# Patient Record
Sex: FEMALE | Race: BLACK OR AFRICAN AMERICAN | NOT HISPANIC OR LATINO
[De-identification: names, ages, dates, MRNs, and addresses within clinical notes are randomized per-mention and may not be internally consistent; named-entity substitution may affect disease eponyms.]

---

## 2018-04-23 PROBLEM — Z00.00 ENCOUNTER FOR PREVENTIVE HEALTH EXAMINATION: Status: ACTIVE | Noted: 2018-04-23

## 2018-04-24 ENCOUNTER — APPOINTMENT (OUTPATIENT)
Dept: CARDIOLOGY | Facility: CLINIC | Age: 43
End: 2018-04-24
Payer: MEDICAID

## 2018-04-24 DIAGNOSIS — Z82.49 FAMILY HISTORY OF ISCHEMIC HEART DISEASE AND OTHER DISEASES OF THE CIRCULATORY SYSTEM: ICD-10-CM

## 2018-04-24 PROCEDURE — 99205 OFFICE O/P NEW HI 60 MIN: CPT

## 2018-04-25 VITALS
WEIGHT: 240 LBS | SYSTOLIC BLOOD PRESSURE: 156 MMHG | HEIGHT: 59 IN | DIASTOLIC BLOOD PRESSURE: 90 MMHG | BODY MASS INDEX: 48.38 KG/M2 | HEART RATE: 90 BPM

## 2018-04-25 PROBLEM — Z82.49 FAMILY HISTORY OF PREMATURE CAD: Status: ACTIVE | Noted: 2018-04-25

## 2018-04-25 PROBLEM — Z82.49 FAMILY HISTORY OF HEART ATTACK: Status: ACTIVE | Noted: 2018-04-25

## 2018-04-25 RX ORDER — GARLIC 200 MG
TABLET ORAL
Refills: 0 | Status: ACTIVE | COMMUNITY

## 2018-04-25 RX ORDER — MULTIVIT-MIN/IRON/FOLIC ACID/K 18-600-40
CAPSULE ORAL
Refills: 0 | Status: ACTIVE | COMMUNITY

## 2018-04-25 RX ORDER — OXYCODONE HYDROCHLORIDE AND ACETAMINOPHEN 10; 325 MG/1; MG/1
10-325 TABLET ORAL
Refills: 0 | Status: ACTIVE | COMMUNITY

## 2018-04-25 RX ORDER — HYDROCHLOROTHIAZIDE 25 MG/1
25 TABLET ORAL
Refills: 0 | Status: ACTIVE | COMMUNITY

## 2018-04-25 RX ORDER — SIMVASTATIN 20 MG/1
20 TABLET, FILM COATED ORAL
Refills: 0 | Status: ACTIVE | COMMUNITY

## 2018-04-25 RX ORDER — LOSARTAN POTASSIUM 50 MG/1
50 TABLET, FILM COATED ORAL
Refills: 0 | Status: ACTIVE | COMMUNITY

## 2018-06-05 ENCOUNTER — APPOINTMENT (OUTPATIENT)
Dept: CARDIOLOGY | Facility: CLINIC | Age: 43
End: 2018-06-05

## 2018-06-19 ENCOUNTER — APPOINTMENT (OUTPATIENT)
Dept: CARDIOLOGY | Facility: CLINIC | Age: 43
End: 2018-06-19
Payer: MEDICAID

## 2018-06-19 VITALS
BODY MASS INDEX: 49.59 KG/M2 | HEART RATE: 80 BPM | HEIGHT: 59 IN | WEIGHT: 246 LBS | SYSTOLIC BLOOD PRESSURE: 120 MMHG | DIASTOLIC BLOOD PRESSURE: 87 MMHG

## 2018-06-19 DIAGNOSIS — R73.09 OTHER ABNORMAL GLUCOSE: ICD-10-CM

## 2018-06-19 DIAGNOSIS — I10 ESSENTIAL (PRIMARY) HYPERTENSION: ICD-10-CM

## 2018-06-19 DIAGNOSIS — E78.00 PURE HYPERCHOLESTEROLEMIA, UNSPECIFIED: ICD-10-CM

## 2018-06-19 DIAGNOSIS — E66.01 MORBID (SEVERE) OBESITY DUE TO EXCESS CALORIES: ICD-10-CM

## 2018-06-19 PROCEDURE — 99215 OFFICE O/P EST HI 40 MIN: CPT

## 2018-08-28 ENCOUNTER — APPOINTMENT (OUTPATIENT)
Dept: CARDIOLOGY | Facility: CLINIC | Age: 43
End: 2018-08-28

## 2018-09-21 ENCOUNTER — EMERGENCY (EMERGENCY)
Facility: HOSPITAL | Age: 43
LOS: 1 days | Discharge: HOME | End: 2018-09-21
Attending: EMERGENCY MEDICINE | Admitting: EMERGENCY MEDICINE
Payer: MEDICAID

## 2018-09-21 VITALS
SYSTOLIC BLOOD PRESSURE: 134 MMHG | DIASTOLIC BLOOD PRESSURE: 66 MMHG | TEMPERATURE: 98 F | HEART RATE: 86 BPM | OXYGEN SATURATION: 100 % | RESPIRATION RATE: 18 BRPM

## 2018-09-21 PROCEDURE — 99282 EMERGENCY DEPT VISIT SF MDM: CPT

## 2018-09-21 RX ORDER — IBUPROFEN 200 MG
600 TABLET ORAL ONCE
Qty: 0 | Refills: 0 | Status: COMPLETED | OUTPATIENT
Start: 2018-09-21 | End: 2018-09-21

## 2018-09-21 RX ORDER — ACETAMINOPHEN 500 MG
650 TABLET ORAL ONCE
Qty: 0 | Refills: 0 | Status: COMPLETED | OUTPATIENT
Start: 2018-09-21 | End: 2018-09-21

## 2018-09-21 RX ADMIN — Medication 600 MILLIGRAM(S): at 14:12

## 2018-09-21 RX ADMIN — Medication 650 MILLIGRAM(S): at 14:12
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
## 2018-09-21 NOTE — ED PROVIDER NOTE - CARE PLAN
Principal Discharge DX:	Back pain  Goal:	followup care and meds  Assessment and plan of treatment:	Take motrin 200 mg every 6-8 hours as needed for pain and/or tylenol 325 mg every 4 hours as needed for pain.  If worse pain, swelling, dizziness, headache, nausea, vomiting, or any worse symptoms return to the ER.  Follow up with your doctor within the next few days.  No heavy lifting.

## 2018-09-21 NOTE — ED PROVIDER NOTE - PLAN OF CARE
followup care and meds Take motrin 200 mg every 6-8 hours as needed for pain and/or tylenol 325 mg every 4 hours as needed for pain.  If worse pain, swelling, dizziness, headache, nausea, vomiting, or any worse symptoms return to the ER.  Follow up with your doctor within the next few days.  No heavy lifting.

## 2020-03-28 ENCOUNTER — INPATIENT (INPATIENT)
Facility: HOSPITAL | Age: 45
LOS: 72 days | Discharge: HOME HEALTH | End: 2020-06-09
Attending: INTERNAL MEDICINE | Admitting: INTERNAL MEDICINE
Payer: MEDICAID

## 2020-03-28 VITALS
HEART RATE: 111 BPM | RESPIRATION RATE: 20 BRPM | SYSTOLIC BLOOD PRESSURE: 135 MMHG | TEMPERATURE: 100 F | DIASTOLIC BLOOD PRESSURE: 86 MMHG | OXYGEN SATURATION: 93 %

## 2020-03-28 DIAGNOSIS — R73.03 PREDIABETES: ICD-10-CM

## 2020-03-28 DIAGNOSIS — E87.6 HYPOKALEMIA: ICD-10-CM

## 2020-03-28 DIAGNOSIS — Z98.891 HISTORY OF UTERINE SCAR FROM PREVIOUS SURGERY: Chronic | ICD-10-CM

## 2020-03-28 DIAGNOSIS — J96.01 ACUTE RESPIRATORY FAILURE WITH HYPOXIA: ICD-10-CM

## 2020-03-28 DIAGNOSIS — J18.9 PNEUMONIA, UNSPECIFIED ORGANISM: ICD-10-CM

## 2020-03-28 DIAGNOSIS — E87.1 HYPO-OSMOLALITY AND HYPONATREMIA: ICD-10-CM

## 2020-03-28 DIAGNOSIS — Z02.9 ENCOUNTER FOR ADMINISTRATIVE EXAMINATIONS, UNSPECIFIED: ICD-10-CM

## 2020-03-28 DIAGNOSIS — I10 ESSENTIAL (PRIMARY) HYPERTENSION: ICD-10-CM

## 2020-03-28 LAB
ALBUMIN SERPL ELPH-MCNC: 3.6 G/DL — SIGNIFICANT CHANGE UP (ref 3.3–5)
ALP SERPL-CCNC: 65 U/L — SIGNIFICANT CHANGE UP (ref 40–120)
ALT FLD-CCNC: 15 U/L — SIGNIFICANT CHANGE UP (ref 4–33)
ANION GAP SERPL CALC-SCNC: 15 MMO/L — HIGH (ref 7–14)
ANISOCYTOSIS BLD QL: SIGNIFICANT CHANGE UP
APTT BLD: 30 SEC — SIGNIFICANT CHANGE UP (ref 27.5–36.3)
AST SERPL-CCNC: 31 U/L — SIGNIFICANT CHANGE UP (ref 4–32)
BASE EXCESS BLDV CALC-SCNC: 2 MMOL/L — SIGNIFICANT CHANGE UP
BASOPHILS # BLD AUTO: 0.02 K/UL — SIGNIFICANT CHANGE UP (ref 0–0.2)
BASOPHILS NFR BLD AUTO: 0.1 % — SIGNIFICANT CHANGE UP (ref 0–2)
BASOPHILS NFR SPEC: 0 % — SIGNIFICANT CHANGE UP (ref 0–2)
BILIRUB SERPL-MCNC: 0.4 MG/DL — SIGNIFICANT CHANGE UP (ref 0.2–1.2)
BLASTS # FLD: 0 % — SIGNIFICANT CHANGE UP (ref 0–0)
BLOOD GAS VENOUS - CREATININE: 0.72 MG/DL — SIGNIFICANT CHANGE UP (ref 0.5–1.3)
BLOOD GAS VENOUS - FIO2: 21 — SIGNIFICANT CHANGE UP
BUN SERPL-MCNC: 5 MG/DL — LOW (ref 7–23)
BURR CELLS BLD QL SMEAR: SLIGHT — SIGNIFICANT CHANGE UP
CALCIUM SERPL-MCNC: 8.6 MG/DL — SIGNIFICANT CHANGE UP (ref 8.4–10.5)
CHLORIDE BLDV-SCNC: 102 MMOL/L — SIGNIFICANT CHANGE UP (ref 96–108)
CHLORIDE SERPL-SCNC: 96 MMOL/L — LOW (ref 98–107)
CO2 SERPL-SCNC: 23 MMOL/L — SIGNIFICANT CHANGE UP (ref 22–31)
CREAT SERPL-MCNC: 0.74 MG/DL — SIGNIFICANT CHANGE UP (ref 0.5–1.3)
EOSINOPHIL # BLD AUTO: 0.01 K/UL — SIGNIFICANT CHANGE UP (ref 0–0.5)
EOSINOPHIL NFR BLD AUTO: 0.1 % — SIGNIFICANT CHANGE UP (ref 0–6)
EOSINOPHIL NFR FLD: 0.9 % — SIGNIFICANT CHANGE UP (ref 0–6)
GAS PNL BLDV: 135 MMOL/L — LOW (ref 136–146)
GIANT PLATELETS BLD QL SMEAR: PRESENT — SIGNIFICANT CHANGE UP
GLUCOSE BLDV-MCNC: 129 MG/DL — HIGH (ref 70–99)
GLUCOSE SERPL-MCNC: 129 MG/DL — HIGH (ref 70–99)
HCG SERPL-ACNC: < 5 MIU/ML — SIGNIFICANT CHANGE UP
HCO3 BLDV-SCNC: 26 MMOL/L — SIGNIFICANT CHANGE UP (ref 20–27)
HCT VFR BLD CALC: 28.7 % — LOW (ref 34.5–45)
HCT VFR BLDV CALC: 26.9 % — LOW (ref 34.5–45)
HGB BLD-MCNC: 8.6 G/DL — LOW (ref 11.5–15.5)
HGB BLDV-MCNC: 8.7 G/DL — LOW (ref 11.5–15.5)
HYPOCHROMIA BLD QL: SIGNIFICANT CHANGE UP
IMM GRANULOCYTES NFR BLD AUTO: 1.4 % — SIGNIFICANT CHANGE UP (ref 0–1.5)
INR BLD: 1.29 — HIGH (ref 0.88–1.17)
LACTATE BLDV-MCNC: 2.3 MMOL/L — HIGH (ref 0.5–2)
LYMPHOCYTES # BLD AUTO: 1.58 K/UL — SIGNIFICANT CHANGE UP (ref 1–3.3)
LYMPHOCYTES # BLD AUTO: 11.5 % — LOW (ref 13–44)
LYMPHOCYTES NFR SPEC AUTO: 9.6 % — LOW (ref 13–44)
MAGNESIUM SERPL-MCNC: 1.8 MG/DL — SIGNIFICANT CHANGE UP (ref 1.6–2.6)
MCHC RBC-ENTMCNC: 21.5 PG — LOW (ref 27–34)
MCHC RBC-ENTMCNC: 30 % — LOW (ref 32–36)
MCV RBC AUTO: 71.8 FL — LOW (ref 80–100)
METAMYELOCYTES # FLD: 0 % — SIGNIFICANT CHANGE UP (ref 0–1)
MICROCYTES BLD QL: SIGNIFICANT CHANGE UP
MONOCYTES # BLD AUTO: 0.81 K/UL — SIGNIFICANT CHANGE UP (ref 0–0.9)
MONOCYTES NFR BLD AUTO: 5.9 % — SIGNIFICANT CHANGE UP (ref 2–14)
MONOCYTES NFR BLD: 2.6 % — SIGNIFICANT CHANGE UP (ref 2–9)
MYELOCYTES NFR BLD: 0 % — SIGNIFICANT CHANGE UP (ref 0–0)
NEUTROPHIL AB SER-ACNC: 86 % — HIGH (ref 43–77)
NEUTROPHILS # BLD AUTO: 11.12 K/UL — HIGH (ref 1.8–7.4)
NEUTROPHILS NFR BLD AUTO: 81 % — HIGH (ref 43–77)
NEUTS BAND # BLD: 0 % — SIGNIFICANT CHANGE UP (ref 0–6)
NRBC # FLD: 0.02 K/UL — SIGNIFICANT CHANGE UP (ref 0–0)
NT-PROBNP SERPL-SCNC: 74.31 PG/ML — SIGNIFICANT CHANGE UP
OTHER - HEMATOLOGY %: 0 — SIGNIFICANT CHANGE UP
PCO2 BLDV: 41 MMHG — SIGNIFICANT CHANGE UP (ref 41–51)
PH BLDV: 7.42 PH — SIGNIFICANT CHANGE UP (ref 7.32–7.43)
PHOSPHATE SERPL-MCNC: 2.7 MG/DL — SIGNIFICANT CHANGE UP (ref 2.5–4.5)
PLATELET # BLD AUTO: 404 K/UL — HIGH (ref 150–400)
PLATELET COUNT - ESTIMATE: NORMAL — SIGNIFICANT CHANGE UP
PMV BLD: 11 FL — SIGNIFICANT CHANGE UP (ref 7–13)
PO2 BLDV: 41 MMHG — HIGH (ref 35–40)
POIKILOCYTOSIS BLD QL AUTO: SLIGHT — SIGNIFICANT CHANGE UP
POTASSIUM BLDV-SCNC: 2.9 MMOL/L — CRITICAL LOW (ref 3.4–4.5)
POTASSIUM SERPL-MCNC: 3.2 MMOL/L — LOW (ref 3.5–5.3)
POTASSIUM SERPL-SCNC: 3.2 MMOL/L — LOW (ref 3.5–5.3)
PROMYELOCYTES # FLD: 0 % — SIGNIFICANT CHANGE UP (ref 0–0)
PROT SERPL-MCNC: 8.7 G/DL — HIGH (ref 6–8.3)
PROTHROM AB SERPL-ACNC: 14.8 SEC — HIGH (ref 9.8–13.1)
RBC # BLD: 4 M/UL — SIGNIFICANT CHANGE UP (ref 3.8–5.2)
RBC # FLD: 22.4 % — HIGH (ref 10.3–14.5)
REVIEW TO FOLLOW: YES — SIGNIFICANT CHANGE UP
SAO2 % BLDV: 68.8 % — SIGNIFICANT CHANGE UP (ref 60–85)
SARS-COV-2 RNA SPEC QL NAA+PROBE: DETECTED
SODIUM SERPL-SCNC: 134 MMOL/L — LOW (ref 135–145)
VARIANT LYMPHS # BLD: 0.9 % — SIGNIFICANT CHANGE UP
WBC # BLD: 13.73 K/UL — HIGH (ref 3.8–10.5)
WBC # FLD AUTO: 13.73 K/UL — HIGH (ref 3.8–10.5)

## 2020-03-28 PROCEDURE — 71045 X-RAY EXAM CHEST 1 VIEW: CPT | Mod: 26

## 2020-03-28 PROCEDURE — 99223 1ST HOSP IP/OBS HIGH 75: CPT

## 2020-03-28 RX ORDER — AZITHROMYCIN 500 MG/1
500 TABLET, FILM COATED ORAL ONCE
Refills: 0 | Status: COMPLETED | OUTPATIENT
Start: 2020-03-28 | End: 2020-03-28

## 2020-03-28 RX ORDER — POTASSIUM CHLORIDE 20 MEQ
40 PACKET (EA) ORAL ONCE
Refills: 0 | Status: COMPLETED | OUTPATIENT
Start: 2020-03-28 | End: 2020-03-28

## 2020-03-28 RX ORDER — SIMVASTATIN 20 MG/1
5 TABLET, FILM COATED ORAL AT BEDTIME
Refills: 0 | Status: DISCONTINUED | OUTPATIENT
Start: 2020-03-28 | End: 2020-06-09

## 2020-03-28 RX ORDER — ZINC SULFATE TAB 220 MG (50 MG ZINC EQUIVALENT) 220 (50 ZN) MG
220 TAB ORAL DAILY
Refills: 0 | Status: DISCONTINUED | OUTPATIENT
Start: 2020-03-28 | End: 2020-06-09

## 2020-03-28 RX ORDER — ASCORBIC ACID 60 MG
500 TABLET,CHEWABLE ORAL
Refills: 0 | Status: DISCONTINUED | OUTPATIENT
Start: 2020-03-28 | End: 2020-06-09

## 2020-03-28 RX ORDER — ACETAMINOPHEN 500 MG
975 TABLET ORAL ONCE
Refills: 0 | Status: COMPLETED | OUTPATIENT
Start: 2020-03-28 | End: 2020-03-28

## 2020-03-28 RX ORDER — LOSARTAN POTASSIUM 100 MG/1
50 TABLET, FILM COATED ORAL DAILY
Refills: 0 | Status: DISCONTINUED | OUTPATIENT
Start: 2020-03-28 | End: 2020-03-31

## 2020-03-28 RX ORDER — SODIUM CHLORIDE 9 MG/ML
1000 INJECTION INTRAMUSCULAR; INTRAVENOUS; SUBCUTANEOUS ONCE
Refills: 0 | Status: COMPLETED | OUTPATIENT
Start: 2020-03-28 | End: 2020-03-28

## 2020-03-28 RX ORDER — CEFTRIAXONE 500 MG/1
1000 INJECTION, POWDER, FOR SOLUTION INTRAMUSCULAR; INTRAVENOUS ONCE
Refills: 0 | Status: COMPLETED | OUTPATIENT
Start: 2020-03-28 | End: 2020-03-28

## 2020-03-28 RX ADMIN — Medication 40 MILLIEQUIVALENT(S): at 19:20

## 2020-03-28 RX ADMIN — SODIUM CHLORIDE 1000 MILLILITER(S): 9 INJECTION INTRAMUSCULAR; INTRAVENOUS; SUBCUTANEOUS at 19:21

## 2020-03-28 RX ADMIN — CEFTRIAXONE 100 MILLIGRAM(S): 500 INJECTION, POWDER, FOR SOLUTION INTRAMUSCULAR; INTRAVENOUS at 19:21

## 2020-03-28 RX ADMIN — Medication 975 MILLIGRAM(S): at 19:20

## 2020-03-28 RX ADMIN — AZITHROMYCIN 500 MILLIGRAM(S): 500 TABLET, FILM COATED ORAL at 19:21

## 2020-03-28 NOTE — ED PROVIDER NOTE - PMH
No pertinent past medical history HTN (hypertension)    No pertinent past medical history    Pre-diabetes

## 2020-03-28 NOTE — H&P ADULT - NSICDXFAMILYHX_GEN_ALL_CORE_FT
FAMILY HISTORY:  No pertinent family history in first degree relatives FAMILY HISTORY:  Family history of hypertension, ~ mother, sister  Family history of scleroderma, ~ mother ()

## 2020-03-28 NOTE — ED PROVIDER NOTE - ATTENDING CONTRIBUTION TO CARE
I performed a face-to-face evaluation of the patient and performed a history and physical examination. I agree with the history and physical examination.    SOB, ETIENNE, and non-productive cough. Hypoxic. Minimal LE edema (B). Concern for COVID PNA. CXR, labs, apply O2, admit.

## 2020-03-28 NOTE — H&P ADULT - PROBLEM SELECTOR PLAN 3
- potassium = 3.2  - s/p KCL 40 meq x one in the ED; will give additional 40 meq  - f/u repeat lab-work, including magnesium level

## 2020-03-28 NOTE — H&P ADULT - NSHPPHYSICALEXAM_GEN_ALL_CORE
PHYSICAL EXAMINATION:    APPEARANCE: Well groomed, adequately nourished.  NAD	  HEENT: Normal oral mucosa, PERRL, EOMI	  LYMPHATIC: No lymphadenopathy appreciated  CARDIOVASCULAR: (+) S1 S2.  No JVD.  No murmurs.  No edema  RESPIRATORY: Speaking without difficulty.  Hypoxia to 83% on RA (93% on 4L NC).  No wheezing, rhonchi, crackles appreciated  PSYCHIATRIC: A&O x 3.  Mood & affect appropriate to situation  GASTROINTESTINAL:  Soft, Non-tender, + BS	  SKIN: No rashes. No ecchymoses.  No cyanosis	  NEUROLOGICAL: Non-focal, A&Ox3, nonfocal, AVILES x 4 against gravity  EXTREMITIES: Normal range of motion.  No clubbing, cyanosis or edema  VASCULAR: Peripheral pulses palpable 2+ bilaterally

## 2020-03-28 NOTE — H&P ADULT - PROBLEM SELECTOR PLAN 1
- shortness of breath, ETIENNE  - O2 Sat = 83% on RA (93% to 97% on 4L NC)  - CXR w/ "B/L patchy opacities compatible with multifocal pneumonia..."  - COVID-19 PCR (+)  - s/p azithromycin and ceftriaxone in the ED; holding both for now  - continues on supplemental oxygen 4 liters via NC  - judicious fluid rehydration, in the setting of COVID-19  - HOB elevation  - albuterol PRN  - antitussive

## 2020-03-28 NOTE — H&P ADULT - NSICDXPASTMEDICALHX_GEN_ALL_CORE_FT
PAST MEDICAL HISTORY:  HTN (hypertension)     No pertinent past medical history     Pre-diabetes PAST MEDICAL HISTORY:  Dyslipidemia     HTN (hypertension)     Pre-diabetes
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
## 2020-03-28 NOTE — H&P ADULT - NSHPLABSRESULTS_GEN_ALL_CORE
Vital Signs Last 24 Hrs  T(C): 37.8 (28 Mar 2020 19:39), Max: 38.1 (28 Mar 2020 17:34)  T(F): 100 (28 Mar 2020 19:39), Max: 100.5 (28 Mar 2020 17:34)  HR: 101 (28 Mar 2020 19:39) (101 - 111)  BP: 151/94 (28 Mar 2020 19:39) (135/86 - 151/94)  BP(mean): --  RR: 22 (28 Mar 2020 19:39) (20 - 22)  SpO2: 96% (28 Mar 2020 19:39) (93% - 97%)    ===============================================================                          8.6    13.73 )-----------( 404      ( 28 Mar 2020 17:40 )             28.7     28 Mar 2020 17:40    134    |  96     |  5      ----------------------------<  129    3.2     |  23     |  0.74     Ca    8.6        28 Mar 2020 17:40  Phos  2.7       28 Mar 2020 17:40  Mg     1.8       28 Mar 2020 17:40    TPro  8.7    /  Alb  3.6    /  TBili  0.4    /  DBili  x      /  AST  31     /  ALT  15     /  AlkPhos  65     28 Mar 2020 17:40    LIVER FUNCTIONS - ( 28 Mar 2020 17:40 )  Alb: 3.6 g/dL / Pro: 8.7 g/dL / ALK PHOS: 65 u/L / ALT: 15 u/L / AST: 31 u/L / GGT: x           PT/INR - ( 28 Mar 2020 17:40 )   PT: 14.8 SEC;   INR: 1.29          PTT - ( 28 Mar 2020 17:40 )  PTT:30.0 SEC  CAPILLARY BLOOD GLUCOSE    ===============================================================      ===============================================================

## 2020-03-28 NOTE — H&P ADULT - NSHPSOCIALHISTORY_GEN_ALL_CORE
SOCIAL HISTORY:    Marital Status:  (  )    (  ) Single        (  )         (  )   Occupation:   Lives with:        (  ) alone        (  ) children    (  ) spouse           (  ) parents            (  ) other    No history of smoking  No history of alcohol abuse  No history of illegal drug use SOCIAL HISTORY:    Marital Status:  (  )    (  ) Single        (  )         (  )   Occupation:   Lives with:        (  ) alone        ( x ) children/daughter    (  ) spouse           ( x ) parents/father            (  ) other    No history of smoking  No history of alcohol abuse  No history of illegal drug use
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
## 2020-03-28 NOTE — ED PROVIDER NOTE - PROGRESS NOTE DETAILS
Angelito PGY3: on reassessment pt in NAD speaking and breathing comfortably satting high 90s on NC will admit for previous hypoxia in setting of sepsis with PNA likely 2/2 covid
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
## 2020-03-28 NOTE — ED PROVIDER NOTE - RESPIRATORY DISTRESS
no increased wob speaking and breathing comfortably no accessory mm use however hypoxic to 83 on RA -> 93% on 4LNC/no

## 2020-03-28 NOTE — H&P ADULT - NSHPREVIEWOFSYSTEMS_GEN_ALL_CORE
per ED and patient:    REVIEW OF SYSTEMS:    CONSTITUTIONAL: Sweating.  No fevers or chills.  Appetite improving since ED.  EYES/ENT: No visual changes.  No dysphagia  NECK: No pain or stiffness  RESPIRATORY: (+) cough, ETIENNE, shortness of breath.  No hemoptysis.  CARDIOVASCULAR: No chest pain or palpitation.  No lower extremity edema  GASTROINTESTINAL: Watery diarrhea.  No abdominal or epigastric pain. No nausea, vomiting or hematemesis.  No constipation. No melena or hematochezia.  GENITOURINARY: No dysuria, frequency or hematuria  MUSCULOSKELETAL: No joint pain, swelling, decreased ROM, erythema, warmth  NEUROLOGICAL: No numbness or weakness  PSYCHIATRY: No anxiety, or depression.  SKIN: No itching, burning, rashes, or lesions   All other review of systems is negative unless indicated above.

## 2020-03-28 NOTE — H&P ADULT - PROBLEM SELECTOR PLAN 7
- no acute issues presently  - on Losartan 50 mg and HCTZ 25 mg PTA; continuing w/ Losartan and holding HCTZ for now  - f/u w/ repeat measurements
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
## 2020-03-28 NOTE — H&P ADULT - PROBLEM SELECTOR PLAN 2
- CXR w/ "B/L patchy opacities compatible with multifocal pneumonia..."  - WBC = 13.73, Lactate = 2.3  - since COVID-19 resulted as positive, will not continue abx presently  - f/u pulse oximetry  - other supportive care as above - CXR w/ "B/L patchy opacities compatible with multifocal pneumonia..."  - WBC = 13.73, Lactate = 2.3  - since COVID-19 resulted as positive, will not continue abx presently  - started on Plaquenil  - f/u pulse oximetry  - other supportive care as above - CXR w/ "B/L patchy opacities compatible with multifocal pneumonia..."  - WBC = 13.73, Lactate = 2.3  - since COVID-19 resulted as positive, will not continue abx presently  - started on Plaquenil  - f/u pulse oximetry  - other supportive care as above  ----AM lab-work w/ Procalcitonin = 64.97; continuing azithromycin and ceftriaxone

## 2020-03-28 NOTE — H&P ADULT - PROBLEM SELECTOR PLAN 5
- Hgb = 8.6, MCV = 71.8, RDW = 22.4  - unclear etiology at present  - f/u anemia profile/iron studies in the AM

## 2020-03-29 DIAGNOSIS — D50.9 IRON DEFICIENCY ANEMIA, UNSPECIFIED: ICD-10-CM

## 2020-03-29 LAB
24R-OH-CALCIDIOL SERPL-MCNC: 5.6 NG/ML — LOW (ref 30–80)
ANION GAP SERPL CALC-SCNC: 15 MMO/L — HIGH (ref 7–14)
APTT BLD: 28.4 SEC — SIGNIFICANT CHANGE UP (ref 27.5–36.3)
BASOPHILS # BLD AUTO: 0.02 K/UL — SIGNIFICANT CHANGE UP (ref 0–0.2)
BASOPHILS NFR BLD AUTO: 0.2 % — SIGNIFICANT CHANGE UP (ref 0–2)
BUN SERPL-MCNC: 5 MG/DL — LOW (ref 7–23)
CALCIUM SERPL-MCNC: 8 MG/DL — LOW (ref 8.4–10.5)
CHLORIDE SERPL-SCNC: 94 MMOL/L — LOW (ref 98–107)
CO2 SERPL-SCNC: 23 MMOL/L — SIGNIFICANT CHANGE UP (ref 22–31)
CREAT SERPL-MCNC: 0.62 MG/DL — SIGNIFICANT CHANGE UP (ref 0.5–1.3)
EOSINOPHIL # BLD AUTO: 0.02 K/UL — SIGNIFICANT CHANGE UP (ref 0–0.5)
EOSINOPHIL NFR BLD AUTO: 0.2 % — SIGNIFICANT CHANGE UP (ref 0–6)
FERRITIN SERPL-MCNC: 182.1 NG/ML — HIGH (ref 15–150)
GLUCOSE SERPL-MCNC: 102 MG/DL — HIGH (ref 70–99)
HCT VFR BLD CALC: 25.4 % — LOW (ref 34.5–45)
HGB BLD-MCNC: 7.8 G/DL — LOW (ref 11.5–15.5)
IMM GRANULOCYTES NFR BLD AUTO: 2.5 % — HIGH (ref 0–1.5)
INR BLD: 1.26 — HIGH (ref 0.88–1.17)
IRON SATN MFR SERPL: 11 UG/DL — LOW (ref 30–160)
IRON SATN MFR SERPL: 237 UG/DL — SIGNIFICANT CHANGE UP (ref 140–530)
LACTATE SERPL-SCNC: 1.2 MMOL/L — SIGNIFICANT CHANGE UP (ref 0.5–2)
LYMPHOCYTES # BLD AUTO: 1.68 K/UL — SIGNIFICANT CHANGE UP (ref 1–3.3)
LYMPHOCYTES # BLD AUTO: 15.2 % — SIGNIFICANT CHANGE UP (ref 13–44)
MAGNESIUM SERPL-MCNC: 1.8 MG/DL — SIGNIFICANT CHANGE UP (ref 1.6–2.6)
MCHC RBC-ENTMCNC: 22.2 PG — LOW (ref 27–34)
MCHC RBC-ENTMCNC: 30.7 % — LOW (ref 32–36)
MCV RBC AUTO: 72.2 FL — LOW (ref 80–100)
MONOCYTES # BLD AUTO: 0.84 K/UL — SIGNIFICANT CHANGE UP (ref 0–0.9)
MONOCYTES NFR BLD AUTO: 7.6 % — SIGNIFICANT CHANGE UP (ref 2–14)
NEUTROPHILS # BLD AUTO: 8.24 K/UL — HIGH (ref 1.8–7.4)
NEUTROPHILS NFR BLD AUTO: 74.3 % — SIGNIFICANT CHANGE UP (ref 43–77)
NRBC # FLD: 0.02 K/UL — SIGNIFICANT CHANGE UP (ref 0–0)
PHOSPHATE SERPL-MCNC: 2.3 MG/DL — LOW (ref 2.5–4.5)
PLATELET # BLD AUTO: 281 K/UL — SIGNIFICANT CHANGE UP (ref 150–400)
PMV BLD: 11.2 FL — SIGNIFICANT CHANGE UP (ref 7–13)
POTASSIUM SERPL-MCNC: 3.2 MMOL/L — LOW (ref 3.5–5.3)
POTASSIUM SERPL-SCNC: 3.2 MMOL/L — LOW (ref 3.5–5.3)
PROCALCITONIN SERPL-MCNC: 64.97 NG/ML — HIGH (ref 0.02–0.1)
PROTHROM AB SERPL-ACNC: 14.5 SEC — HIGH (ref 9.8–13.1)
RBC # BLD: 3.52 M/UL — LOW (ref 3.8–5.2)
RBC # FLD: 22.2 % — HIGH (ref 10.3–14.5)
RETICS #: 16 K/UL — LOW (ref 25–125)
RETICS/RBC NFR: 0.5 % — SIGNIFICANT CHANGE UP (ref 0.5–2.5)
SODIUM SERPL-SCNC: 132 MMOL/L — LOW (ref 135–145)
TSH SERPL-MCNC: 1.08 UIU/ML — SIGNIFICANT CHANGE UP (ref 0.27–4.2)
UIBC SERPL-MCNC: 225.5 UG/DL — SIGNIFICANT CHANGE UP (ref 110–370)
WBC # BLD: 11.08 K/UL — HIGH (ref 3.8–10.5)
WBC # FLD AUTO: 11.08 K/UL — HIGH (ref 3.8–10.5)

## 2020-03-29 RX ORDER — AZITHROMYCIN 500 MG/1
500 TABLET, FILM COATED ORAL DAILY
Refills: 0 | Status: DISCONTINUED | OUTPATIENT
Start: 2020-03-29 | End: 2020-03-29

## 2020-03-29 RX ORDER — POTASSIUM CHLORIDE 20 MEQ
40 PACKET (EA) ORAL ONCE
Refills: 0 | Status: COMPLETED | OUTPATIENT
Start: 2020-03-29 | End: 2020-03-29

## 2020-03-29 RX ORDER — CEFTRIAXONE 500 MG/1
1000 INJECTION, POWDER, FOR SOLUTION INTRAMUSCULAR; INTRAVENOUS ONCE
Refills: 0 | Status: COMPLETED | OUTPATIENT
Start: 2020-03-29 | End: 2020-03-29

## 2020-03-29 RX ORDER — CEFTRIAXONE 500 MG/1
1000 INJECTION, POWDER, FOR SOLUTION INTRAMUSCULAR; INTRAVENOUS EVERY 24 HOURS
Refills: 0 | Status: DISCONTINUED | OUTPATIENT
Start: 2020-03-30 | End: 2020-04-01

## 2020-03-29 RX ORDER — ACETAMINOPHEN 500 MG
650 TABLET ORAL EVERY 6 HOURS
Refills: 0 | Status: DISCONTINUED | OUTPATIENT
Start: 2020-03-29 | End: 2020-06-09

## 2020-03-29 RX ORDER — INFLUENZA VIRUS VACCINE 15; 15; 15; 15 UG/.5ML; UG/.5ML; UG/.5ML; UG/.5ML
0.5 SUSPENSION INTRAMUSCULAR ONCE
Refills: 0 | Status: DISCONTINUED | OUTPATIENT
Start: 2020-03-29 | End: 2020-06-09

## 2020-03-29 RX ORDER — HYDROXYCHLOROQUINE SULFATE 200 MG
200 TABLET ORAL EVERY 12 HOURS
Refills: 0 | Status: COMPLETED | OUTPATIENT
Start: 2020-03-30 | End: 2020-04-02

## 2020-03-29 RX ORDER — CEFTRIAXONE 500 MG/1
INJECTION, POWDER, FOR SOLUTION INTRAMUSCULAR; INTRAVENOUS
Refills: 0 | Status: DISCONTINUED | OUTPATIENT
Start: 2020-03-30 | End: 2020-04-01

## 2020-03-29 RX ORDER — AZITHROMYCIN 500 MG/1
500 TABLET, FILM COATED ORAL DAILY
Refills: 0 | Status: DISCONTINUED | OUTPATIENT
Start: 2020-03-29 | End: 2020-04-01

## 2020-03-29 RX ORDER — HYDROXYCHLOROQUINE SULFATE 200 MG
400 TABLET ORAL EVERY 12 HOURS
Refills: 0 | Status: COMPLETED | OUTPATIENT
Start: 2020-03-29 | End: 2020-03-30

## 2020-03-29 RX ORDER — CEFTRIAXONE 500 MG/1
1000 INJECTION, POWDER, FOR SOLUTION INTRAMUSCULAR; INTRAVENOUS EVERY 24 HOURS
Refills: 0 | Status: DISCONTINUED | OUTPATIENT
Start: 2020-03-29 | End: 2020-03-29

## 2020-03-29 RX ORDER — ALBUTEROL 90 UG/1
2 AEROSOL, METERED ORAL EVERY 6 HOURS
Refills: 0 | Status: DISCONTINUED | OUTPATIENT
Start: 2020-03-29 | End: 2020-06-09

## 2020-03-29 RX ORDER — SODIUM,POTASSIUM PHOSPHATES 278-250MG
1 POWDER IN PACKET (EA) ORAL ONCE
Refills: 0 | Status: COMPLETED | OUTPATIENT
Start: 2020-03-29 | End: 2020-03-30

## 2020-03-29 RX ORDER — HYDROXYCHLOROQUINE SULFATE 200 MG
TABLET ORAL
Refills: 0 | Status: COMPLETED | OUTPATIENT
Start: 2020-03-29 | End: 2020-04-03

## 2020-03-29 RX ADMIN — Medication 650 MILLIGRAM(S): at 06:08

## 2020-03-29 RX ADMIN — Medication 650 MILLIGRAM(S): at 06:22

## 2020-03-29 RX ADMIN — Medication 200 MILLIGRAM(S): at 14:14

## 2020-03-29 RX ADMIN — Medication 400 MILLIGRAM(S): at 14:08

## 2020-03-29 RX ADMIN — Medication 200 MILLIGRAM(S): at 00:57

## 2020-03-29 RX ADMIN — Medication 40 MILLIEQUIVALENT(S): at 14:07

## 2020-03-29 RX ADMIN — ZINC SULFATE TAB 220 MG (50 MG ZINC EQUIVALENT) 220 MILLIGRAM(S): 220 (50 ZN) TAB at 14:08

## 2020-03-29 RX ADMIN — Medication 650 MILLIGRAM(S): at 00:57

## 2020-03-29 RX ADMIN — Medication 500 MILLIGRAM(S): at 14:07

## 2020-03-29 RX ADMIN — LOSARTAN POTASSIUM 50 MILLIGRAM(S): 100 TABLET, FILM COATED ORAL at 06:08

## 2020-03-29 RX ADMIN — Medication 650 MILLIGRAM(S): at 22:58

## 2020-03-29 RX ADMIN — Medication 650 MILLIGRAM(S): at 01:08

## 2020-03-29 RX ADMIN — Medication 650 MILLIGRAM(S): at 14:14

## 2020-03-29 RX ADMIN — Medication 1 TABLET(S): at 14:08

## 2020-03-30 DIAGNOSIS — J12.89 OTHER VIRAL PNEUMONIA: ICD-10-CM

## 2020-03-30 DIAGNOSIS — Z29.9 ENCOUNTER FOR PROPHYLACTIC MEASURES, UNSPECIFIED: ICD-10-CM

## 2020-03-30 LAB
ANION GAP SERPL CALC-SCNC: 14 MMO/L — SIGNIFICANT CHANGE UP (ref 7–14)
BASE EXCESS BLDA CALC-SCNC: 2.3 MMOL/L — SIGNIFICANT CHANGE UP
BUN SERPL-MCNC: 5 MG/DL — LOW (ref 7–23)
CALCIUM SERPL-MCNC: 8.2 MG/DL — LOW (ref 8.4–10.5)
CHLORIDE SERPL-SCNC: 92 MMOL/L — LOW (ref 98–107)
CO2 SERPL-SCNC: 23 MMOL/L — SIGNIFICANT CHANGE UP (ref 22–31)
CREAT SERPL-MCNC: 0.58 MG/DL — SIGNIFICANT CHANGE UP (ref 0.5–1.3)
GLUCOSE BLDA-MCNC: 129 MG/DL — HIGH (ref 70–99)
GLUCOSE BLDC GLUCOMTR-MCNC: 134 MG/DL — HIGH (ref 70–99)
GLUCOSE SERPL-MCNC: 96 MG/DL — SIGNIFICANT CHANGE UP (ref 70–99)
HBA1C BLD-MCNC: 5.9 % — HIGH (ref 4–5.6)
HCO3 BLDA-SCNC: 26 MMOL/L — SIGNIFICANT CHANGE UP (ref 22–26)
HCT VFR BLD CALC: 26.9 % — LOW (ref 34.5–45)
HCT VFR BLDA CALC: 25.2 % — LOW (ref 34.5–46.5)
HGB BLD-MCNC: 8 G/DL — LOW (ref 11.5–15.5)
HGB BLDA-MCNC: 8.1 G/DL — LOW (ref 11.5–15.5)
MAGNESIUM SERPL-MCNC: 2 MG/DL — SIGNIFICANT CHANGE UP (ref 1.6–2.6)
MCHC RBC-ENTMCNC: 21.6 PG — LOW (ref 27–34)
MCHC RBC-ENTMCNC: 29.7 % — LOW (ref 32–36)
MCV RBC AUTO: 72.5 FL — LOW (ref 80–100)
NRBC # FLD: 0.03 K/UL — SIGNIFICANT CHANGE UP (ref 0–0)
PCO2 BLDA: 42 MMHG — SIGNIFICANT CHANGE UP (ref 32–48)
PH BLDA: 7.42 PH — SIGNIFICANT CHANGE UP (ref 7.35–7.45)
PHOSPHATE SERPL-MCNC: 3.5 MG/DL — SIGNIFICANT CHANGE UP (ref 2.5–4.5)
PLATELET # BLD AUTO: 342 K/UL — SIGNIFICANT CHANGE UP (ref 150–400)
PMV BLD: 10.6 FL — SIGNIFICANT CHANGE UP (ref 7–13)
PO2 BLDA: 42 MMHG — CRITICAL LOW (ref 83–108)
POTASSIUM BLDA-SCNC: 3.3 MMOL/L — LOW (ref 3.4–4.5)
POTASSIUM SERPL-MCNC: 3.4 MMOL/L — LOW (ref 3.5–5.3)
POTASSIUM SERPL-SCNC: 3.4 MMOL/L — LOW (ref 3.5–5.3)
RBC # BLD: 3.71 M/UL — LOW (ref 3.8–5.2)
RBC # FLD: 22.8 % — HIGH (ref 10.3–14.5)
SAO2 % BLDA: 70.3 % — LOW (ref 95–99)
SODIUM BLDA-SCNC: 133 MMOL/L — LOW (ref 136–146)
SODIUM SERPL-SCNC: 129 MMOL/L — LOW (ref 135–145)
WBC # BLD: 13.62 K/UL — HIGH (ref 3.8–10.5)
WBC # FLD AUTO: 13.62 K/UL — HIGH (ref 3.8–10.5)

## 2020-03-30 PROCEDURE — 99233 SBSQ HOSP IP/OBS HIGH 50: CPT | Mod: GC,25

## 2020-03-30 PROCEDURE — 99233 SBSQ HOSP IP/OBS HIGH 50: CPT

## 2020-03-30 PROCEDURE — 93010 ELECTROCARDIOGRAM REPORT: CPT

## 2020-03-30 RX ORDER — FUROSEMIDE 40 MG
20 TABLET ORAL ONCE
Refills: 0 | Status: COMPLETED | OUTPATIENT
Start: 2020-03-30 | End: 2020-03-30

## 2020-03-30 RX ORDER — LOPERAMIDE HCL 2 MG
2 TABLET ORAL ONCE
Refills: 0 | Status: DISCONTINUED | OUTPATIENT
Start: 2020-03-30 | End: 2020-03-30

## 2020-03-30 RX ORDER — POTASSIUM CHLORIDE 20 MEQ
40 PACKET (EA) ORAL ONCE
Refills: 0 | Status: COMPLETED | OUTPATIENT
Start: 2020-03-30 | End: 2020-03-30

## 2020-03-30 RX ORDER — ENOXAPARIN SODIUM 100 MG/ML
40 INJECTION SUBCUTANEOUS DAILY
Refills: 0 | Status: DISCONTINUED | OUTPATIENT
Start: 2020-03-30 | End: 2020-04-01

## 2020-03-30 RX ADMIN — Medication 200 MILLIGRAM(S): at 00:35

## 2020-03-30 RX ADMIN — Medication 200 MILLIGRAM(S): at 12:20

## 2020-03-30 RX ADMIN — Medication 500 MILLIGRAM(S): at 00:35

## 2020-03-30 RX ADMIN — Medication 650 MILLIGRAM(S): at 00:00

## 2020-03-30 RX ADMIN — Medication 20 MILLIGRAM(S): at 18:23

## 2020-03-30 RX ADMIN — CEFTRIAXONE 100 MILLIGRAM(S): 500 INJECTION, POWDER, FOR SOLUTION INTRAMUSCULAR; INTRAVENOUS at 01:00

## 2020-03-30 RX ADMIN — Medication 40 MILLIEQUIVALENT(S): at 12:19

## 2020-03-30 RX ADMIN — AZITHROMYCIN 500 MILLIGRAM(S): 500 TABLET, FILM COATED ORAL at 13:31

## 2020-03-30 RX ADMIN — Medication 1 PACKET(S): at 00:35

## 2020-03-30 RX ADMIN — LOSARTAN POTASSIUM 50 MILLIGRAM(S): 100 TABLET, FILM COATED ORAL at 06:51

## 2020-03-30 RX ADMIN — SIMVASTATIN 5 MILLIGRAM(S): 20 TABLET, FILM COATED ORAL at 01:00

## 2020-03-30 RX ADMIN — Medication 1 TABLET(S): at 12:20

## 2020-03-30 RX ADMIN — ENOXAPARIN SODIUM 40 MILLIGRAM(S): 100 INJECTION SUBCUTANEOUS at 18:24

## 2020-03-30 RX ADMIN — CEFTRIAXONE 100 MILLIGRAM(S): 500 INJECTION, POWDER, FOR SOLUTION INTRAMUSCULAR; INTRAVENOUS at 13:33

## 2020-03-30 RX ADMIN — Medication 50 MILLIGRAM(S): at 18:23

## 2020-03-30 RX ADMIN — Medication 500 MILLIGRAM(S): at 12:21

## 2020-03-30 RX ADMIN — Medication 400 MILLIGRAM(S): at 01:00

## 2020-03-30 RX ADMIN — ZINC SULFATE TAB 220 MG (50 MG ZINC EQUIVALENT) 220 MILLIGRAM(S): 220 (50 ZN) TAB at 12:20

## 2020-03-30 RX ADMIN — Medication 500 MILLIGRAM(S): at 06:51

## 2020-03-30 NOTE — PROVIDER CONTACT NOTE (OTHER) - SITUATION
Patient O2 saturation decreased to 80% when transferring from stretcher to bed. Patient had temp of 100.4 and HR of 113.

## 2020-03-30 NOTE — PROGRESS NOTE ADULT - PROBLEM SELECTOR PLAN 3
Secondary to COVID-19 but given marked elevation in Procalcitonin, will c/w IV ceftriaxone and azithro

## 2020-03-30 NOTE — PROVIDER CONTACT NOTE (OTHER) - ASSESSMENT
Patient O2 saturation decreased to 80% when transferring from stretcher to bed. Patient had temp of 100.4 and HR of 113. Nonrebreather put on at 100%, patient O2 sat 93

## 2020-03-30 NOTE — PROGRESS NOTE ADULT - PROBLEM SELECTOR PLAN 1
Requiring non-rebreather  Secondary to PNA due to COVID-19  monitor O2 sats closely  d/w pulm, appreciate recs  Lasix 20 IV x 1  ABG  Albuterol MDI

## 2020-03-30 NOTE — PROVIDER CONTACT NOTE (OTHER) - SITUATION
Pt's vitals are 138/81 BP, 131 HR, RR is 39, O2 sat on 100% non-rebreather and 15L Nasal canula is 88 (can go up to 91% but comes back down)

## 2020-03-30 NOTE — PROGRESS NOTE ADULT - PROBLEM SELECTOR PLAN 8
d/w pulm  d/w ACP  plan d/w pt  VTE PPx with lovenox d/w pulm  d/w ACP  plan d/w pt  Called patient's father at patient's request  VTE PPx with lovenox
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
## 2020-03-30 NOTE — PROGRESS NOTE ADULT - PROBLEM SELECTOR PLAN 2
B/L patchy opacities compatible with multifocal pneumonia  QTc is 420 - trend  Trend inflammatory markers  c/w plaquenil, vitamin C  Adding solumedrol given requires NRB  ID contacted to evaluate for clinical trials  pulm c/s appreciated
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
## 2020-03-30 NOTE — PROGRESS NOTE ADULT - ATTENDING COMMENTS
Miguelito Quintana MD.    Attending Physician, [facility].   x [phone]       CPT 11426  Diagnoses  Acute hypoxic respiratory failure  Pneumonia due to novel coronovirus  multifocal pneumonia  Hyponatremia  Hypokalemia  Hypertension

## 2020-03-30 NOTE — PROGRESS NOTE ADULT - SUBJECTIVE AND OBJECTIVE BOX
Patient is a 44y old  Female who presents with a chief complaint of Acute Hypoxemic Respiratory Failure and Pneumonia (30 Mar 2020 17:04)      SUBJECTIVE / OVERNIGHT EVENTS:  Patient reports persistent dyspnea. reports dry cough which is slightly improved. Still spiking fevers.  Patient describes fatigue and episodes of PND.    MEDICATIONS  (STANDING):  ascorbic acid 500 milliGRAM(s) Oral two times a day  azithromycin   Tablet 500 milliGRAM(s) Oral daily  cefTRIAXone   IVPB      cefTRIAXone   IVPB 1000 milliGRAM(s) IV Intermittent every 24 hours  enoxaparin Injectable 40 milliGRAM(s) SubCutaneous daily  hydroxychloroquine 200 milliGRAM(s) Oral every 12 hours  hydroxychloroquine   Oral   influenza   Vaccine 0.5 milliLiter(s) IntraMuscular once  losartan 50 milliGRAM(s) Oral daily  methylPREDNISolone sodium succinate Injectable 50 milliGRAM(s) IV Push two times a day  multivitamin 1 Tablet(s) Oral daily  simvastatin 5 milliGRAM(s) Oral at bedtime  zinc sulfate 220 milliGRAM(s) Oral daily    MEDICATIONS  (PRN):  acetaminophen   Tablet .. 650 milliGRAM(s) Oral every 6 hours PRN Temp greater or equal to 38C (100.4F), Mild Pain (1 - 3)  ALBUTerol    90 MICROgram(s) HFA Inhaler 2 Puff(s) Inhalation every 6 hours PRN Shortness of Breath and/or Wheezing  guaiFENesin   Syrup  (Sugar-Free) 200 milliGRAM(s) Oral every 6 hours PRN Cough      Vital Signs Last 24 Hrs  T(C): 37.9 (30 Mar 2020 12:16), Max: 38 (29 Mar 2020 22:27)  T(F): 100.3 (30 Mar 2020 12:16), Max: 100.4 (29 Mar 2020 22:27)  HR: 109 (30 Mar 2020 12:16) (97 - 114)  BP: 147/99 (30 Mar 2020 12:16) (123/80 - 155/108)  BP(mean): --  RR: 21 (30 Mar 2020 12:16) (21 - 22)  SpO2: 93% (30 Mar 2020 12:16) (91% - 99%)      PHYSICAL EXAM:  GENERAL: Uncomfortable, Tachypnic (24-28/min), well-developed  HEENT: MMM, conjunctiva and sclera clear  NECK: Supple, No JVD, obese  CHEST/LUNG: Diminished BS throughout with scattered rales. No wheeze  HEART: Regular rate and rhythm; No murmurs, rubs, or gallops  ABDOMEN: Soft, Nontender, Nondistended; Bowel sounds present  EXTREMITIES:  2+ Peripheral Pulses, No clubbing, cyanosis, or edema  PSYCH: AAOx3  NEUROLOGY: non-focal  SKIN: No rashes or lesions    LABS:                        8.0    13.62 )-----------( 342      ( 30 Mar 2020 04:50 )             26.9     03-30    129<L>  |  92<L>  |  5<L>  ----------------------------<  96  3.4<L>   |  23  |  0.58    Ca    8.2<L>      30 Mar 2020 04:50  Phos  3.5     03-30  Mg     2.0     03-30    TPro  8.7<H>  /  Alb  3.6  /  TBili  0.4  /  DBili  x   /  AST  31  /  ALT  15  /  AlkPhos  65  03-28    PT/INR - ( 29 Mar 2020 06:45 )   PT: 14.5 SEC;   INR: 1.26          PTT - ( 29 Mar 2020 06:45 )  PTT:28.4 SEC

## 2020-03-30 NOTE — CHART NOTE - NSCHARTNOTEFT_GEN_A_CORE
44F hx HTN, HLD, preDM, who p/w SOB. States that she was having fevers, productive cough, which developed into SOB. Came to ED on 3/28 for SOB and admitted to the floors. O2 Sat 93% but had worsening hypoxia, requiring NRB. Given ceftriaxone, azithromycin, and started on plaquenil and steroids for COVID.     MICU consult called for hypoxia  Patient seen and  assessed; partially proned with O2 sat 90-91%. Patient position adjusted/ fully proned with O2 Sat improved to 94-96% on NRB. Patient with RR 20-26, tachycardic to the 120s. Patient found to have a fever earlier.       Recs: Continue with proned position and NRB  Recheck temp and give 1g Tylenol if still febrile    Patient not requiring intubation/ not ICU candidate at this time, please reconsult as indicated   Discussed with ICU attending Dr. Cuevas

## 2020-03-30 NOTE — PROVIDER CONTACT NOTE (OTHER) - ASSESSMENT
Pt's vitals are 138/81 BP, 131 HR, RR is 39, O2 sat on 100% non-rebreather and 15L Nasal canula is 88 (can go up to 91% but comes back down) Pt denies dizziness or lightheadedness. Pt denies feeling short of breath but states that she wants to "breathe better" Pt's vitals are 138/81 BP, 131 HR, RR is 39, O2 sat on 100% non-rebreather and 15L Nasal canula is 88 (can go up to 91%-94% but comes back down) Pt denies dizziness or lightheadedness. Pt denies feeling short of breath but states that she wants to "breathe better". Pt is currently belly breathing.

## 2020-03-31 LAB
ANION GAP SERPL CALC-SCNC: 16 MMO/L — HIGH (ref 7–14)
BASOPHILS # BLD AUTO: 0.04 K/UL — SIGNIFICANT CHANGE UP (ref 0–0.2)
BASOPHILS NFR BLD AUTO: 0.2 % — SIGNIFICANT CHANGE UP (ref 0–2)
BUN SERPL-MCNC: 9 MG/DL — SIGNIFICANT CHANGE UP (ref 7–23)
CALCIUM SERPL-MCNC: 9.1 MG/DL — SIGNIFICANT CHANGE UP (ref 8.4–10.5)
CHLORIDE SERPL-SCNC: 94 MMOL/L — LOW (ref 98–107)
CO2 SERPL-SCNC: 26 MMOL/L — SIGNIFICANT CHANGE UP (ref 22–31)
CREAT SERPL-MCNC: 0.63 MG/DL — SIGNIFICANT CHANGE UP (ref 0.5–1.3)
CRP SERPL-MCNC: 351 MG/L — HIGH
D DIMER BLD IA.RAPID-MCNC: 5363 NG/ML — SIGNIFICANT CHANGE UP
EOSINOPHIL # BLD AUTO: 0.01 K/UL — SIGNIFICANT CHANGE UP (ref 0–0.5)
EOSINOPHIL NFR BLD AUTO: 0.1 % — SIGNIFICANT CHANGE UP (ref 0–6)
ERYTHROCYTE [SEDIMENTATION RATE] IN BLOOD: 67 MM/HR — HIGH (ref 4–25)
FERRITIN SERPL-MCNC: 246.5 NG/ML — HIGH (ref 15–150)
GLUCOSE SERPL-MCNC: 162 MG/DL — HIGH (ref 70–99)
HCT VFR BLD CALC: 30.2 % — LOW (ref 34.5–45)
HGB BLD-MCNC: 9.1 G/DL — LOW (ref 11.5–15.5)
IMM GRANULOCYTES NFR BLD AUTO: 2.6 % — HIGH (ref 0–1.5)
LDH SERPL L TO P-CCNC: 849 U/L — HIGH (ref 135–225)
LYMPHOCYTES # BLD AUTO: 1.89 K/UL — SIGNIFICANT CHANGE UP (ref 1–3.3)
LYMPHOCYTES # BLD AUTO: 9.7 % — LOW (ref 13–44)
MAGNESIUM SERPL-MCNC: 2.2 MG/DL — SIGNIFICANT CHANGE UP (ref 1.6–2.6)
MCHC RBC-ENTMCNC: 21.6 PG — LOW (ref 27–34)
MCHC RBC-ENTMCNC: 30.1 % — LOW (ref 32–36)
MCV RBC AUTO: 71.7 FL — LOW (ref 80–100)
MONOCYTES # BLD AUTO: 0.93 K/UL — HIGH (ref 0–0.9)
MONOCYTES NFR BLD AUTO: 4.8 % — SIGNIFICANT CHANGE UP (ref 2–14)
NEUTROPHILS # BLD AUTO: 16.07 K/UL — HIGH (ref 1.8–7.4)
NEUTROPHILS NFR BLD AUTO: 82.6 % — HIGH (ref 43–77)
NRBC # FLD: 0.07 K/UL — SIGNIFICANT CHANGE UP (ref 0–0)
PHOSPHATE SERPL-MCNC: 2.6 MG/DL — SIGNIFICANT CHANGE UP (ref 2.5–4.5)
PLATELET # BLD AUTO: 254 K/UL — SIGNIFICANT CHANGE UP (ref 150–400)
PMV BLD: SIGNIFICANT CHANGE UP FL (ref 7–13)
POTASSIUM SERPL-MCNC: 3.4 MMOL/L — LOW (ref 3.5–5.3)
POTASSIUM SERPL-SCNC: 3.4 MMOL/L — LOW (ref 3.5–5.3)
PROCALCITONIN SERPL-MCNC: 15.37 NG/ML — HIGH (ref 0.02–0.1)
RBC # BLD: 4.21 M/UL — SIGNIFICANT CHANGE UP (ref 3.8–5.2)
RBC # FLD: 22.3 % — HIGH (ref 10.3–14.5)
SODIUM SERPL-SCNC: 136 MMOL/L — SIGNIFICANT CHANGE UP (ref 135–145)
WBC # BLD: 19.44 K/UL — HIGH (ref 3.8–10.5)
WBC # FLD AUTO: 19.44 K/UL — HIGH (ref 3.8–10.5)

## 2020-03-31 PROCEDURE — 99233 SBSQ HOSP IP/OBS HIGH 50: CPT

## 2020-03-31 PROCEDURE — 99233 SBSQ HOSP IP/OBS HIGH 50: CPT | Mod: GC

## 2020-03-31 RX ORDER — TOCILIZUMAB 20 MG/ML
400 INJECTION, SOLUTION, CONCENTRATE INTRAVENOUS ONCE
Refills: 0 | Status: COMPLETED | OUTPATIENT
Start: 2020-03-31 | End: 2020-03-31

## 2020-03-31 RX ORDER — POTASSIUM CHLORIDE 20 MEQ
40 PACKET (EA) ORAL ONCE
Refills: 0 | Status: COMPLETED | OUTPATIENT
Start: 2020-03-31 | End: 2020-03-31

## 2020-03-31 RX ORDER — POTASSIUM CHLORIDE 20 MEQ
40 PACKET (EA) ORAL ONCE
Refills: 0 | Status: DISCONTINUED | OUTPATIENT
Start: 2020-03-31 | End: 2020-03-31

## 2020-03-31 RX ADMIN — ENOXAPARIN SODIUM 40 MILLIGRAM(S): 100 INJECTION SUBCUTANEOUS at 14:28

## 2020-03-31 RX ADMIN — Medication 40 MILLIEQUIVALENT(S): at 10:05

## 2020-03-31 RX ADMIN — Medication 1 TABLET(S): at 13:33

## 2020-03-31 RX ADMIN — Medication 500 MILLIGRAM(S): at 18:03

## 2020-03-31 RX ADMIN — Medication 200 MILLIGRAM(S): at 14:28

## 2020-03-31 RX ADMIN — ZINC SULFATE TAB 220 MG (50 MG ZINC EQUIVALENT) 220 MILLIGRAM(S): 220 (50 ZN) TAB at 13:33

## 2020-03-31 RX ADMIN — Medication 500 MILLIGRAM(S): at 04:54

## 2020-03-31 RX ADMIN — Medication 200 MILLIGRAM(S): at 00:40

## 2020-03-31 RX ADMIN — Medication 50 MILLIGRAM(S): at 04:54

## 2020-03-31 RX ADMIN — Medication 200 MILLIGRAM(S): at 20:35

## 2020-03-31 RX ADMIN — AZITHROMYCIN 500 MILLIGRAM(S): 500 TABLET, FILM COATED ORAL at 14:27

## 2020-03-31 RX ADMIN — Medication 50 MILLIGRAM(S): at 18:02

## 2020-03-31 RX ADMIN — SIMVASTATIN 5 MILLIGRAM(S): 20 TABLET, FILM COATED ORAL at 20:34

## 2020-03-31 RX ADMIN — LOSARTAN POTASSIUM 50 MILLIGRAM(S): 100 TABLET, FILM COATED ORAL at 04:54

## 2020-03-31 RX ADMIN — CEFTRIAXONE 100 MILLIGRAM(S): 500 INJECTION, POWDER, FOR SOLUTION INTRAMUSCULAR; INTRAVENOUS at 14:27

## 2020-03-31 RX ADMIN — TOCILIZUMAB 100 MILLIGRAM(S): 20 INJECTION, SOLUTION, CONCENTRATE INTRAVENOUS at 10:05

## 2020-03-31 NOTE — PROGRESS NOTE ADULT - SUBJECTIVE AND OBJECTIVE BOX
OBJECTIVE:  ICU Vital Signs Last 24 Hrs  T(C): 36.9 (31 Mar 2020 04:37), Max: 37.9 (30 Mar 2020 12:16)  T(F): 98.5 (31 Mar 2020 04:37), Max: 100.3 (30 Mar 2020 12:16)  HR: 115 (31 Mar 2020 04:37) (109 - 131)  BP: 154/95 (31 Mar 2020 04:37) (138/81 - 154/95)  BP(mean): --  ABP: --  ABP(mean): --  RR: 35 (31 Mar 2020 04:37) (21 - 41)  SpO2: 90% (31 Mar 2020 04:37) (88% - 94%)        03-30 @ 07:01  -  03-31 @ 07:00  --------------------------------------------------------  IN: 0 mL / OUT: 1200 mL / NET: -1200 mL      CAPILLARY BLOOD GLUCOSE      POCT Blood Glucose.: 134 mg/dL (30 Mar 2020 18:15)      PHYSICAL EXAM:  General:   HEENT:   Respiratory:   Cardiovascular:   Abdomen:   Extremities:   Skin:   Neurological:    HOSPITAL MEDICATIONS:  enoxaparin Injectable 40 milliGRAM(s) SubCutaneous daily    azithromycin   Tablet 500 milliGRAM(s) Oral daily  cefTRIAXone   IVPB      cefTRIAXone   IVPB 1000 milliGRAM(s) IV Intermittent every 24 hours  hydroxychloroquine 200 milliGRAM(s) Oral every 12 hours  hydroxychloroquine   Oral       methylPREDNISolone sodium succinate Injectable 50 milliGRAM(s) IV Push two times a day  simvastatin 5 milliGRAM(s) Oral at bedtime    ALBUTerol    90 MICROgram(s) HFA Inhaler 2 Puff(s) Inhalation every 6 hours PRN  guaiFENesin   Syrup  (Sugar-Free) 200 milliGRAM(s) Oral every 6 hours PRN    acetaminophen   Tablet .. 650 milliGRAM(s) Oral every 6 hours PRN          ascorbic acid 500 milliGRAM(s) Oral two times a day  multivitamin 1 Tablet(s) Oral daily  zinc sulfate 220 milliGRAM(s) Oral daily    influenza   Vaccine 0.5 milliLiter(s) IntraMuscular once          LABS:                        9.1    19.44 )-----------( 254      ( 31 Mar 2020 07:11 )             30.2     Hgb Trend: 9.1<--, 8.0<--, 7.8<--, 8.6<--  03-31    136  |  94<L>  |  9   ----------------------------<  162<H>  3.4<L>   |  26  |  0.63    Ca    9.1      31 Mar 2020 07:11  Phos  2.6     03-31  Mg     2.2     03-31      Creatinine Trend: 0.63<--, 0.58<--, 0.62<--, 0.74<--      Arterial Blood Gas:  03-30 @ 18:30  7.42/42/42/26/70.3/2.3  ABG lactate: --        MICROBIOLOGY:     RADIOLOGY:  [ ] Reviewed and interpreted by me    PULMONARY FUNCTION TESTS: Pt seen and examined by the bedside. Overnight, became more hypoxic. Feeling more SOB and hypoxic to 80-90% (lowest O2 in 70s when exerting herself). Having diarrhea.     OBJECTIVE:  ICU Vital Signs Last 24 Hrs  T(C): 36.9 (31 Mar 2020 04:37), Max: 37.9 (30 Mar 2020 12:16)  T(F): 98.5 (31 Mar 2020 04:37), Max: 100.3 (30 Mar 2020 12:16)  HR: 115 (31 Mar 2020 04:37) (109 - 131)  BP: 154/95 (31 Mar 2020 04:37) (138/81 - 154/95)  BP(mean): --  ABP: --  ABP(mean): --  RR: 35 (31 Mar 2020 04:37) (21 - 41)  SpO2: 90% (31 Mar 2020 04:37) (88% - 94%)      03-30 @ 07:01  -  03-31 @ 07:00  --------------------------------------------------------  IN: 0 mL / OUT: 1200 mL / NET: -1200 mL      CAPILLARY BLOOD GLUCOSE  POCT Blood Glucose.: 134 mg/dL (30 Mar 2020 18:15)      PHYSICAL EXAM:  General: NAD, pleasant   HEENT: NCAT, moist mucosal membranes  Respiratory: decreased breath sounds; tachypneic   Cardiovascular: S1/S2 normal, RRR, no murmurs/rubs/gallops appreciated  Abdomen: soft, non-tender, non-distended with normal bowel sounds  Extremities: no b/l LE edema, no cyanosis/clubbing  Skin: warm/dry  Neurological: AAOx3, answering questions appropriately, no focal deficits    HOSPITAL MEDICATIONS:  enoxaparin Injectable 40 milliGRAM(s) SubCutaneous daily    azithromycin   Tablet 500 milliGRAM(s) Oral daily  cefTRIAXone   IVPB      cefTRIAXone   IVPB 1000 milliGRAM(s) IV Intermittent every 24 hours  hydroxychloroquine 200 milliGRAM(s) Oral every 12 hours  hydroxychloroquine   Oral       methylPREDNISolone sodium succinate Injectable 50 milliGRAM(s) IV Push two times a day  simvastatin 5 milliGRAM(s) Oral at bedtime    ALBUTerol    90 MICROgram(s) HFA Inhaler 2 Puff(s) Inhalation every 6 hours PRN  guaiFENesin   Syrup  (Sugar-Free) 200 milliGRAM(s) Oral every 6 hours PRN    acetaminophen   Tablet .. 650 milliGRAM(s) Oral every 6 hours PRN    ascorbic acid 500 milliGRAM(s) Oral two times a day  multivitamin 1 Tablet(s) Oral daily  zinc sulfate 220 milliGRAM(s) Oral daily    influenza   Vaccine 0.5 milliLiter(s) IntraMuscular once          LABS:                        9.1    19.44 )-----------( 254      ( 31 Mar 2020 07:11 )             30.2     Hgb Trend: 9.1<--, 8.0<--, 7.8<--, 8.6<--  03-31    136  |  94<L>  |  9   ----------------------------<  162<H>  3.4<L>   |  26  |  0.63    Ca    9.1      31 Mar 2020 07:11  Phos  2.6     03-31  Mg     2.2     03-31      Creatinine Trend: 0.63<--, 0.58<--, 0.62<--, 0.74<--      Arterial Blood Gas:  03-30 @ 18:30  7.42/42/42/26/70.3/2.3  ABG lactate: --
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
## 2020-03-31 NOTE — CONSULT NOTE ADULT - ASSESSMENT
44F hx HTN, HLD, preDM, who p/w SOB. COVID+. Course c/b worsening hypoxic respiratory failure to 80's while on NRB. On Tocilizumab.    Impression  - Worsening hypoxic respiratory failure in the setting of COVID+ on trial Tocilizumab    Recommendation  - Continue NRB and pronation for now. Patient seem to be improving s/p Tocilizumab  - Inhaler PRN   - Patient not currently a MICU candidate.   - Will continue to follow

## 2020-03-31 NOTE — PROGRESS NOTE ADULT - SUBJECTIVE AND OBJECTIVE BOX
Patient is a 44y old  Female who presents with a chief complaint of Acute Hypoxemic Respiratory Failure and Pneumonia (31 Mar 2020 13:49)      SUBJECTIVE / OVERNIGHT EVENTS:  Patient with unchanged SOB and cough.  No fevers.    Continuous pulse ox - dips to 85%, mostly high 80s/low 90s.    MEDICATIONS  (STANDING):  ascorbic acid 500 milliGRAM(s) Oral two times a day  azithromycin   Tablet 500 milliGRAM(s) Oral daily  cefTRIAXone   IVPB      cefTRIAXone   IVPB 1000 milliGRAM(s) IV Intermittent every 24 hours  enoxaparin Injectable 40 milliGRAM(s) SubCutaneous daily  hydroxychloroquine 200 milliGRAM(s) Oral every 12 hours  hydroxychloroquine   Oral   influenza   Vaccine 0.5 milliLiter(s) IntraMuscular once  methylPREDNISolone sodium succinate Injectable 50 milliGRAM(s) IV Push two times a day  multivitamin 1 Tablet(s) Oral daily  simvastatin 5 milliGRAM(s) Oral at bedtime  zinc sulfate 220 milliGRAM(s) Oral daily    MEDICATIONS  (PRN):  acetaminophen   Tablet .. 650 milliGRAM(s) Oral every 6 hours PRN Temp greater or equal to 38C (100.4F), Mild Pain (1 - 3)  ALBUTerol    90 MICROgram(s) HFA Inhaler 2 Puff(s) Inhalation every 6 hours PRN Shortness of Breath and/or Wheezing  guaiFENesin   Syrup  (Sugar-Free) 200 milliGRAM(s) Oral every 6 hours PRN Cough      Vital Signs Last 24 Hrs  T(C): 36.9 (31 Mar 2020 04:37), Max: 36.9 (30 Mar 2020 22:27)  T(F): 98.5 (31 Mar 2020 04:37), Max: 98.5 (31 Mar 2020 04:37)  HR: 115 (31 Mar 2020 04:37) (111 - 131)  BP: 154/95 (31 Mar 2020 04:37) (138/81 - 154/95)  BP(mean): --  RR: 35 (31 Mar 2020 04:37) (35 - 41)  SpO2: 90% (31 Mar 2020 04:37) (88% - 94%)  CAPILLARY BLOOD GLUCOSE      POCT Blood Glucose.: 134 mg/dL (30 Mar 2020 18:15)    I&O's Summary    30 Mar 2020 07:01  -  31 Mar 2020 07:00  --------------------------------------------------------  IN: 0 mL / OUT: 1200 mL / NET: -1200 mL    31 Mar 2020 07:01  -  31 Mar 2020 17:01  --------------------------------------------------------  IN: 600 mL / OUT: 400 mL / NET: 200 mL      PHYSICAL EXAM:  GENERAL: Uncomfortable, Tachypnic (32/min), well-developed  HEENT: MMM, conjunctiva and sclera clear  NECK: Supple, No JVD, obese  CHEST/LUNG: Diminished BS throughout  HEART: Regular rate and rhythm; No murmurs, rubs, or gallops  ABDOMEN: Soft, Nontender, Nondistended; Bowel sounds present  EXTREMITIES:  2+ Peripheral Pulses, No clubbing, cyanosis, or edema  PSYCH: AAOx3  NEUROLOGY: non-focal  SKIN: No rashes or lesions      LABS:                        9.1    19.44 )-----------( 254      ( 31 Mar 2020 07:11 )             30.2     03-31    136  |  94<L>  |  9   ----------------------------<  162<H>  3.4<L>   |  26  |  0.63    Ca    9.1      31 Mar 2020 07:11  Phos  2.6     03-31  Mg     2.2     03-31          Care Discussed with Consultants/Other Providers:  Dr. Fitzgerald, [facility]

## 2020-03-31 NOTE — PROGRESS NOTE ADULT - PROBLEM SELECTOR PLAN 2
B/L patchy opacities compatible with multifocal pneumonia  QTc is 449, was 420   Trending inflammatory markers  c/w plaquenil, vitamin C.  Given Tocilizumab  On solumedrol   NRB with NC   pulm c/s appreciated

## 2020-03-31 NOTE — CHART NOTE - NSCHARTNOTEFT_GEN_A_CORE
RN called that patient is tachypneic and de-stating between 85-91% with a respiratory rate of 38 and tachycardic at 124 bpm. Patient is on 15 L NC and 100% FiO2 NRB. Patient's ABG came back and results were as follows:   ABG - ( 30 Mar 2020 18:30 )  pH, Arterial: 7.42  pH, Blood: x     /  pCO2: 42    /  pO2: 42    / HCO3: 26    / Base Excess: 2.3   /  SaO2: 70.3        MICU was consulted for recs: "Patient seen and  assessed; partially proned with O2 sat 90-91%. Patient position adjusted/ fully proned with O2 Sat improved to 94-96% on NRB. Patient with RR 20-26, tachycardic to the 120s. Patient found to have a fever earlier.   Recs: Continue with proned position and NRB  Recheck temp and give 1g Tylenol if still febrile    Patient not requiring intubation/ not ICU candidate at this time, please reconsult as indicated   Discussed with ICU attending Dr. Cuevas."    Patient was seen and examined after MICU to follow up with current condition. The patient was lying fully prone stating it was uncomfortable but knows she had to lay like that for better oxygenation. She was stating between 94-96% while fully prone. Her respiratory rate slightly decreased to 26.     Vital Signs Last 24 Hrs  T(C): 36.9 (30 Mar 2020 22:27), Max: 37.9 (30 Mar 2020 12:16)  T(F): 98.4 (30 Mar 2020 22:27), Max: 100.3 (30 Mar 2020 12:16)  HR: 119 (30 Mar 2020 22:27) (97 - 131)  BP: 138/81 (30 Mar 2020 21:20) (123/80 - 155/108)  BP(mean): --  RR: 37 (30 Mar 2020 22:27) (21 - 41)  SpO2: 92% (30 Mar 2020 22:27) (88% - 99%)      Plan:    - Continue to prone patient as tolerated  - The patient does not currently have a fever but will continue to monitor and treat appropriately with IV tyenol as recommended by MICU  - Bedboard is looking for a continuous pulse ox for the patient at the recommendation of Dr. Quintana  - EKG was sinus tachycardia with QTc of 455  - Will continue to monitor the patient through the night and call MICU for further assistance if needed

## 2020-03-31 NOTE — PROGRESS NOTE ADULT - PROBLEM SELECTOR PLAN 3
Secondary to COVID-19  Marked elevation in Procalcitonin, will c/w IV ceftriaxone and azithro for a 5 day course

## 2020-03-31 NOTE — PROGRESS NOTE ADULT - PROBLEM SELECTOR PLAN 1
Requiring non-rebreather with NC  Secondary to PNA due to COVID-19  monitor O2 sats closely  d/w pulm.  MICU evaluated patient - appreciate eval  d/w pulm, appreciate recs  Lasix 20 IV x 1 yesterday. Seems euvolemic today.  Albuterol MDI

## 2020-03-31 NOTE — PROGRESS NOTE ADULT - ASSESSMENT
44F hx HTN, HLD, preDM, who p/w SOB. COVID+, now hypoxic on NRB. Elevated ferritin. No CRP. CXR with b/l patchy opacities, diffuse. All consistent with COVID. Procal is high. Pulmonary consulted for hypoxia. Today pt is more hypoxic than yesterday. Improves with proning. s/p tocilizumab this morning  - another dose of tocilizumab at 6PM  - cont proning as tolerated  - cont azithro, plaquenil, check EKG for QTc  - f/u coags, ddimer, LDH, ferritin, lactate, full T cell subset, CPK, Trop, G6PD, NK cells, Cytokine panel  - daily labs: CBC, CMP, Mg, Phos, CRP, Ferritin  - Every 2-3 days: T cell subset, Ddimer, LDH, CPK, and Trops, coags  - maintain K >4 and Mg > 3, daily EKGs  - cont solumedrol at 1 mg/kg   - would obtain cultures to r/o underlying infection  - spoke with pt about possibility of intubation; low threshold to intubate pt if worsens    ----------------------------------------  Celestina Elizalde MD PGY-6  Pulmonary/Critical Care Fellow  Pager # [phone] ([place]), [phone] ([facility])

## 2020-03-31 NOTE — PROGRESS NOTE ADULT - ATTENDING COMMENTS
Miguelito Quintana MD.    Attending Physician, [facility].   x [phone]       CPT [id number]  Diagnoses  Acute hypoxic respiratory failure  Pneumonia due to novel coronovirus  multifocal pneumonia  Hyponatremia  Hypokalemia  Hypertension

## 2020-04-01 LAB
ANION GAP SERPL CALC-SCNC: 16 MMO/L — HIGH (ref 7–14)
ANISOCYTOSIS BLD QL: SIGNIFICANT CHANGE UP
BASOPHILS # BLD AUTO: 0.04 K/UL — SIGNIFICANT CHANGE UP (ref 0–0.2)
BASOPHILS NFR BLD AUTO: 0.2 % — SIGNIFICANT CHANGE UP (ref 0–2)
BASOPHILS NFR SPEC: 0 % — SIGNIFICANT CHANGE UP (ref 0–2)
BLASTS # FLD: 0 % — SIGNIFICANT CHANGE UP (ref 0–0)
BUN SERPL-MCNC: 16 MG/DL — SIGNIFICANT CHANGE UP (ref 7–23)
CALCIUM SERPL-MCNC: 9.1 MG/DL — SIGNIFICANT CHANGE UP (ref 8.4–10.5)
CHLORIDE SERPL-SCNC: 100 MMOL/L — SIGNIFICANT CHANGE UP (ref 98–107)
CO2 SERPL-SCNC: 26 MMOL/L — SIGNIFICANT CHANGE UP (ref 22–31)
CREAT SERPL-MCNC: 0.68 MG/DL — SIGNIFICANT CHANGE UP (ref 0.5–1.3)
CRP SERPL-MCNC: 180.8 MG/L — HIGH
ELLIPTOCYTES BLD QL SMEAR: SLIGHT — SIGNIFICANT CHANGE UP
EOSINOPHIL # BLD AUTO: 0 K/UL — SIGNIFICANT CHANGE UP (ref 0–0.5)
EOSINOPHIL NFR BLD AUTO: 0 % — SIGNIFICANT CHANGE UP (ref 0–6)
EOSINOPHIL NFR FLD: 0 % — SIGNIFICANT CHANGE UP (ref 0–6)
GIANT PLATELETS BLD QL SMEAR: PRESENT — SIGNIFICANT CHANGE UP
GLUCOSE SERPL-MCNC: 146 MG/DL — HIGH (ref 70–99)
HCT VFR BLD CALC: 28.2 % — LOW (ref 34.5–45)
HGB BLD-MCNC: 8.4 G/DL — LOW (ref 11.5–15.5)
HYPOCHROMIA BLD QL: SIGNIFICANT CHANGE UP
IMM GRANULOCYTES NFR BLD AUTO: 4 % — HIGH (ref 0–1.5)
LYMPHOCYTES # BLD AUTO: 1.99 K/UL — SIGNIFICANT CHANGE UP (ref 1–3.3)
LYMPHOCYTES # BLD AUTO: 7.6 % — LOW (ref 13–44)
LYMPHOCYTES NFR SPEC AUTO: 4.4 % — LOW (ref 13–44)
MAGNESIUM SERPL-MCNC: 2.8 MG/DL — HIGH (ref 1.6–2.6)
MCHC RBC-ENTMCNC: 21.8 PG — LOW (ref 27–34)
MCHC RBC-ENTMCNC: 29.8 % — LOW (ref 32–36)
MCV RBC AUTO: 73.1 FL — LOW (ref 80–100)
METAMYELOCYTES # FLD: 0 % — SIGNIFICANT CHANGE UP (ref 0–1)
MICROCYTES BLD QL: SIGNIFICANT CHANGE UP
MONOCYTES # BLD AUTO: 1.47 K/UL — HIGH (ref 0–0.9)
MONOCYTES NFR BLD AUTO: 5.6 % — SIGNIFICANT CHANGE UP (ref 2–14)
MONOCYTES NFR BLD: 9.7 % — HIGH (ref 2–9)
MYELOCYTES NFR BLD: 0 % — SIGNIFICANT CHANGE UP (ref 0–0)
NEUTROPHIL AB SER-ACNC: 84.1 % — HIGH (ref 43–77)
NEUTROPHILS # BLD AUTO: 21.68 K/UL — HIGH (ref 1.8–7.4)
NEUTROPHILS NFR BLD AUTO: 82.6 % — HIGH (ref 43–77)
NEUTS BAND # BLD: 0 % — SIGNIFICANT CHANGE UP (ref 0–6)
NRBC # BLD: 1 /100WBC — SIGNIFICANT CHANGE UP
NRBC # FLD: 0.14 K/UL — SIGNIFICANT CHANGE UP (ref 0–0)
OTHER - HEMATOLOGY %: 0 — SIGNIFICANT CHANGE UP
PHOSPHATE SERPL-MCNC: 2.4 MG/DL — LOW (ref 2.5–4.5)
PLATELET # BLD AUTO: 386 K/UL — SIGNIFICANT CHANGE UP (ref 150–400)
PLATELET COUNT - ESTIMATE: NORMAL — SIGNIFICANT CHANGE UP
PMV BLD: 10.3 FL — SIGNIFICANT CHANGE UP (ref 7–13)
POIKILOCYTOSIS BLD QL AUTO: SIGNIFICANT CHANGE UP
POLYCHROMASIA BLD QL SMEAR: SLIGHT — SIGNIFICANT CHANGE UP
POTASSIUM SERPL-MCNC: 4.8 MMOL/L — SIGNIFICANT CHANGE UP (ref 3.5–5.3)
POTASSIUM SERPL-SCNC: 4.8 MMOL/L — SIGNIFICANT CHANGE UP (ref 3.5–5.3)
PROMYELOCYTES # FLD: 0 % — SIGNIFICANT CHANGE UP (ref 0–0)
RBC # BLD: 3.86 M/UL — SIGNIFICANT CHANGE UP (ref 3.8–5.2)
RBC # FLD: 22.5 % — HIGH (ref 10.3–14.5)
SCHISTOCYTES BLD QL AUTO: SLIGHT — SIGNIFICANT CHANGE UP
SODIUM SERPL-SCNC: 142 MMOL/L — SIGNIFICANT CHANGE UP (ref 135–145)
TARGETS BLD QL SMEAR: SIGNIFICANT CHANGE UP
VARIANT LYMPHS # BLD: 1.8 % — SIGNIFICANT CHANGE UP
WBC # BLD: 26.24 K/UL — HIGH (ref 3.8–10.5)
WBC # FLD AUTO: 26.24 K/UL — HIGH (ref 3.8–10.5)

## 2020-04-01 PROCEDURE — 99233 SBSQ HOSP IP/OBS HIGH 50: CPT

## 2020-04-01 RX ORDER — ENOXAPARIN SODIUM 100 MG/ML
40 INJECTION SUBCUTANEOUS EVERY 12 HOURS
Refills: 0 | Status: DISCONTINUED | OUTPATIENT
Start: 2020-04-01 | End: 2020-04-09

## 2020-04-01 RX ORDER — SODIUM,POTASSIUM PHOSPHATES 278-250MG
1 POWDER IN PACKET (EA) ORAL ONCE
Refills: 0 | Status: COMPLETED | OUTPATIENT
Start: 2020-04-01 | End: 2020-04-01

## 2020-04-01 RX ADMIN — Medication 500 MILLIGRAM(S): at 17:18

## 2020-04-01 RX ADMIN — AZITHROMYCIN 500 MILLIGRAM(S): 500 TABLET, FILM COATED ORAL at 13:27

## 2020-04-01 RX ADMIN — Medication 1 PACKET(S): at 10:05

## 2020-04-01 RX ADMIN — Medication 50 MILLIGRAM(S): at 05:07

## 2020-04-01 RX ADMIN — Medication 500 MILLIGRAM(S): at 05:07

## 2020-04-01 RX ADMIN — Medication 200 MILLIGRAM(S): at 22:04

## 2020-04-01 RX ADMIN — ZINC SULFATE TAB 220 MG (50 MG ZINC EQUIVALENT) 220 MILLIGRAM(S): 220 (50 ZN) TAB at 13:26

## 2020-04-01 RX ADMIN — ENOXAPARIN SODIUM 40 MILLIGRAM(S): 100 INJECTION SUBCUTANEOUS at 13:27

## 2020-04-01 RX ADMIN — SIMVASTATIN 5 MILLIGRAM(S): 20 TABLET, FILM COATED ORAL at 22:04

## 2020-04-01 RX ADMIN — Medication 50 MILLIGRAM(S): at 17:18

## 2020-04-01 RX ADMIN — Medication 200 MILLIGRAM(S): at 13:26

## 2020-04-01 RX ADMIN — Medication 1 TABLET(S): at 13:26

## 2020-04-01 RX ADMIN — CEFTRIAXONE 100 MILLIGRAM(S): 500 INJECTION, POWDER, FOR SOLUTION INTRAMUSCULAR; INTRAVENOUS at 15:00

## 2020-04-01 RX ADMIN — TOCILIZUMAB 100 MILLIGRAM(S): 20 INJECTION, SOLUTION, CONCENTRATE INTRAVENOUS at 05:10

## 2020-04-01 NOTE — PROGRESS NOTE ADULT - SUBJECTIVE AND OBJECTIVE BOX
Patient is a 44y old  Female who presents with a chief complaint of Acute Hypoxemic Respiratory Failure and Pneumonia (31 Mar 2020 17:01)      SUBJECTIVE / OVERNIGHT EVENTS: Pt reports continued SOB but somewhat improved. She denies chest pain, n/v. She reports having loose stools.     Review of Systems:     MEDICATIONS  (STANDING):  ascorbic acid 500 milliGRAM(s) Oral two times a day  azithromycin   Tablet 500 milliGRAM(s) Oral daily  cefTRIAXone   IVPB      cefTRIAXone   IVPB 1000 milliGRAM(s) IV Intermittent every 24 hours  enoxaparin Injectable 40 milliGRAM(s) SubCutaneous every 12 hours  hydroxychloroquine 200 milliGRAM(s) Oral every 12 hours  hydroxychloroquine   Oral   influenza   Vaccine 0.5 milliLiter(s) IntraMuscular once  methylPREDNISolone sodium succinate Injectable 50 milliGRAM(s) IV Push two times a day  multivitamin 1 Tablet(s) Oral daily  simvastatin 5 milliGRAM(s) Oral at bedtime  zinc sulfate 220 milliGRAM(s) Oral daily    MEDICATIONS  (PRN):  acetaminophen   Tablet .. 650 milliGRAM(s) Oral every 6 hours PRN Temp greater or equal to 38C (100.4F), Mild Pain (1 - 3)  ALBUTerol    90 MICROgram(s) HFA Inhaler 2 Puff(s) Inhalation every 6 hours PRN Shortness of Breath and/or Wheezing  guaiFENesin   Syrup  (Sugar-Free) 200 milliGRAM(s) Oral every 6 hours PRN Cough      PHYSICAL EXAM:    I&O's Summary    31 Mar 2020 07:01  -  01 Apr 2020 07:00  --------------------------------------------------------  IN: 600 mL / OUT: 400 mL / NET: 200 mL      Vital Signs Last 24 Hrs  T(C): 36.7 (01 Apr 2020 13:31), Max: 37 (01 Apr 2020 05:06)  T(F): 98 (01 Apr 2020 13:31), Max: 98.6 (01 Apr 2020 05:06)  HR: 110 (01 Apr 2020 13:31) (110 - 111)  BP: 161/109 (01 Apr 2020 13:31) (144/96 - 161/109)  BP(mean): --  RR: 24 (01 Apr 2020 13:31) (24 - 25)  SpO2: 93% (01 Apr 2020 13:31) (90% - 93%)  PHYSICAL EXAM:  GENERAL: tacypneic, winded with breathing, obese  HEENT: MMM, conjunctiva and sclera clear  NECK: Supple, No JVD, obese  CHEST/LUNG: Diminished BS throughout, on NRB   HEART: Regular rate and rhythm; No murmurs, rubs, or gallops  ABDOMEN: Soft, Nontender, Nondistended; Bowel sounds present  EXTREMITIES:  2+ Peripheral Pulses, No clubbing, cyanosis, or edema  PSYCH: AAOx3  NEUROLOGY: non-focal  SKIN: No rashes or lesions    LABS:  CAPILLARY BLOOD GLUCOSE                              8.4    26.24 )-----------( 386      ( 01 Apr 2020 05:01 )             28.2     04-01    142  |  100  |  16  ----------------------------<  146<H>  4.8   |  26  |  0.68    Ca    9.1      01 Apr 2020 05:01  Phos  2.4     04-01  Mg     2.8     04-01                RADIOLOGY & ADDITIONAL TESTS:    Imaging Personally Reviewed:    Consultant(s) Notes Reviewed:      Care Discussed with Consultants/Other Providers:

## 2020-04-01 NOTE — PROGRESS NOTE ADULT - PROBLEM SELECTOR PLAN 4
BPs not at goal.  -Given COVID severity will continue hold ARB for now.  -Continue to hold home HCTZ given recent hyponatremia   -If BPs continue rise will consider adding alternative agent such as norvasc

## 2020-04-01 NOTE — PROGRESS NOTE ADULT - PROBLEM SELECTOR PLAN 7
Dispo- pt needs further medical optimization  prior to dc.     1.  PCP Name                           =   2.  PCP Contacted at Admission =  [  ] Y       [  ] N          [  ] N/A  3.  PCP Contacted at Discharge  =  [  ] Y       [  ] N          [  ] N/A  4.  Post-Discharge Appointment Date and Location =   5.  Summary of Handoff Given to PCP                    =

## 2020-04-01 NOTE — PROGRESS NOTE ADULT - PROBLEM SELECTOR PLAN 1
Multifocal PNA complicated by acute hypoxic respiratory failure in setting of COVID 19. B/L patchy opacities  on imaging compatible with multifocal pneumonia  -S/p Given Tocilizumab  -Trending inflammatory markers  -C/w plaquenil, vitamin C.    -c/w solumedrol   -Requiring non-rebreather with NC. Monitor O2 sats closely  -Albuterol MDI  -c/w IV ceftriaxone and azithro for a 5 day course. Today is the last day  -trend CBC. Increasing leukocytosis noted. Likely in the setting of steroids   -Case d/w pulm attending Dr. Dia O2 sats improving w/ above management and proning. Continue to prone or lay patient on side as much as possible.   -S/p MICU evaluation and pt not deemed to be a candidate for intubation at this time. Low threshold for MICU reeval for intubation if pt deteriorates clinically.

## 2020-04-01 NOTE — PROGRESS NOTE ADULT - PROBLEM SELECTOR PLAN 5
A1C is 5.9%. not on any hypoglycemics  -Continue to monitor FS on BMP for now especially in setting of steroids.   -IF FS begin to increase will add ISS  -once clinically improved will need to encourage weight loss and lifestyle/ diet changes

## 2020-04-01 NOTE — CHART NOTE - NSCHARTNOTEFT_GEN_A_CORE
Informed by RN that patient with O2 saturation in 80s on NRB mask with intermittent desaturation to the 70s.     Patient assessed at bedside. Patient is mildly tachypneic without accessory muscle use on NRB mask. Alert and oriented. Unable to complete full sentences however does appear comfortable. Due to large body habitus (BMI 47.5), it is difficult for patient to lay prone however she is laying on her side as much as she can. While on the floor, continuous pulse oximetry notable for O2 sat in 80s with brief episode (few seconds) desaturation to 70s with quick recovery to mid-to-high 80s. Per chart review, patient has been evaluated by the MICU with O2 sats in 80s and deemed not a MICU candidate at that time. Solumedrol started on 3/30 for hypoxia. MICU called for re-evaluation. Continue with NRB for now. If patient desaturates again prior to MICU re-eval, will call RRT.     Above discussed with Dr. Dior.     [employer] CRUZITO Sanders  [id number]

## 2020-04-01 NOTE — PROGRESS NOTE ADULT - ATTENDING COMMENTS
Yasir Dior MD  Attending Physician, Hospitalist Medicine   (p) [phone]    CPT [id number]  Diagnoses  Acute hypoxic respiratory failure  Pneumonia due to novel coronovirus  multifocal pneumonia  Hyponatremia  Hypokalemia  Hypertension

## 2020-04-01 NOTE — CHART NOTE - NSCHARTNOTEFT_GEN_A_CORE
Patient seen and examined earlier this morning.    VS stable - oxygen saturation 92% on NRB while proned, RR 22 but able to speak in full sentences to me.  BP stable.  No pain.  States she feels better than she did yesterday.     No need for intubation at this time.  Patient does not require MICU level of care at this time.  Please re-consult as needed.

## 2020-04-02 DIAGNOSIS — R74.0 NONSPECIFIC ELEVATION OF LEVELS OF TRANSAMINASE AND LACTIC ACID DEHYDROGENASE [LDH]: ICD-10-CM

## 2020-04-02 LAB
ALBUMIN SERPL ELPH-MCNC: 3.2 G/DL — LOW (ref 3.3–5)
ALP SERPL-CCNC: 91 U/L — SIGNIFICANT CHANGE UP (ref 40–120)
ALT FLD-CCNC: 44 U/L — HIGH (ref 4–33)
ANION GAP SERPL CALC-SCNC: 16 MMO/L — HIGH (ref 7–14)
AST SERPL-CCNC: 44 U/L — HIGH (ref 4–32)
BILIRUB SERPL-MCNC: 0.3 MG/DL — SIGNIFICANT CHANGE UP (ref 0.2–1.2)
BUN SERPL-MCNC: 18 MG/DL — SIGNIFICANT CHANGE UP (ref 7–23)
CALCIUM SERPL-MCNC: 8.8 MG/DL — SIGNIFICANT CHANGE UP (ref 8.4–10.5)
CHLORIDE SERPL-SCNC: 96 MMOL/L — LOW (ref 98–107)
CO2 SERPL-SCNC: 21 MMOL/L — LOW (ref 22–31)
CREAT SERPL-MCNC: 0.75 MG/DL — SIGNIFICANT CHANGE UP (ref 0.5–1.3)
GLUCOSE SERPL-MCNC: 116 MG/DL — HIGH (ref 70–99)
MAGNESIUM SERPL-MCNC: 2.6 MG/DL — SIGNIFICANT CHANGE UP (ref 1.6–2.6)
PHOSPHATE SERPL-MCNC: 4.2 MG/DL — SIGNIFICANT CHANGE UP (ref 2.5–4.5)
POTASSIUM SERPL-MCNC: 4 MMOL/L — SIGNIFICANT CHANGE UP (ref 3.5–5.3)
POTASSIUM SERPL-SCNC: 4 MMOL/L — SIGNIFICANT CHANGE UP (ref 3.5–5.3)
PROT SERPL-MCNC: 8.1 G/DL — SIGNIFICANT CHANGE UP (ref 6–8.3)
SODIUM SERPL-SCNC: 133 MMOL/L — LOW (ref 135–145)
TROPONIN T, HIGH SENSITIVITY: 10 NG/L — SIGNIFICANT CHANGE UP (ref ?–14)

## 2020-04-02 PROCEDURE — 99233 SBSQ HOSP IP/OBS HIGH 50: CPT

## 2020-04-02 PROCEDURE — 93010 ELECTROCARDIOGRAM REPORT: CPT

## 2020-04-02 RX ADMIN — Medication 50 MILLIGRAM(S): at 16:29

## 2020-04-02 RX ADMIN — ENOXAPARIN SODIUM 40 MILLIGRAM(S): 100 INJECTION SUBCUTANEOUS at 16:29

## 2020-04-02 RX ADMIN — SIMVASTATIN 5 MILLIGRAM(S): 20 TABLET, FILM COATED ORAL at 23:04

## 2020-04-02 RX ADMIN — Medication 500 MILLIGRAM(S): at 06:37

## 2020-04-02 RX ADMIN — Medication 500 MILLIGRAM(S): at 16:28

## 2020-04-02 RX ADMIN — Medication 50 MILLIGRAM(S): at 06:39

## 2020-04-02 RX ADMIN — ZINC SULFATE TAB 220 MG (50 MG ZINC EQUIVALENT) 220 MILLIGRAM(S): 220 (50 ZN) TAB at 09:08

## 2020-04-02 RX ADMIN — ENOXAPARIN SODIUM 40 MILLIGRAM(S): 100 INJECTION SUBCUTANEOUS at 06:38

## 2020-04-02 RX ADMIN — Medication 1 TABLET(S): at 09:09

## 2020-04-02 RX ADMIN — Medication 200 MILLIGRAM(S): at 09:09

## 2020-04-02 RX ADMIN — Medication 200 MILLIGRAM(S): at 23:04

## 2020-04-02 NOTE — PROGRESS NOTE ADULT - PROBLEM SELECTOR PLAN 5
A1C is 5.9%. not on any hypoglycemics  -Continue to monitor FS on BMP for now especially in setting of steroids. Will check  FS during the day periodically   -IF FS begin to increase will add ISS  -once clinically improved will need to encourage weight loss and lifestyle/ diet changes BPs not at goal. However, pt also in discomfort from her respiratory status  -Given COVID severity will continue hold ARB for now.  -Continue to hold home HCTZ given recent hyponatremia   -If BPs continue rise will consider adding alternative agent such as norvasc

## 2020-04-02 NOTE — PROGRESS NOTE ADULT - PROBLEM SELECTOR PLAN 6
VTE PPx with lovenox BID in setting of COVID 19 A1C is 5.9%. not on any hypoglycemics  -Continue to monitor FS on BMP for now especially in setting of steroids. Will check  FS during the day periodically   -IF FS begin to increase will add ISS  -once clinically improved will need to encourage weight loss and lifestyle/ diet changes

## 2020-04-02 NOTE — PROGRESS NOTE ADULT - ATTENDING COMMENTS
Yasir Dior MD  Attending Physician, Hospitalist Medicine   (p) [phone]    CPT [id number]  Diagnoses  Acute hypoxic respiratory failure  Pneumonia due to novel coronovirus  multifocal pneumonia  Hyponatremia  Hypokalemia  Hypertension Yasir Dior MD  Attending Physician, Hospitalist Medicine   (p) [phone]    CPT [id number]  Diagnoses  Acute hypoxic respiratory failure  Pneumonia due to novel coronovirus  multifocal pneumonia  Hyponatremia  Hypokalemia  Transaminitis  Hypertension

## 2020-04-02 NOTE — PROGRESS NOTE ADULT - PROBLEM SELECTOR PLAN 4
BPs not at goal. However, pt also in discomfort from her respiratory status  -Given COVID severity will continue hold ARB for now.  -Continue to hold home HCTZ given recent hyponatremia   -If BPs continue rise will consider adding alternative agent such as norvasc Mild transaminitis noted on todays labs. Pt is asymptomatic w/o abdominal pain. Likely multifactorial given infection/ recent abx.   -monitor for now.

## 2020-04-02 NOTE — PROGRESS NOTE ADULT - PROBLEM SELECTOR PLAN 7
Dispo- pt needs further medical optimization  prior to dc.     1.  PCP Name                           =   2.  PCP Contacted at Admission =  [  ] Y       [  ] N          [  ] N/A  3.  PCP Contacted at Discharge  =  [  ] Y       [  ] N          [  ] N/A  4.  Post-Discharge Appointment Date and Location =   5.  Summary of Handoff Given to PCP                    = VTE PPx with lovenox BID in setting of COVID 19

## 2020-04-02 NOTE — PROGRESS NOTE ADULT - SUBJECTIVE AND OBJECTIVE BOX
Patient is a 44y old  Female who presents with a chief complaint of Acute Hypoxemic Respiratory Failure and Pneumonia (01 Apr 2020 16:11)      SUBJECTIVE / OVERNIGHT EVENTS: Pt still w/ SOB. She states around the same as yesterday. She has not been able to tolerate much food bc she becomes more SOB off the NRB. Still w/ diarrhea/loose stools     Review of Systems:     MEDICATIONS  (STANDING):  ascorbic acid 500 milliGRAM(s) Oral two times a day  enoxaparin Injectable 40 milliGRAM(s) SubCutaneous every 12 hours  hydroxychloroquine 200 milliGRAM(s) Oral every 12 hours  hydroxychloroquine   Oral   influenza   Vaccine 0.5 milliLiter(s) IntraMuscular once  methylPREDNISolone sodium succinate Injectable 50 milliGRAM(s) IV Push two times a day  multivitamin 1 Tablet(s) Oral daily  simvastatin 5 milliGRAM(s) Oral at bedtime  zinc sulfate 220 milliGRAM(s) Oral daily    MEDICATIONS  (PRN):  acetaminophen   Tablet .. 650 milliGRAM(s) Oral every 6 hours PRN Temp greater or equal to 38C (100.4F), Mild Pain (1 - 3)  ALBUTerol    90 MICROgram(s) HFA Inhaler 2 Puff(s) Inhalation every 6 hours PRN Shortness of Breath and/or Wheezing  guaiFENesin   Syrup  (Sugar-Free) 200 milliGRAM(s) Oral every 6 hours PRN Cough      PHYSICAL EXAM:    I&O's Summary    01 Apr 2020 07:01  -  02 Apr 2020 07:00  --------------------------------------------------------  IN: 800 mL / OUT: 350 mL / NET: 450 mL    Vital Signs Last 24 Hrs  T(C): 36.6 (02 Apr 2020 10:00), Max: 37.1 (02 Apr 2020 06:00)  T(F): 97.8 (02 Apr 2020 10:00), Max: 98.7 (02 Apr 2020 06:00)  HR: 98 (02 Apr 2020 10:00) (98 - 110)  BP: 153/99 (02 Apr 2020 10:00) (145/98 - 161/109)  BP(mean): --  RR: 22 (02 Apr 2020 10:00) (22 - 32)  SpO2: 96% (02 Apr 2020 10:00) (84% - 96%)  PHYSICAL EXAM:  GENERAL: , winded with talking, obese,proned on belly  HEENT: MMM, conjunctiva and sclera clear  NECK: Supple, No JVD, obese  CHEST/LUNG: CTA b/l, poor inspiratory effort, on NRB   HEART: Regular rate and rhythm; No murmurs, rubs, or gallops  ABDOMEN: Soft, Nontender, Nondistended; Bowel sounds present  EXTREMITIES:  2+ Peripheral Pulses, No clubbing, cyanosis, or edema  PSYCH: AAOx3  NEUROLOGY: non-focal  SKIN: No rashes or lesions    LABS:  CAPILLARY BLOOD GLUCOSE                              8.4    26.24 )-----------( 386      ( 01 Apr 2020 05:01 )             28.2     04-02    133<L>  |  96<L>  |  18  ----------------------------<  116<H>  4.0   |  21<L>  |  0.75    Ca    8.8      02 Apr 2020 08:10  Phos  4.2     04-02  Mg     2.6     04-02    TPro  8.1  /  Alb  3.2<L>  /  TBili  0.3  /  DBili  x   /  AST  44<H>  /  ALT  44<H>  /  AlkPhos  91  04-02              RADIOLOGY & ADDITIONAL TESTS:    Imaging Personally Reviewed:    Consultant(s) Notes Reviewed:      Care Discussed with Consultants/Other Providers:

## 2020-04-02 NOTE — PROGRESS NOTE ADULT - PROBLEM SELECTOR PLAN 1
Multifocal PNA complicated by acute hypoxic respiratory failure in setting of COVID 19. B/L patchy opacities  on imaging compatible with multifocal pneumonia  -S/p Given Tocilizumab  -Trending inflammatory markers  -C/w plaquenil, vitamin C.    -c/w solumedrol   -Requiring non-rebreather with NC. Monitor O2 sats closely. At bedside today she has been sating 90-91%  -Albuterol MDI  -s/p ceftriaxone and azithro for a 5 day course-completed 4/1.   -trend CBC. Increasing leukocytosis noted. Likely in the setting of steroids   -Continue to prone or lay patient on side as much as possible.   -will continue to appreciate pulm recs   -Low threshold for MICU reeval for intubation if pt deteriorates clinically.

## 2020-04-03 DIAGNOSIS — D64.9 ANEMIA, UNSPECIFIED: ICD-10-CM

## 2020-04-03 LAB
ALBUMIN SERPL ELPH-MCNC: 3.5 G/DL — SIGNIFICANT CHANGE UP (ref 3.3–5)
ALP SERPL-CCNC: 85 U/L — SIGNIFICANT CHANGE UP (ref 40–120)
ALT FLD-CCNC: 32 U/L — SIGNIFICANT CHANGE UP (ref 4–33)
ANION GAP SERPL CALC-SCNC: 16 MMO/L — HIGH (ref 7–14)
AST SERPL-CCNC: 31 U/L — SIGNIFICANT CHANGE UP (ref 4–32)
BASOPHILS # BLD AUTO: 0.04 K/UL — SIGNIFICANT CHANGE UP (ref 0–0.2)
BASOPHILS NFR BLD AUTO: 0.2 % — SIGNIFICANT CHANGE UP (ref 0–2)
BILIRUB SERPL-MCNC: 0.3 MG/DL — SIGNIFICANT CHANGE UP (ref 0.2–1.2)
BUN SERPL-MCNC: 19 MG/DL — SIGNIFICANT CHANGE UP (ref 7–23)
CALCIUM SERPL-MCNC: 8.6 MG/DL — SIGNIFICANT CHANGE UP (ref 8.4–10.5)
CHLORIDE SERPL-SCNC: 98 MMOL/L — SIGNIFICANT CHANGE UP (ref 98–107)
CO2 SERPL-SCNC: 27 MMOL/L — SIGNIFICANT CHANGE UP (ref 22–31)
CREAT SERPL-MCNC: 0.83 MG/DL — SIGNIFICANT CHANGE UP (ref 0.5–1.3)
CRP SERPL-MCNC: 31.8 MG/L — HIGH
D DIMER BLD IA.RAPID-MCNC: 3584 NG/ML — SIGNIFICANT CHANGE UP
EOSINOPHIL # BLD AUTO: 0.03 K/UL — SIGNIFICANT CHANGE UP (ref 0–0.5)
EOSINOPHIL NFR BLD AUTO: 0.1 % — SIGNIFICANT CHANGE UP (ref 0–6)
FERRITIN SERPL-MCNC: 221.5 NG/ML — HIGH (ref 15–150)
GLUCOSE SERPL-MCNC: 100 MG/DL — HIGH (ref 70–99)
HCT VFR BLD CALC: 27.2 % — LOW (ref 34.5–45)
HGB BLD-MCNC: 8 G/DL — LOW (ref 11.5–15.5)
IMM GRANULOCYTES NFR BLD AUTO: 5.7 % — HIGH (ref 0–1.5)
LDH SERPL L TO P-CCNC: 804 U/L — HIGH (ref 135–225)
LYMPHOCYTES # BLD AUTO: 10.4 % — LOW (ref 13–44)
LYMPHOCYTES # BLD AUTO: 2.58 K/UL — SIGNIFICANT CHANGE UP (ref 1–3.3)
MAGNESIUM SERPL-MCNC: 2.6 MG/DL — SIGNIFICANT CHANGE UP (ref 1.6–2.6)
MANUAL SMEAR VERIFICATION: SIGNIFICANT CHANGE UP
MCHC RBC-ENTMCNC: 22 PG — LOW (ref 27–34)
MCHC RBC-ENTMCNC: 29.4 % — LOW (ref 32–36)
MCV RBC AUTO: 74.9 FL — LOW (ref 80–100)
MONOCYTES # BLD AUTO: 1.49 K/UL — HIGH (ref 0–0.9)
MONOCYTES NFR BLD AUTO: 6 % — SIGNIFICANT CHANGE UP (ref 2–14)
NEUTROPHILS # BLD AUTO: 19.28 K/UL — HIGH (ref 1.8–7.4)
NEUTROPHILS NFR BLD AUTO: 77.6 % — HIGH (ref 43–77)
NRBC # FLD: 0.47 K/UL — SIGNIFICANT CHANGE UP (ref 0–0)
NRBC FLD-RTO: 1.9 — SIGNIFICANT CHANGE UP
PHOSPHATE SERPL-MCNC: 4 MG/DL — SIGNIFICANT CHANGE UP (ref 2.5–4.5)
PLATELET # BLD AUTO: 336 K/UL — SIGNIFICANT CHANGE UP (ref 150–400)
PMV BLD: 10.6 FL — SIGNIFICANT CHANGE UP (ref 7–13)
POTASSIUM SERPL-MCNC: 3.1 MMOL/L — LOW (ref 3.5–5.3)
POTASSIUM SERPL-SCNC: 3.1 MMOL/L — LOW (ref 3.5–5.3)
PROCALCITONIN SERPL-MCNC: 2.06 NG/ML — HIGH (ref 0.02–0.1)
PROT SERPL-MCNC: 7.7 G/DL — SIGNIFICANT CHANGE UP (ref 6–8.3)
RBC # BLD: 3.63 M/UL — LOW (ref 3.8–5.2)
RBC # FLD: 22.7 % — HIGH (ref 10.3–14.5)
SODIUM SERPL-SCNC: 141 MMOL/L — SIGNIFICANT CHANGE UP (ref 135–145)
WBC # BLD: 24.84 K/UL — HIGH (ref 3.8–10.5)
WBC # FLD AUTO: 24.84 K/UL — HIGH (ref 3.8–10.5)

## 2020-04-03 PROCEDURE — 99233 SBSQ HOSP IP/OBS HIGH 50: CPT

## 2020-04-03 RX ORDER — POTASSIUM CHLORIDE 20 MEQ
40 PACKET (EA) ORAL ONCE
Refills: 0 | Status: COMPLETED | OUTPATIENT
Start: 2020-04-03 | End: 2020-04-03

## 2020-04-03 RX ADMIN — ENOXAPARIN SODIUM 40 MILLIGRAM(S): 100 INJECTION SUBCUTANEOUS at 05:07

## 2020-04-03 RX ADMIN — Medication 50 MILLIGRAM(S): at 05:06

## 2020-04-03 RX ADMIN — ENOXAPARIN SODIUM 40 MILLIGRAM(S): 100 INJECTION SUBCUTANEOUS at 16:31

## 2020-04-03 RX ADMIN — Medication 500 MILLIGRAM(S): at 16:31

## 2020-04-03 RX ADMIN — ZINC SULFATE TAB 220 MG (50 MG ZINC EQUIVALENT) 220 MILLIGRAM(S): 220 (50 ZN) TAB at 16:31

## 2020-04-03 RX ADMIN — SIMVASTATIN 5 MILLIGRAM(S): 20 TABLET, FILM COATED ORAL at 20:54

## 2020-04-03 RX ADMIN — Medication 500 MILLIGRAM(S): at 05:07

## 2020-04-03 RX ADMIN — Medication 1 TABLET(S): at 16:30

## 2020-04-03 RX ADMIN — Medication 50 MILLIGRAM(S): at 16:31

## 2020-04-03 RX ADMIN — Medication 40 MILLIEQUIVALENT(S): at 16:31

## 2020-04-03 NOTE — DIETITIAN INITIAL EVALUATION ADULT. - ADD RECOMMEND
1). Continue with nutrition care plan. 2). Monitor weights, labs, BM's, skin integrity, p.o. intake. 3). Follow pt as per protocol.

## 2020-04-03 NOTE — PROGRESS NOTE ADULT - PROBLEM SELECTOR PLAN 5
Microcytic. Recent iron studies c/w AOCD , however given TIBC on high normal end and ferritin could be an acute phase reactant. Likely multifactorial in setting of her illness and blood loss/iron deficiency as she currently has her menses which she states started 4/2

## 2020-04-03 NOTE — PROGRESS NOTE ADULT - PROBLEM SELECTOR PLAN 3
Likely in the setting of diarrhea and decreased PO  -Monitor and Replete PRN  -Monitor BMs- likely in the setting of COVID

## 2020-04-03 NOTE — DIETITIAN INITIAL EVALUATION ADULT. - PERTINENT MEDS FT
MEDICATIONS  (STANDING):  ascorbic acid 500 milliGRAM(s) Oral two times a day  enoxaparin Injectable 40 milliGRAM(s) SubCutaneous every 12 hours  influenza   Vaccine 0.5 milliLiter(s) IntraMuscular once  methylPREDNISolone sodium succinate Injectable 50 milliGRAM(s) IV Push two times a day  multivitamin 1 Tablet(s) Oral daily  simvastatin 5 milliGRAM(s) Oral at bedtime  zinc sulfate 220 milliGRAM(s) Oral daily

## 2020-04-03 NOTE — DIETITIAN INITIAL EVALUATION ADULT. - OTHER INFO
43 y/o female admitted with dx of PNA and COVID-19+. Unable to conduct a face to face interview or nutrition-focused physical exam due to limited contact restrictions related to pt's medical condition and isolation precautions. Contacted pt's father to obtain nutrition hx. Father said that pt's oral intake PTA was good and that her weight was stable PTA. He verified that pt does not consume pork nor shellfish, but that they are not food allergies. Other food preferences taken and to be honored. Discussed with father that pt is a pre-diabetic. He is not aware of any dietary restrictions that pt follows. Offered to email copies of Consistent Carbohydrate and Lowfat literature which father accepted, giving pt's email address to send them to. Father otherwise denies food allergies, nausea/vomiting/diarrhea/constipation, or issues with chewing/swallowing of pt. He thought that pt was eating well at present, but could not confirm with RN. Pt's weight is Obesity Class III without edema noted. Additional nutrition related intervention does not appear to be needed at the present time. RDN services to remain available.

## 2020-04-03 NOTE — PROGRESS NOTE ADULT - PROBLEM SELECTOR PLAN 1
Multifocal PNA complicated by acute hypoxic respiratory failure in setting of COVID 19. B/L patchy opacities  on imaging compatible with multifocal pneumonia  -S/p Given Tocilizumab  -Trending inflammatory markers  -S/p plaquenil protocol  -c/w vitamin C.    -c/w solumedrol for 5 day course   -Requiring non-rebreather with NC. Monitor O2 sats closely. At bedside today she has been sating ~94% which is an improvement. If her o2 remains stable and/or continues to improve will f/u w/ respiratory to start slowly tritrating down of NRB.  -Albuterol MDI  -s/p ceftriaxone and azithro for a 5 day course-completed 4/1.   -trend CBC. Increasing leukocytosis noted. Likely in the setting of steroids   -Continue to prone or lay patient on side as much as possible.   -will continue to appreciate pulm recs   -Low threshold for MICU reeval for intubation if pt deteriorates clinically.

## 2020-04-03 NOTE — DIETITIAN INITIAL EVALUATION ADULT. - PERTINENT LABORATORY DATA
04-02 Na 133 mmol/L<L> Glu 116 mg/dL<H> K+ 4.0 mmol/L Cr 0.75 mg/dL BUN 18 mg/dL Phos 4.2 mg/dL  03-30-20 HbA1c 5.9 %

## 2020-04-03 NOTE — PROGRESS NOTE ADULT - PROBLEM SELECTOR PLAN 7
A1C is 5.9%. not on any hypoglycemics  -Continue to monitor FS on BMP for now especially in setting of steroids. Will check  FS during the day periodically   -IF FS begin to increase will add ISS  -once clinically improved will need to encourage weight loss and lifestyle/ diet changes

## 2020-04-03 NOTE — PROGRESS NOTE ADULT - ATTENDING COMMENTS
Yasir Dior MD  Attending Physician, [facility]   (p) [phone]    CPT [id number]  Diagnoses  Acute hypoxic respiratory failure  Pneumonia due to novel coronovirus  multifocal pneumonia  Hyponatremia  Hypokalemia  Transaminitis  Hypertension

## 2020-04-03 NOTE — PROGRESS NOTE ADULT - PROBLEM SELECTOR PLAN 4
Mild transaminitis noted on 4/2 labs. Pt is asymptomatic w/o abdominal pain. Likely multifactorial given infection/ recent abx.   -monitor for now. Resolved on todays labs

## 2020-04-03 NOTE — PROGRESS NOTE ADULT - SUBJECTIVE AND OBJECTIVE BOX
Patient is a 44y old  Female who presents with a chief complaint of Acute Hypoxemic Respiratory Failure and Pneumonia (02 Apr 2020 12:27)      SUBJECTIVE / OVERNIGHT EVENTS:    Review of Systems:     MEDICATIONS  (STANDING):  ascorbic acid 500 milliGRAM(s) Oral two times a day  enoxaparin Injectable 40 milliGRAM(s) SubCutaneous every 12 hours  influenza   Vaccine 0.5 milliLiter(s) IntraMuscular once  methylPREDNISolone sodium succinate Injectable 50 milliGRAM(s) IV Push two times a day  Vital Signs Last 24 Hrs  T(C): 37.2 (03 Apr 2020 05:00), Max: 37.2 (03 Apr 2020 05:00)  T(F): 98.9 (03 Apr 2020 05:00), Max: 98.9 (03 Apr 2020 05:00)  HR: 105 (03 Apr 2020 05:00) (94 - 105)  BP: 155/99 (03 Apr 2020 05:00) (143/97 - 159/99)  BP(mean): --  RR: 18 (03 Apr 2020 05:00) (18 - 24)  SpO2: 95% (03 Apr 2020 05:00) (92% - 96%)multivitamin 1 Tablet(s) Oral daily  simvastatin 5 milliGRAM(s) Oral at bedtime  zinc sulfate 220 milliGRAM(s) Oral daily    MEDICATIONS  (PRN):  acetaminophen   Tablet .. 650 milliGRAM(s) Oral every 6 hours PRN Temp greater or equal to 38C (100.4F), Mild Pain (1 - 3)  ALBUTerol    90 MICROgram(s) HFA Inhaler 2 Puff(s) Inhalation every 6 hours PRN Shortness of Breath and/or Wheezing  guaiFENesin   Syrup  (Sugar-Free) 200 milliGRAM(s) Oral every 6 hours PRN Cough      PHYSICAL EXAM:    I&O's Summary    02 Apr 2020 07:01  -  03 Apr 2020 07:00  --------------------------------------------------------  IN: 400 mL / OUT: 200 mL / NET: 200 mL      LABS:  CAPILLARY BLOOD GLUCOSE          04-02    133<L>  |  96<L>  |  18  ----------------------------<  116<H>  4.0   |  21<L>  |  0.75    Ca    8.8      02 Apr 2020 08:10  Phos  4.2     04-02  Mg     2.6     04-02    TPro  8.1  /  Alb  3.2<L>  /  TBili  0.3  /  DBili  x   /  AST  44<H>  /  ALT  44<H>  /  AlkPhos  91  04-02              RADIOLOGY & ADDITIONAL TESTS:    Imaging Personally Reviewed:    Consultant(s) Notes Reviewed:      Care Discussed with Consultants/Other Providers: Patient is a 44y old  Female who presents with a chief complaint of Acute Hypoxemic Respiratory Failure and Pneumonia (02 Apr 2020 12:27)      SUBJECTIVE / OVERNIGHT EVENTS: Pt states SOB slightly improved. She reports continued diarrhea but not as frequent.     Review of Systems:     MEDICATIONS  (STANDING):  ascorbic acid 500 milliGRAM(s) Oral two times a day  enoxaparin Injectable 40 milliGRAM(s) SubCutaneous every 12 hours  influenza   Vaccine 0.5 milliLiter(s) IntraMuscular once  methylPREDNISolone sodium succinate Injectable 50 milliGRAM(s) IV Push two times a day  Vital Signs Last 24 Hrs  T(C): 37.2 (03 Apr 2020 05:00), Max: 37.2 (03 Apr 2020 05:00)  T(F): 98.9 (03 Apr 2020 05:00), Max: 98.9 (03 Apr 2020 05:00)  HR: 105 (03 Apr 2020 05:00) (94 - 105)  BP: 155/99 (03 Apr 2020 05:00) (143/97 - 159/99)  BP(mean): --  RR: 18 (03 Apr 2020 05:00) (18 - 24)  SpO2: 95% (03 Apr 2020 05:00) (92% - 96%)multivitamin 1 Tablet(s) Oral daily  simvastatin 5 milliGRAM(s) Oral at bedtime  zinc sulfate 220 milliGRAM(s) Oral daily    MEDICATIONS  (PRN):  acetaminophen   Tablet .. 650 milliGRAM(s) Oral every 6 hours PRN Temp greater or equal to 38C (100.4F), Mild Pain (1 - 3)  ALBUTerol    90 MICROgram(s) HFA Inhaler 2 Puff(s) Inhalation every 6 hours PRN Shortness of Breath and/or Wheezing  guaiFENesin   Syrup  (Sugar-Free) 200 milliGRAM(s) Oral every 6 hours PRN Cough      PHYSICAL EXAM:  Vital Signs Last 24 Hrs  T(C): 37.2 (03 Apr 2020 05:00), Max: 37.2 (03 Apr 2020 05:00)  T(F): 98.9 (03 Apr 2020 05:00), Max: 98.9 (03 Apr 2020 05:00)  HR: 105 (03 Apr 2020 05:00) (94 - 105)  BP: 155/99 (03 Apr 2020 05:00) (143/97 - 159/99)  BP(mean): --  RR: 18 (03 Apr 2020 05:00) (18 - 24)  SpO2: 95% (03 Apr 2020 05:00) (92% - 96%)  PHYSICAL EXAM:  GENERAL: , winded with talking, obese ,proned on belly  HEENT: MMM, conjunctiva and sclera clear  NECK: Supple, No JVD, obese  CHEST/LUNG: CTA b/l, poor inspiratory effort, on NRB   HEART: Regular rate and rhythm; No murmurs, rubs, or gallops  ABDOMEN: Soft, Nontender, Nondistended; Bowel sounds present  EXTREMITIES:  2+ Peripheral Pulses, No clubbing, cyanosis, or edema  PSYCH: AAOx3  NEUROLOGY: non-focal  SKIN: No rashes or lesions      I&O's Summary    02 Apr 2020 07:01  -  03 Apr 2020 07:00  --------------------------------------------------------  IN: 400 mL / OUT: 200 mL / NET: 200 mL      LABS:  CAPILLARY BLOOD GLUCOSE          04-02    133<L>  |  96<L>  |  18  ----------------------------<  116<H>  4.0   |  21<L>  |  0.75    Ca    8.8      02 Apr 2020 08:10  Phos  4.2     04-02  Mg     2.6     04-02    TPro  8.1  /  Alb  3.2<L>  /  TBili  0.3  /  DBili  x   /  AST  44<H>  /  ALT  44<H>  /  AlkPhos  91  04-02              RADIOLOGY & ADDITIONAL TESTS:    Imaging Personally Reviewed:    Consultant(s) Notes Reviewed:      Care Discussed with Consultants/Other Providers:

## 2020-04-03 NOTE — PROGRESS NOTE ADULT - PROBLEM SELECTOR PLAN 6
BPs not at goal. However, pt also in discomfort from her respiratory status  -Given COVID severity will continue hold ARB for now.  -Continue to hold home HCTZ given recent hyponatremia   -If BPs continue rise will consider adding alternative agent such as norvasc

## 2020-04-04 LAB
ALBUMIN SERPL ELPH-MCNC: 3.5 G/DL — SIGNIFICANT CHANGE UP (ref 3.3–5)
ALP SERPL-CCNC: 100 U/L — SIGNIFICANT CHANGE UP (ref 40–120)
ALT FLD-CCNC: 35 U/L — HIGH (ref 4–33)
ANION GAP SERPL CALC-SCNC: 13 MMO/L — SIGNIFICANT CHANGE UP (ref 7–14)
AST SERPL-CCNC: 33 U/L — HIGH (ref 4–32)
BASOPHILS # BLD AUTO: 0.06 K/UL — SIGNIFICANT CHANGE UP (ref 0–0.2)
BASOPHILS NFR BLD AUTO: 0.2 % — SIGNIFICANT CHANGE UP (ref 0–2)
BILIRUB SERPL-MCNC: 0.4 MG/DL — SIGNIFICANT CHANGE UP (ref 0.2–1.2)
BUN SERPL-MCNC: 14 MG/DL — SIGNIFICANT CHANGE UP (ref 7–23)
CALCIUM SERPL-MCNC: 8.6 MG/DL — SIGNIFICANT CHANGE UP (ref 8.4–10.5)
CHLORIDE SERPL-SCNC: 101 MMOL/L — SIGNIFICANT CHANGE UP (ref 98–107)
CO2 SERPL-SCNC: 26 MMOL/L — SIGNIFICANT CHANGE UP (ref 22–31)
CREAT SERPL-MCNC: 0.76 MG/DL — SIGNIFICANT CHANGE UP (ref 0.5–1.3)
EOSINOPHIL # BLD AUTO: 0.32 K/UL — SIGNIFICANT CHANGE UP (ref 0–0.5)
EOSINOPHIL NFR BLD AUTO: 1.2 % — SIGNIFICANT CHANGE UP (ref 0–6)
GLUCOSE SERPL-MCNC: 112 MG/DL — HIGH (ref 70–99)
HCT VFR BLD CALC: 29.1 % — LOW (ref 34.5–45)
HGB BLD-MCNC: 8.6 G/DL — LOW (ref 11.5–15.5)
IMM GRANULOCYTES NFR BLD AUTO: 4.6 % — HIGH (ref 0–1.5)
LYMPHOCYTES # BLD AUTO: 2.21 K/UL — SIGNIFICANT CHANGE UP (ref 1–3.3)
LYMPHOCYTES # BLD AUTO: 8.1 % — LOW (ref 13–44)
MAGNESIUM SERPL-MCNC: 2.4 MG/DL — SIGNIFICANT CHANGE UP (ref 1.6–2.6)
MANUAL SMEAR VERIFICATION: SIGNIFICANT CHANGE UP
MCHC RBC-ENTMCNC: 21.9 PG — LOW (ref 27–34)
MCHC RBC-ENTMCNC: 29.6 % — LOW (ref 32–36)
MCV RBC AUTO: 74 FL — LOW (ref 80–100)
MONOCYTES # BLD AUTO: 1.14 K/UL — HIGH (ref 0–0.9)
MONOCYTES NFR BLD AUTO: 4.2 % — SIGNIFICANT CHANGE UP (ref 2–14)
NEUTROPHILS # BLD AUTO: 22.35 K/UL — HIGH (ref 1.8–7.4)
NEUTROPHILS NFR BLD AUTO: 81.7 % — HIGH (ref 43–77)
NRBC # FLD: 0.22 K/UL — SIGNIFICANT CHANGE UP (ref 0–0)
PHOSPHATE SERPL-MCNC: 3.3 MG/DL — SIGNIFICANT CHANGE UP (ref 2.5–4.5)
PLATELET # BLD AUTO: 311 K/UL — SIGNIFICANT CHANGE UP (ref 150–400)
PMV BLD: 9.7 FL — SIGNIFICANT CHANGE UP (ref 7–13)
POTASSIUM SERPL-MCNC: 3.8 MMOL/L — SIGNIFICANT CHANGE UP (ref 3.5–5.3)
POTASSIUM SERPL-SCNC: 3.8 MMOL/L — SIGNIFICANT CHANGE UP (ref 3.5–5.3)
PROT SERPL-MCNC: 7.3 G/DL — SIGNIFICANT CHANGE UP (ref 6–8.3)
RBC # BLD: 3.93 M/UL — SIGNIFICANT CHANGE UP (ref 3.8–5.2)
RBC # FLD: 23.5 % — HIGH (ref 10.3–14.5)
SODIUM SERPL-SCNC: 140 MMOL/L — SIGNIFICANT CHANGE UP (ref 135–145)
WBC # BLD: 27.35 K/UL — HIGH (ref 3.8–10.5)
WBC # FLD AUTO: 27.35 K/UL — HIGH (ref 3.8–10.5)

## 2020-04-04 PROCEDURE — 99233 SBSQ HOSP IP/OBS HIGH 50: CPT

## 2020-04-04 RX ADMIN — Medication 500 MILLIGRAM(S): at 16:38

## 2020-04-04 RX ADMIN — Medication 1 TABLET(S): at 13:35

## 2020-04-04 RX ADMIN — ENOXAPARIN SODIUM 40 MILLIGRAM(S): 100 INJECTION SUBCUTANEOUS at 16:38

## 2020-04-04 RX ADMIN — SIMVASTATIN 5 MILLIGRAM(S): 20 TABLET, FILM COATED ORAL at 23:07

## 2020-04-04 RX ADMIN — ENOXAPARIN SODIUM 40 MILLIGRAM(S): 100 INJECTION SUBCUTANEOUS at 05:24

## 2020-04-04 RX ADMIN — Medication 500 MILLIGRAM(S): at 05:24

## 2020-04-04 RX ADMIN — Medication 50 MILLIGRAM(S): at 05:24

## 2020-04-04 RX ADMIN — ZINC SULFATE TAB 220 MG (50 MG ZINC EQUIVALENT) 220 MILLIGRAM(S): 220 (50 ZN) TAB at 13:35

## 2020-04-04 NOTE — PROGRESS NOTE ADULT - PROBLEM SELECTOR PLAN 6
-Given COVID severity will continue hold ARB for now.  -Continue to hold home HCTZ given recent hyponatremia   -If BPs continue rise will consider adding alternative agent such as norvasc

## 2020-04-04 NOTE — PROGRESS NOTE ADULT - SUBJECTIVE AND OBJECTIVE BOX
Patient is a 44y old  Female who presents with a chief complaint of Acute Hypoxemic Respiratory Failure and Pneumonia (03 Apr 2020 07:37)      SUBJECTIVE / OVERNIGHT EVENTS:  Slowly improving  Soft stools  No abdominal pain  persistent cough    MEDICATIONS  (STANDING):  ascorbic acid 500 milliGRAM(s) Oral two times a day  enoxaparin Injectable 40 milliGRAM(s) SubCutaneous every 12 hours  influenza   Vaccine 0.5 milliLiter(s) IntraMuscular once  multivitamin 1 Tablet(s) Oral daily  simvastatin 5 milliGRAM(s) Oral at bedtime  zinc sulfate 220 milliGRAM(s) Oral daily    MEDICATIONS  (PRN):  acetaminophen   Tablet .. 650 milliGRAM(s) Oral every 6 hours PRN Temp greater or equal to 38C (100.4F), Mild Pain (1 - 3)  ALBUTerol    90 MICROgram(s) HFA Inhaler 2 Puff(s) Inhalation every 6 hours PRN Shortness of Breath and/or Wheezing  guaiFENesin   Syrup  (Sugar-Free) 200 milliGRAM(s) Oral every 6 hours PRN Cough      Vital Signs Last 24 Hrs  T(C): 36.9 (04 Apr 2020 11:46), Max: 37.1 (04 Apr 2020 05:23)  T(F): 98.4 (04 Apr 2020 11:46), Max: 98.7 (04 Apr 2020 05:23)  HR: 108 (04 Apr 2020 11:46) (102 - 108)  BP: 169/102 (04 Apr 2020 11:46) (153/84 - 169/102)  BP(mean): --  RR: 20 (04 Apr 2020 11:46) (20 - 20)  SpO2: 96% (04 Apr 2020 11:46) (95% - 96%)        PHYSICAL EXAM:  GENERAL: SOB with speaking, obese ,proned  HEENT: MMM, conjunctiva and sclera clear  NECK: Supple, No JVD, obese  CHEST/LUNG: CTA b/l, poor inspiratory effort, on NRB   HEART: Regular rate and rhythm; No murmurs, rubs, or gallops  ABDOMEN: Soft, Nontender, Nondistended; Bowel sounds present  EXTREMITIES:  2+ Peripheral Pulses, No clubbing, cyanosis, or edema  PSYCH: AAOx3  NEUROLOGY: non-focal  SKIN: No rashes or lesions    LABS:                        8.6    27.35 )-----------( 311      ( 04 Apr 2020 07:10 )             29.1     04-04    140  |  101  |  14  ----------------------------<  112<H>  3.8   |  26  |  0.76    Ca    8.6      04 Apr 2020 07:10  Phos  3.3     04-04  Mg     2.4     04-04    TPro  7.3  /  Alb  3.5  /  TBili  0.4  /  DBili  x   /  AST  33<H>  /  ALT  35<H>  /  AlkPhos  100  04-04

## 2020-04-04 NOTE — PROGRESS NOTE ADULT - PROBLEM SELECTOR PLAN 9
Dispo- pt needs further medical optimization  prior to dc.     1.  PCP Name                           =   2.  PCP Contacted at Admission =  [  ] Y       [  ] N          [  ] N/A  3.  PCP Contacted at Discharge  =  [  ] Y       [  ] N          [  ] N/A  4.  Post-Discharge Appointment Date and Location =   5.  Summary of Handoff Given to PCP                    =
1.  PCP Name                           =   2.  PCP Contacted at Admission =  [  ] Y       [  ] N          [  ] N/A  3.  PCP Contacted at Discharge  =  [  ] Y       [  ] N          [  ] N/A  4.  Post-Discharge Appointment Date and Location =   5.  Summary of Handoff Given to PCP                    =
1.  PCP Name                           =   2.  PCP Contacted at Admission =  [  ] Y       [  ] N          [  ] N/A  3.  PCP Contacted at Discharge  =  [  ] Y       [  ] N          [  ] N/A  4.  Post-Discharge Appointment Date and Location =   5.  Summary of Handoff Given to PCP                    =
Dispo- pt needs further medical optimization  prior to dc.     1.  PCP Name                           =   2.  PCP Contacted at Admission =  [  ] Y       [  ] N          [  ] N/A  3.  PCP Contacted at Discharge  =  [  ] Y       [  ] N          [  ] N/A  4.  Post-Discharge Appointment Date and Location =   5.  Summary of Handoff Given to PCP                    =

## 2020-04-04 NOTE — PROGRESS NOTE ADULT - ATTENDING COMMENTS
Miguelito Quintana MD.    Attending Physician, [facility].   x [phone]     CPT [id number]  Diagnoses  Acute hypoxic respiratory failure  Pneumonia due to novel coronovirus  multifocal pneumonia  Hyponatremia  Hypokalemia  Transaminitis  Hypertension

## 2020-04-04 NOTE — PROGRESS NOTE ADULT - PROBLEM SELECTOR PLAN 1
Multifocal PNA complicated by acute hypoxic respiratory failure in setting of COVID 19 viral infection.   CXR with B/L patchy opacities  -S/p Tocilizumab, plaquenil, solumedrol  -Trending inflammatory markers  -Requiring non-rebreather with NC.   - Monitor O2 sats closely.   - cont prone as tolerates

## 2020-04-04 NOTE — PROGRESS NOTE ADULT - PROBLEM SELECTOR PLAN 7
A1C is 5.9%. not on any hypoglycemics  -Continue to monitor FS on BMP  -IF FS begin to increase will add ISS  -once clinically improved will need to encourage weight loss and lifestyle/ diet changes

## 2020-04-05 LAB
ALBUMIN SERPL ELPH-MCNC: 3.5 G/DL — SIGNIFICANT CHANGE UP (ref 3.3–5)
ALP SERPL-CCNC: 115 U/L — SIGNIFICANT CHANGE UP (ref 40–120)
ALT FLD-CCNC: 43 U/L — HIGH (ref 4–33)
ANION GAP SERPL CALC-SCNC: 13 MMO/L — SIGNIFICANT CHANGE UP (ref 7–14)
AST SERPL-CCNC: 47 U/L — HIGH (ref 4–32)
BASOPHILS # BLD AUTO: 0.04 K/UL — SIGNIFICANT CHANGE UP (ref 0–0.2)
BASOPHILS NFR BLD AUTO: 0.2 % — SIGNIFICANT CHANGE UP (ref 0–2)
BILIRUB SERPL-MCNC: 0.4 MG/DL — SIGNIFICANT CHANGE UP (ref 0.2–1.2)
BUN SERPL-MCNC: 11 MG/DL — SIGNIFICANT CHANGE UP (ref 7–23)
CALCIUM SERPL-MCNC: 8.7 MG/DL — SIGNIFICANT CHANGE UP (ref 8.4–10.5)
CHLORIDE SERPL-SCNC: 100 MMOL/L — SIGNIFICANT CHANGE UP (ref 98–107)
CO2 SERPL-SCNC: 28 MMOL/L — SIGNIFICANT CHANGE UP (ref 22–31)
CREAT SERPL-MCNC: 0.65 MG/DL — SIGNIFICANT CHANGE UP (ref 0.5–1.3)
CRP SERPL-MCNC: 9.2 MG/L — HIGH
EOSINOPHIL # BLD AUTO: 0.99 K/UL — HIGH (ref 0–0.5)
EOSINOPHIL NFR BLD AUTO: 4.3 % — SIGNIFICANT CHANGE UP (ref 0–6)
FERRITIN SERPL-MCNC: 189.4 NG/ML — HIGH (ref 15–150)
GLUCOSE SERPL-MCNC: 96 MG/DL — SIGNIFICANT CHANGE UP (ref 70–99)
HCT VFR BLD CALC: 30.1 % — LOW (ref 34.5–45)
HGB BLD-MCNC: 8.6 G/DL — LOW (ref 11.5–15.5)
IMM GRANULOCYTES NFR BLD AUTO: 4.1 % — HIGH (ref 0–1.5)
LYMPHOCYTES # BLD AUTO: 1.98 K/UL — SIGNIFICANT CHANGE UP (ref 1–3.3)
LYMPHOCYTES # BLD AUTO: 8.5 % — LOW (ref 13–44)
MAGNESIUM SERPL-MCNC: 2.1 MG/DL — SIGNIFICANT CHANGE UP (ref 1.6–2.6)
MCHC RBC-ENTMCNC: 21.9 PG — LOW (ref 27–34)
MCHC RBC-ENTMCNC: 28.6 % — LOW (ref 32–36)
MCV RBC AUTO: 76.8 FL — LOW (ref 80–100)
MONOCYTES # BLD AUTO: 1.17 K/UL — HIGH (ref 0–0.9)
MONOCYTES NFR BLD AUTO: 5 % — SIGNIFICANT CHANGE UP (ref 2–14)
NEUTROPHILS # BLD AUTO: 18.08 K/UL — HIGH (ref 1.8–7.4)
NEUTROPHILS NFR BLD AUTO: 77.9 % — HIGH (ref 43–77)
NRBC # FLD: 0.12 K/UL — SIGNIFICANT CHANGE UP (ref 0–0)
PHOSPHATE SERPL-MCNC: 3.1 MG/DL — SIGNIFICANT CHANGE UP (ref 2.5–4.5)
PLATELET # BLD AUTO: 271 K/UL — SIGNIFICANT CHANGE UP (ref 150–400)
PMV BLD: 10.4 FL — SIGNIFICANT CHANGE UP (ref 7–13)
POTASSIUM SERPL-MCNC: 3.7 MMOL/L — SIGNIFICANT CHANGE UP (ref 3.5–5.3)
POTASSIUM SERPL-SCNC: 3.7 MMOL/L — SIGNIFICANT CHANGE UP (ref 3.5–5.3)
PROCALCITONIN SERPL-MCNC: 0.51 NG/ML — HIGH (ref 0.02–0.1)
PROT SERPL-MCNC: 7.4 G/DL — SIGNIFICANT CHANGE UP (ref 6–8.3)
RBC # BLD: 3.92 M/UL — SIGNIFICANT CHANGE UP (ref 3.8–5.2)
RBC # FLD: 24.5 % — HIGH (ref 10.3–14.5)
SODIUM SERPL-SCNC: 141 MMOL/L — SIGNIFICANT CHANGE UP (ref 135–145)
WBC # BLD: 23.21 K/UL — HIGH (ref 3.8–10.5)
WBC # FLD AUTO: 23.21 K/UL — HIGH (ref 3.8–10.5)

## 2020-04-05 PROCEDURE — 99233 SBSQ HOSP IP/OBS HIGH 50: CPT

## 2020-04-05 RX ORDER — ACETAMINOPHEN 500 MG
1000 TABLET ORAL ONCE
Refills: 0 | Status: DISCONTINUED | OUTPATIENT
Start: 2020-04-05 | End: 2020-04-09

## 2020-04-05 RX ORDER — FUROSEMIDE 40 MG
20 TABLET ORAL ONCE
Refills: 0 | Status: DISCONTINUED | OUTPATIENT
Start: 2020-04-05 | End: 2020-04-09

## 2020-04-05 RX ORDER — ACETAMINOPHEN 500 MG
975 TABLET ORAL ONCE
Refills: 0 | Status: COMPLETED | OUTPATIENT
Start: 2020-04-05 | End: 2020-04-05

## 2020-04-05 RX ADMIN — SIMVASTATIN 5 MILLIGRAM(S): 20 TABLET, FILM COATED ORAL at 22:07

## 2020-04-05 RX ADMIN — Medication 975 MILLIGRAM(S): at 12:22

## 2020-04-05 RX ADMIN — ENOXAPARIN SODIUM 40 MILLIGRAM(S): 100 INJECTION SUBCUTANEOUS at 05:58

## 2020-04-05 RX ADMIN — Medication 500 MILLIGRAM(S): at 05:58

## 2020-04-05 RX ADMIN — ZINC SULFATE TAB 220 MG (50 MG ZINC EQUIVALENT) 220 MILLIGRAM(S): 220 (50 ZN) TAB at 14:14

## 2020-04-05 RX ADMIN — ENOXAPARIN SODIUM 40 MILLIGRAM(S): 100 INJECTION SUBCUTANEOUS at 17:22

## 2020-04-05 RX ADMIN — Medication 1 TABLET(S): at 14:14

## 2020-04-05 RX ADMIN — Medication 500 MILLIGRAM(S): at 17:22

## 2020-04-05 NOTE — PROGRESS NOTE ADULT - ATTENDING COMMENTS
Miguelito Quintana MD.    Attending Physician, [facility].   x [phone]     CPT [id number]  Diagnoses  Acute hypoxic respiratory failure  Pneumonia due to novel coronovirus  multifocal pneumonia  Transaminitis  Hypertension

## 2020-04-05 NOTE — PROGRESS NOTE ADULT - SUBJECTIVE AND OBJECTIVE BOX
Patient is a 44y old  Female who presents with a chief complaint of Acute Hypoxemic Respiratory Failure and Pneumonia (04 Apr 2020 19:55)      SUBJECTIVE / OVERNIGHT EVENTS:  Patient seen and examined at RRT - see RRT note    MEDICATIONS  (STANDING):  acetaminophen  IVPB .. 1000 milliGRAM(s) IV Intermittent once  ascorbic acid 500 milliGRAM(s) Oral two times a day  enoxaparin Injectable 40 milliGRAM(s) SubCutaneous every 12 hours  furosemide   Injectable 20 milliGRAM(s) IV Push once  influenza   Vaccine 0.5 milliLiter(s) IntraMuscular once  multivitamin 1 Tablet(s) Oral daily  simvastatin 5 milliGRAM(s) Oral at bedtime  zinc sulfate 220 milliGRAM(s) Oral daily    MEDICATIONS  (PRN):  acetaminophen   Tablet .. 650 milliGRAM(s) Oral every 6 hours PRN Temp greater or equal to 38C (100.4F), Mild Pain (1 - 3)  ALBUTerol    90 MICROgram(s) HFA Inhaler 2 Puff(s) Inhalation every 6 hours PRN Shortness of Breath and/or Wheezing  guaiFENesin   Syrup  (Sugar-Free) 200 milliGRAM(s) Oral every 6 hours PRN Cough      Vital Signs Last 24 Hrs  T(C): 38.6 (05 Apr 2020 12:20), Max: 38.6 (05 Apr 2020 12:20)  T(F): 101.4 (05 Apr 2020 12:20), Max: 101.4 (05 Apr 2020 12:20)  HR: 104 (05 Apr 2020 12:39) (96 - 104)  BP: 161/104 (05 Apr 2020 12:39) (148/97 - 161/104)  BP(mean): --  RR: 19 (05 Apr 2020 12:39) (19 - 19)  SpO2: 91% (05 Apr 2020 12:39) (91% - 97%)        PHYSICAL EXAM:  GENERAL: SOB with speaking, obese ,proned  HEENT: MMM, conjunctiva and sclera clear  NECK: Supple, No JVD, obese  CHEST/LUNG: Basilar rales  HEART: Regular rate and rhythm; No murmurs, rubs, or gallops  ABDOMEN: Soft, Nontender, Nondistended; Bowel sounds present  EXTREMITIES:  2+ Peripheral Pulses, No clubbing, cyanosis, or edema  PSYCH: AAOx3  NEUROLOGY: non-focal  SKIN: No rashes or lesions    LABS:                        8.6    23.21 )-----------( 271      ( 05 Apr 2020 10:32 )             30.1     04-05    141  |  100  |  11  ----------------------------<  96  3.7   |  28  |  0.65    Ca    8.7      05 Apr 2020 10:32  Phos  3.1     04-05  Mg     2.1     04-05    TPro  7.4  /  Alb  3.5  /  TBili  0.4  /  DBili  x   /  AST  47<H>  /  ALT  43<H>  /  AlkPhos  115  04-05

## 2020-04-05 NOTE — CHART NOTE - NSCHARTNOTEFT_GEN_A_CORE
Notified by RN that patient is desaturating in the low 70's, patient is already prone position and on 15L NRB.   RRT was called patient was given Tylenol 975mg PO and Lasix 40 IV push x1 and aggressive Chest PT was performed.   Oxygen saturation improved to 95% on 15 L NRB. RRT team and attending at patient bedside.   Will continue to monitor.

## 2020-04-05 NOTE — RAPID RESPONSE TEAM SUMMARY - NSSITUATIONBACKGROUNDRRT_GEN_ALL_CORE
44F with HTN, pre-dm, obesity admitted with COVID-19 viral PNA. RRT called for hypoxia to 70s. Upon arrival to RRT, patient proned, on NRB, saturating 88%. Rectally febrile, and primary attending at bedside auscultating lungs. Ordere 40 mg IV lasix, 975 PO tylenol. Resultant saturation 93-95%.

## 2020-04-05 NOTE — PROGRESS NOTE ADULT - PROBLEM SELECTOR PLAN 1
Multifocal PNA complicated by acute hypoxic respiratory failure in setting of COVID 19 viral infection. s/p RRT on 4/5 for hypoxia - given lasix with some improvement  CXR with B/L patchy opacities  -S/p Tocilizumab, plaquenil, solumedrol  -Trending inflammatory markers  -Requiring non-rebreather with NC.   - Monitor O2 sats closely.   - cont prone with NRB as tolerates

## 2020-04-05 NOTE — PROGRESS NOTE ADULT - PROBLEM SELECTOR PLAN 4
-Given COVID severity will continue hold ARB for now.  -Hold home HCTZ given recent hyponatremia   -If BPs continue rise will consider adding alternative agent such as norvasc

## 2020-04-06 LAB
ALBUMIN SERPL ELPH-MCNC: 3.7 G/DL — SIGNIFICANT CHANGE UP (ref 3.3–5)
ALP SERPL-CCNC: 112 U/L — SIGNIFICANT CHANGE UP (ref 40–120)
ALT FLD-CCNC: 63 U/L — HIGH (ref 4–33)
ANION GAP SERPL CALC-SCNC: 15 MMO/L — HIGH (ref 7–14)
AST SERPL-CCNC: 52 U/L — HIGH (ref 4–32)
BASOPHILS # BLD AUTO: 0.05 K/UL — SIGNIFICANT CHANGE UP (ref 0–0.2)
BASOPHILS NFR BLD AUTO: 0.3 % — SIGNIFICANT CHANGE UP (ref 0–2)
BILIRUB SERPL-MCNC: 0.3 MG/DL — SIGNIFICANT CHANGE UP (ref 0.2–1.2)
BUN SERPL-MCNC: 12 MG/DL — SIGNIFICANT CHANGE UP (ref 7–23)
CALCIUM SERPL-MCNC: 8.9 MG/DL — SIGNIFICANT CHANGE UP (ref 8.4–10.5)
CHLORIDE SERPL-SCNC: 95 MMOL/L — LOW (ref 98–107)
CO2 SERPL-SCNC: 25 MMOL/L — SIGNIFICANT CHANGE UP (ref 22–31)
CREAT SERPL-MCNC: 0.63 MG/DL — SIGNIFICANT CHANGE UP (ref 0.5–1.3)
EOSINOPHIL # BLD AUTO: 1.24 K/UL — HIGH (ref 0–0.5)
EOSINOPHIL NFR BLD AUTO: 6.3 % — HIGH (ref 0–6)
GLUCOSE SERPL-MCNC: 103 MG/DL — HIGH (ref 70–99)
HCT VFR BLD CALC: 33.1 % — LOW (ref 34.5–45)
HGB BLD-MCNC: 9.4 G/DL — LOW (ref 11.5–15.5)
IMM GRANULOCYTES NFR BLD AUTO: 3.8 % — HIGH (ref 0–1.5)
LYMPHOCYTES # BLD AUTO: 11.3 % — LOW (ref 13–44)
LYMPHOCYTES # BLD AUTO: 2.21 K/UL — SIGNIFICANT CHANGE UP (ref 1–3.3)
MAGNESIUM SERPL-MCNC: 2.1 MG/DL — SIGNIFICANT CHANGE UP (ref 1.6–2.6)
MCHC RBC-ENTMCNC: 21.9 PG — LOW (ref 27–34)
MCHC RBC-ENTMCNC: 28.4 % — LOW (ref 32–36)
MCV RBC AUTO: 77 FL — LOW (ref 80–100)
MONOCYTES # BLD AUTO: 1.15 K/UL — HIGH (ref 0–0.9)
MONOCYTES NFR BLD AUTO: 5.9 % — SIGNIFICANT CHANGE UP (ref 2–14)
NEUTROPHILS # BLD AUTO: 14.25 K/UL — HIGH (ref 1.8–7.4)
NEUTROPHILS NFR BLD AUTO: 72.4 % — SIGNIFICANT CHANGE UP (ref 43–77)
NRBC # FLD: 0.05 K/UL — SIGNIFICANT CHANGE UP (ref 0–0)
PHOSPHATE SERPL-MCNC: 4.1 MG/DL — SIGNIFICANT CHANGE UP (ref 2.5–4.5)
PLATELET # BLD AUTO: 264 K/UL — SIGNIFICANT CHANGE UP (ref 150–400)
PMV BLD: SIGNIFICANT CHANGE UP FL (ref 7–13)
POTASSIUM SERPL-MCNC: 3.6 MMOL/L — SIGNIFICANT CHANGE UP (ref 3.5–5.3)
POTASSIUM SERPL-SCNC: 3.6 MMOL/L — SIGNIFICANT CHANGE UP (ref 3.5–5.3)
PROT SERPL-MCNC: 7.6 G/DL — SIGNIFICANT CHANGE UP (ref 6–8.3)
RBC # BLD: 4.3 M/UL — SIGNIFICANT CHANGE UP (ref 3.8–5.2)
RBC # FLD: 24.9 % — HIGH (ref 10.3–14.5)
SODIUM SERPL-SCNC: 135 MMOL/L — SIGNIFICANT CHANGE UP (ref 135–145)
WBC # BLD: 19.64 K/UL — HIGH (ref 3.8–10.5)
WBC # FLD AUTO: 19.64 K/UL — HIGH (ref 3.8–10.5)

## 2020-04-06 PROCEDURE — 99233 SBSQ HOSP IP/OBS HIGH 50: CPT

## 2020-04-06 RX ADMIN — Medication 500 MILLIGRAM(S): at 18:15

## 2020-04-06 RX ADMIN — ENOXAPARIN SODIUM 40 MILLIGRAM(S): 100 INJECTION SUBCUTANEOUS at 18:15

## 2020-04-06 RX ADMIN — ENOXAPARIN SODIUM 40 MILLIGRAM(S): 100 INJECTION SUBCUTANEOUS at 06:26

## 2020-04-06 RX ADMIN — SIMVASTATIN 5 MILLIGRAM(S): 20 TABLET, FILM COATED ORAL at 23:00

## 2020-04-06 RX ADMIN — Medication 1 TABLET(S): at 11:55

## 2020-04-06 RX ADMIN — Medication 500 MILLIGRAM(S): at 06:25

## 2020-04-06 RX ADMIN — ZINC SULFATE TAB 220 MG (50 MG ZINC EQUIVALENT) 220 MILLIGRAM(S): 220 (50 ZN) TAB at 16:18

## 2020-04-06 NOTE — PROGRESS NOTE ADULT - PROBLEM SELECTOR PLAN 1
Multifocal PNA complicated by acute hypoxic respiratory failure in setting of COVID 19 viral infection. s/p RRT on 4/5 for hypoxia - given lasix with some improvement  CXR with B/L patchy opacities  -S/p Tocilizumab, plaquenil, solumedrol  -Trending inflammatory markers --improving, trend daily   -Requiring non-rebreather -- continue for now, will attempt to wean tomorrow   - Monitor O2 sats closely  - c/w prone position but allow intermittent supine position to see if she tolerates

## 2020-04-06 NOTE — PROGRESS NOTE ADULT - ASSESSMENT
Patient is a 44yoF with HTN, pre-dm, obesity admitted with COVID-19 viral PNA. Desaturated yesterday, now on NRB and stabilized.

## 2020-04-06 NOTE — PROGRESS NOTE ADULT - SUBJECTIVE AND OBJECTIVE BOX
Medicine Progress note    Patient is a 44y old  Female who presents with a chief complaint of Acute Hypoxemic Respiratory Failure and Pneumonia (05 Apr 2020 18:17)      SUBJECTIVE / OVERNIGHT EVENTS: RRT yesterday for desaturation to 70s, placed on NRB and has stabilized since. Saw her today while lying prone with NRB on, breathing comfortably with it, can't take it off for too long however. Able to lay flat to use bedpan without SOB however. Feels better than yesterday. No other complaints.     MEDICATIONS  (STANDING):  acetaminophen  IVPB .. 1000 milliGRAM(s) IV Intermittent once  ascorbic acid 500 milliGRAM(s) Oral two times a day  enoxaparin Injectable 40 milliGRAM(s) SubCutaneous every 12 hours  furosemide   Injectable 20 milliGRAM(s) IV Push once  influenza   Vaccine 0.5 milliLiter(s) IntraMuscular once  multivitamin 1 Tablet(s) Oral daily  simvastatin 5 milliGRAM(s) Oral at bedtime  zinc sulfate 220 milliGRAM(s) Oral daily    MEDICATIONS  (PRN):  acetaminophen   Tablet .. 650 milliGRAM(s) Oral every 6 hours PRN Temp greater or equal to 38C (100.4F), Mild Pain (1 - 3)  ALBUTerol    90 MICROgram(s) HFA Inhaler 2 Puff(s) Inhalation every 6 hours PRN Shortness of Breath and/or Wheezing  guaiFENesin   Syrup  (Sugar-Free) 200 milliGRAM(s) Oral every 6 hours PRN Cough    I&O's Summary    06 Apr 2020 07:01  -  06 Apr 2020 18:59  --------------------------------------------------------  IN: 300 mL / OUT: 0 mL / NET: 300 mL      PHYSICAL EXAM:  Vital Signs Last 24 Hrs  T(C): 36.5 (06 Apr 2020 12:16), Max: 36.5 (06 Apr 2020 12:16)  T(F): 97.7 (06 Apr 2020 12:16), Max: 97.7 (06 Apr 2020 12:16)  HR: 98 (06 Apr 2020 15:26) (90 - 117)  BP: 140/92 (06 Apr 2020 12:16) (139/99 - 141/105)  RR: 18 (06 Apr 2020 15:26) (18 - 20)  SpO2: 99% (06 Apr 2020 15:26) (93% - 99%)    GENERAL: + NRB, obese, proned  HEENT: MMM, conjunctiva and sclera clear  NECK: Supple, No JVD  CHEST/LUNG: Basilar rales  HEART: Regular rate and rhythm; No murmurs, rubs, or gallops  ABDOMEN: Soft, Nontender, Nondistended; Bowel sounds present  EXTREMITIES:  2+ Peripheral Pulses, No clubbing, cyanosis, or edema  PSYCH: AAOx3  NEUROLOGY: non-focal  SKIN: No rashes or lesions    LABS:                        9.4    19.64 )-----------( 264      ( 06 Apr 2020 03:20 )             33.1     04-06    135  |  95<L>  |  12  ----------------------------<  103<H>  3.6   |  25  |  0.63    Ca    8.9      06 Apr 2020 03:20  Phos  4.1     04-06  Mg     2.1     04-06    TPro  7.6  /  Alb  3.7  /  TBili  0.3  /  DBili  x   /  AST  52<H>  /  ALT  63<H>  /  AlkPhos  112  04-06    COVID-19 PCR: Detected (03-28-20 @ 17:24)      RADIOLOGY & ADDITIONAL TESTS:  Imaging from Last 24 Hours:     Case discussed with: PA

## 2020-04-07 LAB
ANION GAP SERPL CALC-SCNC: 16 MMO/L — HIGH (ref 7–14)
BUN SERPL-MCNC: 15 MG/DL — SIGNIFICANT CHANGE UP (ref 7–23)
CALCIUM SERPL-MCNC: 9.3 MG/DL — SIGNIFICANT CHANGE UP (ref 8.4–10.5)
CHLORIDE SERPL-SCNC: 99 MMOL/L — SIGNIFICANT CHANGE UP (ref 98–107)
CO2 SERPL-SCNC: 25 MMOL/L — SIGNIFICANT CHANGE UP (ref 22–31)
CREAT SERPL-MCNC: 0.66 MG/DL — SIGNIFICANT CHANGE UP (ref 0.5–1.3)
CRP SERPL-MCNC: 4.2 MG/L — SIGNIFICANT CHANGE UP
D DIMER BLD IA.RAPID-MCNC: 3741 NG/ML — SIGNIFICANT CHANGE UP
ERYTHROCYTE [SEDIMENTATION RATE] IN BLOOD: 34 MM/HR — HIGH (ref 4–25)
GLUCOSE SERPL-MCNC: 131 MG/DL — HIGH (ref 70–99)
HCT VFR BLD CALC: 36.9 % — SIGNIFICANT CHANGE UP (ref 34.5–45)
HGB BLD-MCNC: 10.7 G/DL — LOW (ref 11.5–15.5)
MCHC RBC-ENTMCNC: 22.7 PG — LOW (ref 27–34)
MCHC RBC-ENTMCNC: 29 % — LOW (ref 32–36)
MCV RBC AUTO: 78.2 FL — LOW (ref 80–100)
NRBC # FLD: 0.02 K/UL — SIGNIFICANT CHANGE UP (ref 0–0)
PLATELET # BLD AUTO: 316 K/UL — SIGNIFICANT CHANGE UP (ref 150–400)
PMV BLD: SIGNIFICANT CHANGE UP FL (ref 7–13)
POTASSIUM SERPL-MCNC: 3.9 MMOL/L — SIGNIFICANT CHANGE UP (ref 3.5–5.3)
POTASSIUM SERPL-SCNC: 3.9 MMOL/L — SIGNIFICANT CHANGE UP (ref 3.5–5.3)
RBC # BLD: 4.72 M/UL — SIGNIFICANT CHANGE UP (ref 3.8–5.2)
RBC # FLD: 26.5 % — HIGH (ref 10.3–14.5)
SODIUM SERPL-SCNC: 140 MMOL/L — SIGNIFICANT CHANGE UP (ref 135–145)
WBC # BLD: 16.13 K/UL — HIGH (ref 3.8–10.5)
WBC # FLD AUTO: 16.13 K/UL — HIGH (ref 3.8–10.5)

## 2020-04-07 PROCEDURE — 99232 SBSQ HOSP IP/OBS MODERATE 35: CPT

## 2020-04-07 RX ORDER — AMLODIPINE BESYLATE 2.5 MG/1
5 TABLET ORAL DAILY
Refills: 0 | Status: DISCONTINUED | OUTPATIENT
Start: 2020-04-07 | End: 2020-06-09

## 2020-04-07 RX ADMIN — SIMVASTATIN 5 MILLIGRAM(S): 20 TABLET, FILM COATED ORAL at 21:17

## 2020-04-07 RX ADMIN — ZINC SULFATE TAB 220 MG (50 MG ZINC EQUIVALENT) 220 MILLIGRAM(S): 220 (50 ZN) TAB at 16:02

## 2020-04-07 RX ADMIN — Medication 1 TABLET(S): at 16:02

## 2020-04-07 RX ADMIN — ENOXAPARIN SODIUM 40 MILLIGRAM(S): 100 INJECTION SUBCUTANEOUS at 06:25

## 2020-04-07 RX ADMIN — Medication 500 MILLIGRAM(S): at 06:25

## 2020-04-07 RX ADMIN — Medication 500 MILLIGRAM(S): at 16:02

## 2020-04-07 RX ADMIN — ENOXAPARIN SODIUM 40 MILLIGRAM(S): 100 INJECTION SUBCUTANEOUS at 16:02

## 2020-04-07 NOTE — PROGRESS NOTE ADULT - PROBLEM SELECTOR PLAN 1
Multifocal PNA complicated by acute hypoxic respiratory failure in setting of COVID 19 viral infection. s/p RRT on 4/5 for hypoxia - given lasix with some improvement  CXR with B/L patchy opacities  - S/p Tocilizumab on 4/1, plaquenil, solumedrol  - Trending inflammatory markers --improving, trend daily   - Requiring non-rebreather -- attempt to wean as tolerated    - Monitor O2 sats closely, maintain > 90%  - c/w prone position but allow intermittent supine position to see if she tolerates

## 2020-04-07 NOTE — PROGRESS NOTE ADULT - ASSESSMENT
Patient is a 44 yoF with HTN, pre-dm, obesity admitted with COVID-19 viral PNA. Desaturated 2 days ago, remains on NRB but has stabilized.

## 2020-04-07 NOTE — PROGRESS NOTE ADULT - SUBJECTIVE AND OBJECTIVE BOX
Medicine Progress note    Patient is a 44y old  Female who presents with a chief complaint of Acute Hypoxemic Respiratory Failure and Pneumonia (06 Apr 2020 18:59)    SUBJECTIVE / OVERNIGHT EVENTS: Pt seen/examined. Stable today, sleeping comfortably when I saw her, NRB on her face. No overnight events.     MEDICATIONS  (STANDING):  acetaminophen  IVPB .. 1000 milliGRAM(s) IV Intermittent once  ascorbic acid 500 milliGRAM(s) Oral two times a day  enoxaparin Injectable 40 milliGRAM(s) SubCutaneous every 12 hours  furosemide   Injectable 20 milliGRAM(s) IV Push once  influenza   Vaccine 0.5 milliLiter(s) IntraMuscular once  multivitamin 1 Tablet(s) Oral daily  simvastatin 5 milliGRAM(s) Oral at bedtime  zinc sulfate 220 milliGRAM(s) Oral daily    MEDICATIONS  (PRN):  acetaminophen   Tablet .. 650 milliGRAM(s) Oral every 6 hours PRN Temp greater or equal to 38C (100.4F), Mild Pain (1 - 3)  ALBUTerol    90 MICROgram(s) HFA Inhaler 2 Puff(s) Inhalation every 6 hours PRN Shortness of Breath and/or Wheezing  guaiFENesin   Syrup  (Sugar-Free) 200 milliGRAM(s) Oral every 6 hours PRN Cough    CAPILLARY BLOOD GLUCOSE    I&O's Summary    06 Apr 2020 07:01  -  07 Apr 2020 07:00  --------------------------------------------------------  IN: 300 mL / OUT: 0 mL / NET: 300 mL    PHYSICAL EXAM:  Vital Signs Last 24 Hrs  T(C): 36.7 (07 Apr 2020 11:21), Max: 36.7 (06 Apr 2020 22:59)  T(F): 98.1 (07 Apr 2020 11:21), Max: 98.1 (07 Apr 2020 11:21)  HR: 116 (07 Apr 2020 11:21) (84 - 116)  BP: 143/98 (07 Apr 2020 11:21) (143/98 - 146/62)  RR: 17 (07 Apr 2020 11:21) (17 - 18)  SpO2: 99% (07 Apr 2020 11:21) (96% - 99%)    GENERAL: + NRB, obese, proned  HEENT: MMM, conjunctiva and sclera clear  NECK: Supple, No JVD  CHEST/LUNG: Basilar rales, breathing more comfortably today  HEART: Regular rate and rhythm; No murmurs  ABDOMEN: Soft, Nontender, Nondistended  EXTREMITIES: no edema  PSYCH: AAOx3  NEUROLOGY: non-focal  SKIN: No rashes or lesions    LABS:                        10.7   16.13 )-----------( 316      ( 07 Apr 2020 14:00 )             36.9     04-07    140  |  99  |  15  ----------------------------<  131<H>  3.9   |  25  |  0.66    Ca    9.3      07 Apr 2020 14:00  Phos  4.1     04-06  Mg     2.1     04-06    TPro  7.6  /  Alb  3.7  /  TBili  0.3  /  DBili  x   /  AST  52<H>  /  ALT  63<H>  /  AlkPhos  112  04-06    COVID-19 PCR: Detected (03-28-20 @ 17:24)    RADIOLOGY & ADDITIONAL TESTS:  Imaging from Last 24 Hours:    Electrocardiogram/QTc Interval:    Case discussed with: PA

## 2020-04-08 PROCEDURE — 99232 SBSQ HOSP IP/OBS MODERATE 35: CPT

## 2020-04-08 RX ADMIN — ENOXAPARIN SODIUM 40 MILLIGRAM(S): 100 INJECTION SUBCUTANEOUS at 16:43

## 2020-04-08 RX ADMIN — ENOXAPARIN SODIUM 40 MILLIGRAM(S): 100 INJECTION SUBCUTANEOUS at 05:14

## 2020-04-08 RX ADMIN — Medication 1 TABLET(S): at 16:43

## 2020-04-08 RX ADMIN — Medication 500 MILLIGRAM(S): at 05:13

## 2020-04-08 RX ADMIN — ZINC SULFATE TAB 220 MG (50 MG ZINC EQUIVALENT) 220 MILLIGRAM(S): 220 (50 ZN) TAB at 16:43

## 2020-04-08 RX ADMIN — Medication 500 MILLIGRAM(S): at 16:43

## 2020-04-08 RX ADMIN — SIMVASTATIN 5 MILLIGRAM(S): 20 TABLET, FILM COATED ORAL at 20:04

## 2020-04-08 RX ADMIN — AMLODIPINE BESYLATE 5 MILLIGRAM(S): 2.5 TABLET ORAL at 05:13

## 2020-04-08 NOTE — PROGRESS NOTE ADULT - SUBJECTIVE AND OBJECTIVE BOX
Medicine Progress note    Patient is a 44y old  Female who presents with a chief complaint of Acute Hypoxemic Respiratory Failure and Pneumonia (07 Apr 2020 17:25)      SUBJECTIVE / OVERNIGHT EVENTS: Pt seen/examined. Remains unable to wean from NRB. Stable lying down, best proned, but ETIENNE with even mild exertion in bed. No other complaints.     MEDICATIONS  (STANDING):  acetaminophen  IVPB .. 1000 milliGRAM(s) IV Intermittent once  amLODIPine   Tablet 5 milliGRAM(s) Oral daily  ascorbic acid 500 milliGRAM(s) Oral two times a day  enoxaparin Injectable 40 milliGRAM(s) SubCutaneous every 12 hours  furosemide   Injectable 20 milliGRAM(s) IV Push once  influenza   Vaccine 0.5 milliLiter(s) IntraMuscular once  multivitamin 1 Tablet(s) Oral daily  simvastatin 5 milliGRAM(s) Oral at bedtime  zinc sulfate 220 milliGRAM(s) Oral daily    MEDICATIONS  (PRN):  acetaminophen   Tablet .. 650 milliGRAM(s) Oral every 6 hours PRN Temp greater or equal to 38C (100.4F), Mild Pain (1 - 3)  ALBUTerol    90 MICROgram(s) HFA Inhaler 2 Puff(s) Inhalation every 6 hours PRN Shortness of Breath and/or Wheezing  guaiFENesin   Syrup  (Sugar-Free) 200 milliGRAM(s) Oral every 6 hours PRN Cough      CAPILLARY BLOOD GLUCOSE        I&O's Summary    07 Apr 2020 07:01  -  08 Apr 2020 07:00  --------------------------------------------------------  IN: 950 mL / OUT: 0 mL / NET: 950 mL    08 Apr 2020 07:01  -  08 Apr 2020 23:29  --------------------------------------------------------  IN: 540 mL / OUT: 350 mL / NET: 190 mL    PHYSICAL EXAM:  Vital Signs Last 24 Hrs  T(C): 36.3 (08 Apr 2020 11:53), Max: 37.1 (08 Apr 2020 05:13)  T(F): 97.4 (08 Apr 2020 11:53), Max: 98.7 (08 Apr 2020 05:13)  HR: 106 (08 Apr 2020 11:53) (84 - 106)  BP: 132/93 (08 Apr 2020 11:53) (118/55 - 132/93)  BP(mean): --  RR: 19 (08 Apr 2020 11:53) (19 - 20)  SpO2: 99% (08 Apr 2020 11:53) (99% - 99%)    GENERAL: + NRB, obese, proned  HEENT: MMM, conjunctiva and sclera clear  NECK: Supple, No JVD  CHEST/LUNG: Basilar rales  HEART: Regular rate and rhythm; No murmurs  ABDOMEN: Soft, Nontender, Nondistended  EXTREMITIES: no edema  PSYCH: AAOx3  NEUROLOGY: non-focal  SKIN: No rashes or lesions    LABS:                        10.7   16.13 )-----------( 316      ( 07 Apr 2020 14:00 )             36.9     04-07    140  |  99  |  15  ----------------------------<  131<H>  3.9   |  25  |  0.66    Ca    9.3      07 Apr 2020 14:00    COVID-19 PCR: Detected (03-28-20 @ 17:24)    RADIOLOGY & ADDITIONAL TESTS:  Imaging from Last 24 Hours:    Case discussed with: PA

## 2020-04-08 NOTE — PROGRESS NOTE ADULT - ASSESSMENT
Patient is a 44 yoF with HTN, pre-dm, obesity admitted with COVID-19 viral PNA. Desaturated 2 days ago, stabilized now, O2 requirements not increasing but unable to wean from NRB. S/p PLQ, Anakinra, Actemra and steroids. On FD A/C for concern for concomitant hypercoagulable state.

## 2020-04-08 NOTE — PROGRESS NOTE ADULT - PROBLEM SELECTOR PLAN 1
Multifocal PNA complicated by acute hypoxic respiratory failure in setting of COVID 19 viral infection. s/p RRT on 4/5 for hypoxia - given lasix with some improvement  CXR with B/L patchy opacities  - S/p Tocilizumab on 4/1, plaquenil, solumedrol  - Trending inflammatory markers --improving, trend daily   - Requiring non-rebreather -- attempt to wean as tolerated    - Monitor O2 sats closely, maintain > 90%  - c/w prone position but allow intermittent supine position to see if she tolerates  - will discuss with ID/pulm if CT warranted

## 2020-04-09 LAB
ALBUMIN SERPL ELPH-MCNC: 3.9 G/DL — SIGNIFICANT CHANGE UP (ref 3.3–5)
ALP SERPL-CCNC: 108 U/L — SIGNIFICANT CHANGE UP (ref 40–120)
ALT FLD-CCNC: 98 U/L — HIGH (ref 4–33)
ANION GAP SERPL CALC-SCNC: 15 MMO/L — HIGH (ref 7–14)
AST SERPL-CCNC: 46 U/L — HIGH (ref 4–32)
BILIRUB SERPL-MCNC: 0.3 MG/DL — SIGNIFICANT CHANGE UP (ref 0.2–1.2)
BUN SERPL-MCNC: 9 MG/DL — SIGNIFICANT CHANGE UP (ref 7–23)
CALCIUM SERPL-MCNC: 9 MG/DL — SIGNIFICANT CHANGE UP (ref 8.4–10.5)
CHLORIDE SERPL-SCNC: 97 MMOL/L — LOW (ref 98–107)
CO2 SERPL-SCNC: 25 MMOL/L — SIGNIFICANT CHANGE UP (ref 22–31)
CREAT SERPL-MCNC: 0.67 MG/DL — SIGNIFICANT CHANGE UP (ref 0.5–1.3)
CRP SERPL-MCNC: < 4 MG/L — SIGNIFICANT CHANGE UP
D DIMER BLD IA.RAPID-MCNC: 3780 NG/ML — SIGNIFICANT CHANGE UP
FERRITIN SERPL-MCNC: 220.1 NG/ML — HIGH (ref 15–150)
GLUCOSE SERPL-MCNC: 138 MG/DL — HIGH (ref 70–99)
HCT VFR BLD CALC: 34.4 % — LOW (ref 34.5–45)
HGB BLD-MCNC: 10 G/DL — LOW (ref 11.5–15.5)
MCHC RBC-ENTMCNC: 22.7 PG — LOW (ref 27–34)
MCHC RBC-ENTMCNC: 29.1 % — LOW (ref 32–36)
MCV RBC AUTO: 78 FL — LOW (ref 80–100)
NRBC # FLD: 0.02 K/UL — SIGNIFICANT CHANGE UP (ref 0–0)
PLATELET # BLD AUTO: 278 K/UL — SIGNIFICANT CHANGE UP (ref 150–400)
PMV BLD: SIGNIFICANT CHANGE UP FL (ref 7–13)
POTASSIUM SERPL-MCNC: 3.5 MMOL/L — SIGNIFICANT CHANGE UP (ref 3.5–5.3)
POTASSIUM SERPL-SCNC: 3.5 MMOL/L — SIGNIFICANT CHANGE UP (ref 3.5–5.3)
PROT SERPL-MCNC: 7.7 G/DL — SIGNIFICANT CHANGE UP (ref 6–8.3)
RBC # BLD: 4.41 M/UL — SIGNIFICANT CHANGE UP (ref 3.8–5.2)
RBC # FLD: 26.8 % — HIGH (ref 10.3–14.5)
SODIUM SERPL-SCNC: 137 MMOL/L — SIGNIFICANT CHANGE UP (ref 135–145)
WBC # BLD: 12.06 K/UL — HIGH (ref 3.8–10.5)
WBC # FLD AUTO: 12.06 K/UL — HIGH (ref 3.8–10.5)

## 2020-04-09 PROCEDURE — 99233 SBSQ HOSP IP/OBS HIGH 50: CPT

## 2020-04-09 PROCEDURE — 93970 EXTREMITY STUDY: CPT | Mod: 26

## 2020-04-09 PROCEDURE — 71045 X-RAY EXAM CHEST 1 VIEW: CPT | Mod: 26

## 2020-04-09 RX ORDER — ENOXAPARIN SODIUM 100 MG/ML
100 INJECTION SUBCUTANEOUS EVERY 12 HOURS
Refills: 0 | Status: DISCONTINUED | OUTPATIENT
Start: 2020-04-09 | End: 2020-04-24

## 2020-04-09 RX ADMIN — Medication 500 MILLIGRAM(S): at 20:15

## 2020-04-09 RX ADMIN — SIMVASTATIN 5 MILLIGRAM(S): 20 TABLET, FILM COATED ORAL at 20:15

## 2020-04-09 RX ADMIN — ENOXAPARIN SODIUM 40 MILLIGRAM(S): 100 INJECTION SUBCUTANEOUS at 05:22

## 2020-04-09 RX ADMIN — ZINC SULFATE TAB 220 MG (50 MG ZINC EQUIVALENT) 220 MILLIGRAM(S): 220 (50 ZN) TAB at 08:07

## 2020-04-09 RX ADMIN — AMLODIPINE BESYLATE 5 MILLIGRAM(S): 2.5 TABLET ORAL at 05:22

## 2020-04-09 RX ADMIN — Medication 500 MILLIGRAM(S): at 05:22

## 2020-04-09 RX ADMIN — ENOXAPARIN SODIUM 100 MILLIGRAM(S): 100 INJECTION SUBCUTANEOUS at 20:15

## 2020-04-09 RX ADMIN — Medication 1 TABLET(S): at 08:07

## 2020-04-09 NOTE — PROGRESS NOTE ADULT - PROBLEM SELECTOR PLAN 1
Multifocal PNA complicated by acute hypoxic respiratory failure in setting of COVID 19 viral infection. s/p RRT on 4/5 for hypoxia - given lasix with some improvement  CXR with B/L patchy opacities  - S/p Tocilizumab on 4/1, plaquenil, solumedrol  - Trending inflammatory markers --improving/stabilized, trend daily   - Requiring non-rebreather -- attempt to wean as tolerated   - Monitor O2 sats closely, maintain > 90%  - c/w prone position but allow intermittent supine position to see if she tolerates  - obtain CXR today, will discuss with ID/pulm if CT warranted Multifocal PNA complicated by acute hypoxic respiratory failure in setting of COVID 19 viral infection. s/p RRT on 4/5 for hypoxia - given lasix with some improvement  CXR with B/L patchy opacities -- worsening in interval  - S/p Tocilizumab on 4/1, plaquenil, solumedrol  - Trending inflammatory markers --improving/stabilized, trend daily   - Requiring non-rebreather -- attempt to wean as tolerated   - Monitor O2 sats closely, maintain > 90%  - c/w prone position but allow intermittent supine position to see if she tolerates  - Given worsening CXR ? advancement of infection, fluid (given remote response to lasix, however does not appear clinically overloaded), or hypercoagulable state -- check LE dopplers to start. Will d/w ID potential of Anakinra (previously erroneously stated that she received this) however I worry about severe immunosuppression with dual IL1 and IL6 inhibition. Thus will pursue coag approach first.   - check ACL, C3/4 as well

## 2020-04-09 NOTE — CHART NOTE - NSCHARTNOTEFT_GEN_A_CORE
As per attending Dr. De Anda, will increase lovenox ppx to full dose 1mg/kg BID in setting of hypoxia/Covid infection with minimal improvement and check LE duplex.

## 2020-04-09 NOTE — PROGRESS NOTE ADULT - SUBJECTIVE AND OBJECTIVE BOX
Medicine Progress note    Patient is a 44y old  Female who presents with a chief complaint of Acute Hypoxemic Respiratory Failure and Pneumonia (08 Apr 2020 23:29)      SUBJECTIVE / OVERNIGHT EVENTS:      MEDICATIONS  (STANDING):  amLODIPine   Tablet 5 milliGRAM(s) Oral daily  ascorbic acid 500 milliGRAM(s) Oral two times a day  enoxaparin Injectable 40 milliGRAM(s) SubCutaneous every 12 hours  influenza   Vaccine 0.5 milliLiter(s) IntraMuscular once  multivitamin 1 Tablet(s) Oral daily  simvastatin 5 milliGRAM(s) Oral at bedtime  zinc sulfate 220 milliGRAM(s) Oral daily    MEDICATIONS  (PRN):  acetaminophen   Tablet .. 650 milliGRAM(s) Oral every 6 hours PRN Temp greater or equal to 38C (100.4F), Mild Pain (1 - 3)  ALBUTerol    90 MICROgram(s) HFA Inhaler 2 Puff(s) Inhalation every 6 hours PRN Shortness of Breath and/or Wheezing  guaiFENesin   Syrup  (Sugar-Free) 200 milliGRAM(s) Oral every 6 hours PRN Cough      CAPILLARY BLOOD GLUCOSE        I&O's Summary    08 Apr 2020 07:01  -  09 Apr 2020 07:00  --------------------------------------------------------  IN: 540 mL / OUT: 350 mL / NET: 190 mL        PHYSICAL EXAM:  Vital Signs Last 24 Hrs  T(C): 37.1 (09 Apr 2020 05:31), Max: 37.1 (09 Apr 2020 05:31)  T(F): 98.7 (09 Apr 2020 05:31), Max: 98.7 (09 Apr 2020 05:31)  HR: 88 (09 Apr 2020 05:31) (88 - 88)  BP: 125/87 (09 Apr 2020 05:31) (125/87 - 125/87)  BP(mean): --  RR: 18 (09 Apr 2020 05:31) (18 - 18)  SpO2: 100% (09 Apr 2020 05:31) (100% - 100%)    GENERAL: + NRB, obese, proned  HEENT: MMM, conjunctiva and sclera clear  NECK: Supple, No JVD  CHEST/LUNG: Basilar rales  HEART: Regular rate and rhythm; No murmurs  ABDOMEN: Soft, Nontender, Nondistended  EXTREMITIES: no edema  PSYCH: AAOx3  NEUROLOGY: non-focal  SKIN: No rashes or lesions    LABS:                        10.0   12.06 )-----------( 278      ( 09 Apr 2020 08:47 )             34.4     04-09    137  |  97<L>  |  9   ----------------------------<  138<H>  3.5   |  25  |  0.67    Ca    9.0      09 Apr 2020 08:47    TPro  7.7  /  Alb  3.9  /  TBili  0.3  /  DBili  x   /  AST  46<H>  /  ALT  98<H>  /  AlkPhos  108  04-09    D-Dimer Assay, Quantitative (04.09.20 @ 08:47)    D-Dimer Assay, Quantitative: 3780: A result less than 230 ng/mL DDU correlates with the absence  of thrombosis in a patient with low and moderate pre-test  probability of thrombosis.    Note: 1 ng/ml DDU   2 ng/ml FEU  If you have any questions, please contact Hematology Lab at  ext. [phone]. ng/mL    Ferritin, Serum in AM (04.09.20 @ 08:47)    Ferritin, Serum: 220.1 ng/mL    C-Reactive Protein, Serum (04.09.20 @ 08:47)    C-Reactive Protein, Serum: < 4.0 mg/L    COVID-19 PCR: Detected (03-28-20 @ 17:24)      RADIOLOGY & ADDITIONAL TESTS:  Imaging from Last 24 Hours: repeat CXR pending     Case discussed with: ISAIAH Medicine Progress note    Patient is a 44y old  Female who presents with a chief complaint of Acute Hypoxemic Respiratory Failure and Pneumonia (08 Apr 2020 23:29)      SUBJECTIVE / OVERNIGHT EVENTS: Pt seen/examined. Remains on NRB. Had patient sit at bedside for a short amount of time, then stand for 5 seconds at bedside. Desatted to low 80s and remained there despite NRB for 10 min though clinically stable. No other complaints.     MEDICATIONS  (STANDING):  amLODIPine   Tablet 5 milliGRAM(s) Oral daily  ascorbic acid 500 milliGRAM(s) Oral two times a day  enoxaparin Injectable 40 milliGRAM(s) SubCutaneous every 12 hours  influenza   Vaccine 0.5 milliLiter(s) IntraMuscular once  multivitamin 1 Tablet(s) Oral daily  simvastatin 5 milliGRAM(s) Oral at bedtime  zinc sulfate 220 milliGRAM(s) Oral daily    MEDICATIONS  (PRN):  acetaminophen   Tablet .. 650 milliGRAM(s) Oral every 6 hours PRN Temp greater or equal to 38C (100.4F), Mild Pain (1 - 3)  ALBUTerol    90 MICROgram(s) HFA Inhaler 2 Puff(s) Inhalation every 6 hours PRN Shortness of Breath and/or Wheezing  guaiFENesin   Syrup  (Sugar-Free) 200 milliGRAM(s) Oral every 6 hours PRN Cough    CAPILLARY BLOOD GLUCOSE    I&O's Summary    08 Apr 2020 07:01  -  09 Apr 2020 07:00  --------------------------------------------------------  IN: 540 mL / OUT: 350 mL / NET: 190 mL    PHYSICAL EXAM:  Vital Signs Last 24 Hrs  T(C): 37.1 (09 Apr 2020 05:31), Max: 37.1 (09 Apr 2020 05:31)  T(F): 98.7 (09 Apr 2020 05:31), Max: 98.7 (09 Apr 2020 05:31)  HR: 88 (09 Apr 2020 05:31) (88 - 88)  BP: 125/87 (09 Apr 2020 05:31) (125/87 - 125/87)  RR: 18 (09 Apr 2020 05:31) (18 - 18)  SpO2: 100% (09 Apr 2020 05:31) (100% - 100%)    GENERAL: + NRB, obese, proned  HEENT: MMM, conjunctiva and sclera clear  NECK: Supple, No JVD  CHEST/LUNG: Basilar rales  HEART: Regular rate and rhythm; No murmurs  ABDOMEN: Soft, Nontender, Nondistended  EXTREMITIES: no edema  PSYCH: AAOx3  NEUROLOGY: non-focal  SKIN: No rashes or lesions    LABS:                        10.0   12.06 )-----------( 278      ( 09 Apr 2020 08:47 )             34.4     04-09    137  |  97<L>  |  9   ----------------------------<  138<H>  3.5   |  25  |  0.67    Ca    9.0      09 Apr 2020 08:47    TPro  7.7  /  Alb  3.9  /  TBili  0.3  /  DBili  x   /  AST  46<H>  /  ALT  98<H>  /  AlkPhos  108  04-09    D-Dimer Assay, Quantitative (04.09.20 @ 08:47)    D-Dimer Assay, Quantitative: 3780: A result less than 230 ng/mL DDU correlates with the absence  of thrombosis in a patient with low and moderate pre-test  probability of thrombosis.    Note: 1 ng/ml DDU   2 ng/ml FEU  If you have any questions, please contact Hematology Lab at  ext. [phone]. ng/mL    Ferritin, Serum in AM (04.09.20 @ 08:47)    Ferritin, Serum: 220.1 ng/mL    C-Reactive Protein, Serum (04.09.20 @ 08:47)    C-Reactive Protein, Serum: < 4.0 mg/L    COVID-19 PCR: Detected (03-28-20 @ 17:24)    RADIOLOGY & ADDITIONAL TESTS:  Imaging from Last 24 Hours:   < from: Xray Chest 1 View AP/PA (04.09.20 @ 14:17) >  Marked worsening of bilateral airspace disease.    < end of copied text >    Case discussed with: ISAIAH

## 2020-04-09 NOTE — PROGRESS NOTE ADULT - ASSESSMENT
Patient is a 44 yoF with HTN, pre-dm, obesity admitted with COVID-19 viral PNA. Desaturated 2 days ago, stabilized now, O2 requirements not increasing but unable to wean from NRB. S/p PLQ, Anakinra, Actemra and steroids. On FD A/C for concern for concomitant hypercoagulable state. Patient is a 44 yoF with HTN, pre-dm, obesity admitted with COVID-19 viral PNA. Desaturated 2 days ago, stabilized now, O2 requirements not increasing but unable to wean from NRB. S/p PLQ, Actemra and steroids. Worsening CXR. Now on FD A/C for concern for concomitant hypercoagulable state.

## 2020-04-10 LAB
ALBUMIN SERPL ELPH-MCNC: 4.2 G/DL — SIGNIFICANT CHANGE UP (ref 3.3–5)
ALP SERPL-CCNC: 107 U/L — SIGNIFICANT CHANGE UP (ref 40–120)
ALT FLD-CCNC: 85 U/L — HIGH (ref 4–33)
ANION GAP SERPL CALC-SCNC: 24 MMO/L — HIGH (ref 7–14)
AST SERPL-CCNC: 46 U/L — HIGH (ref 4–32)
BILIRUB SERPL-MCNC: 0.3 MG/DL — SIGNIFICANT CHANGE UP (ref 0.2–1.2)
BUN SERPL-MCNC: 9 MG/DL — SIGNIFICANT CHANGE UP (ref 7–23)
C3 SERPL-MCNC: 132.9 MG/DL — SIGNIFICANT CHANGE UP (ref 90–180)
C4 SERPL-MCNC: 25.5 MG/DL — SIGNIFICANT CHANGE UP (ref 10–40)
CALCIUM SERPL-MCNC: 9.6 MG/DL — SIGNIFICANT CHANGE UP (ref 8.4–10.5)
CHLORIDE SERPL-SCNC: 101 MMOL/L — SIGNIFICANT CHANGE UP (ref 98–107)
CO2 SERPL-SCNC: 19 MMOL/L — LOW (ref 22–31)
CREAT SERPL-MCNC: 0.64 MG/DL — SIGNIFICANT CHANGE UP (ref 0.5–1.3)
CRP SERPL-MCNC: < 4 MG/L — SIGNIFICANT CHANGE UP
D DIMER BLD IA.RAPID-MCNC: 3395 NG/ML — SIGNIFICANT CHANGE UP
FERRITIN SERPL-MCNC: 229.8 NG/ML — HIGH (ref 15–150)
GLUCOSE SERPL-MCNC: 94 MG/DL — SIGNIFICANT CHANGE UP (ref 70–99)
HCT VFR BLD CALC: 34.4 % — LOW (ref 34.5–45)
HGB BLD-MCNC: 10 G/DL — LOW (ref 11.5–15.5)
LDH SERPL L TO P-CCNC: 895 U/L — HIGH (ref 135–225)
MAGNESIUM SERPL-MCNC: 2.3 MG/DL — SIGNIFICANT CHANGE UP (ref 1.6–2.6)
MCHC RBC-ENTMCNC: 22.9 PG — LOW (ref 27–34)
MCHC RBC-ENTMCNC: 29.1 % — LOW (ref 32–36)
MCV RBC AUTO: 78.9 FL — LOW (ref 80–100)
NRBC # FLD: 0.03 K/UL — SIGNIFICANT CHANGE UP (ref 0–0)
PHOSPHATE SERPL-MCNC: 3.7 MG/DL — SIGNIFICANT CHANGE UP (ref 2.5–4.5)
PLATELET # BLD AUTO: 179 K/UL — SIGNIFICANT CHANGE UP (ref 150–400)
PMV BLD: SIGNIFICANT CHANGE UP FL (ref 7–13)
POTASSIUM SERPL-MCNC: 4.3 MMOL/L — SIGNIFICANT CHANGE UP (ref 3.5–5.3)
POTASSIUM SERPL-SCNC: 4.3 MMOL/L — SIGNIFICANT CHANGE UP (ref 3.5–5.3)
PROCALCITONIN SERPL-MCNC: 0.11 NG/ML — HIGH (ref 0.02–0.1)
PROT SERPL-MCNC: 7.8 G/DL — SIGNIFICANT CHANGE UP (ref 6–8.3)
RBC # BLD: 4.36 M/UL — SIGNIFICANT CHANGE UP (ref 3.8–5.2)
RBC # FLD: 26.8 % — HIGH (ref 10.3–14.5)
SODIUM SERPL-SCNC: 144 MMOL/L — SIGNIFICANT CHANGE UP (ref 135–145)
WBC # BLD: 11.88 K/UL — HIGH (ref 3.8–10.5)
WBC # FLD AUTO: 11.88 K/UL — HIGH (ref 3.8–10.5)

## 2020-04-10 PROCEDURE — 99232 SBSQ HOSP IP/OBS MODERATE 35: CPT

## 2020-04-10 RX ADMIN — AMLODIPINE BESYLATE 5 MILLIGRAM(S): 2.5 TABLET ORAL at 05:01

## 2020-04-10 RX ADMIN — Medication 500 MILLIGRAM(S): at 17:35

## 2020-04-10 RX ADMIN — ENOXAPARIN SODIUM 100 MILLIGRAM(S): 100 INJECTION SUBCUTANEOUS at 22:40

## 2020-04-10 RX ADMIN — SIMVASTATIN 5 MILLIGRAM(S): 20 TABLET, FILM COATED ORAL at 22:39

## 2020-04-10 RX ADMIN — Medication 500 MILLIGRAM(S): at 05:01

## 2020-04-10 RX ADMIN — Medication 1 TABLET(S): at 17:35

## 2020-04-10 RX ADMIN — ZINC SULFATE TAB 220 MG (50 MG ZINC EQUIVALENT) 220 MILLIGRAM(S): 220 (50 ZN) TAB at 17:35

## 2020-04-10 RX ADMIN — ENOXAPARIN SODIUM 100 MILLIGRAM(S): 100 INJECTION SUBCUTANEOUS at 05:01

## 2020-04-10 NOTE — PROGRESS NOTE ADULT - ASSESSMENT
Patient is a 44 yoF with HTN, pre-dm, obesity admitted with COVID-19 viral PNA. Desaturated 2 days ago, stabilized now, O2 requirements not increasing but unable to wean from NRB. S/p PLQ, Actemra and steroids. Worsening CXR. Now on FD A/C for concern for concomitant hypercoagulable state.

## 2020-04-10 NOTE — PROGRESS NOTE ADULT - SUBJECTIVE AND OBJECTIVE BOX
Medicine Progress note    Patient is a 44y old  Female who presents with a chief complaint of Acute Hypoxemic Respiratory Failure and Pneumonia (09 Apr 2020 13:19)      SUBJECTIVE / OVERNIGHT EVENTS: Pt seen/examined. Remains on NRB, stable. No complaints today.     MEDICATIONS  (STANDING):  amLODIPine   Tablet 5 milliGRAM(s) Oral daily  ascorbic acid 500 milliGRAM(s) Oral two times a day  enoxaparin Injectable 100 milliGRAM(s) SubCutaneous every 12 hours  influenza   Vaccine 0.5 milliLiter(s) IntraMuscular once  multivitamin 1 Tablet(s) Oral daily  simvastatin 5 milliGRAM(s) Oral at bedtime  zinc sulfate 220 milliGRAM(s) Oral daily    MEDICATIONS  (PRN):  acetaminophen   Tablet .. 650 milliGRAM(s) Oral every 6 hours PRN Temp greater or equal to 38C (100.4F), Mild Pain (1 - 3)  ALBUTerol    90 MICROgram(s) HFA Inhaler 2 Puff(s) Inhalation every 6 hours PRN Shortness of Breath and/or Wheezing  guaiFENesin   Syrup  (Sugar-Free) 200 milliGRAM(s) Oral every 6 hours PRN Cough      CAPILLARY BLOOD GLUCOSE        I&O's Summary    09 Apr 2020 07:01  -  10 Apr 2020 07:00  --------------------------------------------------------  IN: 540 mL / OUT: 0 mL / NET: 540 mL    PHYSICAL EXAM:  Vital Signs Last 24 Hrs  T(C): 36.6 (10 Apr 2020 12:03), Max: 37.1 (10 Apr 2020 05:00)  T(F): 97.9 (10 Apr 2020 12:03), Max: 98.7 (10 Apr 2020 05:00)  HR: 118 (10 Apr 2020 12:03) (90 - 118)  BP: 136/91 (10 Apr 2020 12:03) (135/86 - 136/91)  RR: 18 (10 Apr 2020 12:03) (17 - 18)  SpO2: 92% (10 Apr 2020 12:03) (92% - 100%)    GENERAL: + NRB, obese, proned  HEENT: MMM, conjunctiva and sclera clear  NECK: Supple, No JVD  CHEST/LUNG: Basilar rales  HEART: Regular rate and rhythm; No murmurs  ABDOMEN: Soft, Nontender, Nondistended  EXTREMITIES: no edema  PSYCH: AAOx3  NEUROLOGY: non-focal  SKIN: No rashes or lesions    LABS:                        10.0   11.88 )-----------( 179      ( 10 Apr 2020 05:05 )             34.4     04-10    144  |  101  |  9   ----------------------------<  94  4.3   |  19<L>  |  0.64    Ca    9.6      10 Apr 2020 05:05  Phos  3.7     04-10  Mg     2.3     04-10    TPro  7.8  /  Alb  4.2  /  TBili  0.3  /  DBili  x   /  AST  46<H>  /  ALT  85<H>  /  AlkPhos  107  04-10    Ferritin, Serum in AM (04.10.20 @ 05:05)    Ferritin, Serum: 229.8 ng/mL    D-Dimer Assay, Quantitative (04.10.20 @ 05:05)    D-Dimer Assay, Quantitative: 3395: A result less than 230 ng/mL DDU correlates with the absence  of thrombosis in a patient with low and moderate pre-test  probability of thrombosis.    Note: 1 ng/ml DDU   2 ng/ml FEU  If you have any questions, please contact Hematology Lab at  ext. [phone]. ng/mL    C-Reactive Protein, Serum (04.10.20 @ 05:05)    C-Reactive Protein, Serum: < 4.0 mg/L    COVID-19 PCR: Detected (03-28-20 @ 17:24)    RADIOLOGY & ADDITIONAL TESTS:  Imaging from Last 24 Hours:  < from: US Duplex Venous Lower Ext Complete, Bilateral (04.09.20 @ 20:36) >  No evidence of deep venous thrombosis in either lower extremity.    < end of copied text >    Case discussed with: ISAIAH

## 2020-04-10 NOTE — PROGRESS NOTE ADULT - PROBLEM SELECTOR PLAN 1
Multifocal PNA complicated by acute hypoxic respiratory failure in setting of COVID 19 viral infection. s/p RRT on 4/5 for hypoxia - given lasix with some improvement  CXR with B/L patchy opacities -- worsening in interval  - S/p Tocilizumab on 4/1, plaquenil, solumedrol  - Trending inflammatory markers --improving/stabilized, trend daily   - Requiring non-rebreather -- attempt to wean as tolerated   - Monitor O2 sats closely, maintain > 90%  - c/w prone position but allow intermittent supine position to see if she tolerates  - Given worsening CXR ? advancement of infection, fluid (given remote response to lasix, however does not appear clinically overloaded), or hypercoagulable state -- check CT PE to eval for clot and PNA extent. Will d/w ID potential of Anakinra (previously erroneously stated that she received this) however I worry about severe immunosuppression with dual IL1 and IL6 inhibition. Thus will pursue coag approach first.   - check ACL, C3/4 as well

## 2020-04-11 PROCEDURE — 99232 SBSQ HOSP IP/OBS MODERATE 35: CPT

## 2020-04-11 RX ADMIN — AMLODIPINE BESYLATE 5 MILLIGRAM(S): 2.5 TABLET ORAL at 06:11

## 2020-04-11 RX ADMIN — ZINC SULFATE TAB 220 MG (50 MG ZINC EQUIVALENT) 220 MILLIGRAM(S): 220 (50 ZN) TAB at 19:32

## 2020-04-11 RX ADMIN — Medication 500 MILLIGRAM(S): at 19:32

## 2020-04-11 RX ADMIN — ENOXAPARIN SODIUM 100 MILLIGRAM(S): 100 INJECTION SUBCUTANEOUS at 19:32

## 2020-04-11 RX ADMIN — SIMVASTATIN 5 MILLIGRAM(S): 20 TABLET, FILM COATED ORAL at 21:12

## 2020-04-11 RX ADMIN — Medication 1 TABLET(S): at 19:32

## 2020-04-11 RX ADMIN — Medication 500 MILLIGRAM(S): at 06:11

## 2020-04-11 RX ADMIN — ENOXAPARIN SODIUM 100 MILLIGRAM(S): 100 INJECTION SUBCUTANEOUS at 06:11

## 2020-04-11 NOTE — PROGRESS NOTE ADULT - PROBLEM SELECTOR PLAN 4
Problem: Pain  Goal: Alleviation of Pain or a reduction in pain to the patient's comfort goal    Intervention: Initial pain assessment  Pt states she is comfortable with her epidural at this time, but does report feeling pressure.      Problem: Risk for Infection, Impaired Wound Healing  Goal: Remain free from signs and symptoms of infection    Intervention: Infection prevention measures  No S/S of infection at this time. Pt is afebrile, WBC's are WNL, MEWS is WNL. Limited SVE's.          -Given COVID severity will continue hold ARB for now.  - c/w amlodipine 5mg daily

## 2020-04-11 NOTE — PROGRESS NOTE ADULT - PROBLEM SELECTOR PLAN 1
Multifocal PNA complicated by acute hypoxic respiratory failure in setting of COVID 19 viral infection. s/p RRT on 4/5 for hypoxia - given lasix with some improvement  CXR with B/L patchy opacities -- worsening in interval  - S/p Tocilizumab on 4/1, plaquenil, solumedrol  - Trending inflammatory markers --improving/stabilized, trend daily   - Requiring non-rebreather -- attempt to wean as tolerated   - Monitor O2 sats closely, maintain > 90%  - c/w prone position but allow intermittent supine position to see if she tolerates  - Given worsening CXR ? advancement of infection, fluid (given remote response to lasix, however does not appear clinically overloaded), or hypercoagulable state -- check CT PE to eval for clot and PNA extent. Will d/w ID potential of Anakinra (previously erroneously stated that she received this) however I worry about severe immunosuppression with dual IL1 and IL6 inhibition. Thus will pursue coag approach first.   - f/u ACL, C3/4 as well

## 2020-04-11 NOTE — PROGRESS NOTE ADULT - SUBJECTIVE AND OBJECTIVE BOX
Medicine Progress note    Patient is a 44y old  Female who presents with a chief complaint of Acute Hypoxemic Respiratory Failure and Pneumonia (10 Apr 2020 23:49)      SUBJECTIVE / OVERNIGHT EVENTS: Pt seen/examined, Stable on NRB, no complaints today.     MEDICATIONS  (STANDING):  amLODIPine   Tablet 5 milliGRAM(s) Oral daily  ascorbic acid 500 milliGRAM(s) Oral two times a day  enoxaparin Injectable 100 milliGRAM(s) SubCutaneous every 12 hours  influenza   Vaccine 0.5 milliLiter(s) IntraMuscular once  multivitamin 1 Tablet(s) Oral daily  simvastatin 5 milliGRAM(s) Oral at bedtime  zinc sulfate 220 milliGRAM(s) Oral daily    MEDICATIONS  (PRN):  acetaminophen   Tablet .. 650 milliGRAM(s) Oral every 6 hours PRN Temp greater or equal to 38C (100.4F), Mild Pain (1 - 3)  ALBUTerol    90 MICROgram(s) HFA Inhaler 2 Puff(s) Inhalation every 6 hours PRN Shortness of Breath and/or Wheezing  guaiFENesin   Syrup  (Sugar-Free) 200 milliGRAM(s) Oral every 6 hours PRN Cough      CAPILLARY BLOOD GLUCOSE        I&O's Summary      PHYSICAL EXAM:  Vital Signs Last 24 Hrs  T(C): 36.7 (11 Apr 2020 15:00), Max: 36.8 (11 Apr 2020 06:07)  T(F): 98.1 (11 Apr 2020 15:00), Max: 98.2 (11 Apr 2020 06:07)  HR: 107 (11 Apr 2020 15:00) (107 - 112)  BP: 147/89 (11 Apr 2020 15:00) (147/89 - 150/112)  RR: 20 (11 Apr 2020 15:00) (18 - 20)  SpO2: 93% (11 Apr 2020 15:00) (93% - 95%)    GENERAL: + NRB, obese, proned  HEENT: MMM, conjunctiva and sclera clear  NECK: Supple, No JVD  CHEST/LUNG: Basilar rales  HEART: Regular rate and rhythm; No murmurs  ABDOMEN: Soft, Nontender, Nondistended  EXTREMITIES: no edema  PSYCH: AAOx3  NEUROLOGY: non-focal  SKIN: No rashes or lesions    LABS:                        10.0   11.88 )-----------( 179      ( 10 Apr 2020 05:05 )             34.4     04-10    144  |  101  |  9   ----------------------------<  94  4.3   |  19<L>  |  0.64    Ca    9.6      10 Apr 2020 05:05  Phos  3.7     04-10  Mg     2.3     04-10    TPro  7.8  /  Alb  4.2  /  TBili  0.3  /  DBili  x   /  AST  46<H>  /  ALT  85<H>  /  AlkPhos  107  04-10    COVID-19 PCR: Detected (03-28-20 @ 17:24)    RADIOLOGY & ADDITIONAL TESTS:  Imaging from Last 24 Hours:    Case discussed with: ISAIAH

## 2020-04-12 LAB
ALBUMIN SERPL ELPH-MCNC: 3.9 G/DL — SIGNIFICANT CHANGE UP (ref 3.3–5)
ALP SERPL-CCNC: 87 U/L — SIGNIFICANT CHANGE UP (ref 40–120)
ALT FLD-CCNC: 79 U/L — HIGH (ref 4–33)
ANION GAP SERPL CALC-SCNC: 15 MMO/L — HIGH (ref 7–14)
AST SERPL-CCNC: 41 U/L — HIGH (ref 4–32)
BILIRUB SERPL-MCNC: 0.4 MG/DL — SIGNIFICANT CHANGE UP (ref 0.2–1.2)
BUN SERPL-MCNC: 12 MG/DL — SIGNIFICANT CHANGE UP (ref 7–23)
CALCIUM SERPL-MCNC: 9.4 MG/DL — SIGNIFICANT CHANGE UP (ref 8.4–10.5)
CHLORIDE SERPL-SCNC: 95 MMOL/L — LOW (ref 98–107)
CO2 SERPL-SCNC: 25 MMOL/L — SIGNIFICANT CHANGE UP (ref 22–31)
CREAT SERPL-MCNC: 0.69 MG/DL — SIGNIFICANT CHANGE UP (ref 0.5–1.3)
D DIMER BLD IA.RAPID-MCNC: 2687 NG/ML — SIGNIFICANT CHANGE UP
GLUCOSE SERPL-MCNC: 126 MG/DL — HIGH (ref 70–99)
HCT VFR BLD CALC: 36.6 % — SIGNIFICANT CHANGE UP (ref 34.5–45)
HGB BLD-MCNC: 10.5 G/DL — LOW (ref 11.5–15.5)
MCHC RBC-ENTMCNC: 22.9 PG — LOW (ref 27–34)
MCHC RBC-ENTMCNC: 28.7 % — LOW (ref 32–36)
MCV RBC AUTO: 79.7 FL — LOW (ref 80–100)
NRBC # FLD: 0.02 K/UL — SIGNIFICANT CHANGE UP (ref 0–0)
PLATELET # BLD AUTO: 284 K/UL — SIGNIFICANT CHANGE UP (ref 150–400)
PMV BLD: SIGNIFICANT CHANGE UP FL (ref 7–13)
POTASSIUM SERPL-MCNC: 4.1 MMOL/L — SIGNIFICANT CHANGE UP (ref 3.5–5.3)
POTASSIUM SERPL-SCNC: 4.1 MMOL/L — SIGNIFICANT CHANGE UP (ref 3.5–5.3)
PROT SERPL-MCNC: 7.5 G/DL — SIGNIFICANT CHANGE UP (ref 6–8.3)
RBC # BLD: 4.59 M/UL — SIGNIFICANT CHANGE UP (ref 3.8–5.2)
RBC # FLD: 27.9 % — HIGH (ref 10.3–14.5)
SODIUM SERPL-SCNC: 135 MMOL/L — SIGNIFICANT CHANGE UP (ref 135–145)
WBC # BLD: 12.44 K/UL — HIGH (ref 3.8–10.5)
WBC # FLD AUTO: 12.44 K/UL — HIGH (ref 3.8–10.5)

## 2020-04-12 PROCEDURE — 71275 CT ANGIOGRAPHY CHEST: CPT | Mod: 26

## 2020-04-12 PROCEDURE — 99232 SBSQ HOSP IP/OBS MODERATE 35: CPT

## 2020-04-12 RX ADMIN — ENOXAPARIN SODIUM 100 MILLIGRAM(S): 100 INJECTION SUBCUTANEOUS at 16:41

## 2020-04-12 RX ADMIN — Medication 500 MILLIGRAM(S): at 06:08

## 2020-04-12 RX ADMIN — ENOXAPARIN SODIUM 100 MILLIGRAM(S): 100 INJECTION SUBCUTANEOUS at 06:08

## 2020-04-12 RX ADMIN — SIMVASTATIN 5 MILLIGRAM(S): 20 TABLET, FILM COATED ORAL at 20:32

## 2020-04-12 RX ADMIN — ZINC SULFATE TAB 220 MG (50 MG ZINC EQUIVALENT) 220 MILLIGRAM(S): 220 (50 ZN) TAB at 11:59

## 2020-04-12 RX ADMIN — AMLODIPINE BESYLATE 5 MILLIGRAM(S): 2.5 TABLET ORAL at 06:07

## 2020-04-12 RX ADMIN — Medication 500 MILLIGRAM(S): at 16:41

## 2020-04-12 RX ADMIN — Medication 1 TABLET(S): at 11:59

## 2020-04-12 NOTE — PROGRESS NOTE ADULT - SUBJECTIVE AND OBJECTIVE BOX
Medicine Progress note    Patient is a 44y old  Female who presents with a chief complaint of Acute Hypoxemic Respiratory Failure and Pneumonia (11 Apr 2020 23:44)      SUBJECTIVE / OVERNIGHT EVENTS: Pt seen/examined earlier today. Pt sitting up in bed, appears comfortable. Reviewed CT results with her -- + PE. No other complaints.     MEDICATIONS  (STANDING):  amLODIPine   Tablet 5 milliGRAM(s) Oral daily  ascorbic acid 500 milliGRAM(s) Oral two times a day  enoxaparin Injectable 100 milliGRAM(s) SubCutaneous every 12 hours  influenza   Vaccine 0.5 milliLiter(s) IntraMuscular once  multivitamin 1 Tablet(s) Oral daily  simvastatin 5 milliGRAM(s) Oral at bedtime  zinc sulfate 220 milliGRAM(s) Oral daily    MEDICATIONS  (PRN):  acetaminophen   Tablet .. 650 milliGRAM(s) Oral every 6 hours PRN Temp greater or equal to 38C (100.4F), Mild Pain (1 - 3)  ALBUTerol    90 MICROgram(s) HFA Inhaler 2 Puff(s) Inhalation every 6 hours PRN Shortness of Breath and/or Wheezing  guaiFENesin   Syrup  (Sugar-Free) 200 milliGRAM(s) Oral every 6 hours PRN Cough      CAPILLARY BLOOD GLUCOSE    I&O's Summary    PHYSICAL EXAM:  Vital Signs Last 24 Hrs  T(C): 36.6 (12 Apr 2020 20:42), Max: 36.6 (12 Apr 2020 20:42)  T(F): 97.9 (12 Apr 2020 20:42), Max: 97.9 (12 Apr 2020 20:42)  HR: 102 (12 Apr 2020 20:42) (102 - 115)  BP: 138/92 (12 Apr 2020 20:42) (129/88 - 148/90)  RR: 18 (12 Apr 2020 20:42) (18 - 20)  SpO2: 96% (12 Apr 2020 20:42) (93% - 98%)    GENERAL: + NRB, obese, sitting up, appears more comfortable  HEENT: MMM, conjunctiva and sclera clear  NECK: Supple, No JVD  CHEST/LUNG: Basilar rales  HEART: Regular rate and rhythm; No murmurs  ABDOMEN: Soft, Nontender, Nondistended  EXTREMITIES: no edema  PSYCH: AAOx3  NEUROLOGY: non-focal  SKIN: No rashes or lesions    LABS:                        10.5   12.44 )-----------( 284      ( 12 Apr 2020 05:30 )             36.6     04-12    135  |  95<L>  |  12  ----------------------------<  126<H>  4.1   |  25  |  0.69    Ca    9.4      12 Apr 2020 05:30    TPro  7.5  /  Alb  3.9  /  TBili  0.4  /  DBili  x   /  AST  41<H>  /  ALT  79<H>  /  AlkPhos  87  04-12    COVID-19 PCR: Detected (03-28-20 @ 17:24)    RADIOLOGY & ADDITIONAL TESTS:  Imaging from Last 24 Hours:  < from: CT Angio Chest w/ IV Cont (04.12.20 @ 11:22) >  Pulmonary embolism within the superior segment left lower lobe pulmonary artery.    Pattern of GGO suggests infection including atypical pneumonia/viral infection from atypical agents including COVID-19 (C19V-1).    < end of copied text >    Case discussed with: ISAIAH

## 2020-04-12 NOTE — PROGRESS NOTE ADULT - ASSESSMENT
Patient is a 44 yoF with HTN, pre-dm, obesity admitted with COVID-19 viral PNA. Desaturated 2 days ago, stabilized now, O2 requirements not increasing but unable to wean from NRB. S/p PLQ, Actemra and steroids. Worsening CXR. Now on FD A/C for concern for concomitant hypercoagulable state, CT with +PE.

## 2020-04-12 NOTE — PROGRESS NOTE ADULT - PROBLEM SELECTOR PLAN 1
Multifocal PNA complicated by acute hypoxic respiratory failure in setting of COVID 19 viral infection. s/p RRT on 4/5 for hypoxia - given lasix with some improvement  CXR with B/L patchy opacities -- worsening in interval  - S/p Tocilizumab on 4/1, plaquenil, solumedrol  - Trending inflammatory markers --improving/stabilized, can stop trending unless acute decompensation  - Requiring non-rebreather -- attempt to wean as tolerated   - Monitor O2 sats closely, maintain > 90%  - c/w prone position but allow intermittent supine position to see if she tolerates  - CT PE with + PE, likely reason for continued reliance on supplemental O2, c/w FD A/c, will hold off on further treatment for Covid.   - f/u ACL

## 2020-04-13 DIAGNOSIS — J96.01 ACUTE RESPIRATORY FAILURE WITH HYPOXIA: ICD-10-CM

## 2020-04-13 DIAGNOSIS — B97.21 SARS-ASSOCIATED CORONAVIRUS AS THE CAUSE OF DISEASES CLASSIFIED ELSEWHERE: ICD-10-CM

## 2020-04-13 LAB
ANION GAP SERPL CALC-SCNC: 17 MMO/L — HIGH (ref 7–14)
BUN SERPL-MCNC: 12 MG/DL — SIGNIFICANT CHANGE UP (ref 7–23)
CALCIUM SERPL-MCNC: 9.6 MG/DL — SIGNIFICANT CHANGE UP (ref 8.4–10.5)
CHLORIDE SERPL-SCNC: 98 MMOL/L — SIGNIFICANT CHANGE UP (ref 98–107)
CO2 SERPL-SCNC: 23 MMOL/L — SIGNIFICANT CHANGE UP (ref 22–31)
CREAT SERPL-MCNC: 0.68 MG/DL — SIGNIFICANT CHANGE UP (ref 0.5–1.3)
CRP SERPL-MCNC: < 4 MG/L — SIGNIFICANT CHANGE UP
FERRITIN SERPL-MCNC: 198.2 NG/ML — HIGH (ref 15–150)
GLUCOSE SERPL-MCNC: 105 MG/DL — HIGH (ref 70–99)
HCT VFR BLD CALC: 35.5 % — SIGNIFICANT CHANGE UP (ref 34.5–45)
HGB BLD-MCNC: 10.3 G/DL — LOW (ref 11.5–15.5)
MAGNESIUM SERPL-MCNC: 2.2 MG/DL — SIGNIFICANT CHANGE UP (ref 1.6–2.6)
MCHC RBC-ENTMCNC: 23.4 PG — LOW (ref 27–34)
MCHC RBC-ENTMCNC: 29 % — LOW (ref 32–36)
MCV RBC AUTO: 80.7 FL — SIGNIFICANT CHANGE UP (ref 80–100)
NRBC # FLD: 0 K/UL — SIGNIFICANT CHANGE UP (ref 0–0)
PHOSPHATE SERPL-MCNC: 4.9 MG/DL — HIGH (ref 2.5–4.5)
PLATELET # BLD AUTO: 187 K/UL — SIGNIFICANT CHANGE UP (ref 150–400)
PMV BLD: SIGNIFICANT CHANGE UP FL (ref 7–13)
POTASSIUM SERPL-MCNC: 4 MMOL/L — SIGNIFICANT CHANGE UP (ref 3.5–5.3)
POTASSIUM SERPL-SCNC: 4 MMOL/L — SIGNIFICANT CHANGE UP (ref 3.5–5.3)
RBC # BLD: 4.4 M/UL — SIGNIFICANT CHANGE UP (ref 3.8–5.2)
RBC # FLD: 28 % — HIGH (ref 10.3–14.5)
SODIUM SERPL-SCNC: 138 MMOL/L — SIGNIFICANT CHANGE UP (ref 135–145)
WBC # BLD: 8.96 K/UL — SIGNIFICANT CHANGE UP (ref 3.8–10.5)
WBC # FLD AUTO: 8.96 K/UL — SIGNIFICANT CHANGE UP (ref 3.8–10.5)

## 2020-04-13 PROCEDURE — 99233 SBSQ HOSP IP/OBS HIGH 50: CPT

## 2020-04-13 RX ORDER — RIVAROXABAN 15 MG-20MG
1 KIT ORAL
Qty: 30 | Refills: 0
Start: 2020-04-13 | End: 2020-05-12

## 2020-04-13 RX ADMIN — SIMVASTATIN 5 MILLIGRAM(S): 20 TABLET, FILM COATED ORAL at 22:38

## 2020-04-13 RX ADMIN — Medication 500 MILLIGRAM(S): at 16:37

## 2020-04-13 RX ADMIN — ZINC SULFATE TAB 220 MG (50 MG ZINC EQUIVALENT) 220 MILLIGRAM(S): 220 (50 ZN) TAB at 13:42

## 2020-04-13 RX ADMIN — Medication 1 TABLET(S): at 13:42

## 2020-04-13 RX ADMIN — ENOXAPARIN SODIUM 100 MILLIGRAM(S): 100 INJECTION SUBCUTANEOUS at 16:37

## 2020-04-13 RX ADMIN — ENOXAPARIN SODIUM 100 MILLIGRAM(S): 100 INJECTION SUBCUTANEOUS at 04:22

## 2020-04-13 RX ADMIN — Medication 500 MILLIGRAM(S): at 04:22

## 2020-04-13 RX ADMIN — AMLODIPINE BESYLATE 5 MILLIGRAM(S): 2.5 TABLET ORAL at 04:21

## 2020-04-13 NOTE — PROGRESS NOTE ADULT - SUBJECTIVE AND OBJECTIVE BOX
SUBJECTIVE / OVERNIGHT EVENTS:  No overnight events. No new AM complaints.     MEDICATIONS  (STANDING):  amLODIPine   Tablet 5 milliGRAM(s) Oral daily  ascorbic acid 500 milliGRAM(s) Oral two times a day  enoxaparin Injectable 100 milliGRAM(s) SubCutaneous every 12 hours  influenza   Vaccine 0.5 milliLiter(s) IntraMuscular once  multivitamin 1 Tablet(s) Oral daily  simvastatin 5 milliGRAM(s) Oral at bedtime  zinc sulfate 220 milliGRAM(s) Oral daily    MEDICATIONS  (PRN):  acetaminophen   Tablet .. 650 milliGRAM(s) Oral every 6 hours PRN Temp greater or equal to 38C (100.4F), Mild Pain (1 - 3)  ALBUTerol    90 MICROgram(s) HFA Inhaler 2 Puff(s) Inhalation every 6 hours PRN Shortness of Breath and/or Wheezing  guaiFENesin   Syrup  (Sugar-Free) 200 milliGRAM(s) Oral every 6 hours PRN Cough    CAPILLARY BLOOD GLUCOSE    I&O's Summary    T(C): 36.8 (04-13-20 @ 14:00), Max: 36.8 (04-13-20 @ 14:00)  HR: 95 (04-13-20 @ 14:00) (95 - 102)  BP: 124/85 (04-13-20 @ 14:00) (124/85 - 138/92)  RR: 18 (04-13-20 @ 14:00) (18 - 18)  SpO2: 97% (04-13-20 @ 14:00) (96% - 97%)  100% non-rebreather face mask in place    PHYSICAL EXAM:  GENERAL: NAD, well-developed  HEAD:  Atraumatic, Normocephalic  EYES: EOMI, PERRLA, conjunctiva and sclera clear  NECK: Supple, No JVD  ENT: 100% non-rebreather face mask in place  CHEST/LUNG: Labored respiration  HEART: Regular rate and rhythm  ABDOMEN: Soft, Nontender, Nondistended  EXTREMITIES: Non-edematous b/l LEs  PSYCH: AAOx3  NEUROLOGY: non-focal  SKIN: No rashes or lesions    LABS:                        10.3   8.96  )-----------( 187      ( 13 Apr 2020 04:50 )             35.5     04-13    138  |  98  |  12  ----------------------------<  105<H>  4.0   |  23  |  0.68    Ca    9.6      13 Apr 2020 04:50  Phos  4.9     04-13  Mg     2.2     04-13    TPro  7.5  /  Alb  3.9  /  TBili  0.4  /  DBili  x   /  AST  41<H>  /  ALT  79<H>  /  AlkPhos  87  04-12

## 2020-04-13 NOTE — PROGRESS NOTE ADULT - ASSESSMENT
44 yoF with HTN, pre-dm, obesity now with acute PE and acute respiratory failure secondary to COVID-19 infection.

## 2020-04-14 PROCEDURE — 99233 SBSQ HOSP IP/OBS HIGH 50: CPT

## 2020-04-14 RX ADMIN — ZINC SULFATE TAB 220 MG (50 MG ZINC EQUIVALENT) 220 MILLIGRAM(S): 220 (50 ZN) TAB at 12:50

## 2020-04-14 RX ADMIN — ENOXAPARIN SODIUM 100 MILLIGRAM(S): 100 INJECTION SUBCUTANEOUS at 05:49

## 2020-04-14 RX ADMIN — Medication 500 MILLIGRAM(S): at 16:09

## 2020-04-14 RX ADMIN — ENOXAPARIN SODIUM 100 MILLIGRAM(S): 100 INJECTION SUBCUTANEOUS at 16:09

## 2020-04-14 RX ADMIN — Medication 1 TABLET(S): at 12:50

## 2020-04-14 RX ADMIN — Medication 500 MILLIGRAM(S): at 05:49

## 2020-04-14 RX ADMIN — SIMVASTATIN 5 MILLIGRAM(S): 20 TABLET, FILM COATED ORAL at 21:16

## 2020-04-14 RX ADMIN — AMLODIPINE BESYLATE 5 MILLIGRAM(S): 2.5 TABLET ORAL at 05:49

## 2020-04-14 NOTE — PROGRESS NOTE ADULT - SUBJECTIVE AND OBJECTIVE BOX
SUBJECTIVE / OVERNIGHT EVENTS:   No overnight events. No new AM complaints. Ongoing SOB.     MEDICATIONS  (STANDING):  amLODIPine   Tablet 5 milliGRAM(s) Oral daily  ascorbic acid 500 milliGRAM(s) Oral two times a day  enoxaparin Injectable 100 milliGRAM(s) SubCutaneous every 12 hours  influenza   Vaccine 0.5 milliLiter(s) IntraMuscular once  multivitamin 1 Tablet(s) Oral daily  simvastatin 5 milliGRAM(s) Oral at bedtime  zinc sulfate 220 milliGRAM(s) Oral daily    MEDICATIONS  (PRN):  acetaminophen   Tablet .. 650 milliGRAM(s) Oral every 6 hours PRN Temp greater or equal to 38C (100.4F), Mild Pain (1 - 3)  ALBUTerol    90 MICROgram(s) HFA Inhaler 2 Puff(s) Inhalation every 6 hours PRN Shortness of Breath and/or Wheezing  guaiFENesin   Syrup  (Sugar-Free) 200 milliGRAM(s) Oral every 6 hours PRN Cough    CAPILLARY BLOOD GLUCOSE    I&O's Summary    14 Apr 2020 07:01  -  14 Apr 2020 11:25  --------------------------------------------------------  IN: 240 mL / OUT: 0 mL / NET: 240 mL      T(C): 37.1 (04-13-20 @ 19:51), Max: 37.1 (04-13-20 @ 19:51)  HR: 83 (04-14-20 @ 05:49) (82 - 95)  BP: 116/71 (04-14-20 @ 05:49) (116/71 - 137/91)  RR: 18 (04-13-20 @ 19:51) (18 - 18)  SpO2: 97% (04-13-20 @ 19:51) (97% - 97%)  100% non-rebreather face mask in place    PHYSICAL EXAM:  GENERAL: NAD, well-developed, Obese  HEAD:  Atraumatic, Normocephalic  EYES: EOMI, conjunctiva and sclera clear  NECK: Supple, No JVD  ENT: 100% non-rebreather face mask in place  CHEST/LUNG: Non-labored  HEART:   ABDOMEN: Nondistended  EXTREMITIES: Full range of motion in all extremities.   PSYCH: AAOx3  NEUROLOGY: non-focal  SKIN: No rashes or lesions    LABS:                        10.3   8.96  )-----------( 187      ( 13 Apr 2020 04:50 )             35.5     04-13    138  |  98  |  12  ----------------------------<  105<H>  4.0   |  23  |  0.68    Ca    9.6      13 Apr 2020 04:50  Phos  4.9     04-13  Mg     2.2     04-13

## 2020-04-14 NOTE — PROGRESS NOTE ADULT - PROBLEM SELECTOR PLAN 1
- Continue with supplemental O2 via Non-rebreather  - s/p plaquenil, solumedrol, and ACTEMRA  - Incentive spirometry  - Prone position  - Obtain AM labs (CBC, CMP, Mag, Phos, CRP, Procalcitonin, and Ferritin)

## 2020-04-14 NOTE — PROGRESS NOTE ADULT - PROBLEM SELECTOR PLAN 4
- Likely multifactorial  - Anemia secondary to chronic blood loss and chronic disease  - Will continue to monitor CBC

## 2020-04-15 LAB
ANION GAP SERPL CALC-SCNC: 16 MMO/L — HIGH (ref 7–14)
BUN SERPL-MCNC: 11 MG/DL — SIGNIFICANT CHANGE UP (ref 7–23)
CALCIUM SERPL-MCNC: 9.3 MG/DL — SIGNIFICANT CHANGE UP (ref 8.4–10.5)
CHLORIDE SERPL-SCNC: 98 MMOL/L — SIGNIFICANT CHANGE UP (ref 98–107)
CO2 SERPL-SCNC: 23 MMOL/L — SIGNIFICANT CHANGE UP (ref 22–31)
CREAT SERPL-MCNC: 0.59 MG/DL — SIGNIFICANT CHANGE UP (ref 0.5–1.3)
CRP SERPL-MCNC: < 4 MG/L — SIGNIFICANT CHANGE UP
D DIMER BLD IA.RAPID-MCNC: 1188 NG/ML — SIGNIFICANT CHANGE UP
FERRITIN SERPL-MCNC: 174.2 NG/ML — HIGH (ref 15–150)
GLUCOSE SERPL-MCNC: 115 MG/DL — HIGH (ref 70–99)
HCT VFR BLD CALC: 33.9 % — LOW (ref 34.5–45)
HGB BLD-MCNC: 9.8 G/DL — LOW (ref 11.5–15.5)
HIV COMBO RESULT: SIGNIFICANT CHANGE UP
HIV1+2 AB SPEC QL: SIGNIFICANT CHANGE UP
MAGNESIUM SERPL-MCNC: 2 MG/DL — SIGNIFICANT CHANGE UP (ref 1.6–2.6)
MCHC RBC-ENTMCNC: 23 PG — LOW (ref 27–34)
MCHC RBC-ENTMCNC: 28.9 % — LOW (ref 32–36)
MCV RBC AUTO: 79.6 FL — LOW (ref 80–100)
NRBC # FLD: 0 K/UL — SIGNIFICANT CHANGE UP (ref 0–0)
PHOSPHATE SERPL-MCNC: 4.5 MG/DL — SIGNIFICANT CHANGE UP (ref 2.5–4.5)
PLATELET # BLD AUTO: 120 K/UL — LOW (ref 150–400)
PMV BLD: SIGNIFICANT CHANGE UP FL (ref 7–13)
POTASSIUM SERPL-MCNC: 4 MMOL/L — SIGNIFICANT CHANGE UP (ref 3.5–5.3)
POTASSIUM SERPL-SCNC: 4 MMOL/L — SIGNIFICANT CHANGE UP (ref 3.5–5.3)
PROCALCITONIN SERPL-MCNC: 0.06 NG/ML — SIGNIFICANT CHANGE UP (ref 0.02–0.1)
RBC # BLD: 4.26 M/UL — SIGNIFICANT CHANGE UP (ref 3.8–5.2)
RBC # FLD: 27.6 % — HIGH (ref 10.3–14.5)
SODIUM SERPL-SCNC: 137 MMOL/L — SIGNIFICANT CHANGE UP (ref 135–145)
WBC # BLD: 6.36 K/UL — SIGNIFICANT CHANGE UP (ref 3.8–10.5)
WBC # FLD AUTO: 6.36 K/UL — SIGNIFICANT CHANGE UP (ref 3.8–10.5)

## 2020-04-15 PROCEDURE — 99233 SBSQ HOSP IP/OBS HIGH 50: CPT

## 2020-04-15 RX ADMIN — SIMVASTATIN 5 MILLIGRAM(S): 20 TABLET, FILM COATED ORAL at 21:07

## 2020-04-15 RX ADMIN — Medication 1 TABLET(S): at 16:07

## 2020-04-15 RX ADMIN — AMLODIPINE BESYLATE 5 MILLIGRAM(S): 2.5 TABLET ORAL at 05:16

## 2020-04-15 RX ADMIN — Medication 500 MILLIGRAM(S): at 16:07

## 2020-04-15 RX ADMIN — Medication 500 MILLIGRAM(S): at 05:16

## 2020-04-15 RX ADMIN — ENOXAPARIN SODIUM 100 MILLIGRAM(S): 100 INJECTION SUBCUTANEOUS at 05:16

## 2020-04-15 RX ADMIN — ENOXAPARIN SODIUM 100 MILLIGRAM(S): 100 INJECTION SUBCUTANEOUS at 16:07

## 2020-04-15 RX ADMIN — ZINC SULFATE TAB 220 MG (50 MG ZINC EQUIVALENT) 220 MILLIGRAM(S): 220 (50 ZN) TAB at 16:07

## 2020-04-15 NOTE — PROGRESS NOTE ADULT - PROBLEM SELECTOR PLAN 1
- Continue with supplemental O2 via Non-rebreather  - Patient desaturated to 78% on   - s/p plaquenil, solumedrol, and ACTEMRA  - Incentive spirometry  - Prone position  - Obtain AM labs (CBC, CMP, Mag, Phos, CRP, Procalcitonin, and Ferritin)

## 2020-04-15 NOTE — PROGRESS NOTE ADULT - SUBJECTIVE AND OBJECTIVE BOX
SUBJECTIVE / OVERNIGHT EVENTS:  No overnight events. No new AM complaints.     MEDICATIONS  (STANDING):  amLODIPine   Tablet 5 milliGRAM(s) Oral daily  ascorbic acid 500 milliGRAM(s) Oral two times a day  enoxaparin Injectable 100 milliGRAM(s) SubCutaneous every 12 hours  influenza   Vaccine 0.5 milliLiter(s) IntraMuscular once  multivitamin 1 Tablet(s) Oral daily  simvastatin 5 milliGRAM(s) Oral at bedtime  zinc sulfate 220 milliGRAM(s) Oral daily    MEDICATIONS  (PRN):  acetaminophen   Tablet .. 650 milliGRAM(s) Oral every 6 hours PRN Temp greater or equal to 38C (100.4F), Mild Pain (1 - 3)  ALBUTerol    90 MICROgram(s) HFA Inhaler 2 Puff(s) Inhalation every 6 hours PRN Shortness of Breath and/or Wheezing  guaiFENesin   Syrup  (Sugar-Free) 200 milliGRAM(s) Oral every 6 hours PRN Cough    CAPILLARY BLOOD GLUCOSE    I&O's Summary    14 Apr 2020 07:01  -  15 Apr 2020 07:00  --------------------------------------------------------  IN: 240 mL / OUT: 0 mL / NET: 240 mL      T(C): 36.4 (04-15-20 @ 05:15), Max: 36.8 (04-14-20 @ 12:35)  HR: 102 (04-15-20 @ 05:15) (102 - 108)  BP: 112/71 (04-15-20 @ 05:15) (112/71 - 145/96)  RR: 19 (04-15-20 @ 05:15) (18 - 19)  SpO2: 98% (04-15-20 @ 05:15) (93% - 98%)  100% non-rebreather face mask in place    PHYSICAL EXAM:  GENERAL: NAD, well-developed, Obese  HEAD:  Atraumatic, Normocephalic  EYES: EOMI, conjunctiva and sclera clear  NECK: Supple, No JVD  ENT: 100% non-rebreather face mask in place  CHEST/LUNG: Non-labored breathing  HEART: Regular rate and rhythm  ABDOMEN: Soft, Nontender, Nondistended  EXTREMITIES: Non-edematous b/l LEs  PSYCH: AAOx3  NEUROLOGY: non-focal  SKIN: No rashes or lesions    LABS:                        9.8    6.36  )-----------( 120      ( 15 Apr 2020 07:28 )             33.9     04-15    137  |  98  |  11  ----------------------------<  115<H>  4.0   |  23  |  0.59    Ca    9.3      15 Apr 2020 07:28  Phos  4.5     04-15  Mg     2.0     04-15

## 2020-04-16 LAB
ANION GAP SERPL CALC-SCNC: 12 MMO/L — SIGNIFICANT CHANGE UP (ref 7–14)
BUN SERPL-MCNC: 12 MG/DL — SIGNIFICANT CHANGE UP (ref 7–23)
CALCIUM SERPL-MCNC: 9.1 MG/DL — SIGNIFICANT CHANGE UP (ref 8.4–10.5)
CHLORIDE SERPL-SCNC: 99 MMOL/L — SIGNIFICANT CHANGE UP (ref 98–107)
CO2 SERPL-SCNC: 23 MMOL/L — SIGNIFICANT CHANGE UP (ref 22–31)
CREAT SERPL-MCNC: 0.62 MG/DL — SIGNIFICANT CHANGE UP (ref 0.5–1.3)
GLUCOSE SERPL-MCNC: 103 MG/DL — HIGH (ref 70–99)
HBV CORE AB SER-ACNC: NONREACTIVE — SIGNIFICANT CHANGE UP
HBV SURFACE AB SER-ACNC: NONREACTIVE — SIGNIFICANT CHANGE UP
HBV SURFACE AG SER-ACNC: NONREACTIVE — SIGNIFICANT CHANGE UP
HCT VFR BLD CALC: 33.4 % — LOW (ref 34.5–45)
HCV AB S/CO SERPL IA: 0.26 S/CO — SIGNIFICANT CHANGE UP (ref 0–0.99)
HCV AB SERPL-IMP: SIGNIFICANT CHANGE UP
HGB BLD-MCNC: 9.8 G/DL — LOW (ref 11.5–15.5)
MAGNESIUM SERPL-MCNC: 2 MG/DL — SIGNIFICANT CHANGE UP (ref 1.6–2.6)
MCHC RBC-ENTMCNC: 23.6 PG — LOW (ref 27–34)
MCHC RBC-ENTMCNC: 29.3 % — LOW (ref 32–36)
MCV RBC AUTO: 80.5 FL — SIGNIFICANT CHANGE UP (ref 80–100)
NRBC # FLD: 0 K/UL — SIGNIFICANT CHANGE UP (ref 0–0)
PHOSPHATE SERPL-MCNC: 5 MG/DL — HIGH (ref 2.5–4.5)
PLATELET # BLD AUTO: 167 K/UL — SIGNIFICANT CHANGE UP (ref 150–400)
PMV BLD: SIGNIFICANT CHANGE UP FL (ref 7–13)
POTASSIUM SERPL-MCNC: 4.2 MMOL/L — SIGNIFICANT CHANGE UP (ref 3.5–5.3)
POTASSIUM SERPL-SCNC: 4.2 MMOL/L — SIGNIFICANT CHANGE UP (ref 3.5–5.3)
RBC # BLD: 4.15 M/UL — SIGNIFICANT CHANGE UP (ref 3.8–5.2)
RBC # FLD: 27.2 % — HIGH (ref 10.3–14.5)
SODIUM SERPL-SCNC: 134 MMOL/L — LOW (ref 135–145)
WBC # BLD: 5.67 K/UL — SIGNIFICANT CHANGE UP (ref 3.8–10.5)
WBC # FLD AUTO: 5.67 K/UL — SIGNIFICANT CHANGE UP (ref 3.8–10.5)

## 2020-04-16 PROCEDURE — 99233 SBSQ HOSP IP/OBS HIGH 50: CPT

## 2020-04-16 RX ADMIN — AMLODIPINE BESYLATE 5 MILLIGRAM(S): 2.5 TABLET ORAL at 05:06

## 2020-04-16 RX ADMIN — SIMVASTATIN 5 MILLIGRAM(S): 20 TABLET, FILM COATED ORAL at 22:32

## 2020-04-16 RX ADMIN — ENOXAPARIN SODIUM 100 MILLIGRAM(S): 100 INJECTION SUBCUTANEOUS at 17:33

## 2020-04-16 RX ADMIN — Medication 1 TABLET(S): at 17:33

## 2020-04-16 RX ADMIN — ENOXAPARIN SODIUM 100 MILLIGRAM(S): 100 INJECTION SUBCUTANEOUS at 05:06

## 2020-04-16 RX ADMIN — Medication 500 MILLIGRAM(S): at 05:06

## 2020-04-16 RX ADMIN — ZINC SULFATE TAB 220 MG (50 MG ZINC EQUIVALENT) 220 MILLIGRAM(S): 220 (50 ZN) TAB at 17:33

## 2020-04-16 RX ADMIN — Medication 500 MILLIGRAM(S): at 17:33

## 2020-04-16 NOTE — PROGRESS NOTE ADULT - PROBLEM SELECTOR PLAN 1
- Continue with supplemental O2 via Non-rebreather  - Patient desaturated to 86% on attempts at weaning off non-rebreather.   - s/p plaquenil, solumedrol, and ACTEMRA  - Incentive spirometry  - Prone position  - Obtain AM labs (CBC, CMP, Mag, Phos, CRP, Procalcitonin, and Ferritin)  - Continue with attempts at weaning off non-rebreather face mask in evening.

## 2020-04-16 NOTE — PROGRESS NOTE ADULT - SUBJECTIVE AND OBJECTIVE BOX
SUBJECTIVE / OVERNIGHT EVENTS:  No overnight events. No new AM complaints. Ongoing SOB and cough.     MEDICATIONS  (STANDING):  amLODIPine   Tablet 5 milliGRAM(s) Oral daily  ascorbic acid 500 milliGRAM(s) Oral two times a day  enoxaparin Injectable 100 milliGRAM(s) SubCutaneous every 12 hours  influenza   Vaccine 0.5 milliLiter(s) IntraMuscular once  multivitamin 1 Tablet(s) Oral daily  simvastatin 5 milliGRAM(s) Oral at bedtime  zinc sulfate 220 milliGRAM(s) Oral daily    MEDICATIONS  (PRN):  acetaminophen   Tablet .. 650 milliGRAM(s) Oral every 6 hours PRN Temp greater or equal to 38C (100.4F), Mild Pain (1 - 3)  ALBUTerol    90 MICROgram(s) HFA Inhaler 2 Puff(s) Inhalation every 6 hours PRN Shortness of Breath and/or Wheezing  guaiFENesin   Syrup  (Sugar-Free) 200 milliGRAM(s) Oral every 6 hours PRN Cough    CAPILLARY BLOOD GLUCOSE  I&O's Summary    16 Apr 2020 07:01  -  16 Apr 2020 13:39  --------------------------------------------------------  IN: 240 mL / OUT: 0 mL / NET: 240 mL    T(C): 36.3 (04-16-20 @ 11:23), Max: 36.5 (04-15-20 @ 15:40)  HR: 107 (04-16-20 @ 11:23) (91 - 107)  BP: 110/69 (04-16-20 @ 11:23) (103/63 - 110/69)  RR: 20 (04-16-20 @ 11:23) (20 - 30)  SpO2: 95% (04-16-20 @ 11:23) (95% - 100%)  100% non-rebreather face mask in place    PHYSICAL EXAM:  GENERAL: NAD, well-developed, Obese  HEAD:  Atraumatic, Normocephalic  EYES: EOMI, conjunctiva and sclera clear  NECK: Supple, No JVD  ENT: 100% non-rebreather face mask in place  CHEST/LUNG: Labored respiration.   HEART: Regular rate and rhythm  ABDOMEN: Soft, Nontender, Nondistended  EXTREMITIES: Non-edematous b/l LEs  PSYCH: AAOx3  NEUROLOGY: non-focal  SKIN: No rashes or lesions    LABS:                        9.8    5.67  )-----------( 167      ( 16 Apr 2020 07:45 )             33.4     04-16    134<L>  |  99  |  12  ----------------------------<  103<H>  4.2   |  23  |  0.62    Ca    9.1      16 Apr 2020 07:45  Phos  5.0     04-16  Mg     2.0     04-16

## 2020-04-17 LAB
CARDIOLIPIN IGM SER-MCNC: 10.74 MPL — SIGNIFICANT CHANGE UP (ref 0–11)
CARDIOLIPIN IGM SER-MCNC: 9.05 GPL — SIGNIFICANT CHANGE UP (ref 0–23)
CRP SERPL-MCNC: < 4 MG/L — SIGNIFICANT CHANGE UP
D DIMER BLD IA.RAPID-MCNC: 976 NG/ML — SIGNIFICANT CHANGE UP
FERRITIN SERPL-MCNC: 150.6 NG/ML — HIGH (ref 15–150)
HCT VFR BLD CALC: 34.7 % — SIGNIFICANT CHANGE UP (ref 34.5–45)
HCV RNA SERPL NAA DL=5-ACNC: NOT DETECTED IU/ML — SIGNIFICANT CHANGE UP
HCV RNA SPEC NAA+PROBE-LOG IU: SIGNIFICANT CHANGE UP
HGB BLD-MCNC: 9.9 G/DL — LOW (ref 11.5–15.5)
LDH SERPL L TO P-CCNC: 478 U/L — HIGH (ref 135–225)
MCHC RBC-ENTMCNC: 23.1 PG — LOW (ref 27–34)
MCHC RBC-ENTMCNC: 28.5 % — LOW (ref 32–36)
MCV RBC AUTO: 80.9 FL — SIGNIFICANT CHANGE UP (ref 80–100)
NRBC # FLD: 0 K/UL — SIGNIFICANT CHANGE UP (ref 0–0)
PLATELET # BLD AUTO: 167 K/UL — SIGNIFICANT CHANGE UP (ref 150–400)
PMV BLD: SIGNIFICANT CHANGE UP FL (ref 7–13)
PROCALCITONIN SERPL-MCNC: 0.06 NG/ML — SIGNIFICANT CHANGE UP (ref 0.02–0.1)
RBC # BLD: 4.29 M/UL — SIGNIFICANT CHANGE UP (ref 3.8–5.2)
RBC # FLD: 27 % — HIGH (ref 10.3–14.5)
TROPONIN T, HIGH SENSITIVITY: 11 NG/L — SIGNIFICANT CHANGE UP (ref ?–14)
WBC # BLD: 7.11 K/UL — SIGNIFICANT CHANGE UP (ref 3.8–10.5)
WBC # FLD AUTO: 7.11 K/UL — SIGNIFICANT CHANGE UP (ref 3.8–10.5)

## 2020-04-17 PROCEDURE — 99233 SBSQ HOSP IP/OBS HIGH 50: CPT

## 2020-04-17 RX ADMIN — AMLODIPINE BESYLATE 5 MILLIGRAM(S): 2.5 TABLET ORAL at 04:55

## 2020-04-17 RX ADMIN — ENOXAPARIN SODIUM 100 MILLIGRAM(S): 100 INJECTION SUBCUTANEOUS at 04:55

## 2020-04-17 RX ADMIN — Medication 500 MILLIGRAM(S): at 17:09

## 2020-04-17 RX ADMIN — Medication 500 MILLIGRAM(S): at 04:55

## 2020-04-17 RX ADMIN — SIMVASTATIN 5 MILLIGRAM(S): 20 TABLET, FILM COATED ORAL at 22:34

## 2020-04-17 RX ADMIN — ZINC SULFATE TAB 220 MG (50 MG ZINC EQUIVALENT) 220 MILLIGRAM(S): 220 (50 ZN) TAB at 12:22

## 2020-04-17 RX ADMIN — Medication 1 TABLET(S): at 12:22

## 2020-04-17 RX ADMIN — ENOXAPARIN SODIUM 100 MILLIGRAM(S): 100 INJECTION SUBCUTANEOUS at 17:08

## 2020-04-17 NOTE — PROGRESS NOTE ADULT - PROBLEM SELECTOR PLAN 1
- Continue with supplemental O2 via Non-rebreather  - Patient desaturated to 76% on attempts at weaning off non-rebreather.   - s/p plaquenil, solumedrol, and ACTEMRA  - Incentive spirometry  - Prone position  - Obtain AM labs (CBC, CMP, Mag, Phos, CRP, Procalcitonin, and Ferritin)  - Continue with attempts at weaning off non-rebreather face mask in evening.

## 2020-04-17 NOTE — PROGRESS NOTE ADULT - SUBJECTIVE AND OBJECTIVE BOX
SUBJECTIVE / OVERNIGHT EVENTS:  No overnight events. No new AM complaints. Ongoing SOB and cough. Unable tolerate weaning trials for now.     MEDICATIONS  (STANDING):  amLODIPine   Tablet 5 milliGRAM(s) Oral daily  ascorbic acid 500 milliGRAM(s) Oral two times a day  enoxaparin Injectable 100 milliGRAM(s) SubCutaneous every 12 hours  influenza   Vaccine 0.5 milliLiter(s) IntraMuscular once  multivitamin 1 Tablet(s) Oral daily  simvastatin 5 milliGRAM(s) Oral at bedtime  zinc sulfate 220 milliGRAM(s) Oral daily    MEDICATIONS  (PRN):  acetaminophen   Tablet .. 650 milliGRAM(s) Oral every 6 hours PRN Temp greater or equal to 38C (100.4F), Mild Pain (1 - 3)  ALBUTerol    90 MICROgram(s) HFA Inhaler 2 Puff(s) Inhalation every 6 hours PRN Shortness of Breath and/or Wheezing  guaiFENesin   Syrup  (Sugar-Free) 200 milliGRAM(s) Oral every 6 hours PRN Cough    CAPILLARY BLOOD GLUCOSE    I&O's Summary    16 Apr 2020 07:01  -  17 Apr 2020 07:00  --------------------------------------------------------  IN: 240 mL / OUT: 0 mL / NET: 240 mL      T(C): 36.2 (04-17-20 @ 11:45), Max: 36.7 (04-16-20 @ 19:53)  HR: 95 (04-17-20 @ 11:45) (88 - 100)  BP: 121/67 (04-17-20 @ 11:45) (121/67 - 130/89)  RR: 20 (04-17-20 @ 11:45) (20 - 20)  SpO2: 99% (04-17-20 @ 11:45) (99% - 99%)  100% non-rebreather face mask in place    PHYSICAL EXAM:  GENERAL: NAD, well-developed, Obese  HEAD:  Atraumatic, Normocephalic  EYES: EOMI, conjunctiva and sclera clear  NECK: Supple, No JVD  ENT: 100% non-rebreather face mask in place  CHEST/LUNG: Labored respiration.   HEART: Regular rate and rhythm  ABDOMEN: Soft, Nontender, Nondistended  EXTREMITIES: Non-edematous b/l LEs.   PSYCH: AAOx3  NEUROLOGY: non-focal  SKIN: No rashes or lesions    LABS:                        9.9    7.11  )-----------( 167      ( 17 Apr 2020 09:00 )             34.7     04-16    134<L>  |  99  |  12  ----------------------------<  103<H>  4.2   |  23  |  0.62    Ca    9.1      16 Apr 2020 07:45  Phos  5.0     04-16  Mg     2.0     04-16

## 2020-04-18 LAB
ANION GAP SERPL CALC-SCNC: 15 MMO/L — HIGH (ref 7–14)
BUN SERPL-MCNC: 10 MG/DL — SIGNIFICANT CHANGE UP (ref 7–23)
CALCIUM SERPL-MCNC: 9.3 MG/DL — SIGNIFICANT CHANGE UP (ref 8.4–10.5)
CHLORIDE SERPL-SCNC: 100 MMOL/L — SIGNIFICANT CHANGE UP (ref 98–107)
CO2 SERPL-SCNC: 24 MMOL/L — SIGNIFICANT CHANGE UP (ref 22–31)
CREAT SERPL-MCNC: 0.53 MG/DL — SIGNIFICANT CHANGE UP (ref 0.5–1.3)
GLUCOSE SERPL-MCNC: 109 MG/DL — HIGH (ref 70–99)
HCT VFR BLD CALC: 33.5 % — LOW (ref 34.5–45)
HGB BLD-MCNC: 9.8 G/DL — LOW (ref 11.5–15.5)
MAGNESIUM SERPL-MCNC: 1.9 MG/DL — SIGNIFICANT CHANGE UP (ref 1.6–2.6)
MCHC RBC-ENTMCNC: 23.3 PG — LOW (ref 27–34)
MCHC RBC-ENTMCNC: 29.3 % — LOW (ref 32–36)
MCV RBC AUTO: 79.8 FL — LOW (ref 80–100)
NRBC # FLD: 0 K/UL — SIGNIFICANT CHANGE UP (ref 0–0)
PHOSPHATE SERPL-MCNC: 4.1 MG/DL — SIGNIFICANT CHANGE UP (ref 2.5–4.5)
PLATELET # BLD AUTO: 154 K/UL — SIGNIFICANT CHANGE UP (ref 150–400)
PMV BLD: SIGNIFICANT CHANGE UP FL (ref 7–13)
POTASSIUM SERPL-MCNC: 3.8 MMOL/L — SIGNIFICANT CHANGE UP (ref 3.5–5.3)
POTASSIUM SERPL-SCNC: 3.8 MMOL/L — SIGNIFICANT CHANGE UP (ref 3.5–5.3)
RBC # BLD: 4.2 M/UL — SIGNIFICANT CHANGE UP (ref 3.8–5.2)
RBC # FLD: 26.9 % — HIGH (ref 10.3–14.5)
SODIUM SERPL-SCNC: 139 MMOL/L — SIGNIFICANT CHANGE UP (ref 135–145)
WBC # BLD: 6.86 K/UL — SIGNIFICANT CHANGE UP (ref 3.8–10.5)
WBC # FLD AUTO: 6.86 K/UL — SIGNIFICANT CHANGE UP (ref 3.8–10.5)

## 2020-04-18 PROCEDURE — 99233 SBSQ HOSP IP/OBS HIGH 50: CPT

## 2020-04-18 RX ADMIN — ENOXAPARIN SODIUM 100 MILLIGRAM(S): 100 INJECTION SUBCUTANEOUS at 16:35

## 2020-04-18 RX ADMIN — ENOXAPARIN SODIUM 100 MILLIGRAM(S): 100 INJECTION SUBCUTANEOUS at 04:19

## 2020-04-18 RX ADMIN — ZINC SULFATE TAB 220 MG (50 MG ZINC EQUIVALENT) 220 MILLIGRAM(S): 220 (50 ZN) TAB at 07:23

## 2020-04-18 RX ADMIN — SIMVASTATIN 5 MILLIGRAM(S): 20 TABLET, FILM COATED ORAL at 21:04

## 2020-04-18 RX ADMIN — Medication 500 MILLIGRAM(S): at 16:35

## 2020-04-18 RX ADMIN — Medication 500 MILLIGRAM(S): at 04:19

## 2020-04-18 RX ADMIN — AMLODIPINE BESYLATE 5 MILLIGRAM(S): 2.5 TABLET ORAL at 04:19

## 2020-04-18 RX ADMIN — Medication 1 TABLET(S): at 07:23

## 2020-04-18 NOTE — PROGRESS NOTE ADULT - PROBLEM SELECTOR PLAN 1
- Continue with supplemental O2 via Non-rebreather  - Weaning off non-rebreather as tolerated  - s/p plaquenil, solumedrol, and ACTEMRA  - Incentive spirometry  - Prone position  - Obtain AM labs (CBC, CMP, Mag, Phos, CRP, Procalcitonin, and Ferritin)  - Continue with attempts at weaning off non-rebreather face mask in evening.

## 2020-04-18 NOTE — PROGRESS NOTE ADULT - SUBJECTIVE AND OBJECTIVE BOX
SUBJECTIVE / OVERNIGHT EVENTS:  No overnight events. No new AM complaints. Ongoing SOB.     MEDICATIONS  (STANDING):  amLODIPine   Tablet 5 milliGRAM(s) Oral daily  ascorbic acid 500 milliGRAM(s) Oral two times a day  enoxaparin Injectable 100 milliGRAM(s) SubCutaneous every 12 hours  influenza   Vaccine 0.5 milliLiter(s) IntraMuscular once  multivitamin 1 Tablet(s) Oral daily  simvastatin 5 milliGRAM(s) Oral at bedtime  zinc sulfate 220 milliGRAM(s) Oral daily    MEDICATIONS  (PRN):  acetaminophen   Tablet .. 650 milliGRAM(s) Oral every 6 hours PRN Temp greater or equal to 38C (100.4F), Mild Pain (1 - 3)  ALBUTerol    90 MICROgram(s) HFA Inhaler 2 Puff(s) Inhalation every 6 hours PRN Shortness of Breath and/or Wheezing  guaiFENesin   Syrup  (Sugar-Free) 200 milliGRAM(s) Oral every 6 hours PRN Cough    CAPILLARY BLOOD GLUCOSE  I&O's Summary    18 Apr 2020 07:01  -  18 Apr 2020 12:39  --------------------------------------------------------  IN: 320 mL / OUT: 0 mL / NET: 320 mL    T(C): 36.3 (04-18-20 @ 12:18), Max: 36.8 (04-18-20 @ 04:20)  HR: 106 (04-18-20 @ 12:18) (98 - 109)  BP: 114/69 (04-18-20 @ 12:18) (101/65 - 146/76)  RR: 22 (04-18-20 @ 12:18) (20 - 22)  SpO2: 95% (04-18-20 @ 12:18) (95% - 98%)  100% non-rebreather face mask in place    PHYSICAL EXAM:  GENERAL: NAD, well-developed, Obese  HEAD:  Atraumatic, Normocephalic  EYES: EOMI, conjunctiva and sclera clear  NECK: Supple, No JVD  ENT: 100% non-rebreather face mask in place  CHEST/LUNG: Labored respiration with tachypnea.   HEART: Regular rate and rhythm  ABDOMEN: Soft, Nontender, Nondistended  EXTREMITIES: Non-edematous b/l LEs  PSYCH: AAOx3  NEUROLOGY: non-focal  SKIN: No rashes or lesions    LABS:                        9.8    6.86  )-----------( 154      ( 18 Apr 2020 05:45 )             33.5     04-18    139  |  100  |  10  ----------------------------<  109<H>  3.8   |  24  |  0.53    Ca    9.3      18 Apr 2020 05:45  Phos  4.1     04-18  Mg     1.9     04-18

## 2020-04-19 LAB
ALBUMIN SERPL ELPH-MCNC: 3.5 G/DL — SIGNIFICANT CHANGE UP (ref 3.3–5)
ALP SERPL-CCNC: 63 U/L — SIGNIFICANT CHANGE UP (ref 40–120)
ALT FLD-CCNC: 40 U/L — HIGH (ref 4–33)
ANION GAP SERPL CALC-SCNC: 11 MMO/L — SIGNIFICANT CHANGE UP (ref 7–14)
ANION GAP SERPL CALC-SCNC: 11 MMO/L — SIGNIFICANT CHANGE UP (ref 7–14)
ANISOCYTOSIS BLD QL: SLIGHT — SIGNIFICANT CHANGE UP
AST SERPL-CCNC: 20 U/L — SIGNIFICANT CHANGE UP (ref 4–32)
BILIRUB SERPL-MCNC: 0.3 MG/DL — SIGNIFICANT CHANGE UP (ref 0.2–1.2)
BUN SERPL-MCNC: 9 MG/DL — SIGNIFICANT CHANGE UP (ref 7–23)
BUN SERPL-MCNC: 9 MG/DL — SIGNIFICANT CHANGE UP (ref 7–23)
CALCIUM SERPL-MCNC: 9.5 MG/DL — SIGNIFICANT CHANGE UP (ref 8.4–10.5)
CALCIUM SERPL-MCNC: 9.5 MG/DL — SIGNIFICANT CHANGE UP (ref 8.4–10.5)
CHLORIDE SERPL-SCNC: 98 MMOL/L — SIGNIFICANT CHANGE UP (ref 98–107)
CHLORIDE SERPL-SCNC: 98 MMOL/L — SIGNIFICANT CHANGE UP (ref 98–107)
CO2 SERPL-SCNC: 25 MMOL/L — SIGNIFICANT CHANGE UP (ref 22–31)
CO2 SERPL-SCNC: 25 MMOL/L — SIGNIFICANT CHANGE UP (ref 22–31)
CREAT SERPL-MCNC: 0.55 MG/DL — SIGNIFICANT CHANGE UP (ref 0.5–1.3)
CREAT SERPL-MCNC: 0.55 MG/DL — SIGNIFICANT CHANGE UP (ref 0.5–1.3)
CRP SERPL HS-MCNC: 2.51 MG/L — SIGNIFICANT CHANGE UP
D DIMER BLD IA.RAPID-MCNC: 920 NG/ML — SIGNIFICANT CHANGE UP
FERRITIN SERPL-MCNC: 127.6 NG/ML — SIGNIFICANT CHANGE UP (ref 15–150)
GLUCOSE SERPL-MCNC: 118 MG/DL — HIGH (ref 70–99)
GLUCOSE SERPL-MCNC: 118 MG/DL — HIGH (ref 70–99)
HCT VFR BLD CALC: 33 % — LOW (ref 34.5–45)
HCT VFR BLD CALC: 34.3 % — LOW (ref 34.5–45)
HGB BLD-MCNC: 10.1 G/DL — LOW (ref 11.5–15.5)
HGB BLD-MCNC: 9.6 G/DL — LOW (ref 11.5–15.5)
HYPOCHROMIA BLD QL: SLIGHT — SIGNIFICANT CHANGE UP
LG PLATELETS BLD QL AUTO: SLIGHT — SIGNIFICANT CHANGE UP
MAGNESIUM SERPL-MCNC: 1.9 MG/DL — SIGNIFICANT CHANGE UP (ref 1.6–2.6)
MANUAL SMEAR VERIFICATION: SIGNIFICANT CHANGE UP
MCHC RBC-ENTMCNC: 23.6 PG — LOW (ref 27–34)
MCHC RBC-ENTMCNC: 23.8 PG — LOW (ref 27–34)
MCHC RBC-ENTMCNC: 29.1 % — LOW (ref 32–36)
MCHC RBC-ENTMCNC: 29.4 % — LOW (ref 32–36)
MCV RBC AUTO: 80.7 FL — SIGNIFICANT CHANGE UP (ref 80–100)
MCV RBC AUTO: 81.1 FL — SIGNIFICANT CHANGE UP (ref 80–100)
NRBC # FLD: 0 K/UL — SIGNIFICANT CHANGE UP (ref 0–0)
NRBC # FLD: 0 K/UL — SIGNIFICANT CHANGE UP (ref 0–0)
OVALOCYTES BLD QL SMEAR: SLIGHT — SIGNIFICANT CHANGE UP
PHOSPHATE SERPL-MCNC: 4.4 MG/DL — SIGNIFICANT CHANGE UP (ref 2.5–4.5)
PLATELET # BLD AUTO: 139 K/UL — LOW (ref 150–400)
PLATELET # BLD AUTO: 46 K/UL — LOW (ref 150–400)
PLATELET CLUMP BLD QL SMEAR: SLIGHT — SIGNIFICANT CHANGE UP
PLATELET COUNT - ESTIMATE: SIGNIFICANT CHANGE UP
PMV BLD: SIGNIFICANT CHANGE UP FL (ref 7–13)
PMV BLD: SIGNIFICANT CHANGE UP FL (ref 7–13)
POIKILOCYTOSIS BLD QL AUTO: SLIGHT — SIGNIFICANT CHANGE UP
POTASSIUM SERPL-MCNC: 4 MMOL/L — SIGNIFICANT CHANGE UP (ref 3.5–5.3)
POTASSIUM SERPL-MCNC: 4 MMOL/L — SIGNIFICANT CHANGE UP (ref 3.5–5.3)
POTASSIUM SERPL-SCNC: 4 MMOL/L — SIGNIFICANT CHANGE UP (ref 3.5–5.3)
POTASSIUM SERPL-SCNC: 4 MMOL/L — SIGNIFICANT CHANGE UP (ref 3.5–5.3)
PROCALCITONIN SERPL-MCNC: 0.06 NG/ML — SIGNIFICANT CHANGE UP (ref 0.02–0.1)
PROT SERPL-MCNC: 7.6 G/DL — SIGNIFICANT CHANGE UP (ref 6–8.3)
RBC # BLD: 4.07 M/UL — SIGNIFICANT CHANGE UP (ref 3.8–5.2)
RBC # BLD: 4.25 M/UL — SIGNIFICANT CHANGE UP (ref 3.8–5.2)
RBC # FLD: 26.5 % — HIGH (ref 10.3–14.5)
RBC # FLD: 27.1 % — HIGH (ref 10.3–14.5)
SCHISTOCYTES BLD QL AUTO: SLIGHT — SIGNIFICANT CHANGE UP
SODIUM SERPL-SCNC: 134 MMOL/L — LOW (ref 135–145)
SODIUM SERPL-SCNC: 134 MMOL/L — LOW (ref 135–145)
WBC # BLD: 7.09 K/UL — SIGNIFICANT CHANGE UP (ref 3.8–10.5)
WBC # BLD: 7.88 K/UL — SIGNIFICANT CHANGE UP (ref 3.8–10.5)
WBC # FLD AUTO: 7.09 K/UL — SIGNIFICANT CHANGE UP (ref 3.8–10.5)
WBC # FLD AUTO: 7.88 K/UL — SIGNIFICANT CHANGE UP (ref 3.8–10.5)

## 2020-04-19 PROCEDURE — 99233 SBSQ HOSP IP/OBS HIGH 50: CPT

## 2020-04-19 RX ORDER — ONDANSETRON 8 MG/1
4 TABLET, FILM COATED ORAL ONCE
Refills: 0 | Status: COMPLETED | OUTPATIENT
Start: 2020-04-19 | End: 2020-04-19

## 2020-04-19 RX ADMIN — Medication 1 TABLET(S): at 11:30

## 2020-04-19 RX ADMIN — ONDANSETRON 4 MILLIGRAM(S): 8 TABLET, FILM COATED ORAL at 13:30

## 2020-04-19 RX ADMIN — SIMVASTATIN 5 MILLIGRAM(S): 20 TABLET, FILM COATED ORAL at 22:16

## 2020-04-19 RX ADMIN — ENOXAPARIN SODIUM 100 MILLIGRAM(S): 100 INJECTION SUBCUTANEOUS at 04:22

## 2020-04-19 RX ADMIN — Medication 500 MILLIGRAM(S): at 04:22

## 2020-04-19 RX ADMIN — AMLODIPINE BESYLATE 5 MILLIGRAM(S): 2.5 TABLET ORAL at 04:22

## 2020-04-19 RX ADMIN — ENOXAPARIN SODIUM 100 MILLIGRAM(S): 100 INJECTION SUBCUTANEOUS at 17:06

## 2020-04-19 RX ADMIN — ZINC SULFATE TAB 220 MG (50 MG ZINC EQUIVALENT) 220 MILLIGRAM(S): 220 (50 ZN) TAB at 11:30

## 2020-04-19 RX ADMIN — Medication 500 MILLIGRAM(S): at 17:05

## 2020-04-19 NOTE — PROGRESS NOTE ADULT - SUBJECTIVE AND OBJECTIVE BOX
SUBJECTIVE / OVERNIGHT EVENTS:  No overnight events. No New AM complaints. Ongoing SOB and cough.     MEDICATIONS  (STANDING):  amLODIPine   Tablet 5 milliGRAM(s) Oral daily  ascorbic acid 500 milliGRAM(s) Oral two times a day  enoxaparin Injectable 100 milliGRAM(s) SubCutaneous every 12 hours  influenza   Vaccine 0.5 milliLiter(s) IntraMuscular once  multivitamin 1 Tablet(s) Oral daily  ondansetron Injectable 4 milliGRAM(s) IV Push once  simvastatin 5 milliGRAM(s) Oral at bedtime  zinc sulfate 220 milliGRAM(s) Oral daily    MEDICATIONS  (PRN):  acetaminophen   Tablet .. 650 milliGRAM(s) Oral every 6 hours PRN Temp greater or equal to 38C (100.4F), Mild Pain (1 - 3)  ALBUTerol    90 MICROgram(s) HFA Inhaler 2 Puff(s) Inhalation every 6 hours PRN Shortness of Breath and/or Wheezing  guaiFENesin   Syrup  (Sugar-Free) 200 milliGRAM(s) Oral every 6 hours PRN Cough    CAPILLARY BLOOD GLUCOSE  I&O's Summary    18 Apr 2020 07:01  -  19 Apr 2020 07:00  --------------------------------------------------------  IN: 320 mL / OUT: 0 mL / NET: 320 mL    19 Apr 2020 07:01  -  19 Apr 2020 13:29  --------------------------------------------------------  IN: 200 mL / OUT: 0 mL / NET: 200 mL      T(C): 36.7 (04-19-20 @ 11:19), Max: 36.7 (04-19-20 @ 11:19)  HR: 78 (04-19-20 @ 11:19) (78 - 80)  BP: 147/78 (04-19-20 @ 11:19) (112/63 - 147/78)  RR: 18 (04-19-20 @ 11:19) (18 - 20)  SpO2: 98% (04-19-20 @ 11:19) (95% - 99%)  100% non-rebreather face mask in place    PHYSICAL EXAM:  GENERAL: NAD, well-developed, Obese  HEAD:  Atraumatic, Normocephalic  EYES: EOMI, conjunctiva and sclera clear  NECK: Supple, No JVD  ENT: 100% non-rebreather face mask in place  CHEST/LUNG: Labored respiration.   HEART: Regular rate and rhythm  ABDOMEN: Soft, Nontender, Nondistended  EXTREMITIES: Non-edematous b/l LEs.   PSYCH: AAOx3  NEUROLOGY: non-focal  SKIN: No rashes or lesions    LABS:                        10.1   7.09  )-----------( 139      ( 19 Apr 2020 11:30 )             34.3     04-19    134<L>  |  98  |  9   ----------------------------<  118<H>  4.0   |  25  |  0.55    Ca    9.5      19 Apr 2020 05:52  Phos  4.4     04-19  Mg     1.9     04-19    TPro  7.6  /  Alb  3.5  /  TBili  0.3  /  DBili  x   /  AST  20  /  ALT  40<H>  /  AlkPhos  63  04-19

## 2020-04-20 LAB
ANION GAP SERPL CALC-SCNC: 11 MMO/L — SIGNIFICANT CHANGE UP (ref 7–14)
BUN SERPL-MCNC: 7 MG/DL — SIGNIFICANT CHANGE UP (ref 7–23)
CALCIUM SERPL-MCNC: 9.6 MG/DL — SIGNIFICANT CHANGE UP (ref 8.4–10.5)
CHLORIDE SERPL-SCNC: 101 MMOL/L — SIGNIFICANT CHANGE UP (ref 98–107)
CO2 SERPL-SCNC: 26 MMOL/L — SIGNIFICANT CHANGE UP (ref 22–31)
CREAT SERPL-MCNC: 0.53 MG/DL — SIGNIFICANT CHANGE UP (ref 0.5–1.3)
GLUCOSE SERPL-MCNC: 112 MG/DL — HIGH (ref 70–99)
HCT VFR BLD CALC: 30.8 % — LOW (ref 34.5–45)
HGB BLD-MCNC: 9.1 G/DL — LOW (ref 11.5–15.5)
MAGNESIUM SERPL-MCNC: 1.8 MG/DL — SIGNIFICANT CHANGE UP (ref 1.6–2.6)
MCHC RBC-ENTMCNC: 23.6 PG — LOW (ref 27–34)
MCHC RBC-ENTMCNC: 29.5 % — LOW (ref 32–36)
MCV RBC AUTO: 80 FL — SIGNIFICANT CHANGE UP (ref 80–100)
NRBC # FLD: 0 K/UL — SIGNIFICANT CHANGE UP (ref 0–0)
PHOSPHATE SERPL-MCNC: 4.7 MG/DL — HIGH (ref 2.5–4.5)
PLATELET # BLD AUTO: 147 K/UL — LOW (ref 150–400)
PMV BLD: SIGNIFICANT CHANGE UP FL (ref 7–13)
POTASSIUM SERPL-MCNC: 4 MMOL/L — SIGNIFICANT CHANGE UP (ref 3.5–5.3)
POTASSIUM SERPL-SCNC: 4 MMOL/L — SIGNIFICANT CHANGE UP (ref 3.5–5.3)
RBC # BLD: 3.85 M/UL — SIGNIFICANT CHANGE UP (ref 3.8–5.2)
RBC # FLD: 26.9 % — HIGH (ref 10.3–14.5)
SODIUM SERPL-SCNC: 138 MMOL/L — SIGNIFICANT CHANGE UP (ref 135–145)
WBC # BLD: 9.44 K/UL — SIGNIFICANT CHANGE UP (ref 3.8–10.5)
WBC # FLD AUTO: 9.44 K/UL — SIGNIFICANT CHANGE UP (ref 3.8–10.5)

## 2020-04-20 PROCEDURE — 99233 SBSQ HOSP IP/OBS HIGH 50: CPT

## 2020-04-20 RX ADMIN — Medication 500 MILLIGRAM(S): at 17:38

## 2020-04-20 RX ADMIN — AMLODIPINE BESYLATE 5 MILLIGRAM(S): 2.5 TABLET ORAL at 05:16

## 2020-04-20 RX ADMIN — ENOXAPARIN SODIUM 100 MILLIGRAM(S): 100 INJECTION SUBCUTANEOUS at 17:38

## 2020-04-20 RX ADMIN — Medication 200 MILLIGRAM(S): at 20:47

## 2020-04-20 RX ADMIN — ZINC SULFATE TAB 220 MG (50 MG ZINC EQUIVALENT) 220 MILLIGRAM(S): 220 (50 ZN) TAB at 12:20

## 2020-04-20 RX ADMIN — ENOXAPARIN SODIUM 100 MILLIGRAM(S): 100 INJECTION SUBCUTANEOUS at 05:18

## 2020-04-20 RX ADMIN — Medication 500 MILLIGRAM(S): at 05:16

## 2020-04-20 RX ADMIN — SIMVASTATIN 5 MILLIGRAM(S): 20 TABLET, FILM COATED ORAL at 20:47

## 2020-04-20 RX ADMIN — Medication 1 TABLET(S): at 12:20

## 2020-04-20 NOTE — PROGRESS NOTE ADULT - PROBLEM SELECTOR PLAN 1
- Continue with supplemental O2 via Non-rebreather  - Weaning off non-rebreather as tolerated  - s/p plaquenil, solumedrol, and ACTEMRA  - Incentive spirometry  - Prone position  - Trend labs CBC, CMP, Mag, Phos, CRP, Procalcitonin, and Ferritin

## 2020-04-20 NOTE — PROGRESS NOTE ADULT - ASSESSMENT
44 yoF with HTN, pre-dm, obesity now with acute PE and acute respiratory failure secondary to COVID-19 infection. Unable to wean off NRB.

## 2020-04-20 NOTE — PROGRESS NOTE ADULT - SUBJECTIVE AND OBJECTIVE BOX
Medicine Progress note    Patient is a 44y old  Female who presents with a chief complaint of Acute Hypoxemic Respiratory Failure and Pneumonia (19 Apr 2020 13:29)      SUBJECTIVE / OVERNIGHT EVENTS: Pt seen/examined. Sleeping comfortably on NRB, lying on back, satting well. Did not tolerate drop to 10L however. No other complaints, no overnight events.       MEDICATIONS  (STANDING):  amLODIPine   Tablet 5 milliGRAM(s) Oral daily  ascorbic acid 500 milliGRAM(s) Oral two times a day  enoxaparin Injectable 100 milliGRAM(s) SubCutaneous every 12 hours  influenza   Vaccine 0.5 milliLiter(s) IntraMuscular once  multivitamin 1 Tablet(s) Oral daily  simvastatin 5 milliGRAM(s) Oral at bedtime  zinc sulfate 220 milliGRAM(s) Oral daily    MEDICATIONS  (PRN):  acetaminophen   Tablet .. 650 milliGRAM(s) Oral every 6 hours PRN Temp greater or equal to 38C (100.4F), Mild Pain (1 - 3)  ALBUTerol    90 MICROgram(s) HFA Inhaler 2 Puff(s) Inhalation every 6 hours PRN Shortness of Breath and/or Wheezing  guaiFENesin   Syrup  (Sugar-Free) 200 milliGRAM(s) Oral every 6 hours PRN Cough    CAPILLARY BLOOD GLUCOSE    I&O's Summary    19 Apr 2020 07:01  -  20 Apr 2020 07:00  --------------------------------------------------------  IN: 200 mL / OUT: 0 mL / NET: 200 mL    20 Apr 2020 07:01  -  20 Apr 2020 17:24  --------------------------------------------------------  IN: 240 mL / OUT: 550 mL / NET: -310 mL    PHYSICAL EXAM:  Vital Signs Last 24 Hrs  T(C): 36.6 (20 Apr 2020 12:38), Max: 36.6 (20 Apr 2020 05:15)  T(F): 97.9 (20 Apr 2020 12:38), Max: 97.9 (20 Apr 2020 12:38)  HR: 102 (20 Apr 2020 12:38) (97 - 102)  BP: 112/64 (20 Apr 2020 12:38) (112/64 - 125/86)  RR: 16 (20 Apr 2020 15:13) (16 - 18)  SpO2: 100% (20 Apr 2020 15:13) (99% - 100%)    PHYSICAL EXAM:  GENERAL: NAD, well-developed, Obese  HEAD:  Atraumatic, Normocephalic  EYES: conjunctiva and sclera clear  NECK: Supple, No JVD  ENT: 100% non-rebreather face mask in place  CHEST/LUNG: crackles diffusely   HEART: Regular rate and rhythm  ABDOMEN: Soft, Nontender, Nondistended  EXTREMITIES: Non-edematous b/l LEs.   PSYCH: AAOx3  NEUROLOGY: non-focal  SKIN: No rashes or lesions    LABS:                        9.1    9.44  )-----------( 147      ( 20 Apr 2020 04:15 )             30.8     04-20    138  |  101  |  7   ----------------------------<  112<H>  4.0   |  26  |  0.53    Ca    9.6      20 Apr 2020 04:15  Phos  4.7     04-20  Mg     1.8     04-20    TPro  7.6  /  Alb  3.5  /  TBili  0.3  /  DBili  x   /  AST  20  /  ALT  40<H>  /  AlkPhos  63  04-19    COVID-19 PCR: Detected (03-28-20 @ 17:24)    RADIOLOGY & ADDITIONAL TESTS:  Imaging from Last 24 Hours:    Case discussed with: ISAIAH

## 2020-04-21 LAB
ALBUMIN SERPL ELPH-MCNC: 3.5 G/DL — SIGNIFICANT CHANGE UP (ref 3.3–5)
ALP SERPL-CCNC: 67 U/L — SIGNIFICANT CHANGE UP (ref 40–120)
ALT FLD-CCNC: 24 U/L — SIGNIFICANT CHANGE UP (ref 4–33)
ANION GAP SERPL CALC-SCNC: 12 MMO/L — SIGNIFICANT CHANGE UP (ref 7–14)
AST SERPL-CCNC: 16 U/L — SIGNIFICANT CHANGE UP (ref 4–32)
BASOPHILS # BLD AUTO: 0.03 K/UL — SIGNIFICANT CHANGE UP (ref 0–0.2)
BASOPHILS NFR BLD AUTO: 0.3 % — SIGNIFICANT CHANGE UP (ref 0–2)
BILIRUB SERPL-MCNC: < 0.2 MG/DL — LOW (ref 0.2–1.2)
BUN SERPL-MCNC: 5 MG/DL — LOW (ref 7–23)
CALCIUM SERPL-MCNC: 9.3 MG/DL — SIGNIFICANT CHANGE UP (ref 8.4–10.5)
CHLORIDE SERPL-SCNC: 97 MMOL/L — LOW (ref 98–107)
CO2 SERPL-SCNC: 27 MMOL/L — SIGNIFICANT CHANGE UP (ref 22–31)
CREAT SERPL-MCNC: 0.49 MG/DL — LOW (ref 0.5–1.3)
EOSINOPHIL # BLD AUTO: 1.01 K/UL — HIGH (ref 0–0.5)
EOSINOPHIL NFR BLD AUTO: 11.2 % — HIGH (ref 0–6)
GLUCOSE SERPL-MCNC: 131 MG/DL — HIGH (ref 70–99)
HCT VFR BLD CALC: 30.2 % — LOW (ref 34.5–45)
HGB BLD-MCNC: 8.9 G/DL — LOW (ref 11.5–15.5)
IMM GRANULOCYTES NFR BLD AUTO: 0.6 % — SIGNIFICANT CHANGE UP (ref 0–1.5)
LYMPHOCYTES # BLD AUTO: 1.73 K/UL — SIGNIFICANT CHANGE UP (ref 1–3.3)
LYMPHOCYTES # BLD AUTO: 19.2 % — SIGNIFICANT CHANGE UP (ref 13–44)
MAGNESIUM SERPL-MCNC: 1.6 MG/DL — SIGNIFICANT CHANGE UP (ref 1.6–2.6)
MCHC RBC-ENTMCNC: 23.7 PG — LOW (ref 27–34)
MCHC RBC-ENTMCNC: 29.5 % — LOW (ref 32–36)
MCV RBC AUTO: 80.5 FL — SIGNIFICANT CHANGE UP (ref 80–100)
MONOCYTES # BLD AUTO: 0.94 K/UL — HIGH (ref 0–0.9)
MONOCYTES NFR BLD AUTO: 10.5 % — SIGNIFICANT CHANGE UP (ref 2–14)
NEUTROPHILS # BLD AUTO: 5.23 K/UL — SIGNIFICANT CHANGE UP (ref 1.8–7.4)
NEUTROPHILS NFR BLD AUTO: 58.2 % — SIGNIFICANT CHANGE UP (ref 43–77)
NRBC # FLD: 0 K/UL — SIGNIFICANT CHANGE UP (ref 0–0)
PLATELET # BLD AUTO: 139 K/UL — LOW (ref 150–400)
PMV BLD: SIGNIFICANT CHANGE UP FL (ref 7–13)
POTASSIUM SERPL-MCNC: 3.5 MMOL/L — SIGNIFICANT CHANGE UP (ref 3.5–5.3)
POTASSIUM SERPL-SCNC: 3.5 MMOL/L — SIGNIFICANT CHANGE UP (ref 3.5–5.3)
PROT SERPL-MCNC: 7.4 G/DL — SIGNIFICANT CHANGE UP (ref 6–8.3)
RBC # BLD: 3.75 M/UL — LOW (ref 3.8–5.2)
RBC # FLD: 26.5 % — HIGH (ref 10.3–14.5)
SODIUM SERPL-SCNC: 136 MMOL/L — SIGNIFICANT CHANGE UP (ref 135–145)
WBC # BLD: 8.99 K/UL — SIGNIFICANT CHANGE UP (ref 3.8–10.5)
WBC # FLD AUTO: 8.99 K/UL — SIGNIFICANT CHANGE UP (ref 3.8–10.5)

## 2020-04-21 PROCEDURE — 99233 SBSQ HOSP IP/OBS HIGH 50: CPT

## 2020-04-21 RX ADMIN — ENOXAPARIN SODIUM 100 MILLIGRAM(S): 100 INJECTION SUBCUTANEOUS at 04:42

## 2020-04-21 RX ADMIN — SIMVASTATIN 5 MILLIGRAM(S): 20 TABLET, FILM COATED ORAL at 20:18

## 2020-04-21 RX ADMIN — ENOXAPARIN SODIUM 100 MILLIGRAM(S): 100 INJECTION SUBCUTANEOUS at 18:32

## 2020-04-21 RX ADMIN — Medication 500 MILLIGRAM(S): at 04:42

## 2020-04-21 RX ADMIN — Medication 500 MILLIGRAM(S): at 18:32

## 2020-04-21 RX ADMIN — AMLODIPINE BESYLATE 5 MILLIGRAM(S): 2.5 TABLET ORAL at 04:42

## 2020-04-21 RX ADMIN — Medication 1 TABLET(S): at 11:44

## 2020-04-21 RX ADMIN — ZINC SULFATE TAB 220 MG (50 MG ZINC EQUIVALENT) 220 MILLIGRAM(S): 220 (50 ZN) TAB at 11:44

## 2020-04-21 NOTE — PROGRESS NOTE ADULT - PROBLEM SELECTOR PLAN 1
- Continue with supplemental O2 via Non-rebreather  - Weaning off non-rebreather as tolerated -- advised to try at rest for periods of up to 30 min not during eating   - c/w incentive spirometry as tolerated  - s/p plaquenil, solumedrol, and ACTEMRA  - Prone position as needed   - Trend daily labs -- CBC, CMP -- hold off on inflammatory markers unless clinically worsens

## 2020-04-21 NOTE — PROGRESS NOTE ADULT - ASSESSMENT
44 yoF with HTN, pre-dm, obesity now with acute PE and acute respiratory failure secondary to COVID-19 infection. Unable to wean off NRB at this time, but with slow clinical improvement.

## 2020-04-21 NOTE — PROGRESS NOTE ADULT - SUBJECTIVE AND OBJECTIVE BOX
Medicine Progress note    Patient is a 44y old  Female who presents with a chief complaint of Acute Hypoxemic Respiratory Failure and Pneumonia (20 Apr 2020 17:24)      SUBJECTIVE / OVERNIGHT EVENTS: Pt seen/examined, no overnight events. Pt continues to look more stable and improved from respiratory status, but desats if tapered down on NRB O2 though was able to hold sats for approx 30 min yesterday per her report. Continued cough, not worsening.       MEDICATIONS  (STANDING):  amLODIPine   Tablet 5 milliGRAM(s) Oral daily  ascorbic acid 500 milliGRAM(s) Oral two times a day  enoxaparin Injectable 100 milliGRAM(s) SubCutaneous every 12 hours  influenza   Vaccine 0.5 milliLiter(s) IntraMuscular once  multivitamin 1 Tablet(s) Oral daily  simvastatin 5 milliGRAM(s) Oral at bedtime  zinc sulfate 220 milliGRAM(s) Oral daily    MEDICATIONS  (PRN):  acetaminophen   Tablet .. 650 milliGRAM(s) Oral every 6 hours PRN Temp greater or equal to 38C (100.4F), Mild Pain (1 - 3)  ALBUTerol    90 MICROgram(s) HFA Inhaler 2 Puff(s) Inhalation every 6 hours PRN Shortness of Breath and/or Wheezing  guaiFENesin   Syrup  (Sugar-Free) 200 milliGRAM(s) Oral every 6 hours PRN Cough      CAPILLARY BLOOD GLUCOSE        I&O's Summary    20 Apr 2020 07:01  -  21 Apr 2020 07:00  --------------------------------------------------------  IN: 840 mL / OUT: 550 mL / NET: 290 mL        PHYSICAL EXAM:  Vital Signs Last 24 Hrs  T(C): 36.7 (21 Apr 2020 12:23), Max: 36.9 (20 Apr 2020 21:43)  T(F): 98 (21 Apr 2020 12:23), Max: 98.5 (20 Apr 2020 21:43)  HR: 99 (21 Apr 2020 12:23) (97 - 102)  BP: 120/70 (21 Apr 2020 12:23) (112/71 - 124/77)  RR: 18 (21 Apr 2020 12:23) (17 - 18)  SpO2: 99% (21 Apr 2020 12:23) (99% - 100%)    PHYSICAL EXAM:  GENERAL: NAD, well-developed, Obese  HEAD:  Atraumatic, Normocephalic  EYES: conjunctiva and sclera clear  NECK: Supple, No JVD  ENT: 100% non-rebreather face mask in place  CHEST/LUNG: crackles diffusely   HEART: Regular rate and rhythm  ABDOMEN: Soft, Nontender, Nondistended  EXTREMITIES: Non-edematous b/l LEs.   PSYCH: AAOx3  NEUROLOGY: non-focal  SKIN: No rashes or lesions    LABS:                        8.9    8.99  )-----------( 139      ( 21 Apr 2020 05:39 )             30.2     04-21    136  |  97<L>  |  5<L>  ----------------------------<  131<H>  3.5   |  27  |  0.49<L>    Ca    9.3      21 Apr 2020 05:39  Phos  4.7     04-20  Mg     1.6     04-21    TPro  7.4  /  Alb  3.5  /  TBili  < 0.2<L>  /  DBili  x   /  AST  16  /  ALT  24  /  AlkPhos  67  04-21    COVID-19 PCR: Detected (03-28-20 @ 17:24)      RADIOLOGY & ADDITIONAL TESTS:  Imaging from Last 24 Hours:    Case discussed with: ISAIAH

## 2020-04-22 LAB
ALBUMIN SERPL ELPH-MCNC: 3.4 G/DL — SIGNIFICANT CHANGE UP (ref 3.3–5)
ALP SERPL-CCNC: 64 U/L — SIGNIFICANT CHANGE UP (ref 40–120)
ALT FLD-CCNC: 21 U/L — SIGNIFICANT CHANGE UP (ref 4–33)
ANION GAP SERPL CALC-SCNC: 12 MMO/L — SIGNIFICANT CHANGE UP (ref 7–14)
AST SERPL-CCNC: 13 U/L — SIGNIFICANT CHANGE UP (ref 4–32)
BASOPHILS # BLD AUTO: 0.05 K/UL — SIGNIFICANT CHANGE UP (ref 0–0.2)
BASOPHILS NFR BLD AUTO: 0.5 % — SIGNIFICANT CHANGE UP (ref 0–2)
BILIRUB SERPL-MCNC: < 0.2 MG/DL — LOW (ref 0.2–1.2)
BUN SERPL-MCNC: 6 MG/DL — LOW (ref 7–23)
CALCIUM SERPL-MCNC: 9.5 MG/DL — SIGNIFICANT CHANGE UP (ref 8.4–10.5)
CHLORIDE SERPL-SCNC: 95 MMOL/L — LOW (ref 98–107)
CO2 SERPL-SCNC: 28 MMOL/L — SIGNIFICANT CHANGE UP (ref 22–31)
CREAT SERPL-MCNC: 0.5 MG/DL — SIGNIFICANT CHANGE UP (ref 0.5–1.3)
EOSINOPHIL # BLD AUTO: 0.92 K/UL — HIGH (ref 0–0.5)
EOSINOPHIL NFR BLD AUTO: 9.7 % — HIGH (ref 0–6)
FERRITIN SERPL-MCNC: 112.2 NG/ML — SIGNIFICANT CHANGE UP (ref 15–150)
GLUCOSE SERPL-MCNC: 94 MG/DL — SIGNIFICANT CHANGE UP (ref 70–99)
HCT VFR BLD CALC: 30.7 % — LOW (ref 34.5–45)
HGB BLD-MCNC: 9.2 G/DL — LOW (ref 11.5–15.5)
IMM GRANULOCYTES NFR BLD AUTO: 0.7 % — SIGNIFICANT CHANGE UP (ref 0–1.5)
LDH SERPL L TO P-CCNC: 404 U/L — HIGH (ref 135–225)
LYMPHOCYTES # BLD AUTO: 1.94 K/UL — SIGNIFICANT CHANGE UP (ref 1–3.3)
LYMPHOCYTES # BLD AUTO: 20.5 % — SIGNIFICANT CHANGE UP (ref 13–44)
MAGNESIUM SERPL-MCNC: 1.8 MG/DL — SIGNIFICANT CHANGE UP (ref 1.6–2.6)
MCHC RBC-ENTMCNC: 24.3 PG — LOW (ref 27–34)
MCHC RBC-ENTMCNC: 30 % — LOW (ref 32–36)
MCV RBC AUTO: 81.2 FL — SIGNIFICANT CHANGE UP (ref 80–100)
MONOCYTES # BLD AUTO: 1.13 K/UL — HIGH (ref 0–0.9)
MONOCYTES NFR BLD AUTO: 12 % — SIGNIFICANT CHANGE UP (ref 2–14)
NEUTROPHILS # BLD AUTO: 5.34 K/UL — SIGNIFICANT CHANGE UP (ref 1.8–7.4)
NEUTROPHILS NFR BLD AUTO: 56.6 % — SIGNIFICANT CHANGE UP (ref 43–77)
NRBC # FLD: 0 K/UL — SIGNIFICANT CHANGE UP (ref 0–0)
PLATELET # BLD AUTO: 139 K/UL — LOW (ref 150–400)
PMV BLD: SIGNIFICANT CHANGE UP FL (ref 7–13)
POTASSIUM SERPL-MCNC: 3.6 MMOL/L — SIGNIFICANT CHANGE UP (ref 3.5–5.3)
POTASSIUM SERPL-SCNC: 3.6 MMOL/L — SIGNIFICANT CHANGE UP (ref 3.5–5.3)
PROCALCITONIN SERPL-MCNC: 0.09 NG/ML — SIGNIFICANT CHANGE UP (ref 0.02–0.1)
PROT SERPL-MCNC: 7.5 G/DL — SIGNIFICANT CHANGE UP (ref 6–8.3)
RBC # BLD: 3.78 M/UL — LOW (ref 3.8–5.2)
RBC # FLD: 26.9 % — HIGH (ref 10.3–14.5)
SODIUM SERPL-SCNC: 135 MMOL/L — SIGNIFICANT CHANGE UP (ref 135–145)
WBC # BLD: 9.45 K/UL — SIGNIFICANT CHANGE UP (ref 3.8–10.5)
WBC # FLD AUTO: 9.45 K/UL — SIGNIFICANT CHANGE UP (ref 3.8–10.5)

## 2020-04-22 PROCEDURE — 99233 SBSQ HOSP IP/OBS HIGH 50: CPT

## 2020-04-22 RX ORDER — POTASSIUM CHLORIDE 20 MEQ
40 PACKET (EA) ORAL ONCE
Refills: 0 | Status: COMPLETED | OUTPATIENT
Start: 2020-04-22 | End: 2020-04-22

## 2020-04-22 RX ADMIN — Medication 500 MILLIGRAM(S): at 16:18

## 2020-04-22 RX ADMIN — AMLODIPINE BESYLATE 5 MILLIGRAM(S): 2.5 TABLET ORAL at 05:03

## 2020-04-22 RX ADMIN — SIMVASTATIN 5 MILLIGRAM(S): 20 TABLET, FILM COATED ORAL at 21:59

## 2020-04-22 RX ADMIN — Medication 1 TABLET(S): at 07:46

## 2020-04-22 RX ADMIN — ENOXAPARIN SODIUM 100 MILLIGRAM(S): 100 INJECTION SUBCUTANEOUS at 16:18

## 2020-04-22 RX ADMIN — Medication 40 MILLIEQUIVALENT(S): at 10:52

## 2020-04-22 RX ADMIN — ZINC SULFATE TAB 220 MG (50 MG ZINC EQUIVALENT) 220 MILLIGRAM(S): 220 (50 ZN) TAB at 07:46

## 2020-04-22 RX ADMIN — Medication 200 MILLIGRAM(S): at 23:43

## 2020-04-22 RX ADMIN — ENOXAPARIN SODIUM 100 MILLIGRAM(S): 100 INJECTION SUBCUTANEOUS at 05:03

## 2020-04-22 RX ADMIN — Medication 500 MILLIGRAM(S): at 05:03

## 2020-04-22 NOTE — PROGRESS NOTE ADULT - SUBJECTIVE AND OBJECTIVE BOX
Medicine Progress note    Patient is a 44y old  Female who presents with a chief complaint of Acute Hypoxemic Respiratory Failure and Pneumonia (21 Apr 2020 15:27)    SUBJECTIVE / OVERNIGHT EVENTS:    MEDICATIONS  (STANDING):  amLODIPine   Tablet 5 milliGRAM(s) Oral daily  ascorbic acid 500 milliGRAM(s) Oral two times a day  enoxaparin Injectable 100 milliGRAM(s) SubCutaneous every 12 hours  influenza   Vaccine 0.5 milliLiter(s) IntraMuscular once  multivitamin 1 Tablet(s) Oral daily  simvastatin 5 milliGRAM(s) Oral at bedtime  zinc sulfate 220 milliGRAM(s) Oral daily    MEDICATIONS  (PRN):  acetaminophen   Tablet .. 650 milliGRAM(s) Oral every 6 hours PRN Temp greater or equal to 38C (100.4F), Mild Pain (1 - 3)  ALBUTerol    90 MICROgram(s) HFA Inhaler 2 Puff(s) Inhalation every 6 hours PRN Shortness of Breath and/or Wheezing  guaiFENesin   Syrup  (Sugar-Free) 200 milliGRAM(s) Oral every 6 hours PRN Cough      CAPILLARY BLOOD GLUCOSE    I&O's Summary    21 Apr 2020 07:01  -  22 Apr 2020 07:00  --------------------------------------------------------  IN: 240 mL / OUT: 200 mL / NET: 40 mL    PHYSICAL EXAM:  Vital Signs Last 24 Hrs  T(C): 36.6 (22 Apr 2020 05:41), Max: 36.8 (21 Apr 2020 20:30)  T(F): 97.8 (22 Apr 2020 05:41), Max: 98.2 (21 Apr 2020 20:30)  HR: 103 (22 Apr 2020 05:41) (99 - 103)  BP: 109/76 (22 Apr 2020 05:41) (109/76 - 128/84)  RR: 18 (22 Apr 2020 05:41) (18 - 19)  SpO2: 97% (22 Apr 2020 05:41) (97% - 100%)    PHYSICAL EXAM:  GENERAL: NAD, well-developed, Obese  HEAD:  Atraumatic, Normocephalic  EYES: conjunctiva and sclera clear  NECK: Supple, No JVD  ENT: 100% non-rebreather face mask in place  CHEST/LUNG: crackles diffusely   HEART: Regular rate and rhythm  ABDOMEN: Soft, Nontender, Nondistended  EXTREMITIES: Non-edematous b/l LEs.   PSYCH: AAOx3  NEUROLOGY: non-focal  SKIN: No rashes or lesions    LABS:                        9.2    9.45  )-----------( 139      ( 22 Apr 2020 05:00 )             30.7     04-22    135  |  95<L>  |  6<L>  ----------------------------<  94  3.6   |  28  |  0.50    Ca    9.5      22 Apr 2020 05:00  Mg     1.8     04-22    TPro  7.5  /  Alb  3.4  /  TBili  < 0.2<L>  /  DBili  x   /  AST  13  /  ALT  21  /  AlkPhos  64  04-22    COVID-19 PCR: Detected (03-28-20 @ 17:24)    RADIOLOGY & ADDITIONAL TESTS:  Imaging from Last 24 Hours:    Case discussed with: ISAIAH Medicine Progress note    Patient is a 44y old  Female who presents with a chief complaint of Acute Hypoxemic Respiratory Failure and Pneumonia (21 Apr 2020 15:27)    SUBJECTIVE / OVERNIGHT EVENTS: Pt seen/examined, no overnight events, no current complaints. Remains on NRB but was able to sit comfortably at bedside today and wash up which is improved from prior. Encouraged more movement in bed and sitting on side of bed as tolerated. She has NRB extender tubing to facilitate this.     MEDICATIONS  (STANDING):  amLODIPine   Tablet 5 milliGRAM(s) Oral daily  ascorbic acid 500 milliGRAM(s) Oral two times a day  enoxaparin Injectable 100 milliGRAM(s) SubCutaneous every 12 hours  influenza   Vaccine 0.5 milliLiter(s) IntraMuscular once  multivitamin 1 Tablet(s) Oral daily  simvastatin 5 milliGRAM(s) Oral at bedtime  zinc sulfate 220 milliGRAM(s) Oral daily    MEDICATIONS  (PRN):  acetaminophen   Tablet .. 650 milliGRAM(s) Oral every 6 hours PRN Temp greater or equal to 38C (100.4F), Mild Pain (1 - 3)  ALBUTerol    90 MICROgram(s) HFA Inhaler 2 Puff(s) Inhalation every 6 hours PRN Shortness of Breath and/or Wheezing  guaiFENesin   Syrup  (Sugar-Free) 200 milliGRAM(s) Oral every 6 hours PRN Cough      CAPILLARY BLOOD GLUCOSE    I&O's Summary    21 Apr 2020 07:01  -  22 Apr 2020 07:00  --------------------------------------------------------  IN: 240 mL / OUT: 200 mL / NET: 40 mL    PHYSICAL EXAM:  Vital Signs Last 24 Hrs  T(C): 36.6 (22 Apr 2020 05:41), Max: 36.8 (21 Apr 2020 20:30)  T(F): 97.8 (22 Apr 2020 05:41), Max: 98.2 (21 Apr 2020 20:30)  HR: 103 (22 Apr 2020 05:41) (99 - 103)  BP: 109/76 (22 Apr 2020 05:41) (109/76 - 128/84)  RR: 18 (22 Apr 2020 05:41) (18 - 19)  SpO2: 97% (22 Apr 2020 05:41) (97% - 100%)    PHYSICAL EXAM:  GENERAL: NAD, well-developed, Obese  HEAD:  Atraumatic, Normocephalic  EYES: conjunctiva and sclera clear  NECK: Supple, No JVD  ENT: 100% non-rebreather face mask in place  CHEST/LUNG: crackles diffusely   HEART: Regular rate and rhythm  ABDOMEN: Soft, Nontender, Nondistended  EXTREMITIES: Non-edematous b/l LEs.   PSYCH: AAOx3  NEUROLOGY: non-focal  SKIN: No rashes or lesions    LABS:                        9.2    9.45  )-----------( 139      ( 22 Apr 2020 05:00 )             30.7     04-22    135  |  95<L>  |  6<L>  ----------------------------<  94  3.6   |  28  |  0.50    Ca    9.5      22 Apr 2020 05:00  Mg     1.8     04-22    TPro  7.5  /  Alb  3.4  /  TBili  < 0.2<L>  /  DBili  x   /  AST  13  /  ALT  21  /  AlkPhos  64  04-22    COVID-19 PCR: Detected (03-28-20 @ 17:24)    RADIOLOGY & ADDITIONAL TESTS:  Imaging from Last 24 Hours:    Case discussed with: ISAIAH

## 2020-04-22 NOTE — PROGRESS NOTE ADULT - PROBLEM SELECTOR PLAN 4
- Likely multifactorial  - Anemia secondary to chronic blood loss and chronic disease  - Will continue to monitor CBC - Likely multifactorial  - Anemia secondary to chronic blood loss and chronic disease  - Will continue to monitor CBC  - platelets also decreased but then stabilized, no signs of bleeding -- ? if due to Covid

## 2020-04-23 PROCEDURE — 99233 SBSQ HOSP IP/OBS HIGH 50: CPT

## 2020-04-23 RX ADMIN — Medication 100 MILLIGRAM(S): at 21:59

## 2020-04-23 RX ADMIN — ZINC SULFATE TAB 220 MG (50 MG ZINC EQUIVALENT) 220 MILLIGRAM(S): 220 (50 ZN) TAB at 13:35

## 2020-04-23 RX ADMIN — ENOXAPARIN SODIUM 100 MILLIGRAM(S): 100 INJECTION SUBCUTANEOUS at 05:52

## 2020-04-23 RX ADMIN — Medication 1 TABLET(S): at 13:36

## 2020-04-23 RX ADMIN — ENOXAPARIN SODIUM 100 MILLIGRAM(S): 100 INJECTION SUBCUTANEOUS at 16:27

## 2020-04-23 RX ADMIN — AMLODIPINE BESYLATE 5 MILLIGRAM(S): 2.5 TABLET ORAL at 05:53

## 2020-04-23 RX ADMIN — Medication 200 MILLIGRAM(S): at 23:27

## 2020-04-23 RX ADMIN — Medication 500 MILLIGRAM(S): at 05:53

## 2020-04-23 RX ADMIN — Medication 500 MILLIGRAM(S): at 16:27

## 2020-04-23 RX ADMIN — Medication 200 MILLIGRAM(S): at 16:29

## 2020-04-23 RX ADMIN — SIMVASTATIN 5 MILLIGRAM(S): 20 TABLET, FILM COATED ORAL at 21:57

## 2020-04-23 NOTE — PROGRESS NOTE ADULT - PROBLEM SELECTOR PLAN 4
- Likely multifactorial  - Anemia secondary to chronic blood loss and chronic disease  - Will continue to monitor CBC  - platelets also decreased but then stabilized, no signs of bleeding -- ? if due to Covid

## 2020-04-23 NOTE — PROGRESS NOTE ADULT - SUBJECTIVE AND OBJECTIVE BOX
Medicine Progress note    Patient is a 44y old  Female who presents with a chief complaint of Acute Hypoxemic Respiratory Failure and Pneumonia (22 Apr 2020 11:56)      SUBJECTIVE / OVERNIGHT EVENTS: Pt seen/examined. No overnight events.       MEDICATIONS  (STANDING):  amLODIPine   Tablet 5 milliGRAM(s) Oral daily  ascorbic acid 500 milliGRAM(s) Oral two times a day  enoxaparin Injectable 100 milliGRAM(s) SubCutaneous every 12 hours  influenza   Vaccine 0.5 milliLiter(s) IntraMuscular once  multivitamin 1 Tablet(s) Oral daily  simvastatin 5 milliGRAM(s) Oral at bedtime  zinc sulfate 220 milliGRAM(s) Oral daily    MEDICATIONS  (PRN):  acetaminophen   Tablet .. 650 milliGRAM(s) Oral every 6 hours PRN Temp greater or equal to 38C (100.4F), Mild Pain (1 - 3)  ALBUTerol    90 MICROgram(s) HFA Inhaler 2 Puff(s) Inhalation every 6 hours PRN Shortness of Breath and/or Wheezing  guaiFENesin   Syrup  (Sugar-Free) 200 milliGRAM(s) Oral every 6 hours PRN Cough      CAPILLARY BLOOD GLUCOSE        I&O's Summary      PHYSICAL EXAM:  Vital Signs Last 24 Hrs  T(C): 36.7 (23 Apr 2020 05:51), Max: 36.8 (22 Apr 2020 16:16)  T(F): 98.1 (23 Apr 2020 05:51), Max: 98.2 (22 Apr 2020 16:16)  HR: 101 (23 Apr 2020 05:51) (85 - 101)  BP: 108/73 (23 Apr 2020 05:51) (100/65 - 108/73)  RR: 16 (23 Apr 2020 05:51) (16 - 20)  SpO2: 97% (23 Apr 2020 05:51) (87% - 100%)    PHYSICAL EXAM:  GENERAL: NAD, well-developed, Obese  HEAD:  Atraumatic, Normocephalic  EYES: conjunctiva and sclera clear  NECK: Supple, No JVD  ENT: 100% non-rebreather face mask in place  CHEST/LUNG: crackles diffusely   HEART: Regular rate and rhythm  ABDOMEN: Soft, Nontender, Nondistended  EXTREMITIES: Non-edematous b/l LEs.   PSYCH: AAOx3  NEUROLOGY: non-focal  SKIN: No rashes or lesions    LABS: 4/23 labs pending                         9.2    9.45  )-----------( 139      ( 22 Apr 2020 05:00 )             30.7     04-22    135  |  95<L>  |  6<L>  ----------------------------<  94  3.6   |  28  |  0.50    Ca    9.5      22 Apr 2020 05:00  Mg     1.8     04-22    TPro  7.5  /  Alb  3.4  /  TBili  < 0.2<L>  /  DBili  x   /  AST  13  /  ALT  21  /  AlkPhos  64  04-22    COVID-19 PCR: Detected (03-28-20 @ 17:24)    RADIOLOGY & ADDITIONAL TESTS:  Imaging from Last 24 Hours:    Case discussed with: ISAIAH Medicine Progress note    Patient is a 44y old  Female who presents with a chief complaint of Acute Hypoxemic Respiratory Failure and Pneumonia (22 Apr 2020 11:56)      SUBJECTIVE / OVERNIGHT EVENTS: Pt seen/examined. No overnight events. Was able to tolerate slight lowering of NRB O2 this AM. Encouraged to attempt sitting at side of bed and using incentive spirometry. Continued throat irritation causing cough, no sputum. Otherwise no complaints.     MEDICATIONS  (STANDING):  amLODIPine   Tablet 5 milliGRAM(s) Oral daily  ascorbic acid 500 milliGRAM(s) Oral two times a day  enoxaparin Injectable 100 milliGRAM(s) SubCutaneous every 12 hours  influenza   Vaccine 0.5 milliLiter(s) IntraMuscular once  multivitamin 1 Tablet(s) Oral daily  simvastatin 5 milliGRAM(s) Oral at bedtime  zinc sulfate 220 milliGRAM(s) Oral daily    MEDICATIONS  (PRN):  acetaminophen   Tablet .. 650 milliGRAM(s) Oral every 6 hours PRN Temp greater or equal to 38C (100.4F), Mild Pain (1 - 3)  ALBUTerol    90 MICROgram(s) HFA Inhaler 2 Puff(s) Inhalation every 6 hours PRN Shortness of Breath and/or Wheezing  guaiFENesin   Syrup  (Sugar-Free) 200 milliGRAM(s) Oral every 6 hours PRN Cough    CAPILLARY BLOOD GLUCOSE    I&O's Summary    PHYSICAL EXAM:  Vital Signs Last 24 Hrs  T(C): 36.7 (23 Apr 2020 05:51), Max: 36.8 (22 Apr 2020 16:16)  T(F): 98.1 (23 Apr 2020 05:51), Max: 98.2 (22 Apr 2020 16:16)  HR: 101 (23 Apr 2020 05:51) (85 - 101)  BP: 108/73 (23 Apr 2020 05:51) (100/65 - 108/73)  RR: 16 (23 Apr 2020 05:51) (16 - 20)  SpO2: 97% (23 Apr 2020 05:51) (87% - 100%)    PHYSICAL EXAM:  GENERAL: NAD, well-developed, Obese  HEAD:  Atraumatic, Normocephalic  EYES: conjunctiva and sclera clear  NECK: Supple, No JVD  ENT: 100% non-rebreather face mask in place  CHEST/LUNG: crackles diffusely   HEART: Regular rate and rhythm  ABDOMEN: Soft, Nontender, Nondistended  EXTREMITIES: Non-edematous b/l LEs.   PSYCH: AAOx3  NEUROLOGY: non-focal  SKIN: No rashes or lesions    LABS: 4/23 labs pending                         9.2    9.45  )-----------( 139      ( 22 Apr 2020 05:00 )             30.7     04-22    135  |  95<L>  |  6<L>  ----------------------------<  94  3.6   |  28  |  0.50    Ca    9.5      22 Apr 2020 05:00  Mg     1.8     04-22    TPro  7.5  /  Alb  3.4  /  TBili  < 0.2<L>  /  DBili  x   /  AST  13  /  ALT  21  /  AlkPhos  64  04-22    COVID-19 PCR: Detected (03-28-20 @ 17:24)    RADIOLOGY & ADDITIONAL TESTS:  Imaging from Last 24 Hours:    Case discussed with: ISAIAH

## 2020-04-23 NOTE — PROGRESS NOTE ADULT - PROBLEM SELECTOR PLAN 1
- Continue with supplemental O2 via Non-rebreather  - Weaning off non-rebreather as tolerated -- advised to try at rest for periods of up to 30 min not during eating   - c/w incentive spirometry as tolerated  - s/p plaquenil, solumedrol, and ACTEMRA  - Prone position as needed   - Trend daily labs -- CBC, CMP -- hold off on inflammatory markers unless clinically worsens - Continue with supplemental O2 via Non-rebreather  - Weaning off non-rebreather as tolerated -- advised to try at rest for periods of up to 30 min not during eating   - c/w incentive spirometry as tolerated  - s/p plaquenil, solumedrol, and ACTEMRA  - Prone position has not been needed   - Trend daily labs -- CBC, CMP -- hold off on inflammatory markers unless clinically worsens  - c/w robitussin and will make it q6 standing, add tessalon perles

## 2020-04-24 LAB
ALBUMIN SERPL ELPH-MCNC: 3.4 G/DL — SIGNIFICANT CHANGE UP (ref 3.3–5)
ALP SERPL-CCNC: 66 U/L — SIGNIFICANT CHANGE UP (ref 40–120)
ALT FLD-CCNC: 18 U/L — SIGNIFICANT CHANGE UP (ref 4–33)
ANION GAP SERPL CALC-SCNC: 13 MMO/L — SIGNIFICANT CHANGE UP (ref 7–14)
ANISOCYTOSIS BLD QL: SLIGHT — SIGNIFICANT CHANGE UP
APTT BLD: 33.7 SEC — SIGNIFICANT CHANGE UP (ref 27.5–36.3)
AST SERPL-CCNC: 17 U/L — SIGNIFICANT CHANGE UP (ref 4–32)
BASOPHILS # BLD AUTO: 0.03 K/UL — SIGNIFICANT CHANGE UP (ref 0–0.2)
BASOPHILS NFR BLD AUTO: 0.3 % — SIGNIFICANT CHANGE UP (ref 0–2)
BASOPHILS NFR SPEC: 0 % — SIGNIFICANT CHANGE UP (ref 0–2)
BILIRUB SERPL-MCNC: 0.2 MG/DL — SIGNIFICANT CHANGE UP (ref 0.2–1.2)
BLASTS # FLD: 0 % — SIGNIFICANT CHANGE UP (ref 0–0)
BUN SERPL-MCNC: 7 MG/DL — SIGNIFICANT CHANGE UP (ref 7–23)
CALCIUM SERPL-MCNC: 9.6 MG/DL — SIGNIFICANT CHANGE UP (ref 8.4–10.5)
CHLORIDE SERPL-SCNC: 96 MMOL/L — LOW (ref 98–107)
CO2 SERPL-SCNC: 27 MMOL/L — SIGNIFICANT CHANGE UP (ref 22–31)
CREAT SERPL-MCNC: 0.49 MG/DL — LOW (ref 0.5–1.3)
EOSINOPHIL # BLD AUTO: 0.92 K/UL — HIGH (ref 0–0.5)
EOSINOPHIL NFR BLD AUTO: 8.8 % — HIGH (ref 0–6)
EOSINOPHIL NFR FLD: 8.8 % — HIGH (ref 0–6)
FIBRINOGEN PPP-MCNC: 831 MG/DL — HIGH (ref 300–520)
GLUCOSE SERPL-MCNC: 109 MG/DL — HIGH (ref 70–99)
HAPTOGLOB SERPL-MCNC: 418 MG/DL — HIGH (ref 34–200)
HCT VFR BLD CALC: 32.8 % — LOW (ref 34.5–45)
HGB BLD-MCNC: 9.9 G/DL — LOW (ref 11.5–15.5)
IMM GRANULOCYTES NFR BLD AUTO: 0.7 % — SIGNIFICANT CHANGE UP (ref 0–1.5)
LDH SERPL L TO P-CCNC: 391 U/L — HIGH (ref 135–225)
LYMPHOCYTES # BLD AUTO: 1.95 K/UL — SIGNIFICANT CHANGE UP (ref 1–3.3)
LYMPHOCYTES # BLD AUTO: 18.7 % — SIGNIFICANT CHANGE UP (ref 13–44)
LYMPHOCYTES NFR SPEC AUTO: 19.3 % — SIGNIFICANT CHANGE UP (ref 13–44)
MCHC RBC-ENTMCNC: 24.6 PG — LOW (ref 27–34)
MCHC RBC-ENTMCNC: 30.2 % — LOW (ref 32–36)
MCV RBC AUTO: 81.4 FL — SIGNIFICANT CHANGE UP (ref 80–100)
METAMYELOCYTES # FLD: 0 % — SIGNIFICANT CHANGE UP (ref 0–1)
MICROCYTES BLD QL: SLIGHT — SIGNIFICANT CHANGE UP
MONOCYTES # BLD AUTO: 1.27 K/UL — HIGH (ref 0–0.9)
MONOCYTES NFR BLD AUTO: 12.2 % — SIGNIFICANT CHANGE UP (ref 2–14)
MONOCYTES NFR BLD: 14.9 % — HIGH (ref 2–9)
MYELOCYTES NFR BLD: 0 % — SIGNIFICANT CHANGE UP (ref 0–0)
NEUTROPHIL AB SER-ACNC: 48.2 % — SIGNIFICANT CHANGE UP (ref 43–77)
NEUTROPHILS # BLD AUTO: 6.19 K/UL — SIGNIFICANT CHANGE UP (ref 1.8–7.4)
NEUTROPHILS NFR BLD AUTO: 59.3 % — SIGNIFICANT CHANGE UP (ref 43–77)
NEUTS BAND # BLD: 4.4 % — SIGNIFICANT CHANGE UP (ref 0–6)
NRBC # FLD: 0 K/UL — SIGNIFICANT CHANGE UP (ref 0–0)
OTHER - HEMATOLOGY %: 0 — SIGNIFICANT CHANGE UP
OVALOCYTES BLD QL SMEAR: SLIGHT — SIGNIFICANT CHANGE UP
PLATELET # BLD AUTO: 98 K/UL — LOW (ref 150–400)
PLATELET COUNT - ESTIMATE: SIGNIFICANT CHANGE UP
PMV BLD: SIGNIFICANT CHANGE UP FL (ref 7–13)
POIKILOCYTOSIS BLD QL AUTO: SIGNIFICANT CHANGE UP
POLYCHROMASIA BLD QL SMEAR: SLIGHT — SIGNIFICANT CHANGE UP
POTASSIUM SERPL-MCNC: 3.8 MMOL/L — SIGNIFICANT CHANGE UP (ref 3.5–5.3)
POTASSIUM SERPL-SCNC: 3.8 MMOL/L — SIGNIFICANT CHANGE UP (ref 3.5–5.3)
PROMYELOCYTES # FLD: 0 % — SIGNIFICANT CHANGE UP (ref 0–0)
PROT SERPL-MCNC: 7.7 G/DL — SIGNIFICANT CHANGE UP (ref 6–8.3)
RBC # BLD: 4.03 M/UL — SIGNIFICANT CHANGE UP (ref 3.8–5.2)
RBC # FLD: 27.3 % — HIGH (ref 10.3–14.5)
RETICS #: 76 K/UL — SIGNIFICANT CHANGE UP (ref 25–125)
RETICS/RBC NFR: 1.8 % — SIGNIFICANT CHANGE UP (ref 0.5–2.5)
SCHISTOCYTES BLD QL AUTO: SIGNIFICANT CHANGE UP
SMUDGE CELLS # BLD: PRESENT — SIGNIFICANT CHANGE UP
SODIUM SERPL-SCNC: 136 MMOL/L — SIGNIFICANT CHANGE UP (ref 135–145)
VARIANT LYMPHS # BLD: 4.4 % — SIGNIFICANT CHANGE UP
WBC # BLD: 10.43 K/UL — SIGNIFICANT CHANGE UP (ref 3.8–10.5)
WBC # FLD AUTO: 10.43 K/UL — SIGNIFICANT CHANGE UP (ref 3.8–10.5)

## 2020-04-24 PROCEDURE — 99233 SBSQ HOSP IP/OBS HIGH 50: CPT

## 2020-04-24 RX ORDER — ARGATROBAN 50 MG/50ML
1 INJECTION, SOLUTION INTRAVENOUS
Qty: 100 | Refills: 0 | Status: DISCONTINUED | OUTPATIENT
Start: 2020-04-24 | End: 2020-04-24

## 2020-04-24 RX ORDER — ARGATROBAN 50 MG/50ML
1 INJECTION, SOLUTION INTRAVENOUS
Qty: 250 | Refills: 0 | Status: DISCONTINUED | OUTPATIENT
Start: 2020-04-24 | End: 2020-04-25

## 2020-04-24 RX ADMIN — Medication 100 MILLIGRAM(S): at 22:33

## 2020-04-24 RX ADMIN — Medication 500 MILLIGRAM(S): at 05:40

## 2020-04-24 RX ADMIN — Medication 200 MILLIGRAM(S): at 17:22

## 2020-04-24 RX ADMIN — ZINC SULFATE TAB 220 MG (50 MG ZINC EQUIVALENT) 220 MILLIGRAM(S): 220 (50 ZN) TAB at 11:01

## 2020-04-24 RX ADMIN — SIMVASTATIN 5 MILLIGRAM(S): 20 TABLET, FILM COATED ORAL at 22:33

## 2020-04-24 RX ADMIN — Medication 100 MILLIGRAM(S): at 05:40

## 2020-04-24 RX ADMIN — ARGATROBAN 6.41 MICROGRAM(S)/KG/MIN: 50 INJECTION, SOLUTION INTRAVENOUS at 23:48

## 2020-04-24 RX ADMIN — Medication 500 MILLIGRAM(S): at 17:22

## 2020-04-24 RX ADMIN — Medication 200 MILLIGRAM(S): at 05:40

## 2020-04-24 RX ADMIN — ENOXAPARIN SODIUM 100 MILLIGRAM(S): 100 INJECTION SUBCUTANEOUS at 05:40

## 2020-04-24 RX ADMIN — AMLODIPINE BESYLATE 5 MILLIGRAM(S): 2.5 TABLET ORAL at 05:40

## 2020-04-24 RX ADMIN — Medication 100 MILLIGRAM(S): at 17:22

## 2020-04-24 RX ADMIN — Medication 200 MILLIGRAM(S): at 11:01

## 2020-04-24 RX ADMIN — Medication 1 TABLET(S): at 11:01

## 2020-04-24 NOTE — PROGRESS NOTE ADULT - PROBLEM SELECTOR PLAN 4
- Likely multifactorial, stable  - Anemia secondary to chronic blood loss and chronic disease  - Will continue to monitor CBC  - platelets also decreased but then stabilized, no signs of bleeding -- ? if due to Covid, monitor - Likely multifactorial, stable  - Anemia secondary to chronic blood loss and chronic disease  - Will continue to monitor CBC  - platelets also decreased but then stabilized, no signs of bleeding -- ? if due to Covid, monitor daily, if remains <100 tomorrow will obtain Heme consult - Likely multifactorial, stable  - Anemia secondary to chronic blood loss and chronic disease  - platelets also decreased -- ?LOCO, Heme consulted. Will change Lovenox to argatroban, and maintain PTT at 1.5-3x upper limit of normal  - send specified labs --ordered for this evening  - trend Plts
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
## 2020-04-24 NOTE — PROGRESS NOTE ADULT - SUBJECTIVE AND OBJECTIVE BOX
Medicine Progress note    Patient is a 44y old  Female who presents with a chief complaint of Acute Hypoxemic Respiratory Failure and Pneumonia (23 Apr 2020 11:36)    SUBJECTIVE / OVERNIGHT EVENTS: Pt seen/examined, no overnight events. Remains stable on NRB, able to sit at bedside for short amounts of time today but does have some ETIENNE. Cough continues, but improved since yesterday. No other complaints.     MEDICATIONS  (STANDING):  amLODIPine   Tablet 5 milliGRAM(s) Oral daily  ascorbic acid 500 milliGRAM(s) Oral two times a day  benzonatate 100 milliGRAM(s) Oral three times a day  enoxaparin Injectable 100 milliGRAM(s) SubCutaneous every 12 hours  guaiFENesin   Syrup  (Sugar-Free) 200 milliGRAM(s) Oral every 6 hours  influenza   Vaccine 0.5 milliLiter(s) IntraMuscular once  multivitamin 1 Tablet(s) Oral daily  simvastatin 5 milliGRAM(s) Oral at bedtime  zinc sulfate 220 milliGRAM(s) Oral daily    MEDICATIONS  (PRN):  acetaminophen   Tablet .. 650 milliGRAM(s) Oral every 6 hours PRN Temp greater or equal to 38C (100.4F), Mild Pain (1 - 3)  ALBUTerol    90 MICROgram(s) HFA Inhaler 2 Puff(s) Inhalation every 6 hours PRN Shortness of Breath and/or Wheezing    CAPILLARY BLOOD GLUCOSE    I&O's Summary    PHYSICAL EXAM:  Vital Signs Last 24 Hrs  T(C): 36.7 (24 Apr 2020 05:17), Max: 37.1 (23 Apr 2020 21:55)  T(F): 98.1 (24 Apr 2020 05:17), Max: 98.7 (23 Apr 2020 21:55)  HR: 100 (24 Apr 2020 11:53) (90 - 100)  BP: 111/59 (24 Apr 2020 11:53) (108/75 - 116/83)  RR: 18 (24 Apr 2020 11:53) (16 - 18)  SpO2: 99% (24 Apr 2020 11:53) (98% - 99%)    PHYSICAL EXAM:  GENERAL: NAD, well-developed, Obese  HEAD:  Atraumatic, Normocephalic  EYES: conjunctiva and sclera clear  NECK: Supple, No JVD  ENT: 100% non-rebreather face mask in place  CHEST/LUNG: crackles diffusely   HEART: Regular rate and rhythm  ABDOMEN: Soft, Nontender, Nondistended  EXTREMITIES: Non-edematous b/l LEs.   PSYCH: AAOx3  NEUROLOGY: non-focal  SKIN: No rashes or lesions    LABS:                        9.9    10.43 )-----------( 98       ( 24 Apr 2020 04:30 )             32.8     04-24    136  |  96<L>  |  7   ----------------------------<  109<H>  3.8   |  27  |  0.49<L>    Ca    9.6      24 Apr 2020 04:30    TPro  7.7  /  Alb  3.4  /  TBili  0.2  /  DBili  x   /  AST  17  /  ALT  18  /  AlkPhos  66  04-24    COVID-19 PCR: Detected (03-28-20 @ 17:24)    RADIOLOGY & ADDITIONAL TESTS:  Imaging from Last 24 Hours:    Case discussed with: ISAIAH

## 2020-04-24 NOTE — PROGRESS NOTE ADULT - PROBLEM SELECTOR PLAN 2
- as above  + PE in setting of Covid - c/w FD Lovenox - as above  + PE in setting of Covid -- change A/C to argatroban, see below

## 2020-04-24 NOTE — PROGRESS NOTE ADULT - PROBLEM SELECTOR PLAN 1
- Continue with supplemental O2 via Non-rebreather  - Weaning off non-rebreather as tolerated -- advised to try at rest for periods of up to 30 min not during eating   - c/w incentive spirometry as tolerated  - s/p plaquenil, solumedrol, and ACTEMRA  - Prone position has not been needed   - Trend daily labs -- CBC, CMP -- hold off on inflammatory markers unless clinically worsens  - c/w robitussin q6 standing, tessalon perles to help with cough

## 2020-04-25 LAB
ALBUMIN SERPL ELPH-MCNC: 3.1 G/DL — LOW (ref 3.3–5)
ALP SERPL-CCNC: 68 U/L — SIGNIFICANT CHANGE UP (ref 40–120)
ALT FLD-CCNC: 18 U/L — SIGNIFICANT CHANGE UP (ref 4–33)
ANION GAP SERPL CALC-SCNC: 14 MMO/L — SIGNIFICANT CHANGE UP (ref 7–14)
APTT BLD: 50.3 SEC — HIGH (ref 27.5–36.3)
APTT BLD: 53.9 SEC — HIGH (ref 27.5–36.3)
APTT BLD: 56.2 SEC — HIGH (ref 27.5–36.3)
AST SERPL-CCNC: 16 U/L — SIGNIFICANT CHANGE UP (ref 4–32)
BASOPHILS # BLD AUTO: 0.05 K/UL — SIGNIFICANT CHANGE UP (ref 0–0.2)
BASOPHILS NFR BLD AUTO: 0.5 % — SIGNIFICANT CHANGE UP (ref 0–2)
BILIRUB SERPL-MCNC: < 0.2 MG/DL — LOW (ref 0.2–1.2)
BUN SERPL-MCNC: 8 MG/DL — SIGNIFICANT CHANGE UP (ref 7–23)
CALCIUM SERPL-MCNC: 9.4 MG/DL — SIGNIFICANT CHANGE UP (ref 8.4–10.5)
CHLORIDE SERPL-SCNC: 98 MMOL/L — SIGNIFICANT CHANGE UP (ref 98–107)
CO2 SERPL-SCNC: 27 MMOL/L — SIGNIFICANT CHANGE UP (ref 22–31)
CREAT SERPL-MCNC: 0.51 MG/DL — SIGNIFICANT CHANGE UP (ref 0.5–1.3)
EOSINOPHIL # BLD AUTO: 1.03 K/UL — HIGH (ref 0–0.5)
EOSINOPHIL NFR BLD AUTO: 9.5 % — HIGH (ref 0–6)
GLUCOSE SERPL-MCNC: 135 MG/DL — HIGH (ref 70–99)
HCT VFR BLD CALC: 29.9 % — LOW (ref 34.5–45)
HCT VFR BLD CALC: 30.2 % — LOW (ref 34.5–45)
HGB BLD-MCNC: 8.8 G/DL — LOW (ref 11.5–15.5)
HGB BLD-MCNC: 9.1 G/DL — LOW (ref 11.5–15.5)
IMM GRANULOCYTES NFR BLD AUTO: 1.1 % — SIGNIFICANT CHANGE UP (ref 0–1.5)
LYMPHOCYTES # BLD AUTO: 1.8 K/UL — SIGNIFICANT CHANGE UP (ref 1–3.3)
LYMPHOCYTES # BLD AUTO: 16.5 % — SIGNIFICANT CHANGE UP (ref 13–44)
MCHC RBC-ENTMCNC: 23.9 PG — LOW (ref 27–34)
MCHC RBC-ENTMCNC: 24.4 PG — LOW (ref 27–34)
MCHC RBC-ENTMCNC: 29.4 % — LOW (ref 32–36)
MCHC RBC-ENTMCNC: 30.1 % — LOW (ref 32–36)
MCV RBC AUTO: 81 FL — SIGNIFICANT CHANGE UP (ref 80–100)
MCV RBC AUTO: 81.3 FL — SIGNIFICANT CHANGE UP (ref 80–100)
MONOCYTES # BLD AUTO: 1.43 K/UL — HIGH (ref 0–0.9)
MONOCYTES NFR BLD AUTO: 13.1 % — SIGNIFICANT CHANGE UP (ref 2–14)
NEUTROPHILS # BLD AUTO: 6.45 K/UL — SIGNIFICANT CHANGE UP (ref 1.8–7.4)
NEUTROPHILS NFR BLD AUTO: 59.3 % — SIGNIFICANT CHANGE UP (ref 43–77)
NRBC # FLD: 0 K/UL — SIGNIFICANT CHANGE UP (ref 0–0)
NRBC # FLD: 0 K/UL — SIGNIFICANT CHANGE UP (ref 0–0)
PLATELET # BLD AUTO: 248 K/UL — SIGNIFICANT CHANGE UP (ref 150–400)
PLATELET # BLD AUTO: 312 K/UL — SIGNIFICANT CHANGE UP (ref 150–400)
PMV BLD: 11 FL — SIGNIFICANT CHANGE UP (ref 7–13)
PMV BLD: SIGNIFICANT CHANGE UP FL (ref 7–13)
POTASSIUM SERPL-MCNC: 3.9 MMOL/L — SIGNIFICANT CHANGE UP (ref 3.5–5.3)
POTASSIUM SERPL-SCNC: 3.9 MMOL/L — SIGNIFICANT CHANGE UP (ref 3.5–5.3)
PROT SERPL-MCNC: 7.3 G/DL — SIGNIFICANT CHANGE UP (ref 6–8.3)
RBC # BLD: 3.68 M/UL — LOW (ref 3.8–5.2)
RBC # BLD: 3.73 M/UL — LOW (ref 3.8–5.2)
RBC # FLD: 26.5 % — HIGH (ref 10.3–14.5)
RBC # FLD: 26.5 % — HIGH (ref 10.3–14.5)
SODIUM SERPL-SCNC: 139 MMOL/L — SIGNIFICANT CHANGE UP (ref 135–145)
WBC # BLD: 10.88 K/UL — HIGH (ref 3.8–10.5)
WBC # BLD: 12.16 K/UL — HIGH (ref 3.8–10.5)
WBC # FLD AUTO: 10.88 K/UL — HIGH (ref 3.8–10.5)
WBC # FLD AUTO: 12.16 K/UL — HIGH (ref 3.8–10.5)

## 2020-04-25 PROCEDURE — 99233 SBSQ HOSP IP/OBS HIGH 50: CPT

## 2020-04-25 RX ORDER — RIVAROXABAN 15 MG-20MG
15 KIT ORAL
Refills: 0 | Status: COMPLETED | OUTPATIENT
Start: 2020-04-25 | End: 2020-05-16

## 2020-04-25 RX ADMIN — Medication 100 MILLIGRAM(S): at 06:42

## 2020-04-25 RX ADMIN — Medication 1 TABLET(S): at 10:22

## 2020-04-25 RX ADMIN — Medication 100 MILLIGRAM(S): at 16:26

## 2020-04-25 RX ADMIN — ZINC SULFATE TAB 220 MG (50 MG ZINC EQUIVALENT) 220 MILLIGRAM(S): 220 (50 ZN) TAB at 10:21

## 2020-04-25 RX ADMIN — Medication 200 MILLIGRAM(S): at 21:12

## 2020-04-25 RX ADMIN — SIMVASTATIN 5 MILLIGRAM(S): 20 TABLET, FILM COATED ORAL at 21:12

## 2020-04-25 RX ADMIN — Medication 200 MILLIGRAM(S): at 10:21

## 2020-04-25 RX ADMIN — Medication 500 MILLIGRAM(S): at 06:41

## 2020-04-25 RX ADMIN — ARGATROBAN 6.41 MICROGRAM(S)/KG/MIN: 50 INJECTION, SOLUTION INTRAVENOUS at 05:03

## 2020-04-25 RX ADMIN — Medication 100 MILLIGRAM(S): at 21:12

## 2020-04-25 RX ADMIN — Medication 500 MILLIGRAM(S): at 16:26

## 2020-04-25 RX ADMIN — RIVAROXABAN 15 MILLIGRAM(S): KIT at 19:28

## 2020-04-25 RX ADMIN — AMLODIPINE BESYLATE 5 MILLIGRAM(S): 2.5 TABLET ORAL at 06:41

## 2020-04-25 RX ADMIN — Medication 200 MILLIGRAM(S): at 16:26

## 2020-04-25 RX ADMIN — Medication 200 MILLIGRAM(S): at 06:42

## 2020-04-25 NOTE — PROGRESS NOTE ADULT - PROBLEM SELECTOR PLAN 1
- Continue with supplemental O2 via Non-rebreather  - Weaning off non-rebreather as tolerated -- advised to try at rest for periods of up to 30 min not during eating   - c/w incentive spirometry as tolerated  - s/p plaquenil, solumedrol, and ACTEMRA  - Prone position has not been needed   - Trend daily labs -- CBC, CMP -- hold off on inflammatory markers unless clinically worsens  - c/w robitussin q6 standing, tessalon perles to help with cough - Continue with supplemental O2 via Non-rebreather  - Weaning off non-rebreather as tolerated -- will try light exertion at current supplemental O2 amount before trying to wean O2 level   - c/w incentive spirometry as tolerated  - s/p plaquenil, solumedrol, and ACTEMRA  - Prone position has not been needed   - Trend daily labs -- CBC, CMP -- hold off on inflammatory markers unless clinically worsens  - c/w robitussin q6 standing, tessalon perles to help with cough

## 2020-04-25 NOTE — PROGRESS NOTE ADULT - ASSESSMENT
44 yoF with HTN, pre-dm, obesity now with acute PE and acute respiratory failure secondary to COVID-19 infection. Unable to wean off NRB at this time, but with slow clinical improvement. 44 yoF with HTN, pre-dm, obesity now with acute PE and acute respiratory failure secondary to COVID-19 infection. Unable to wean off NRB at this time, but with slow clinical improvement. Plts normalized today.

## 2020-04-25 NOTE — PROGRESS NOTE ADULT - PROBLEM SELECTOR PLAN 2
- as above  + PE in setting of Covid -- change A/C to argatroban, see below - as above  + PE in setting of Covid -- Heme note appreciated -- will d/c argatroban in light of marked improvement in plts. Will price out Xarelto vs Eliquis as pt will need 3-6 mo FD A/C on discharge and start DOAC tonight.

## 2020-04-25 NOTE — PROGRESS NOTE ADULT - PROBLEM SELECTOR PLAN 4
- Likely multifactorial, stable  - Anemia secondary to chronic blood loss and chronic disease  - platelets also decreased -- ?LOCO, Heme consulted. Will change Lovenox to argatroban, and maintain PTT at 1.5-3x upper limit of normal  - send specified labs --ordered for this evening  - trend Plts - Likely multifactorial, stable  - Anemia secondary to chronic blood loss and chronic disease  - platelets normalized, see above for plan, low suspicion at this time for LOCO

## 2020-04-25 NOTE — PROGRESS NOTE ADULT - SUBJECTIVE AND OBJECTIVE BOX
Medicine Progress note    Patient is a 44y old  Female who presents with a chief complaint of Acute Hypoxemic Respiratory Failure and Pneumonia (24 Apr 2020 15:58)    SUBJECTIVE / OVERNIGHT EVENTS: No overnight events, platelets markedly improved.     MEDICATIONS  (STANDING):  amLODIPine   Tablet 5 milliGRAM(s) Oral daily  argatroban Infusion 1 MICROgram(s)/kG/Min (6.41 mL/Hr) IV Continuous <Continuous>  ascorbic acid 500 milliGRAM(s) Oral two times a day  benzonatate 100 milliGRAM(s) Oral three times a day  guaiFENesin   Syrup  (Sugar-Free) 200 milliGRAM(s) Oral every 6 hours  influenza   Vaccine 0.5 milliLiter(s) IntraMuscular once  multivitamin 1 Tablet(s) Oral daily  simvastatin 5 milliGRAM(s) Oral at bedtime  zinc sulfate 220 milliGRAM(s) Oral daily    MEDICATIONS  (PRN):  acetaminophen   Tablet .. 650 milliGRAM(s) Oral every 6 hours PRN Temp greater or equal to 38C (100.4F), Mild Pain (1 - 3)  ALBUTerol    90 MICROgram(s) HFA Inhaler 2 Puff(s) Inhalation every 6 hours PRN Shortness of Breath and/or Wheezing      CAPILLARY BLOOD GLUCOSE    I&O's Summary    PHYSICAL EXAM:  Vital Signs Last 24 Hrs  T(C): 36.7 (25 Apr 2020 09:45), Max: 36.7 (24 Apr 2020 20:59)  T(F): 98.1 (25 Apr 2020 09:45), Max: 98.1 (24 Apr 2020 20:59)  HR: 89 (25 Apr 2020 09:45) (89 - 98)  BP: 116/81 (25 Apr 2020 09:45) (116/81 - 125/88)  RR: 18 (25 Apr 2020 09:58) (18 - 18)  SpO2: 86% (25 Apr 2020 09:58) (86% - 100%)    PHYSICAL EXAM:  GENERAL: NAD, well-developed, Obese  HEAD:  Atraumatic, Normocephalic  EYES: conjunctiva and sclera clear  NECK: Supple, No JVD  ENT: 100% non-rebreather face mask in place  CHEST/LUNG: crackles diffusely   HEART: Regular rate and rhythm  ABDOMEN: Soft, Nontender, Nondistended  EXTREMITIES: Non-edematous b/l LEs.   PSYCH: AAOx3  NEUROLOGY: non-focal  SKIN: No rashes or lesions    LABS:                        8.8    10.88 )-----------( 248      ( 25 Apr 2020 03:00 )             29.9     04-25    139  |  98  |  8   ----------------------------<  135<H>  3.9   |  27  |  0.51    Ca    9.4      25 Apr 2020 03:00    TPro  7.3  /  Alb  3.1<L>  /  TBili  < 0.2<L>  /  DBili  x   /  AST  16  /  ALT  18  /  AlkPhos  68  04-25    PTT - ( 25 Apr 2020 08:20 )  PTT:53.9 SEC    COVID-19 PCR: Detected (03-28-20 @ 17:24)    RADIOLOGY & ADDITIONAL TESTS:    Case discussed with: ISAIAH Medicine Progress note    Patient is a 44y old  Female who presents with a chief complaint of Acute Hypoxemic Respiratory Failure and Pneumonia (24 Apr 2020 15:58)    SUBJECTIVE / OVERNIGHT EVENTS: No overnight events, platelets markedly improved, Heme note appreciated. Better sats today with sitting --96% on NRB 15L and feels comfortable. However did not tolerate decrease to 10L earlier today. Asked her to try more light exertion such as sitting at bedside q1 hour x few minute, standing at bedside with assistance as tolerated and using incentive spirometer. She agrees.     MEDICATIONS  (STANDING):  amLODIPine   Tablet 5 milliGRAM(s) Oral daily  argatroban Infusion 1 MICROgram(s)/kG/Min (6.41 mL/Hr) IV Continuous <Continuous>  ascorbic acid 500 milliGRAM(s) Oral two times a day  benzonatate 100 milliGRAM(s) Oral three times a day  guaiFENesin   Syrup  (Sugar-Free) 200 milliGRAM(s) Oral every 6 hours  influenza   Vaccine 0.5 milliLiter(s) IntraMuscular once  multivitamin 1 Tablet(s) Oral daily  simvastatin 5 milliGRAM(s) Oral at bedtime  zinc sulfate 220 milliGRAM(s) Oral daily    MEDICATIONS  (PRN):  acetaminophen   Tablet .. 650 milliGRAM(s) Oral every 6 hours PRN Temp greater or equal to 38C (100.4F), Mild Pain (1 - 3)  ALBUTerol    90 MICROgram(s) HFA Inhaler 2 Puff(s) Inhalation every 6 hours PRN Shortness of Breath and/or Wheezing    CAPILLARY BLOOD GLUCOSE    I&O's Summary    PHYSICAL EXAM:  Vital Signs Last 24 Hrs  T(C): 36.7 (25 Apr 2020 09:45), Max: 36.7 (24 Apr 2020 20:59)  T(F): 98.1 (25 Apr 2020 09:45), Max: 98.1 (24 Apr 2020 20:59)  HR: 89 (25 Apr 2020 09:45) (89 - 98)  BP: 116/81 (25 Apr 2020 09:45) (116/81 - 125/88)  RR: 18 (25 Apr 2020 09:58) (18 - 18)  SpO2: 86% (25 Apr 2020 09:58) (86% - 100%)    PHYSICAL EXAM:  GENERAL: NAD, well-developed, Obese  HEAD:  Atraumatic, Normocephalic  EYES: conjunctiva and sclera clear  NECK: Supple, No JVD  ENT: 100% non-rebreather face mask in place  CHEST/LUNG: crackles diffusely   HEART: Regular rate and rhythm  ABDOMEN: Soft, Nontender, Nondistended  EXTREMITIES: Non-edematous b/l LEs.   PSYCH: AAOx3  NEUROLOGY: non-focal  SKIN: No rashes or lesions    LABS:                        8.8    10.88 )-----------( 248      ( 25 Apr 2020 03:00 )             29.9     04-25    139  |  98  |  8   ----------------------------<  135<H>  3.9   |  27  |  0.51    Ca    9.4      25 Apr 2020 03:00    TPro  7.3  /  Alb  3.1<L>  /  TBili  < 0.2<L>  /  DBili  x   /  AST  16  /  ALT  18  /  AlkPhos  68  04-25    PTT - ( 25 Apr 2020 08:20 )  PTT:53.9 SEC    COVID-19 PCR: Detected (03-28-20 @ 17:24)    RADIOLOGY & ADDITIONAL TESTS:    Case discussed with: ISAIAH

## 2020-04-25 NOTE — CHART NOTE - NSCHARTNOTEFT_GEN_A_CORE
Brief hematology note    Labs from today reviewed, platelet count normalized.  Thrombocytopenia could be secondary to acute infection, would discontinue argatroban and restart full dose anticoagulation for recent PE(such as DOACs).  Monitor CBC with diff daily.  Plans were discussed with floor NP and attending Dr. Armas.        Luci Canales MD  PGY 4, Oncology/Hematology fellow  (P) [phone]  After 5pm, please contact on-call team.

## 2020-04-26 LAB
ALBUMIN SERPL ELPH-MCNC: 3.3 G/DL — SIGNIFICANT CHANGE UP (ref 3.3–5)
ALP SERPL-CCNC: 66 U/L — SIGNIFICANT CHANGE UP (ref 40–120)
ALT FLD-CCNC: 17 U/L — SIGNIFICANT CHANGE UP (ref 4–33)
ANION GAP SERPL CALC-SCNC: 12 MMO/L — SIGNIFICANT CHANGE UP (ref 7–14)
AST SERPL-CCNC: 17 U/L — SIGNIFICANT CHANGE UP (ref 4–32)
BASOPHILS # BLD AUTO: 0.03 K/UL — SIGNIFICANT CHANGE UP (ref 0–0.2)
BASOPHILS NFR BLD AUTO: 0.2 % — SIGNIFICANT CHANGE UP (ref 0–2)
BILIRUB SERPL-MCNC: 0.2 MG/DL — SIGNIFICANT CHANGE UP (ref 0.2–1.2)
BUN SERPL-MCNC: 6 MG/DL — LOW (ref 7–23)
CALCIUM SERPL-MCNC: 9.6 MG/DL — SIGNIFICANT CHANGE UP (ref 8.4–10.5)
CHLORIDE SERPL-SCNC: 97 MMOL/L — LOW (ref 98–107)
CO2 SERPL-SCNC: 30 MMOL/L — SIGNIFICANT CHANGE UP (ref 22–31)
CREAT SERPL-MCNC: 0.5 MG/DL — SIGNIFICANT CHANGE UP (ref 0.5–1.3)
EOSINOPHIL # BLD AUTO: 1.12 K/UL — HIGH (ref 0–0.5)
EOSINOPHIL NFR BLD AUTO: 9.2 % — HIGH (ref 0–6)
GLUCOSE SERPL-MCNC: 116 MG/DL — HIGH (ref 70–99)
HCT VFR BLD CALC: 29 % — LOW (ref 34.5–45)
HGB BLD-MCNC: 8.7 G/DL — LOW (ref 11.5–15.5)
IMM GRANULOCYTES NFR BLD AUTO: 1.1 % — SIGNIFICANT CHANGE UP (ref 0–1.5)
LG PLATELETS BLD QL AUTO: SLIGHT — SIGNIFICANT CHANGE UP
LYMPHOCYTES # BLD AUTO: 1.96 K/UL — SIGNIFICANT CHANGE UP (ref 1–3.3)
LYMPHOCYTES # BLD AUTO: 16.1 % — SIGNIFICANT CHANGE UP (ref 13–44)
MCHC RBC-ENTMCNC: 24.5 PG — LOW (ref 27–34)
MCHC RBC-ENTMCNC: 30 % — LOW (ref 32–36)
MCV RBC AUTO: 81.7 FL — SIGNIFICANT CHANGE UP (ref 80–100)
MONOCYTES # BLD AUTO: 1.67 K/UL — HIGH (ref 0–0.9)
MONOCYTES NFR BLD AUTO: 13.7 % — SIGNIFICANT CHANGE UP (ref 2–14)
NEUTROPHILS # BLD AUTO: 7.26 K/UL — SIGNIFICANT CHANGE UP (ref 1.8–7.4)
NEUTROPHILS NFR BLD AUTO: 59.7 % — SIGNIFICANT CHANGE UP (ref 43–77)
NRBC # FLD: 0 K/UL — SIGNIFICANT CHANGE UP (ref 0–0)
PLATELET # BLD AUTO: 157 K/UL — SIGNIFICANT CHANGE UP (ref 150–400)
PLATELET CLUMP BLD QL SMEAR: SIGNIFICANT CHANGE UP
PLATELET COUNT - ESTIMATE: ADEQUATE — SIGNIFICANT CHANGE UP
PMV BLD: SIGNIFICANT CHANGE UP FL (ref 7–13)
POTASSIUM SERPL-MCNC: 3.9 MMOL/L — SIGNIFICANT CHANGE UP (ref 3.5–5.3)
POTASSIUM SERPL-SCNC: 3.9 MMOL/L — SIGNIFICANT CHANGE UP (ref 3.5–5.3)
PROT SERPL-MCNC: 7.6 G/DL — SIGNIFICANT CHANGE UP (ref 6–8.3)
RBC # BLD: 3.55 M/UL — LOW (ref 3.8–5.2)
RBC # FLD: 26.8 % — HIGH (ref 10.3–14.5)
SODIUM SERPL-SCNC: 139 MMOL/L — SIGNIFICANT CHANGE UP (ref 135–145)
WBC # BLD: 12.36 K/UL — HIGH (ref 3.8–10.5)
WBC # FLD AUTO: 12.36 K/UL — HIGH (ref 3.8–10.5)

## 2020-04-26 PROCEDURE — 99232 SBSQ HOSP IP/OBS MODERATE 35: CPT

## 2020-04-26 PROCEDURE — 71045 X-RAY EXAM CHEST 1 VIEW: CPT | Mod: 26

## 2020-04-26 RX ADMIN — Medication 200 MILLIGRAM(S): at 16:10

## 2020-04-26 RX ADMIN — Medication 200 MILLIGRAM(S): at 08:48

## 2020-04-26 RX ADMIN — Medication 500 MILLIGRAM(S): at 05:24

## 2020-04-26 RX ADMIN — Medication 100 MILLIGRAM(S): at 05:24

## 2020-04-26 RX ADMIN — RIVAROXABAN 15 MILLIGRAM(S): KIT at 08:48

## 2020-04-26 RX ADMIN — ZINC SULFATE TAB 220 MG (50 MG ZINC EQUIVALENT) 220 MILLIGRAM(S): 220 (50 ZN) TAB at 08:48

## 2020-04-26 RX ADMIN — Medication 500 MILLIGRAM(S): at 16:10

## 2020-04-26 RX ADMIN — Medication 100 MILLIGRAM(S): at 12:01

## 2020-04-26 RX ADMIN — RIVAROXABAN 15 MILLIGRAM(S): KIT at 16:12

## 2020-04-26 RX ADMIN — SIMVASTATIN 5 MILLIGRAM(S): 20 TABLET, FILM COATED ORAL at 21:42

## 2020-04-26 RX ADMIN — AMLODIPINE BESYLATE 5 MILLIGRAM(S): 2.5 TABLET ORAL at 05:24

## 2020-04-26 RX ADMIN — Medication 1 TABLET(S): at 08:48

## 2020-04-26 RX ADMIN — Medication 100 MILLIGRAM(S): at 21:42

## 2020-04-26 RX ADMIN — Medication 200 MILLIGRAM(S): at 12:01

## 2020-04-26 NOTE — CHART NOTE - NSCHARTNOTEFT_GEN_A_CORE
Patient's labs reviewed; platelets dropped, but not in thrombocytopenic range. Switched to Xarelto on 4/25/20, off of argatroban. Continues to require non-rebreather for oxygen supplementation.    -Continue to trend platelets  -Follow up HIT antibodies and KEO  -Monitor for new signs of thromboses    Will follow. Please call back with any questions.    Shahzad Westbrook MD, MPH  Hematology / Oncology Fellow, PGY5  p

## 2020-04-26 NOTE — PROGRESS NOTE ADULT - PROBLEM SELECTOR PLAN 4
- Likely multifactorial, stable  - Anemia secondary to chronic blood loss and chronic disease  - platelets normalized, see above for plan, low suspicion at this time for LOCO

## 2020-04-26 NOTE — PROGRESS NOTE ADULT - SUBJECTIVE AND OBJECTIVE BOX
Medicine Progress note    Patient is a 44y old  Female who presents with a chief complaint of Acute Hypoxemic Respiratory Failure and Pneumonia (25 Apr 2020 12:07)    SUBJECTIVE / OVERNIGHT EVENTS: Pt seen/examined. Feels well. No overnight events. Mild rise in WBC.     MEDICATIONS  (STANDING):  amLODIPine   Tablet 5 milliGRAM(s) Oral daily  ascorbic acid 500 milliGRAM(s) Oral two times a day  benzonatate 100 milliGRAM(s) Oral three times a day  guaiFENesin   Syrup  (Sugar-Free) 200 milliGRAM(s) Oral every 6 hours  influenza   Vaccine 0.5 milliLiter(s) IntraMuscular once  multivitamin 1 Tablet(s) Oral daily  rivaroxaban 15 milliGRAM(s) Oral two times a day with meals  simvastatin 5 milliGRAM(s) Oral at bedtime  zinc sulfate 220 milliGRAM(s) Oral daily    MEDICATIONS  (PRN):  acetaminophen   Tablet .. 650 milliGRAM(s) Oral every 6 hours PRN Temp greater or equal to 38C (100.4F), Mild Pain (1 - 3)  ALBUTerol    90 MICROgram(s) HFA Inhaler 2 Puff(s) Inhalation every 6 hours PRN Shortness of Breath and/or Wheezing    CAPILLARY BLOOD GLUCOSE    I&O's Summary    PHYSICAL EXAM:  Vital Signs Last 24 Hrs  T(C): 36.8 (26 Apr 2020 05:22), Max: 36.8 (26 Apr 2020 05:22)  T(F): 98.3 (26 Apr 2020 05:22), Max: 98.3 (26 Apr 2020 05:22)  HR: 93 (26 Apr 2020 05:22) (93 - 98)  BP: 121/91 (26 Apr 2020 05:22) (119/76 - 121/91)  RR: 18 (26 Apr 2020 05:22) (18 - 18)  SpO2: 97% (26 Apr 2020 05:22) (97% - 98%)    PHYSICAL EXAM:  GENERAL: NAD, well-developed, Obese  HEAD:  Atraumatic, Normocephalic  EYES: conjunctiva and sclera clear  NECK: Supple, No JVD  ENT: 100% non-rebreather face mask in place  CHEST/LUNG: crackles diffusely   HEART: Regular rate and rhythm  ABDOMEN: Soft, Nontender, Nondistended  EXTREMITIES: Non-edematous b/l LEs.   PSYCH: AAOx3  NEUROLOGY: non-focal  SKIN: No rashes or lesions    LABS:                        8.7    12.36 )-----------( 157      ( 26 Apr 2020 07:01 )             29.0     04-26    139  |  97<L>  |  6<L>  ----------------------------<  116<H>  3.9   |  30  |  0.50    Ca    9.6      26 Apr 2020 07:01    TPro  7.6  /  Alb  3.3  /  TBili  0.2  /  DBili  x   /  AST  17  /  ALT  17  /  AlkPhos  66  04-26    PTT - ( 25 Apr 2020 14:55 )  PTT:56.2 SEC    COVID-19 PCR: Detected (03-28-20 @ 17:24)    RADIOLOGY & ADDITIONAL TESTS:  Imaging from Last 24 Hours: CXR pending     Case discussed with: ISAIAH Medicine Progress note    Patient is a 44y old  Female who presents with a chief complaint of Acute Hypoxemic Respiratory Failure and Pneumonia (25 Apr 2020 12:07)    SUBJECTIVE / OVERNIGHT EVENTS: Pt seen/examined. No overnight events, but felt SOB this morning when NRB o2 level tapered, now feels better lying flat and satting 100% on NRB. Mild rise in WBC as well. S/p CXR, awaiting read. No other complaints. Reports success with sitting upright for periods of time yesterday.    MEDICATIONS  (STANDING):  amLODIPine   Tablet 5 milliGRAM(s) Oral daily  ascorbic acid 500 milliGRAM(s) Oral two times a day  benzonatate 100 milliGRAM(s) Oral three times a day  guaiFENesin   Syrup  (Sugar-Free) 200 milliGRAM(s) Oral every 6 hours  influenza   Vaccine 0.5 milliLiter(s) IntraMuscular once  multivitamin 1 Tablet(s) Oral daily  rivaroxaban 15 milliGRAM(s) Oral two times a day with meals  simvastatin 5 milliGRAM(s) Oral at bedtime  zinc sulfate 220 milliGRAM(s) Oral daily    MEDICATIONS  (PRN):  acetaminophen   Tablet .. 650 milliGRAM(s) Oral every 6 hours PRN Temp greater or equal to 38C (100.4F), Mild Pain (1 - 3)  ALBUTerol    90 MICROgram(s) HFA Inhaler 2 Puff(s) Inhalation every 6 hours PRN Shortness of Breath and/or Wheezing    CAPILLARY BLOOD GLUCOSE    I&O's Summary    PHYSICAL EXAM:  Vital Signs Last 24 Hrs  T(C): 36.8 (26 Apr 2020 05:22), Max: 36.8 (26 Apr 2020 05:22)  T(F): 98.3 (26 Apr 2020 05:22), Max: 98.3 (26 Apr 2020 05:22)  HR: 93 (26 Apr 2020 05:22) (93 - 98)  BP: 121/91 (26 Apr 2020 05:22) (119/76 - 121/91)  RR: 18 (26 Apr 2020 05:22) (18 - 18)  SpO2: 97% (26 Apr 2020 05:22) (97% - 98%)    PHYSICAL EXAM:  GENERAL: NAD, well-developed, Obese  HEAD:  Atraumatic, Normocephalic  EYES: conjunctiva and sclera clear  NECK: Supple, No JVD  ENT: 100% non-rebreather face mask in place  CHEST/LUNG: crackles diffusely   HEART: Regular rate and rhythm  ABDOMEN: Soft, Nontender, Nondistended  EXTREMITIES: Non-edematous b/l LEs.   PSYCH: AAOx3  NEUROLOGY: non-focal  SKIN: No rashes or lesions    LABS:                        8.7    12.36 )-----------( 157      ( 26 Apr 2020 07:01 )             29.0     04-26    139  |  97<L>  |  6<L>  ----------------------------<  116<H>  3.9   |  30  |  0.50    Ca    9.6      26 Apr 2020 07:01    TPro  7.6  /  Alb  3.3  /  TBili  0.2  /  DBili  x   /  AST  17  /  ALT  17  /  AlkPhos  66  04-26    PTT - ( 25 Apr 2020 14:55 )  PTT:56.2 SEC    COVID-19 PCR: Detected (03-28-20 @ 17:24)    RADIOLOGY & ADDITIONAL TESTS:  Imaging from Last 24 Hours: CXR pending     Case discussed with: ISAIAH

## 2020-04-26 NOTE — PROGRESS NOTE ADULT - ASSESSMENT
44 yoF with HTN, pre-dm, obesity now with acute PE and acute respiratory failure secondary to COVID-19 infection. Unable to wean off NRB at this time, but with slow clinical improvement. Plts normalized, low suspicion for LOCO, argatroban transitioned to Xarelto Mild increase in WBC. 44 yoF with HTN, pre-dm, obesity now with acute PE and acute respiratory failure secondary to COVID-19 infection. Unable to wean off NRB at this time, but with slow clinical improvement. Plts normalized, low suspicion for LOCO, argatroban transitioned to Xarelto. Mild increase in WBC and some SOB this morning, CXR pending.

## 2020-04-26 NOTE — PROGRESS NOTE ADULT - PROBLEM SELECTOR PLAN 1
- Continue with supplemental O2 via Non-rebreather  - Weaning off non-rebreather as tolerated -- will try light exertion at current supplemental O2 amount before trying to wean O2 level   - c/w incentive spirometry as tolerated  - s/p plaquenil, solumedrol, and ACTEMRA  - Prone position has not been needed   - Trend daily labs -- CBC, CMP -- hold off on inflammatory markers unless clinically worsens  - c/w robitussin q6 standing, tessalon perles to help with cough - Continue with supplemental O2 via Non-rebreather  - Weaning off non-rebreather as tolerated -- will try light exertion at current supplemental O2 amount before trying to wean O2 level again  - c/w incentive spirometry as tolerated  - s/p plaquenil, solumedrol, and ACTEMRA  - Prone position has not been needed   - Trend daily labs -- CBC, CMP. Check inflammatory markers in AM given new rise in WBC  - f/u CXR, if concern for superimposed bacterial infection, will start Abx coverage   - c/w robitussin q6 standing, tessalon perles to help with cough

## 2020-04-26 NOTE — PROGRESS NOTE ADULT - PROBLEM SELECTOR PLAN 2
- as above  + PE in setting of Covid -- Heme note appreciated -- will d/c argatroban in light of marked improvement in plts. Will price out Xarelto vs Eliquis as pt will need 3-6 mo FD A/C on discharge and start DOAC tonight. - as above  + PE in setting of Covid -- Heme note appreciated -- now d/c'ed argatroban in light of marked improvement in plts. Started on Xarelto on 4/25.  - f/u LOCO and KEO levels, Heme following

## 2020-04-27 DIAGNOSIS — I26.99 OTHER PULMONARY EMBOLISM WITHOUT ACUTE COR PULMONALE: ICD-10-CM

## 2020-04-27 LAB
ALBUMIN SERPL ELPH-MCNC: 3.3 G/DL — SIGNIFICANT CHANGE UP (ref 3.3–5)
ALP SERPL-CCNC: 69 U/L — SIGNIFICANT CHANGE UP (ref 40–120)
ALT FLD-CCNC: 20 U/L — SIGNIFICANT CHANGE UP (ref 4–33)
ANION GAP SERPL CALC-SCNC: 11 MMO/L — SIGNIFICANT CHANGE UP (ref 7–14)
AST SERPL-CCNC: 16 U/L — SIGNIFICANT CHANGE UP (ref 4–32)
BASOPHILS # BLD AUTO: 0.03 K/UL — SIGNIFICANT CHANGE UP (ref 0–0.2)
BASOPHILS NFR BLD AUTO: 0.2 % — SIGNIFICANT CHANGE UP (ref 0–2)
BILIRUB SERPL-MCNC: 0.2 MG/DL — SIGNIFICANT CHANGE UP (ref 0.2–1.2)
BUN SERPL-MCNC: 5 MG/DL — LOW (ref 7–23)
CALCIUM SERPL-MCNC: 9.3 MG/DL — SIGNIFICANT CHANGE UP (ref 8.4–10.5)
CHLORIDE SERPL-SCNC: 96 MMOL/L — LOW (ref 98–107)
CO2 SERPL-SCNC: 31 MMOL/L — SIGNIFICANT CHANGE UP (ref 22–31)
CREAT SERPL-MCNC: 0.51 MG/DL — SIGNIFICANT CHANGE UP (ref 0.5–1.3)
CRP SERPL-MCNC: 67.7 MG/L — HIGH
EOSINOPHIL # BLD AUTO: 1.09 K/UL — HIGH (ref 0–0.5)
EOSINOPHIL NFR BLD AUTO: 8.2 % — HIGH (ref 0–6)
GLUCOSE SERPL-MCNC: 126 MG/DL — HIGH (ref 70–99)
HCT VFR BLD CALC: 28 % — LOW (ref 34.5–45)
HEPARIN CF II AG PPP-ACNC: 0.37 — SIGNIFICANT CHANGE UP (ref 0–0.39)
HGB BLD-MCNC: 8.5 G/DL — LOW (ref 11.5–15.5)
IMM GRANULOCYTES NFR BLD AUTO: 1.6 % — HIGH (ref 0–1.5)
LYMPHOCYTES # BLD AUTO: 15.9 % — SIGNIFICANT CHANGE UP (ref 13–44)
LYMPHOCYTES # BLD AUTO: 2.12 K/UL — SIGNIFICANT CHANGE UP (ref 1–3.3)
MCHC RBC-ENTMCNC: 24.2 PG — LOW (ref 27–34)
MCHC RBC-ENTMCNC: 30.4 % — LOW (ref 32–36)
MCV RBC AUTO: 79.8 FL — LOW (ref 80–100)
MONOCYTES # BLD AUTO: 1.64 K/UL — HIGH (ref 0–0.9)
MONOCYTES NFR BLD AUTO: 12.3 % — SIGNIFICANT CHANGE UP (ref 2–14)
NEUTROPHILS # BLD AUTO: 8.26 K/UL — HIGH (ref 1.8–7.4)
NEUTROPHILS NFR BLD AUTO: 61.8 % — SIGNIFICANT CHANGE UP (ref 43–77)
NRBC # FLD: 0 K/UL — SIGNIFICANT CHANGE UP (ref 0–0)
PLATELET # BLD AUTO: 256 K/UL — SIGNIFICANT CHANGE UP (ref 150–400)
PMV BLD: 11.3 FL — SIGNIFICANT CHANGE UP (ref 7–13)
POTASSIUM SERPL-MCNC: 3.6 MMOL/L — SIGNIFICANT CHANGE UP (ref 3.5–5.3)
POTASSIUM SERPL-SCNC: 3.6 MMOL/L — SIGNIFICANT CHANGE UP (ref 3.5–5.3)
PROT SERPL-MCNC: 7.5 G/DL — SIGNIFICANT CHANGE UP (ref 6–8.3)
RBC # BLD: 3.51 M/UL — LOW (ref 3.8–5.2)
RBC # FLD: 26.4 % — HIGH (ref 10.3–14.5)
SODIUM SERPL-SCNC: 138 MMOL/L — SIGNIFICANT CHANGE UP (ref 135–145)
WBC # BLD: 13.36 K/UL — HIGH (ref 3.8–10.5)
WBC # FLD AUTO: 13.36 K/UL — HIGH (ref 3.8–10.5)

## 2020-04-27 PROCEDURE — 99233 SBSQ HOSP IP/OBS HIGH 50: CPT

## 2020-04-27 RX ORDER — POTASSIUM CHLORIDE 20 MEQ
40 PACKET (EA) ORAL ONCE
Refills: 0 | Status: COMPLETED | OUTPATIENT
Start: 2020-04-27 | End: 2020-04-27

## 2020-04-27 RX ORDER — ONDANSETRON 8 MG/1
4 TABLET, FILM COATED ORAL ONCE
Refills: 0 | Status: COMPLETED | OUTPATIENT
Start: 2020-04-27 | End: 2020-04-27

## 2020-04-27 RX ADMIN — Medication 200 MILLIGRAM(S): at 16:17

## 2020-04-27 RX ADMIN — Medication 100 MILLIGRAM(S): at 04:25

## 2020-04-27 RX ADMIN — Medication 500 MILLIGRAM(S): at 16:18

## 2020-04-27 RX ADMIN — RIVAROXABAN 15 MILLIGRAM(S): KIT at 21:02

## 2020-04-27 RX ADMIN — Medication 100 MILLIGRAM(S): at 11:44

## 2020-04-27 RX ADMIN — ZINC SULFATE TAB 220 MG (50 MG ZINC EQUIVALENT) 220 MILLIGRAM(S): 220 (50 ZN) TAB at 08:22

## 2020-04-27 RX ADMIN — SIMVASTATIN 5 MILLIGRAM(S): 20 TABLET, FILM COATED ORAL at 21:02

## 2020-04-27 RX ADMIN — AMLODIPINE BESYLATE 5 MILLIGRAM(S): 2.5 TABLET ORAL at 04:25

## 2020-04-27 RX ADMIN — Medication 1 TABLET(S): at 08:22

## 2020-04-27 RX ADMIN — Medication 500 MILLIGRAM(S): at 04:25

## 2020-04-27 RX ADMIN — RIVAROXABAN 15 MILLIGRAM(S): KIT at 08:22

## 2020-04-27 RX ADMIN — ONDANSETRON 4 MILLIGRAM(S): 8 TABLET, FILM COATED ORAL at 11:43

## 2020-04-27 RX ADMIN — Medication 200 MILLIGRAM(S): at 04:26

## 2020-04-27 RX ADMIN — Medication 40 MILLIEQUIVALENT(S): at 16:17

## 2020-04-27 RX ADMIN — Medication 100 MILLIGRAM(S): at 21:02

## 2020-04-27 RX ADMIN — Medication 200 MILLIGRAM(S): at 11:44

## 2020-04-27 NOTE — PROGRESS NOTE ADULT - PROBLEM SELECTOR PLAN 1
- Continue with supplemental O2 via Non-rebreather  - Weaning off non-rebreather as tolerated -- will try light exertion at current supplemental O2 amount before trying to wean O2 level again  - c/w incentive spirometry as tolerated  - s/p plaquenil, solumedrol, and ACTEMRA  - Prone position has not been needed   - c/w robitussin q6 standing, tessalon perles to help with cough  - Obtain AM labs (CBC, CMP, Mag, Phos, CRP, Procalcitonin, and Ferritin)

## 2020-04-27 NOTE — PROGRESS NOTE ADULT - ASSESSMENT
44 y.o. woman with HTN, pre-dm, obesity now with acute PE and acute respiratory failure secondary to COVID-19 infection.

## 2020-04-27 NOTE — PROGRESS NOTE ADULT - SUBJECTIVE AND OBJECTIVE BOX
SUBJECTIVE / OVERNIGHT EVENTS:  No overnight events. No new AM complaints. Ongoing SOB.     MEDICATIONS  (STANDING):  amLODIPine   Tablet 5 milliGRAM(s) Oral daily  ascorbic acid 500 milliGRAM(s) Oral two times a day  benzonatate 100 milliGRAM(s) Oral three times a day  guaiFENesin   Syrup  (Sugar-Free) 200 milliGRAM(s) Oral every 6 hours  influenza   Vaccine 0.5 milliLiter(s) IntraMuscular once  multivitamin 1 Tablet(s) Oral daily  rivaroxaban 15 milliGRAM(s) Oral two times a day with meals  simvastatin 5 milliGRAM(s) Oral at bedtime  zinc sulfate 220 milliGRAM(s) Oral daily    MEDICATIONS  (PRN):  acetaminophen   Tablet .. 650 milliGRAM(s) Oral every 6 hours PRN Temp greater or equal to 38C (100.4F), Mild Pain (1 - 3)  ALBUTerol    90 MICROgram(s) HFA Inhaler 2 Puff(s) Inhalation every 6 hours PRN Shortness of Breath and/or Wheezing    CAPILLARY BLOOD GLUCOSE    I&O's Summary    T(C): 36.7 (04-27-20 @ 11:29), Max: 37 (04-26-20 @ 20:54)  HR: 103 (04-27-20 @ 11:29) (98 - 103)  BP: 110/73 (04-27-20 @ 11:29) (110/67 - 124/84)  RR: 18 (04-27-20 @ 11:29) (18 - 19)  SpO2: 100% (04-27-20 @ 11:29) (99% - 100%)  100% non-rebreather face mask in place    PHYSICAL EXAM:  GENERAL: NAD, well-developed  HEAD:  Atraumatic, Normocephalic  EYES: EOMI, conjunctiva and sclera clear  NECK: Supple, No JVD  ENT: 100% non-rebreather face mask in place  CHEST/LUNG: Labored respiration.   HEART: Regular rate and rhythm  ABDOMEN: Soft, Nontender, Nondistended  EXTREMITIES: non-edematous b/l LEs  PSYCH: AAOx3  NEUROLOGY: non-focal  SKIN: No rashes or lesions    LABS:                        8.5    13.36 )-----------( 256      ( 27 Apr 2020 06:20 )             28.0     04-27    138  |  96<L>  |  5<L>  ----------------------------<  126<H>  3.6   |  31  |  0.51    Ca    9.3      27 Apr 2020 06:20    TPro  7.5  /  Alb  3.3  /  TBili  0.2  /  DBili  x   /  AST  16  /  ALT  20  /  AlkPhos  69  04-27    PTT - ( 25 Apr 2020 14:55 )  PTT:56.2 SEC    < from: Xray Chest 1 View-PORTABLE IMMEDIATE (04.26.20 @ 11:40) >  IMPRESSION:  Low lung volumes with right hemidiaphragm elevation again noted.     Redemonstrated diffusely increased bilateral lung markings and opacities. No gross pleural effusions and no pneumothorax.    Heart size and mediastinal width inaccurately assessed on the projections but appear grossly stable.    < end of copied text >

## 2020-04-28 LAB
ANION GAP SERPL CALC-SCNC: 13 MMO/L — SIGNIFICANT CHANGE UP (ref 7–14)
BLD GP AB SCN SERPL QL: NEGATIVE — SIGNIFICANT CHANGE UP
BUN SERPL-MCNC: 6 MG/DL — LOW (ref 7–23)
CALCIUM SERPL-MCNC: 9.7 MG/DL — SIGNIFICANT CHANGE UP (ref 8.4–10.5)
CHLORIDE SERPL-SCNC: 95 MMOL/L — LOW (ref 98–107)
CO2 SERPL-SCNC: 29 MMOL/L — SIGNIFICANT CHANGE UP (ref 22–31)
CREAT SERPL-MCNC: 0.57 MG/DL — SIGNIFICANT CHANGE UP (ref 0.5–1.3)
GLUCOSE SERPL-MCNC: 124 MG/DL — HIGH (ref 70–99)
HCT VFR BLD CALC: 28.6 % — LOW (ref 34.5–45)
HGB BLD-MCNC: 8.5 G/DL — LOW (ref 11.5–15.5)
MCHC RBC-ENTMCNC: 24.2 PG — LOW (ref 27–34)
MCHC RBC-ENTMCNC: 29.7 % — LOW (ref 32–36)
MCV RBC AUTO: 81.5 FL — SIGNIFICANT CHANGE UP (ref 80–100)
NRBC # FLD: 0 K/UL — SIGNIFICANT CHANGE UP (ref 0–0)
PLATELET # BLD AUTO: 395 K/UL — SIGNIFICANT CHANGE UP (ref 150–400)
PMV BLD: 10.9 FL — SIGNIFICANT CHANGE UP (ref 7–13)
POTASSIUM SERPL-MCNC: 4.1 MMOL/L — SIGNIFICANT CHANGE UP (ref 3.5–5.3)
POTASSIUM SERPL-SCNC: 4.1 MMOL/L — SIGNIFICANT CHANGE UP (ref 3.5–5.3)
RBC # BLD: 3.51 M/UL — LOW (ref 3.8–5.2)
RBC # FLD: 26.4 % — HIGH (ref 10.3–14.5)
RH IG SCN BLD-IMP: NEGATIVE — SIGNIFICANT CHANGE UP
RH IG SCN BLD-IMP: NEGATIVE — SIGNIFICANT CHANGE UP
SODIUM SERPL-SCNC: 137 MMOL/L — SIGNIFICANT CHANGE UP (ref 135–145)
SRA INTERP SER-IMP: SIGNIFICANT CHANGE UP
WBC # BLD: 14.82 K/UL — HIGH (ref 3.8–10.5)
WBC # FLD AUTO: 14.82 K/UL — HIGH (ref 3.8–10.5)

## 2020-04-28 PROCEDURE — 99233 SBSQ HOSP IP/OBS HIGH 50: CPT

## 2020-04-28 RX ADMIN — Medication 1 TABLET(S): at 10:05

## 2020-04-28 RX ADMIN — ZINC SULFATE TAB 220 MG (50 MG ZINC EQUIVALENT) 220 MILLIGRAM(S): 220 (50 ZN) TAB at 10:05

## 2020-04-28 RX ADMIN — Medication 200 MILLIGRAM(S): at 16:12

## 2020-04-28 RX ADMIN — RIVAROXABAN 15 MILLIGRAM(S): KIT at 09:11

## 2020-04-28 RX ADMIN — Medication 500 MILLIGRAM(S): at 04:15

## 2020-04-28 RX ADMIN — Medication 500 MILLIGRAM(S): at 16:12

## 2020-04-28 RX ADMIN — RIVAROXABAN 15 MILLIGRAM(S): KIT at 16:12

## 2020-04-28 RX ADMIN — Medication 200 MILLIGRAM(S): at 04:15

## 2020-04-28 RX ADMIN — SIMVASTATIN 5 MILLIGRAM(S): 20 TABLET, FILM COATED ORAL at 22:25

## 2020-04-28 RX ADMIN — Medication 100 MILLIGRAM(S): at 04:15

## 2020-04-28 RX ADMIN — AMLODIPINE BESYLATE 5 MILLIGRAM(S): 2.5 TABLET ORAL at 04:15

## 2020-04-28 RX ADMIN — Medication 975 MILLIGRAM(S): at 22:24

## 2020-04-28 RX ADMIN — Medication 200 MILLIGRAM(S): at 10:05

## 2020-04-28 RX ADMIN — Medication 100 MILLIGRAM(S): at 22:25

## 2020-04-28 NOTE — PROGRESS NOTE ADULT - SUBJECTIVE AND OBJECTIVE BOX
SUBJECTIVE / OVERNIGHT EVENTS:  No overnight events. No new AM complaints.     MEDICATIONS  (STANDING):  amLODIPine   Tablet 5 milliGRAM(s) Oral daily  ascorbic acid 500 milliGRAM(s) Oral two times a day  benzonatate 100 milliGRAM(s) Oral three times a day  guaiFENesin   Syrup  (Sugar-Free) 200 milliGRAM(s) Oral every 6 hours  influenza   Vaccine 0.5 milliLiter(s) IntraMuscular once  multivitamin 1 Tablet(s) Oral daily  rivaroxaban 15 milliGRAM(s) Oral two times a day with meals  simvastatin 5 milliGRAM(s) Oral at bedtime  zinc sulfate 220 milliGRAM(s) Oral daily    MEDICATIONS  (PRN):  acetaminophen   Tablet .. 650 milliGRAM(s) Oral every 6 hours PRN Temp greater or equal to 38C (100.4F), Mild Pain (1 - 3)  ALBUTerol    90 MICROgram(s) HFA Inhaler 2 Puff(s) Inhalation every 6 hours PRN Shortness of Breath and/or Wheezing    CAPILLARY BLOOD GLUCOSE    I&O's Summary    T(C): 37 (04-28-20 @ 11:44), Max: 37 (04-28-20 @ 04:14)  HR: 99 (04-28-20 @ 11:44) (90 - 99)  BP: 112/76 (04-28-20 @ 11:44) (112/76 - 144/81)  RR: 18 (04-28-20 @ 11:44) (18 - 19)  SpO2: 97% (04-28-20 @ 11:44) (94% - 100%)  100% non-rebreather face mask in place    PHYSICAL EXAM:  GENERAL: NAD, well-developed  HEAD:  Atraumatic, Normocephalic  EYES: EOMI, conjunctiva and sclera clear  NECK: Supple, No JVD  ENT: 100% non-rebreather face mask in place  CHEST/LUNG: Ongoing labored respiration.   HEART: Regular rate and rhythm  ABDOMEN: Soft, Nontender, Nondistended  EXTREMITIES: Non-edematous b/l LEs  PSYCH: AAOx3  NEUROLOGY: non-focal  SKIN: No rashes or lesions    LABS:                        8.5    14.82 )-----------( 395      ( 28 Apr 2020 06:30 )             28.6     04-28    137  |  95<L>  |  6<L>  ----------------------------<  124<H>  4.1   |  29  |  0.57    Ca    9.7      28 Apr 2020 06:30    TPro  7.5  /  Alb  3.3  /  TBili  0.2  /  DBili  x   /  AST  16  /  ALT  20  /  AlkPhos  69  04-27

## 2020-04-28 NOTE — PROGRESS NOTE ADULT - PROBLEM SELECTOR PLAN 1
- Continue with supplemental O2 via Non-rebreather  - Weaning off non-rebreather as tolerated -- will try light exertion at current supplemental O2 amount before trying to wean O2 level again  - c/w incentive spirometry as tolerated  - s/p plaquenil, solumedrol, and ACTEMRA  - Prone position has not been needed   - c/w robitussin q6 standing, tessalon perles to help with cough  - Obtain AM labs (CBC, CMP, Mag, Phos, CRP, Procalcitonin, and Ferritin)  - Evaluation for alex

## 2020-04-29 LAB
ALBUMIN SERPL ELPH-MCNC: 3.2 G/DL — LOW (ref 3.3–5)
ALP SERPL-CCNC: 71 U/L — SIGNIFICANT CHANGE UP (ref 40–120)
ALT FLD-CCNC: 22 U/L — SIGNIFICANT CHANGE UP (ref 4–33)
ANION GAP SERPL CALC-SCNC: 13 MMO/L — SIGNIFICANT CHANGE UP (ref 7–14)
AST SERPL-CCNC: 14 U/L — SIGNIFICANT CHANGE UP (ref 4–32)
BILIRUB SERPL-MCNC: < 0.2 MG/DL — LOW (ref 0.2–1.2)
BLD GP AB SCN SERPL QL: NEGATIVE — SIGNIFICANT CHANGE UP
BUN SERPL-MCNC: 6 MG/DL — LOW (ref 7–23)
CALCIUM SERPL-MCNC: 9.6 MG/DL — SIGNIFICANT CHANGE UP (ref 8.4–10.5)
CHLORIDE SERPL-SCNC: 94 MMOL/L — LOW (ref 98–107)
CO2 SERPL-SCNC: 29 MMOL/L — SIGNIFICANT CHANGE UP (ref 22–31)
CREAT SERPL-MCNC: 0.51 MG/DL — SIGNIFICANT CHANGE UP (ref 0.5–1.3)
CRP SERPL-MCNC: 79.8 MG/L — HIGH
FERRITIN SERPL-MCNC: 81.14 NG/ML — SIGNIFICANT CHANGE UP (ref 15–150)
GLUCOSE SERPL-MCNC: 115 MG/DL — HIGH (ref 70–99)
HCT VFR BLD CALC: 27.6 % — LOW (ref 34.5–45)
HGB BLD-MCNC: 8.2 G/DL — LOW (ref 11.5–15.5)
MCHC RBC-ENTMCNC: 24 PG — LOW (ref 27–34)
MCHC RBC-ENTMCNC: 29.7 % — LOW (ref 32–36)
MCV RBC AUTO: 80.9 FL — SIGNIFICANT CHANGE UP (ref 80–100)
NRBC # FLD: 0 K/UL — SIGNIFICANT CHANGE UP (ref 0–0)
PLATELET # BLD AUTO: 481 K/UL — HIGH (ref 150–400)
PMV BLD: 11.4 FL — SIGNIFICANT CHANGE UP (ref 7–13)
POTASSIUM SERPL-MCNC: 4.1 MMOL/L — SIGNIFICANT CHANGE UP (ref 3.5–5.3)
POTASSIUM SERPL-SCNC: 4.1 MMOL/L — SIGNIFICANT CHANGE UP (ref 3.5–5.3)
PROT SERPL-MCNC: 7.7 G/DL — SIGNIFICANT CHANGE UP (ref 6–8.3)
RBC # BLD: 3.41 M/UL — LOW (ref 3.8–5.2)
RBC # FLD: 26.5 % — HIGH (ref 10.3–14.5)
RH IG SCN BLD-IMP: NEGATIVE — SIGNIFICANT CHANGE UP
SODIUM SERPL-SCNC: 136 MMOL/L — SIGNIFICANT CHANGE UP (ref 135–145)
WBC # BLD: 16.58 K/UL — HIGH (ref 3.8–10.5)
WBC # FLD AUTO: 16.58 K/UL — HIGH (ref 3.8–10.5)

## 2020-04-29 PROCEDURE — 99233 SBSQ HOSP IP/OBS HIGH 50: CPT

## 2020-04-29 PROCEDURE — 99231 SBSQ HOSP IP/OBS SF/LOW 25: CPT | Mod: GC

## 2020-04-29 RX ADMIN — Medication 200 MILLIGRAM(S): at 11:12

## 2020-04-29 RX ADMIN — SIMVASTATIN 5 MILLIGRAM(S): 20 TABLET, FILM COATED ORAL at 19:19

## 2020-04-29 RX ADMIN — Medication 100 MILLIGRAM(S): at 06:17

## 2020-04-29 RX ADMIN — Medication 500 MILLIGRAM(S): at 06:17

## 2020-04-29 RX ADMIN — Medication 200 MILLIGRAM(S): at 16:14

## 2020-04-29 RX ADMIN — Medication 100 MILLIGRAM(S): at 11:12

## 2020-04-29 RX ADMIN — AMLODIPINE BESYLATE 5 MILLIGRAM(S): 2.5 TABLET ORAL at 06:17

## 2020-04-29 RX ADMIN — RIVAROXABAN 15 MILLIGRAM(S): KIT at 07:55

## 2020-04-29 RX ADMIN — Medication 100 MILLIGRAM(S): at 19:19

## 2020-04-29 RX ADMIN — RIVAROXABAN 15 MILLIGRAM(S): KIT at 16:14

## 2020-04-29 RX ADMIN — Medication 500 MILLIGRAM(S): at 16:14

## 2020-04-29 RX ADMIN — ZINC SULFATE TAB 220 MG (50 MG ZINC EQUIVALENT) 220 MILLIGRAM(S): 220 (50 ZN) TAB at 11:12

## 2020-04-29 RX ADMIN — Medication 1 TABLET(S): at 11:12

## 2020-04-29 RX ADMIN — Medication 200 MILLIGRAM(S): at 06:17

## 2020-04-29 NOTE — PROGRESS NOTE ADULT - SUBJECTIVE AND OBJECTIVE BOX
SUBJECTIVE / OVERNIGHT EVENTS:  No overnight events. In AM, patient reports sinus congestion. No other complaints at present.     MEDICATIONS  (STANDING):  amLODIPine   Tablet 5 milliGRAM(s) Oral daily  ascorbic acid 500 milliGRAM(s) Oral two times a day  benzonatate 100 milliGRAM(s) Oral three times a day  guaiFENesin   Syrup  (Sugar-Free) 200 milliGRAM(s) Oral every 6 hours  influenza   Vaccine 0.5 milliLiter(s) IntraMuscular once  multivitamin 1 Tablet(s) Oral daily  rivaroxaban 15 milliGRAM(s) Oral two times a day with meals  simvastatin 5 milliGRAM(s) Oral at bedtime  zinc sulfate 220 milliGRAM(s) Oral daily    MEDICATIONS  (PRN):  acetaminophen   Tablet .. 650 milliGRAM(s) Oral every 6 hours PRN Temp greater or equal to 38C (100.4F), Mild Pain (1 - 3)  ALBUTerol    90 MICROgram(s) HFA Inhaler 2 Puff(s) Inhalation every 6 hours PRN Shortness of Breath and/or Wheezing      CAPILLARY BLOOD GLUCOSE  I&O's Summary    T(C): 36.8 (04-29-20 @ 11:55), Max: 36.8 (04-29-20 @ 11:55)  HR: 95 (04-29-20 @ 11:55) (93 - 95)  BP: 99/60 (04-29-20 @ 11:55) (99/60 - 100/70)  RR: 18 (04-29-20 @ 11:55) (18 - 18)  SpO2: 97% (04-29-20 @ 11:55) (96% - 97%)  100% non-rebreather face mask in place    PHYSICAL EXAM:  GENERAL: NAD, well-developed  HEAD:  Atraumatic, Normocephalic  EYES: EOMI, conjunctiva and sclera clear  NECK: Supple, No JVD  ENT: 100% non-rebreather face mask in place  CHEST/LUNG: Labored respiration  HEART: Regular rate and rhythm  ABDOMEN: Soft, Nontender, Nondistended  EXTREMITIES: Non-edematous b/l LEs.   PSYCH: AAOx3  NEUROLOGY: non-focal  SKIN: No rashes or lesions    LABS:                        8.2    16.58 )-----------( 481      ( 29 Apr 2020 06:10 )             27.6     04-29    136  |  94<L>  |  6<L>  ----------------------------<  115<H>  4.1   |  29  |  0.51    Ca    9.6      29 Apr 2020 06:10    TPro  7.7  /  Alb  3.2<L>  /  TBili  < 0.2<L>  /  DBili  x   /  AST  14  /  ALT  22  /  AlkPhos  71  04-29
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
## 2020-04-29 NOTE — PROGRESS NOTE ADULT - PROBLEM SELECTOR PLAN 1
- Continue with supplemental O2 via Non-rebreather  - Weaning off non-rebreather as tolerated -- will try light exertion at current supplemental O2 amount before trying to wean O2 level again  - c/w incentive spirometry as tolerated  - s/p plaquenil, solumedrol, and ACTEMRA  - Prone position has not been needed   - c/w robitussin q6 standing, tessalon perles to help with cough  - Obtain AM labs (CBC, CMP, Mag, Phos, CRP, Procalcitonin, and Ferritin)  - Evaluation for plasma study

## 2020-04-29 NOTE — CHART NOTE - NSCHARTNOTEFT_GEN_A_CORE
The trial, [id number]: Expanded Access to Convalescent Plasma for the Treatment of Patients with COVID-19, was discussed with patient on 4/29/2020. Consent was witnessed by Kiana Jacobson MD.     During the consent process, the following was discussed:     •	The fact that the study involves research  •	The study schedule and procedures involved  •	The main risks of the study, and the fact that all risks may not be known at this time  •	New information that may affect the subject’s willingness to continue on the study will be presented as soon as it is available  •	Benefits of participating  •	Alternatives to participating  •	Confidentiality   •	Compensation for research-related injury  •	Contacts for questions about the study or their rights while on the study  •	The fact that the subject’s participation is voluntary – they can refuse or withdraw at any time without penalty or loss of benefits.    The patient was given ample time to ask questions. All questions were answered to their satisfaction.     Informed consent was obtained prior to any study procedures being performed. A copy of the signed consent and supplemental documentation was given to the subject.    Eligibility Criteria:   Patient has laboratory confirmed diagnosis of infection with SARS-CoV-2 on 3/28/2020 and admitted to an acute care facility for treatment of COVID-19 complications.     Patient has severe COVID-19 with the following symptoms:   - Dyspnea      ABO was performed on 4/29/2020 and patient blood type confirmed as B-    I have confirmed all eligibility are met for this patient to participate.     Convalescent plasma order has been completed, pending plasma from blood bank.

## 2020-04-30 DIAGNOSIS — D72.829 ELEVATED WHITE BLOOD CELL COUNT, UNSPECIFIED: ICD-10-CM

## 2020-04-30 LAB
ALBUMIN SERPL ELPH-MCNC: 3.3 G/DL — SIGNIFICANT CHANGE UP (ref 3.3–5)
ALP SERPL-CCNC: 81 U/L — SIGNIFICANT CHANGE UP (ref 40–120)
ALT FLD-CCNC: 22 U/L — SIGNIFICANT CHANGE UP (ref 4–33)
ANION GAP SERPL CALC-SCNC: 11 MMO/L — SIGNIFICANT CHANGE UP (ref 7–14)
APPEARANCE UR: SIGNIFICANT CHANGE UP
AST SERPL-CCNC: 17 U/L — SIGNIFICANT CHANGE UP (ref 4–32)
BACTERIA # UR AUTO: SIGNIFICANT CHANGE UP
BILIRUB SERPL-MCNC: < 0.2 MG/DL — LOW (ref 0.2–1.2)
BILIRUB UR-MCNC: NEGATIVE — SIGNIFICANT CHANGE UP
BLOOD UR QL VISUAL: NEGATIVE — SIGNIFICANT CHANGE UP
BUN SERPL-MCNC: 6 MG/DL — LOW (ref 7–23)
CALCIUM SERPL-MCNC: 9.8 MG/DL — SIGNIFICANT CHANGE UP (ref 8.4–10.5)
CHLORIDE SERPL-SCNC: 93 MMOL/L — LOW (ref 98–107)
CO2 SERPL-SCNC: 29 MMOL/L — SIGNIFICANT CHANGE UP (ref 22–31)
COLOR SPEC: YELLOW — SIGNIFICANT CHANGE UP
CREAT SERPL-MCNC: 0.47 MG/DL — LOW (ref 0.5–1.3)
GLUCOSE SERPL-MCNC: 128 MG/DL — HIGH (ref 70–99)
GLUCOSE UR-MCNC: NEGATIVE — SIGNIFICANT CHANGE UP
HCT VFR BLD CALC: 27.1 % — LOW (ref 34.5–45)
HGB BLD-MCNC: 8.1 G/DL — LOW (ref 11.5–15.5)
HYALINE CASTS # UR AUTO: NEGATIVE — SIGNIFICANT CHANGE UP
KETONES UR-MCNC: NEGATIVE — SIGNIFICANT CHANGE UP
LEUKOCYTE ESTERASE UR-ACNC: NEGATIVE — SIGNIFICANT CHANGE UP
MCHC RBC-ENTMCNC: 24 PG — LOW (ref 27–34)
MCHC RBC-ENTMCNC: 29.9 % — LOW (ref 32–36)
MCV RBC AUTO: 80.4 FL — SIGNIFICANT CHANGE UP (ref 80–100)
NITRITE UR-MCNC: NEGATIVE — SIGNIFICANT CHANGE UP
NRBC # FLD: 0 K/UL — SIGNIFICANT CHANGE UP (ref 0–0)
PH UR: 6.5 — SIGNIFICANT CHANGE UP (ref 5–8)
PLATELET # BLD AUTO: 562 K/UL — HIGH (ref 150–400)
PMV BLD: 11.3 FL — SIGNIFICANT CHANGE UP (ref 7–13)
POTASSIUM SERPL-MCNC: 3.7 MMOL/L — SIGNIFICANT CHANGE UP (ref 3.5–5.3)
POTASSIUM SERPL-SCNC: 3.7 MMOL/L — SIGNIFICANT CHANGE UP (ref 3.5–5.3)
PROT SERPL-MCNC: 8 G/DL — SIGNIFICANT CHANGE UP (ref 6–8.3)
PROT UR-MCNC: 10 — SIGNIFICANT CHANGE UP
RBC # BLD: 3.37 M/UL — LOW (ref 3.8–5.2)
RBC # FLD: 26 % — HIGH (ref 10.3–14.5)
RBC CASTS # UR COMP ASSIST: SIGNIFICANT CHANGE UP (ref 0–?)
SODIUM SERPL-SCNC: 133 MMOL/L — LOW (ref 135–145)
SP GR SPEC: 1.01 — SIGNIFICANT CHANGE UP (ref 1–1.04)
SQUAMOUS # UR AUTO: SIGNIFICANT CHANGE UP
UROBILINOGEN FLD QL: NORMAL — SIGNIFICANT CHANGE UP
WBC # BLD: 18.37 K/UL — HIGH (ref 3.8–10.5)
WBC # FLD AUTO: 18.37 K/UL — HIGH (ref 3.8–10.5)
WBC UR QL: SIGNIFICANT CHANGE UP (ref 0–?)

## 2020-04-30 PROCEDURE — 99232 SBSQ HOSP IP/OBS MODERATE 35: CPT

## 2020-04-30 PROCEDURE — 71045 X-RAY EXAM CHEST 1 VIEW: CPT | Mod: 26

## 2020-04-30 RX ORDER — ONDANSETRON 8 MG/1
4 TABLET, FILM COATED ORAL ONCE
Refills: 0 | Status: COMPLETED | OUTPATIENT
Start: 2020-04-30 | End: 2020-04-30

## 2020-04-30 RX ADMIN — ONDANSETRON 4 MILLIGRAM(S): 8 TABLET, FILM COATED ORAL at 11:05

## 2020-04-30 RX ADMIN — Medication 200 MILLIGRAM(S): at 03:29

## 2020-04-30 RX ADMIN — Medication 100 MILLIGRAM(S): at 22:48

## 2020-04-30 RX ADMIN — Medication 1 TABLET(S): at 08:36

## 2020-04-30 RX ADMIN — ZINC SULFATE TAB 220 MG (50 MG ZINC EQUIVALENT) 220 MILLIGRAM(S): 220 (50 ZN) TAB at 08:36

## 2020-04-30 RX ADMIN — Medication 200 MILLIGRAM(S): at 17:21

## 2020-04-30 RX ADMIN — Medication 500 MILLIGRAM(S): at 17:21

## 2020-04-30 RX ADMIN — Medication 500 MILLIGRAM(S): at 03:29

## 2020-04-30 RX ADMIN — RIVAROXABAN 15 MILLIGRAM(S): KIT at 08:36

## 2020-04-30 RX ADMIN — Medication 100 MILLIGRAM(S): at 17:21

## 2020-04-30 RX ADMIN — Medication 200 MILLIGRAM(S): at 23:09

## 2020-04-30 RX ADMIN — SIMVASTATIN 5 MILLIGRAM(S): 20 TABLET, FILM COATED ORAL at 22:48

## 2020-04-30 RX ADMIN — Medication 100 MILLIGRAM(S): at 03:29

## 2020-04-30 RX ADMIN — AMLODIPINE BESYLATE 5 MILLIGRAM(S): 2.5 TABLET ORAL at 03:29

## 2020-04-30 NOTE — PROGRESS NOTE ADULT - ASSESSMENT
44 year old woman with + covid, received convalescent plasma yesterday, increasing wbc( ? secondary to plasma vs infection).

## 2020-04-30 NOTE — CHART NOTE - NSCHARTNOTEFT_GEN_A_CORE
Nutrition Follow Up Note  Patient seen for nutrition follow up evaluation on [facility].    44yoF with PMH of HTN, HLD, and pre-diabetes admitted for SOB, ETIENNE, and cough found to have PNA 2/2 COVID+ c/b acute hypoxic respiratory failure. On NRB, unable to wean currently. Multiple attempts made to contact pt and pt's family, unable to reach either.     Source: Electronic medical record, RN    Diet : Consistent Carbohydrate with snack, low fat, no pork, no shellfish + Ensure Enlive x 2/day (provides 700 kcal, 40 g protein)     RN reports pt has been eating 60-80% of meals. Pt has not been drinking Ensure, pt reports they make her nauseous. Pt has been experiencing some nausea and vomiting x ~1 week. No diarrhea or constipation currently. No chewing/swallowing difficulty noted.     Weight hx per chart (pounds): 235 (03-29)    Pertinent Medications: MEDICATIONS  (STANDING):  amLODIPine   Tablet 5 milliGRAM(s) Oral daily  ascorbic acid 500 milliGRAM(s) Oral two times a day  benzonatate 100 milliGRAM(s) Oral three times a day  guaiFENesin   Syrup  (Sugar-Free) 200 milliGRAM(s) Oral every 6 hours  influenza   Vaccine 0.5 milliLiter(s) IntraMuscular once  multivitamin 1 Tablet(s) Oral daily  rivaroxaban 15 milliGRAM(s) Oral two times a day with meals  simvastatin 5 milliGRAM(s) Oral at bedtime  zinc sulfate 220 milliGRAM(s) Oral daily    MEDICATIONS  (PRN):  acetaminophen   Tablet .. 650 milliGRAM(s) Oral every 6 hours PRN Temp greater or equal to 38C (100.4F), Mild Pain (1 - 3)  ALBUTerol    90 MICROgram(s) HFA Inhaler 2 Puff(s) Inhalation every 6 hours PRN Shortness of Breath and/or Wheezing    Pertinent Labs: 04-30 @ 04:40: Na 133<L>, BUN 6<L>, Cr 0.47<L>, <H>, K+ 3.7, Phos --, Mg --, Alk Phos 81, ALT/SGPT 22, AST/SGOT 17, HbA1c --    Skin per nursing documentation: No pressure injuries noted  Edema per nursing documentation: No edema noted    Estimated Needs:   [X] no change since previous assessment (04-03)  [ ] recalculated:     Previous Nutrition Diagnosis: None    New Nutrition Diagnosis: Inadequate oral intake  Related to: Decreased ability to consume sufficient energy/protein in setting of GI symptoms   As evidenced by: Reported pt with nausea/vomiting x ~1 week   Goal: Pt to consume >75% estimated energy needs     Interventions:     Recommend  1) Recommend change diet order to Consistent Carbohydrate (with snack), low sodium, no shellfish, no pork  2) D/c Ensure Enlive x 2/day (provides 700 kcal, 40 g protein) in setting of pt unable to tolerate  3) Continue to encourage PO intake and obtain food preferences as able  4) Review/provide nutrition education as needed    Monitoring and Evaluation:     Continue to monitor Nutritional intake, Tolerance to diet prescription, weights, labs, skin integrity    RD remains available upon request and will follow up per protocol  Asuncion Mcclendon, Dietetic Intern, Pager: [phone] Nutrition Follow Up Note  Patient seen for nutrition follow up evaluation on [facility].    44yoF with PMH of HTN, HLD, and pre-diabetes admitted for SOB, ETIENNE, and cough found to have PNA 2/2 COVID+ c/b acute hypoxic respiratory failure. On NRB, unable to wean currently. Multiple attempts made to contact pt and pt's family, unable to reach either.     Source: Electronic medical record, RN    Diet : Consistent Carbohydrate with snack, low fat, no pork, no shellfish + Ensure Enlive x 2/day (provides 700 kcal, 40 g protein)     RN reports pt has been eating 60-80% of meals. Pt has not been drinking Ensure, pt reports they make her nauseous. Pt has been experiencing some nausea and vomiting x ~1 week. No diarrhea or constipation currently. No chewing/swallowing difficulty noted.     Weight hx per chart (pounds): 235 (03-29)    Pertinent Medications: MEDICATIONS  (STANDING):  amLODIPine   Tablet 5 milliGRAM(s) Oral daily  ascorbic acid 500 milliGRAM(s) Oral two times a day  benzonatate 100 milliGRAM(s) Oral three times a day  guaiFENesin   Syrup  (Sugar-Free) 200 milliGRAM(s) Oral every 6 hours  influenza   Vaccine 0.5 milliLiter(s) IntraMuscular once  multivitamin 1 Tablet(s) Oral daily  rivaroxaban 15 milliGRAM(s) Oral two times a day with meals  simvastatin 5 milliGRAM(s) Oral at bedtime  zinc sulfate 220 milliGRAM(s) Oral daily    MEDICATIONS  (PRN):  acetaminophen   Tablet .. 650 milliGRAM(s) Oral every 6 hours PRN Temp greater or equal to 38C (100.4F), Mild Pain (1 - 3)  ALBUTerol    90 MICROgram(s) HFA Inhaler 2 Puff(s) Inhalation every 6 hours PRN Shortness of Breath and/or Wheezing    Pertinent Labs: 04-30 @ 04:40: Na 133<L>, BUN 6<L>, Cr 0.47<L>, <H>, K+ 3.7, Phos --, Mg --, Alk Phos 81, ALT/SGPT 22, AST/SGOT 17, HbA1c --    Skin per nursing documentation: No pressure injuries noted  Edema per nursing documentation: No edema noted    Estimated Needs:   [X] no change since previous assessment (04-03)  [ ] recalculated:     Previous Nutrition Diagnosis: None    New Nutrition Diagnosis: Inadequate oral intake  Related to: Decreased ability to consume sufficient energy/protein in setting of GI symptoms   As evidenced by: Reported pt with nausea/vomiting x ~1 week   Goal: Pt to consume >75% estimated energy needs     Interventions:     Recommend  1) Recommend change diet order to Consistent Carbohydrate (with snack), low sodium, no shellfish, no pork  2) D/c Ensure Enlive x 2/day (provides 700 kcal, 40 g protein) in setting of pt unable to tolerate; Replace with [employer] Vegan Nutrition Shake x 1/day (provides 220 kcal, 16 g protein  3) Continue to encourage PO intake and obtain food preferences as able  4) Review/provide nutrition education as needed    Monitoring and Evaluation:     Continue to monitor Nutritional intake, Tolerance to diet prescription, weights, labs, skin integrity    RD remains available upon request and will follow up per protocol  Asuncion Mcclendon, Dietetic Intern, Pager: [phone]

## 2020-04-30 NOTE — PROGRESS NOTE ADULT - PROBLEM SELECTOR PLAN 1
etiol unclear, having a lot of diarrhea, , would do cdiff, BCx2 and CXR and U/a etiol unclear,  BCx2 and CXR and U/a

## 2020-04-30 NOTE — PROGRESS NOTE ADULT - SUBJECTIVE AND OBJECTIVE BOX
Patient is a 44y old  Female who presents with a chief complaint of Acute Hypoxemic Respiratory Failure and Pneumonia (29 Apr 2020 13:50)      SUBJECTIVE / OVERNIGHT EVENTS: received convalescent plasma last night, complains of nausea and vomiting today.    MEDICATIONS  (STANDING):  amLODIPine   Tablet 5 milliGRAM(s) Oral daily  ascorbic acid 500 milliGRAM(s) Oral two times a day  benzonatate 100 milliGRAM(s) Oral three times a day  guaiFENesin   Syrup  (Sugar-Free) 200 milliGRAM(s) Oral every 6 hours  influenza   Vaccine 0.5 milliLiter(s) IntraMuscular once  multivitamin 1 Tablet(s) Oral daily  rivaroxaban 15 milliGRAM(s) Oral two times a day with meals  simvastatin 5 milliGRAM(s) Oral at bedtime  zinc sulfate 220 milliGRAM(s) Oral daily    MEDICATIONS  (PRN):  acetaminophen   Tablet .. 650 milliGRAM(s) Oral every 6 hours PRN Temp greater or equal to 38C (100.4F), Mild Pain (1 - 3)  ALBUTerol    90 MICROgram(s) HFA Inhaler 2 Puff(s) Inhalation every 6 hours PRN Shortness of Breath and/or Wheezing      T(C): 36.9 (04-30-20 @ 11:44), Max: 37.1 (04-29-20 @ 21:29)  HR: 101 (04-30-20 @ 11:44) (96 - 112)  BP: 116/76 (04-30-20 @ 11:44) (104/66 - 124/83)  RR: 18 (04-30-20 @ 11:44) (18 - 21)  SpO2: 100% (04-30-20 @ 11:44) (93% - 100%) on 15L NRB  CAPILLARY BLOOD GLUCOSE        I&O's Summary    29 Apr 2020 07:01  -  30 Apr 2020 07:00  --------------------------------------------------------  IN: 400 mL / OUT: 0 mL / NET: 400 mL        PHYSICAL EXAM:  GENERAL: not in distress, onNRB   EYES: open, sclera clear b/l  CHEST/LUNG: normal respiratory effort, not tachypneic, speaking in full sentences without difficulty  HEART: mildly tachycardic, no lower extremity edema b/l  ABDOMEN: Soft, Nontender, Nondistended  EXTREMITIES: Warm and well perfused  NEUROLOGY: awake, alert, responds to Qs appropriately, no gross deficits  PSYCH: calm, cooperative  SKIN: No visible rashes or lesions    LABS:                        8.1    18.37 )-----------( 562      ( 30 Apr 2020 04:40 )             27.1     04-30    133<L>  |  93<L>  |  6<L>  ----------------------------<  128<H>  3.7   |  29  |  0.47<L>    Ca    9.8      30 Apr 2020 04:40    TPro  8.0  /  Alb  3.3  /  TBili  < 0.2<L>  /  DBili  x   /  AST  17  /  ALT  22  /  AlkPhos  81  04-30              RADIOLOGY & ADDITIONAL TESTS:

## 2020-05-01 DIAGNOSIS — D64.9 ANEMIA, UNSPECIFIED: ICD-10-CM

## 2020-05-01 LAB
ALBUMIN SERPL ELPH-MCNC: 3.6 G/DL — SIGNIFICANT CHANGE UP (ref 3.3–5)
ALP SERPL-CCNC: 79 U/L — SIGNIFICANT CHANGE UP (ref 40–120)
ALT FLD-CCNC: 21 U/L — SIGNIFICANT CHANGE UP (ref 4–33)
ANION GAP SERPL CALC-SCNC: 13 MMO/L — SIGNIFICANT CHANGE UP (ref 7–14)
AST SERPL-CCNC: 18 U/L — SIGNIFICANT CHANGE UP (ref 4–32)
BILIRUB SERPL-MCNC: < 0.2 MG/DL — LOW (ref 0.2–1.2)
BUN SERPL-MCNC: 6 MG/DL — LOW (ref 7–23)
CALCIUM SERPL-MCNC: 9.9 MG/DL — SIGNIFICANT CHANGE UP (ref 8.4–10.5)
CHLORIDE SERPL-SCNC: 93 MMOL/L — LOW (ref 98–107)
CO2 SERPL-SCNC: 29 MMOL/L — SIGNIFICANT CHANGE UP (ref 22–31)
CREAT SERPL-MCNC: 0.54 MG/DL — SIGNIFICANT CHANGE UP (ref 0.5–1.3)
CRP SERPL-MCNC: 94.5 MG/L — HIGH
D DIMER BLD IA.RAPID-MCNC: 170 NG/ML — SIGNIFICANT CHANGE UP
FERRITIN SERPL-MCNC: 106.5 NG/ML — SIGNIFICANT CHANGE UP (ref 15–150)
GLUCOSE SERPL-MCNC: 95 MG/DL — SIGNIFICANT CHANGE UP (ref 70–99)
HCT VFR BLD CALC: 29 % — LOW (ref 34.5–45)
HGB BLD-MCNC: 8.4 G/DL — LOW (ref 11.5–15.5)
LDH SERPL L TO P-CCNC: 359 U/L — HIGH (ref 135–225)
MAGNESIUM SERPL-MCNC: 1.9 MG/DL — SIGNIFICANT CHANGE UP (ref 1.6–2.6)
MCHC RBC-ENTMCNC: 23.5 PG — LOW (ref 27–34)
MCHC RBC-ENTMCNC: 29 % — LOW (ref 32–36)
MCV RBC AUTO: 81.2 FL — SIGNIFICANT CHANGE UP (ref 80–100)
NRBC # FLD: 0 K/UL — SIGNIFICANT CHANGE UP (ref 0–0)
PHOSPHATE SERPL-MCNC: 4.7 MG/DL — HIGH (ref 2.5–4.5)
PLATELET # BLD AUTO: 353 K/UL — SIGNIFICANT CHANGE UP (ref 150–400)
PMV BLD: 11.3 FL — SIGNIFICANT CHANGE UP (ref 7–13)
POTASSIUM SERPL-MCNC: 4 MMOL/L — SIGNIFICANT CHANGE UP (ref 3.5–5.3)
POTASSIUM SERPL-SCNC: 4 MMOL/L — SIGNIFICANT CHANGE UP (ref 3.5–5.3)
PROCALCITONIN SERPL-MCNC: 0.08 NG/ML — SIGNIFICANT CHANGE UP (ref 0.02–0.1)
PROT SERPL-MCNC: 8.3 G/DL — SIGNIFICANT CHANGE UP (ref 6–8.3)
RBC # BLD: 3.57 M/UL — LOW (ref 3.8–5.2)
RBC # FLD: 26.3 % — HIGH (ref 10.3–14.5)
SODIUM SERPL-SCNC: 135 MMOL/L — SIGNIFICANT CHANGE UP (ref 135–145)
WBC # BLD: 19.88 K/UL — HIGH (ref 3.8–10.5)
WBC # FLD AUTO: 19.88 K/UL — HIGH (ref 3.8–10.5)

## 2020-05-01 PROCEDURE — 99232 SBSQ HOSP IP/OBS MODERATE 35: CPT

## 2020-05-01 RX ADMIN — Medication 100 MILLIGRAM(S): at 21:13

## 2020-05-01 RX ADMIN — Medication 200 MILLIGRAM(S): at 16:19

## 2020-05-01 RX ADMIN — Medication 100 MILLIGRAM(S): at 05:32

## 2020-05-01 RX ADMIN — RIVAROXABAN 15 MILLIGRAM(S): KIT at 08:29

## 2020-05-01 RX ADMIN — Medication 500 MILLIGRAM(S): at 16:20

## 2020-05-01 RX ADMIN — RIVAROXABAN 15 MILLIGRAM(S): KIT at 16:20

## 2020-05-01 RX ADMIN — Medication 200 MILLIGRAM(S): at 05:32

## 2020-05-01 RX ADMIN — SIMVASTATIN 5 MILLIGRAM(S): 20 TABLET, FILM COATED ORAL at 21:13

## 2020-05-01 RX ADMIN — ZINC SULFATE TAB 220 MG (50 MG ZINC EQUIVALENT) 220 MILLIGRAM(S): 220 (50 ZN) TAB at 10:21

## 2020-05-01 RX ADMIN — Medication 200 MILLIGRAM(S): at 10:21

## 2020-05-01 RX ADMIN — Medication 500 MILLIGRAM(S): at 05:32

## 2020-05-01 RX ADMIN — AMLODIPINE BESYLATE 5 MILLIGRAM(S): 2.5 TABLET ORAL at 05:32

## 2020-05-01 RX ADMIN — Medication 1 TABLET(S): at 10:21

## 2020-05-01 NOTE — PROGRESS NOTE ADULT - PROBLEM SELECTOR PLAN 3
to continue supportive care, would still try to taper off the NRB, decrease to 10 l to see if tolerates.

## 2020-05-01 NOTE — PROGRESS NOTE ADULT - SUBJECTIVE AND OBJECTIVE BOX
Medicine Progress Note    Patient is a 44y old  Female who presents with a chief complaint of Acute Hypoxemic Respiratory Failure and Pneumonia (2020 14:05)      SUBJECTIVE / OVERNIGHT EVENTS: no acute events, still on 15L NRB, still sob    ADDITIONAL REVIEW OF SYSTEMS:    MEDICATIONS  (STANDING):  amLODIPine   Tablet 5 milliGRAM(s) Oral daily  ascorbic acid 500 milliGRAM(s) Oral two times a day  benzonatate 100 milliGRAM(s) Oral three times a day  guaiFENesin   Syrup  (Sugar-Free) 200 milliGRAM(s) Oral every 6 hours  influenza   Vaccine 0.5 milliLiter(s) IntraMuscular once  multivitamin 1 Tablet(s) Oral daily  rivaroxaban 15 milliGRAM(s) Oral two times a day with meals  simvastatin 5 milliGRAM(s) Oral at bedtime  zinc sulfate 220 milliGRAM(s) Oral daily    MEDICATIONS  (PRN):  acetaminophen   Tablet .. 650 milliGRAM(s) Oral every 6 hours PRN Temp greater or equal to 38C (100.4F), Mild Pain (1 - 3)  ALBUTerol    90 MICROgram(s) HFA Inhaler 2 Puff(s) Inhalation every 6 hours PRN Shortness of Breath and/or Wheezing    CAPILLARY BLOOD GLUCOSE        I&O's Summary    2020 07:01  -  01 May 2020 07:00  --------------------------------------------------------  IN: 300 mL / OUT: 450 mL / NET: -150 mL        PHYSICAL EXAM:  Vital Signs Last 24 Hrs  T(C): 36.9 (01 May 2020 11:47), Max: 37.1 (2020 22:46)  T(F): 98.4 (01 May 2020 11:47), Max: 98.8 (2020 22:46)  HR: 87 (01 May 2020 11:47) (87 - 105)  BP: 123/87 (01 May 2020 11:47) (120/64 - 127/70)  RR: 20 (01 May 2020 11:47) (20 - 26)  SpO2: 96% (01 May 2020 11:47) (77% - 97%) 15L NRB   CONSTITUTIONAL: NAD, well-developed, well-groomed  RESPIRATORY: Normal respiratory effort; mild tachypnea   CARDIOVASCULAR: non tachycardia   ABDOMEN: Nontender to palpation,  no rebound/guarding; PSYCH: A+O to person, place, and time; affect appropriate  NEUROLOGY: CN 2-12 are intact and symmetric; no gross sensory deficits       LABS:                        8.4    19.88 )-----------( 353      ( 01 May 2020 06:15 )             29.0     05-01    135  |  93<L>  |  6<L>  ----------------------------<  95  4.0   |  29  |  0.54    Ca    9.9      01 May 2020 06:15  Phos  4.7     05-01  Mg     1.9     05-    TPro  8.3  /  Alb  3.6  /  TBili  < 0.2<L>  /  DBili  x   /  AST  18  /  ALT  21  /  AlkPhos  79  05-01          Urinalysis Basic - ( 2020 16:30 )    Color: YELLOW / Appearance: Lt TURBID / S.012 / pH: 6.5  Gluc: NEGATIVE / Ketone: NEGATIVE  / Bili: NEGATIVE / Urobili: NORMAL   Blood: NEGATIVE / Protein: 10 / Nitrite: NEGATIVE   Leuk Esterase: NEGATIVE / RBC: 3-5 / WBC 0-2   Sq Epi: FEW / Non Sq Epi: x / Bacteria: FEW        COVID-19 PCR: Detected (28 Mar 2020 17:24)      RADIOLOGY & ADDITIONAL TESTS:  Imaging from Last 24 Hours:    Electrocardiogram/QTc Interval:    COORDINATION OF CARE:  Care Discussed with Consultants/Other Providers:

## 2020-05-01 NOTE — PROGRESS NOTE ADULT - ASSESSMENT
44 year old woman with a history of HTN, and now covid pna, with rising WBC, increasing crp in need of evaluation of potential superinfection.

## 2020-05-02 LAB
ALBUMIN SERPL ELPH-MCNC: 3.3 G/DL — SIGNIFICANT CHANGE UP (ref 3.3–5)
ALP SERPL-CCNC: 78 U/L — SIGNIFICANT CHANGE UP (ref 40–120)
ALT FLD-CCNC: 15 U/L — SIGNIFICANT CHANGE UP (ref 4–33)
ANION GAP SERPL CALC-SCNC: 14 MMO/L — SIGNIFICANT CHANGE UP (ref 7–14)
AST SERPL-CCNC: 17 U/L — SIGNIFICANT CHANGE UP (ref 4–32)
BILIRUB SERPL-MCNC: < 0.2 MG/DL — LOW (ref 0.2–1.2)
BUN SERPL-MCNC: 6 MG/DL — LOW (ref 7–23)
CALCIUM SERPL-MCNC: 9.5 MG/DL — SIGNIFICANT CHANGE UP (ref 8.4–10.5)
CHLORIDE SERPL-SCNC: 94 MMOL/L — LOW (ref 98–107)
CO2 SERPL-SCNC: 29 MMOL/L — SIGNIFICANT CHANGE UP (ref 22–31)
CREAT SERPL-MCNC: 0.57 MG/DL — SIGNIFICANT CHANGE UP (ref 0.5–1.3)
GLUCOSE SERPL-MCNC: 104 MG/DL — HIGH (ref 70–99)
HCT VFR BLD CALC: 26.6 % — LOW (ref 34.5–45)
HGB BLD-MCNC: 7.8 G/DL — LOW (ref 11.5–15.5)
MAGNESIUM SERPL-MCNC: 1.6 MG/DL — SIGNIFICANT CHANGE UP (ref 1.6–2.6)
MCHC RBC-ENTMCNC: 24.1 PG — LOW (ref 27–34)
MCHC RBC-ENTMCNC: 29.3 % — LOW (ref 32–36)
MCV RBC AUTO: 82.4 FL — SIGNIFICANT CHANGE UP (ref 80–100)
NRBC # FLD: 0 K/UL — SIGNIFICANT CHANGE UP (ref 0–0)
PHOSPHATE SERPL-MCNC: 4 MG/DL — SIGNIFICANT CHANGE UP (ref 2.5–4.5)
PLATELET # BLD AUTO: 417 K/UL — HIGH (ref 150–400)
PMV BLD: 11.5 FL — SIGNIFICANT CHANGE UP (ref 7–13)
POTASSIUM SERPL-MCNC: 3.6 MMOL/L — SIGNIFICANT CHANGE UP (ref 3.5–5.3)
POTASSIUM SERPL-SCNC: 3.6 MMOL/L — SIGNIFICANT CHANGE UP (ref 3.5–5.3)
PROT SERPL-MCNC: 8 G/DL — SIGNIFICANT CHANGE UP (ref 6–8.3)
RBC # BLD: 3.23 M/UL — LOW (ref 3.8–5.2)
RBC # FLD: 26 % — HIGH (ref 10.3–14.5)
SODIUM SERPL-SCNC: 137 MMOL/L — SIGNIFICANT CHANGE UP (ref 135–145)
WBC # BLD: 20.57 K/UL — HIGH (ref 3.8–10.5)
WBC # FLD AUTO: 20.57 K/UL — HIGH (ref 3.8–10.5)

## 2020-05-02 PROCEDURE — 99232 SBSQ HOSP IP/OBS MODERATE 35: CPT

## 2020-05-02 PROCEDURE — 99253 IP/OBS CNSLTJ NEW/EST LOW 45: CPT | Mod: GC

## 2020-05-02 RX ORDER — POTASSIUM CHLORIDE 20 MEQ
20 PACKET (EA) ORAL ONCE
Refills: 0 | Status: COMPLETED | OUTPATIENT
Start: 2020-05-02 | End: 2020-05-02

## 2020-05-02 RX ORDER — MAGNESIUM OXIDE 400 MG ORAL TABLET 241.3 MG
400 TABLET ORAL ONCE
Refills: 0 | Status: COMPLETED | OUTPATIENT
Start: 2020-05-02 | End: 2020-05-02

## 2020-05-02 RX ADMIN — Medication 200 MILLIGRAM(S): at 10:25

## 2020-05-02 RX ADMIN — Medication 500 MILLIGRAM(S): at 16:22

## 2020-05-02 RX ADMIN — Medication 200 MILLIGRAM(S): at 04:49

## 2020-05-02 RX ADMIN — Medication 20 MILLIEQUIVALENT(S): at 09:10

## 2020-05-02 RX ADMIN — Medication 200 MILLIGRAM(S): at 16:22

## 2020-05-02 RX ADMIN — Medication 100 MILLIGRAM(S): at 20:24

## 2020-05-02 RX ADMIN — RIVAROXABAN 15 MILLIGRAM(S): KIT at 16:22

## 2020-05-02 RX ADMIN — ZINC SULFATE TAB 220 MG (50 MG ZINC EQUIVALENT) 220 MILLIGRAM(S): 220 (50 ZN) TAB at 10:25

## 2020-05-02 RX ADMIN — MAGNESIUM OXIDE 400 MG ORAL TABLET 400 MILLIGRAM(S): 241.3 TABLET ORAL at 09:10

## 2020-05-02 RX ADMIN — Medication 100 MILLIGRAM(S): at 04:49

## 2020-05-02 RX ADMIN — SIMVASTATIN 5 MILLIGRAM(S): 20 TABLET, FILM COATED ORAL at 20:24

## 2020-05-02 RX ADMIN — Medication 500 MILLIGRAM(S): at 04:49

## 2020-05-02 RX ADMIN — RIVAROXABAN 15 MILLIGRAM(S): KIT at 09:10

## 2020-05-02 RX ADMIN — Medication 200 MILLIGRAM(S): at 23:01

## 2020-05-02 RX ADMIN — AMLODIPINE BESYLATE 5 MILLIGRAM(S): 2.5 TABLET ORAL at 04:49

## 2020-05-02 RX ADMIN — Medication 1 TABLET(S): at 10:26

## 2020-05-02 RX ADMIN — Medication 100 MILLIGRAM(S): at 10:26

## 2020-05-02 NOTE — PROGRESS NOTE ADULT - PROBLEM SELECTOR PLAN 2
etiology still unclear, continues to slowly rise, BC neg, u/a negative , clinically improving, however will have ID evaluate

## 2020-05-02 NOTE — CONSULT NOTE ADULT - ATTENDING COMMENTS
Patient with acute hypoxic respiratory failure and ARDS due to COVID19, received tocilizumab.  Slightly improved today.  C/w steroids.  c/w NRB, trend CRP.  Patient does not require MICU level of care at this time.  Please re-consult as needed.
Patient with acute hypoxic respiratory failure and ARDS due to covid-19/coronavirus, elevated CRP.  Started on steroids. Able to tolerate NRB at this time.  Would check CRP/ferritin in AM, too, consider anakinra/tocilizumab if markers continue to stay elevated.
Seen with ID fellow.  Agree with assessment as above  Covid19 +, long hospital stay s/p multiple treatment for covid. Now with leukocytosis.  No clear source and pt is non-toxic.  Monitoring off antibiotics for now.  If WBC worsens, will check ct a/p.  If change in status overnight, can give zosyn for empiric coverage.    Check cbc w/ diff in am.    d/w team    Sweta Harman MD  [phone] (pager)  [phone] (office)

## 2020-05-02 NOTE — PROGRESS NOTE ADULT - SUBJECTIVE AND OBJECTIVE BOX
Patient is a 44y old  Female who presents with a chief complaint of Acute Hypoxemic Respiratory Failure and Pneumonia (01 May 2020 13:30)      SUBJECTIVE / OVERNIGHT EVENTS: No acute events overnight. Clinically improving, with decreased sob, able to speak more fluently     MEDICATIONS  (STANDING):  amLODIPine   Tablet 5 milliGRAM(s) Oral daily  ascorbic acid 500 milliGRAM(s) Oral two times a day  benzonatate 100 milliGRAM(s) Oral three times a day  guaiFENesin   Syrup  (Sugar-Free) 200 milliGRAM(s) Oral every 6 hours  influenza   Vaccine 0.5 milliLiter(s) IntraMuscular once  multivitamin 1 Tablet(s) Oral daily  rivaroxaban 15 milliGRAM(s) Oral two times a day with meals  simvastatin 5 milliGRAM(s) Oral at bedtime  zinc sulfate 220 milliGRAM(s) Oral daily    MEDICATIONS  (PRN):  acetaminophen   Tablet .. 650 milliGRAM(s) Oral every 6 hours PRN Temp greater or equal to 38C (100.4F), Mild Pain (1 - 3)  ALBUTerol    90 MICROgram(s) HFA Inhaler 2 Puff(s) Inhalation every 6 hours PRN Shortness of Breath and/or Wheezing      T(C): 36.9 (20 @ 04:34), Max: 36.9 (20 @ 11:47)  HR: 101 (20 @ 04:34) (87 - 102)  BP: 118/74 (20 @ 04:34) (118/74 - 124/89)  RR: 19 (20 @ 04:34) (19 - 20)  SpO2: 97% (20 @ 04:34) (96% - 98%)NRB   CAPILLARY BLOOD GLUCOSE        I&O's Summary    01 May 2020 07:01  -  02 May 2020 07:00  --------------------------------------------------------  IN: 1020 mL / OUT: 1200 mL / NET: -180 mL        PHYSICAL EXAM:  GENERAL: not in distress, on 15L NRB speaking in longer , more fluent sentences with greater ease   EYES: open, sclera clear b/l  CHEST/LUNG: normal respiratory effort, not tachypneic, speaking in full sentences without difficulty  HEART: Not tachycardic, no lower extremity edema b/l  ABDOMEN: Soft, Nontender, Nondistended  EXTREMITIES: Warm and well perfused  NEUROLOGY: awake, alert, responds to Qs appropriately, no gross deficits  PSYCH: calm, cooperative  SKIN: No visible rashes or lesions    LABS:                        7.8    20.57 )-----------( 417      ( 02 May 2020 05:06 )             26.6     05-02    137  |  94<L>  |  6<L>  ----------------------------<  104<H>  3.6   |  29  |  0.57    Ca    9.5      02 May 2020 05:07  Phos  4.0     05-02  Mg     1.6     05-02    TPro  8.0  /  Alb  3.3  /  TBili  < 0.2<L>  /  DBili  x   /  AST  17  /  ALT  15  /  AlkPhos  78  05-02    BC x2 neg      Urinalysis Basic - ( 2020 16:30 )    Color: YELLOW / Appearance: Lt TURBID / S.012 / pH: 6.5  Gluc: NEGATIVE / Ketone: NEGATIVE  / Bili: NEGATIVE / Urobili: NORMAL   Blood: NEGATIVE / Protein: 10 / Nitrite: NEGATIVE   Leuk Esterase: NEGATIVE / RBC: 3-5 / WBC 0-2   Sq Epi: FEW / Non Sq Epi: x / Bacteria: FEW        RADIOLOGY & ADDITIONAL TESTS: no new imaging

## 2020-05-02 NOTE — CONSULT NOTE ADULT - REASON FOR ADMISSION
Acute Hypoxemic Respiratory Failure and Pneumonia

## 2020-05-02 NOTE — PROVIDER CONTACT NOTE (OTHER) - ASSESSMENT
pt on nonrebreather and prone, destating to low 80s. Pts o2 goes up when mask is adjusted and when chest PT is performed. Pt does not seem to be in distress at this time.
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
## 2020-05-02 NOTE — CHART NOTE - NSCHARTNOTEFT_GEN_A_CORE
ID consult called and case briefly discussed over the phone today - will follow up their recommendations.

## 2020-05-02 NOTE — PROGRESS NOTE ADULT - ASSESSMENT
44 year old with Covid pna, with respiratory failure, concurrent PE, now less sob , treated with HCQ, Toci, and convalescent plasma and anticoagulation, with improving respiratory status, however with increasing WBC and no source.
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
## 2020-05-02 NOTE — PROVIDER CONTACT NOTE (OTHER) - RECOMMENDATIONS
continue to monitor
Patient now within limits.
ACP at beside
Adjusted liters back to 15.
Notify ACP. Lay patient in prone position.
Replace lead. Day shift RN and manager made aware
continue to adjust mask and perform chest PT as needed

## 2020-05-03 LAB
ALBUMIN SERPL ELPH-MCNC: 3.4 G/DL — SIGNIFICANT CHANGE UP (ref 3.3–5)
ALP SERPL-CCNC: 77 U/L — SIGNIFICANT CHANGE UP (ref 40–120)
ALT FLD-CCNC: 14 U/L — SIGNIFICANT CHANGE UP (ref 4–33)
ANION GAP SERPL CALC-SCNC: 13 MMO/L — SIGNIFICANT CHANGE UP (ref 7–14)
AST SERPL-CCNC: 17 U/L — SIGNIFICANT CHANGE UP (ref 4–32)
BASOPHILS # BLD AUTO: 0.12 K/UL — SIGNIFICANT CHANGE UP (ref 0–0.2)
BASOPHILS NFR BLD AUTO: 0.5 % — SIGNIFICANT CHANGE UP (ref 0–2)
BILIRUB SERPL-MCNC: 0.2 MG/DL — SIGNIFICANT CHANGE UP (ref 0.2–1.2)
BUN SERPL-MCNC: 4 MG/DL — LOW (ref 7–23)
CALCIUM SERPL-MCNC: 9.4 MG/DL — SIGNIFICANT CHANGE UP (ref 8.4–10.5)
CHLORIDE SERPL-SCNC: 95 MMOL/L — LOW (ref 98–107)
CO2 SERPL-SCNC: 29 MMOL/L — SIGNIFICANT CHANGE UP (ref 22–31)
CREAT SERPL-MCNC: 0.53 MG/DL — SIGNIFICANT CHANGE UP (ref 0.5–1.3)
D DIMER BLD IA.RAPID-MCNC: < 150 NG/ML — SIGNIFICANT CHANGE UP
EOSINOPHIL # BLD AUTO: 1.21 K/UL — HIGH (ref 0–0.5)
EOSINOPHIL NFR BLD AUTO: 5.4 % — SIGNIFICANT CHANGE UP (ref 0–6)
FERRITIN SERPL-MCNC: 90.71 NG/ML — SIGNIFICANT CHANGE UP (ref 15–150)
GLUCOSE SERPL-MCNC: 93 MG/DL — SIGNIFICANT CHANGE UP (ref 70–99)
HCG SERPL-ACNC: < 5 MIU/ML — SIGNIFICANT CHANGE UP
HCT VFR BLD CALC: 26.7 % — LOW (ref 34.5–45)
HGB BLD-MCNC: 8 G/DL — LOW (ref 11.5–15.5)
IMM GRANULOCYTES NFR BLD AUTO: 3.5 % — HIGH (ref 0–1.5)
LDH SERPL L TO P-CCNC: 324 U/L — HIGH (ref 135–225)
LYMPHOCYTES # BLD AUTO: 12 % — LOW (ref 13–44)
LYMPHOCYTES # BLD AUTO: 2.67 K/UL — SIGNIFICANT CHANGE UP (ref 1–3.3)
MAGNESIUM SERPL-MCNC: 1.6 MG/DL — SIGNIFICANT CHANGE UP (ref 1.6–2.6)
MCHC RBC-ENTMCNC: 24.2 PG — LOW (ref 27–34)
MCHC RBC-ENTMCNC: 30 % — LOW (ref 32–36)
MCV RBC AUTO: 80.7 FL — SIGNIFICANT CHANGE UP (ref 80–100)
MONOCYTES # BLD AUTO: 1.54 K/UL — HIGH (ref 0–0.9)
MONOCYTES NFR BLD AUTO: 6.9 % — SIGNIFICANT CHANGE UP (ref 2–14)
NEUTROPHILS # BLD AUTO: 15.9 K/UL — HIGH (ref 1.8–7.4)
NEUTROPHILS NFR BLD AUTO: 71.7 % — SIGNIFICANT CHANGE UP (ref 43–77)
NRBC # FLD: 0.02 K/UL — SIGNIFICANT CHANGE UP (ref 0–0)
PHOSPHATE SERPL-MCNC: 4.1 MG/DL — SIGNIFICANT CHANGE UP (ref 2.5–4.5)
PLATELET # BLD AUTO: 373 K/UL — SIGNIFICANT CHANGE UP (ref 150–400)
PMV BLD: 12 FL — SIGNIFICANT CHANGE UP (ref 7–13)
POTASSIUM SERPL-MCNC: 3.8 MMOL/L — SIGNIFICANT CHANGE UP (ref 3.5–5.3)
POTASSIUM SERPL-SCNC: 3.8 MMOL/L — SIGNIFICANT CHANGE UP (ref 3.5–5.3)
PROCALCITONIN SERPL-MCNC: 0.06 NG/ML — SIGNIFICANT CHANGE UP (ref 0.02–0.1)
PROT SERPL-MCNC: 8 G/DL — SIGNIFICANT CHANGE UP (ref 6–8.3)
RBC # BLD: 3.31 M/UL — LOW (ref 3.8–5.2)
RBC # FLD: 25.8 % — HIGH (ref 10.3–14.5)
SODIUM SERPL-SCNC: 137 MMOL/L — SIGNIFICANT CHANGE UP (ref 135–145)
WBC # BLD: 22.21 K/UL — HIGH (ref 3.8–10.5)
WBC # FLD AUTO: 22.21 K/UL — HIGH (ref 3.8–10.5)

## 2020-05-03 PROCEDURE — 99233 SBSQ HOSP IP/OBS HIGH 50: CPT

## 2020-05-03 RX ORDER — FOLIC ACID 0.8 MG
1 TABLET ORAL DAILY
Refills: 0 | Status: DISCONTINUED | OUTPATIENT
Start: 2020-05-03 | End: 2020-06-09

## 2020-05-03 RX ORDER — FERROUS SULFATE 325(65) MG
325 TABLET ORAL THREE TIMES A DAY
Refills: 0 | Status: DISCONTINUED | OUTPATIENT
Start: 2020-05-03 | End: 2020-06-09

## 2020-05-03 RX ADMIN — Medication 200 MILLIGRAM(S): at 04:08

## 2020-05-03 RX ADMIN — AMLODIPINE BESYLATE 5 MILLIGRAM(S): 2.5 TABLET ORAL at 04:08

## 2020-05-03 RX ADMIN — Medication 1 MILLIGRAM(S): at 12:22

## 2020-05-03 RX ADMIN — Medication 325 MILLIGRAM(S): at 12:23

## 2020-05-03 RX ADMIN — Medication 100 MILLIGRAM(S): at 12:22

## 2020-05-03 RX ADMIN — Medication 1 TABLET(S): at 12:22

## 2020-05-03 RX ADMIN — Medication 100 MILLIGRAM(S): at 04:08

## 2020-05-03 RX ADMIN — Medication 500 MILLIGRAM(S): at 04:08

## 2020-05-03 RX ADMIN — Medication 200 MILLIGRAM(S): at 16:00

## 2020-05-03 RX ADMIN — Medication 325 MILLIGRAM(S): at 20:49

## 2020-05-03 RX ADMIN — Medication 100 MILLIGRAM(S): at 20:49

## 2020-05-03 RX ADMIN — RIVAROXABAN 15 MILLIGRAM(S): KIT at 09:04

## 2020-05-03 RX ADMIN — RIVAROXABAN 15 MILLIGRAM(S): KIT at 16:00

## 2020-05-03 RX ADMIN — ZINC SULFATE TAB 220 MG (50 MG ZINC EQUIVALENT) 220 MILLIGRAM(S): 220 (50 ZN) TAB at 12:22

## 2020-05-03 RX ADMIN — Medication 500 MILLIGRAM(S): at 16:00

## 2020-05-03 RX ADMIN — Medication 200 MILLIGRAM(S): at 12:22

## 2020-05-03 RX ADMIN — SIMVASTATIN 5 MILLIGRAM(S): 20 TABLET, FILM COATED ORAL at 20:49

## 2020-05-03 NOTE — PROGRESS NOTE ADULT - SUBJECTIVE AND OBJECTIVE BOX
SUBJECTIVE / OVERNIGHT EVENTS:  No overnight events. In AM, patient reports ongoing SOB and non-productive cough. Ongoing hypoxia alleviated with non-rebreather face mask.     MEDICATIONS  (STANDING):  amLODIPine   Tablet 5 milliGRAM(s) Oral daily  ascorbic acid 500 milliGRAM(s) Oral two times a day  benzonatate 100 milliGRAM(s) Oral three times a day  ferrous    sulfate 325 milliGRAM(s) Oral three times a day  folic acid 1 milliGRAM(s) Oral daily  guaiFENesin   Syrup  (Sugar-Free) 200 milliGRAM(s) Oral every 6 hours  influenza   Vaccine 0.5 milliLiter(s) IntraMuscular once  multivitamin 1 Tablet(s) Oral daily  rivaroxaban 15 milliGRAM(s) Oral two times a day with meals  simvastatin 5 milliGRAM(s) Oral at bedtime  zinc sulfate 220 milliGRAM(s) Oral daily    MEDICATIONS  (PRN):  acetaminophen   Tablet .. 650 milliGRAM(s) Oral every 6 hours PRN Temp greater or equal to 38C (100.4F), Mild Pain (1 - 3)  ALBUTerol    90 MICROgram(s) HFA Inhaler 2 Puff(s) Inhalation every 6 hours PRN Shortness of Breath and/or Wheezing    CAPILLARY BLOOD GLUCOSE    I&O's Summary    03 May 2020 07:01  -  03 May 2020 11:19  --------------------------------------------------------  IN: 180 mL / OUT: 0 mL / NET: 180 mL      T(C): 36.9 (05-03-20 @ 04:07), Max: 36.9 (05-03-20 @ 04:07)  HR: 99 (05-03-20 @ 04:07) (97 - 99)  BP: 128/89 (05-03-20 @ 04:07) (125/85 - 137/84)  RR: 19 (05-03-20 @ 04:07) (18 - 20)  SpO2: 100% (05-03-20 @ 04:07) (81% - 100%)  100% non-rebreather face mask in place    PHYSICAL EXAM:  GENERAL: NAD, well-developed, Obese  HEAD:  Atraumatic, Normocephalic  EYES: EOMI, conjunctiva and sclera clear  NECK: Supple, No JVD  ENT: 100% non-rebreather face mask in place  CHEST/LUNG: Labored respiration   HEART: Regular rate and rhythm  ABDOMEN: Soft, Nontender, Nondistended  EXTREMITIES: Non-edematous b/l LEs  PSYCH: AAOx3  NEUROLOGY: non-focal  SKIN: No rashes or lesions    LABS:                        8.0    22.21 )-----------( 373      ( 03 May 2020 05:03 )             26.7     05-03    137  |  95<L>  |  4<L>  ----------------------------<  93  3.8   |  29  |  0.53    Ca    9.4      03 May 2020 06:13  Phos  4.1     05-03  Mg     1.6     05-03    TPro  8.0  /  Alb  3.4  /  TBili  0.2  /  DBili  x   /  AST  17  /  ALT  14  /  AlkPhos  77  05-03

## 2020-05-03 NOTE — PROGRESS NOTE ADULT - PROBLEM SELECTOR PLAN 3
- ID consult appreciated  - Etiology unclear  - Will obtain CT of abdomen and pelvis after result of HCG add on test  - ID follow up post CT of a/p

## 2020-05-03 NOTE — PROGRESS NOTE ADULT - PROBLEM SELECTOR PLAN 4
- Likely multifactorial, stable  - Anemia secondary to chronic blood loss and chronic disease  - Start on ferrous sulfate TID and folic acid daily  - Continue to monitor CBC

## 2020-05-03 NOTE — PROGRESS NOTE ADULT - PROBLEM SELECTOR PLAN 1
- Continue with supplemental O2 via Non-rebreather  - Weaning off non-rebreather as tolerated -- will try light exertion at current supplemental O2 amount before trying to wean O2 level again  - c/w incentive spirometry as tolerated  - s/p plaquenil, solumedrol, and ACTEMRA  - Prone position has not been needed   - c/w robitussin q6 standing, tessalon perles to help with cough  - Obtain AM labs (CBC, CMP, Mag, Phos, CRP, Procalcitonin, and Ferritin)  - s/p plasma convalescent study infusion   - ID consult appreciated

## 2020-05-04 LAB
ALBUMIN SERPL ELPH-MCNC: 3.4 G/DL — SIGNIFICANT CHANGE UP (ref 3.3–5)
ALP SERPL-CCNC: 80 U/L — SIGNIFICANT CHANGE UP (ref 40–120)
ALT FLD-CCNC: 16 U/L — SIGNIFICANT CHANGE UP (ref 4–33)
ANION GAP SERPL CALC-SCNC: 12 MMO/L — SIGNIFICANT CHANGE UP (ref 7–14)
AST SERPL-CCNC: 14 U/L — SIGNIFICANT CHANGE UP (ref 4–32)
BASOPHILS # BLD AUTO: 0.07 K/UL — SIGNIFICANT CHANGE UP (ref 0–0.2)
BASOPHILS NFR BLD AUTO: 0.3 % — SIGNIFICANT CHANGE UP (ref 0–2)
BILIRUB SERPL-MCNC: < 0.2 MG/DL — LOW (ref 0.2–1.2)
BUN SERPL-MCNC: 4 MG/DL — LOW (ref 7–23)
CALCIUM SERPL-MCNC: 9.7 MG/DL — SIGNIFICANT CHANGE UP (ref 8.4–10.5)
CHLORIDE SERPL-SCNC: 96 MMOL/L — LOW (ref 98–107)
CO2 SERPL-SCNC: 30 MMOL/L — SIGNIFICANT CHANGE UP (ref 22–31)
CREAT SERPL-MCNC: 0.47 MG/DL — LOW (ref 0.5–1.3)
EOSINOPHIL # BLD AUTO: 0.92 K/UL — HIGH (ref 0–0.5)
EOSINOPHIL NFR BLD AUTO: 4.5 % — SIGNIFICANT CHANGE UP (ref 0–6)
GLUCOSE SERPL-MCNC: 104 MG/DL — HIGH (ref 70–99)
HCT VFR BLD CALC: 27.5 % — LOW (ref 34.5–45)
HGB BLD-MCNC: 7.9 G/DL — LOW (ref 11.5–15.5)
IMM GRANULOCYTES NFR BLD AUTO: 3.3 % — HIGH (ref 0–1.5)
LYMPHOCYTES # BLD AUTO: 12.2 % — LOW (ref 13–44)
LYMPHOCYTES # BLD AUTO: 2.47 K/UL — SIGNIFICANT CHANGE UP (ref 1–3.3)
MAGNESIUM SERPL-MCNC: 1.8 MG/DL — SIGNIFICANT CHANGE UP (ref 1.6–2.6)
MCHC RBC-ENTMCNC: 23.3 PG — LOW (ref 27–34)
MCHC RBC-ENTMCNC: 28.7 % — LOW (ref 32–36)
MCV RBC AUTO: 81.1 FL — SIGNIFICANT CHANGE UP (ref 80–100)
MONOCYTES # BLD AUTO: 1.44 K/UL — HIGH (ref 0–0.9)
MONOCYTES NFR BLD AUTO: 7.1 % — SIGNIFICANT CHANGE UP (ref 2–14)
NEUTROPHILS # BLD AUTO: 14.68 K/UL — HIGH (ref 1.8–7.4)
NEUTROPHILS NFR BLD AUTO: 72.6 % — SIGNIFICANT CHANGE UP (ref 43–77)
NRBC # FLD: 0.02 K/UL — SIGNIFICANT CHANGE UP (ref 0–0)
PHOSPHATE SERPL-MCNC: 4.3 MG/DL — SIGNIFICANT CHANGE UP (ref 2.5–4.5)
PLATELET # BLD AUTO: 512 K/UL — HIGH (ref 150–400)
PMV BLD: 11.5 FL — SIGNIFICANT CHANGE UP (ref 7–13)
POTASSIUM SERPL-MCNC: 3.9 MMOL/L — SIGNIFICANT CHANGE UP (ref 3.5–5.3)
POTASSIUM SERPL-SCNC: 3.9 MMOL/L — SIGNIFICANT CHANGE UP (ref 3.5–5.3)
PROT SERPL-MCNC: 8.2 G/DL — SIGNIFICANT CHANGE UP (ref 6–8.3)
RBC # BLD: 3.39 M/UL — LOW (ref 3.8–5.2)
RBC # FLD: 26.4 % — HIGH (ref 10.3–14.5)
SODIUM SERPL-SCNC: 138 MMOL/L — SIGNIFICANT CHANGE UP (ref 135–145)
WBC # BLD: 20.24 K/UL — HIGH (ref 3.8–10.5)
WBC # FLD AUTO: 20.24 K/UL — HIGH (ref 3.8–10.5)

## 2020-05-04 PROCEDURE — 74177 CT ABD & PELVIS W/CONTRAST: CPT | Mod: 26

## 2020-05-04 PROCEDURE — 99232 SBSQ HOSP IP/OBS MODERATE 35: CPT

## 2020-05-04 PROCEDURE — 99233 SBSQ HOSP IP/OBS HIGH 50: CPT

## 2020-05-04 RX ADMIN — Medication 200 MILLIGRAM(S): at 11:47

## 2020-05-04 RX ADMIN — Medication 200 MILLIGRAM(S): at 23:28

## 2020-05-04 RX ADMIN — Medication 200 MILLIGRAM(S): at 16:56

## 2020-05-04 RX ADMIN — Medication 325 MILLIGRAM(S): at 04:37

## 2020-05-04 RX ADMIN — Medication 200 MILLIGRAM(S): at 04:37

## 2020-05-04 RX ADMIN — ZINC SULFATE TAB 220 MG (50 MG ZINC EQUIVALENT) 220 MILLIGRAM(S): 220 (50 ZN) TAB at 11:47

## 2020-05-04 RX ADMIN — Medication 1 MILLIGRAM(S): at 11:47

## 2020-05-04 RX ADMIN — Medication 500 MILLIGRAM(S): at 16:56

## 2020-05-04 RX ADMIN — Medication 100 MILLIGRAM(S): at 13:35

## 2020-05-04 RX ADMIN — AMLODIPINE BESYLATE 5 MILLIGRAM(S): 2.5 TABLET ORAL at 04:37

## 2020-05-04 RX ADMIN — RIVAROXABAN 15 MILLIGRAM(S): KIT at 16:56

## 2020-05-04 RX ADMIN — Medication 100 MILLIGRAM(S): at 21:26

## 2020-05-04 RX ADMIN — SIMVASTATIN 5 MILLIGRAM(S): 20 TABLET, FILM COATED ORAL at 21:26

## 2020-05-04 RX ADMIN — Medication 100 MILLIGRAM(S): at 04:37

## 2020-05-04 RX ADMIN — Medication 325 MILLIGRAM(S): at 21:26

## 2020-05-04 RX ADMIN — Medication 200 MILLIGRAM(S): at 00:34

## 2020-05-04 RX ADMIN — Medication 1 TABLET(S): at 11:47

## 2020-05-04 RX ADMIN — Medication 500 MILLIGRAM(S): at 04:37

## 2020-05-04 RX ADMIN — Medication 325 MILLIGRAM(S): at 13:35

## 2020-05-04 RX ADMIN — RIVAROXABAN 15 MILLIGRAM(S): KIT at 08:46

## 2020-05-04 NOTE — PROGRESS NOTE ADULT - SUBJECTIVE AND OBJECTIVE BOX
SUBJECTIVE / OVERNIGHT EVENTS:  No overnight events. In AM, patient reports ongoing SOB and coughing. Attempting at weaning down from non-rebreather was performed by me and patient's oxygen saturation decreased to 84% on 10 L/min. Patient also reports constipation. Bowel regimen was started yesterday.     MEDICATIONS  (STANDING):  amLODIPine   Tablet 5 milliGRAM(s) Oral daily  ascorbic acid 500 milliGRAM(s) Oral two times a day  benzonatate 100 milliGRAM(s) Oral three times a day  ferrous    sulfate 325 milliGRAM(s) Oral three times a day  folic acid 1 milliGRAM(s) Oral daily  guaiFENesin   Syrup  (Sugar-Free) 200 milliGRAM(s) Oral every 6 hours  influenza   Vaccine 0.5 milliLiter(s) IntraMuscular once  multivitamin 1 Tablet(s) Oral daily  rivaroxaban 15 milliGRAM(s) Oral two times a day with meals  simvastatin 5 milliGRAM(s) Oral at bedtime  zinc sulfate 220 milliGRAM(s) Oral daily    MEDICATIONS  (PRN):  acetaminophen   Tablet .. 650 milliGRAM(s) Oral every 6 hours PRN Temp greater or equal to 38C (100.4F), Mild Pain (1 - 3)  ALBUTerol    90 MICROgram(s) HFA Inhaler 2 Puff(s) Inhalation every 6 hours PRN Shortness of Breath and/or Wheezing    CAPILLARY BLOOD GLUCOSE    I&O's Summary    03 May 2020 07:01  -  04 May 2020 07:00  --------------------------------------------------------  IN: 540 mL / OUT: 0 mL / NET: 540 mL    T(C): 36.9 (05-04-20 @ 11:36), Max: 36.9 (05-04-20 @ 04:35)  HR: 102 (05-04-20 @ 11:36) (65 - 102)  BP: 116/73 (05-04-20 @ 11:36) (110/78 - 116/80)  RR: 17 (05-04-20 @ 11:36) (17 - 18)  SpO2: 95% (05-04-20 @ 11:36) (95% - 99%)  100% non-rebreather face mask in place    PHYSICAL EXAM:  GENERAL: NAD, well-developed, obese  HEAD:  Atraumatic, Normocephalic  EYES: EOMI, conjunctiva and sclera clear  NECK: Supple, No JVD  ENT: 100% non-rebreather face mask in place  CHEST/LUNG: Labored respiration.   HEART: Regular rate and rhythm  ABDOMEN: Soft, Nontender, Nondistended  EXTREMITIES: Non-edematous b/l LEs  PSYCH: AAOx3  NEUROLOGY: non-focal  SKIN: No rashes or lesions    LABS:                        7.9    20.24 )-----------( 512      ( 04 May 2020 06:45 )             27.5     05-04    138  |  96<L>  |  4<L>  ----------------------------<  104<H>  3.9   |  30  |  0.47<L>    Ca    9.7      04 May 2020 06:45  Phos  4.3     05-04  Mg     1.8     05-04    TPro  8.2  /  Alb  3.4  /  TBili  < 0.2<L>  /  DBili  x   /  AST  14  /  ALT  16  /  AlkPhos  80  05-04

## 2020-05-04 NOTE — PROGRESS NOTE ADULT - SUBJECTIVE AND OBJECTIVE BOX
44yPatient is a 44y old  Female who presents with a chief complaint of Acute Hypoxemic Respiratory Failure and Pneumonia (04 May 2020 11:59)      Interval history:  Pt going to ct a/p when arrived at her room.  Coughing  No fevers    ROS limited        Antimicrobials: none    MEDICATIONS  (STANDING):  acetaminophen   Tablet .. 650 every 6 hours PRN  ALBUTerol    90 MICROgram(s) HFA Inhaler 2 every 6 hours PRN  amLODIPine   Tablet 5 daily  benzonatate 100 three times a day  guaiFENesin   Syrup  (Sugar-Free) 200 every 6 hours  influenza   Vaccine 0.5 once  rivaroxaban 15 two times a day with meals  simvastatin 5 at bedtime        Vital Signs Last 24 Hrs  T(C): 36.9 (05-04-20 @ 11:36), Max: 36.9 (05-04-20 @ 04:35)  T(F): 98.5 (05-04-20 @ 11:36), Max: 98.5 (05-04-20 @ 04:35)  HR: 102 (05-04-20 @ 11:36) (65 - 102)  BP: 116/73 (05-04-20 @ 11:36) (110/78 - 116/80)  BP(mean): --  RR: 17 (05-04-20 @ 11:36) (17 - 18)  SpO2: 95% (05-04-20 @ 11:36) (95% - 99%)    EXAM:  Constitutional: Not in acute distress. On NRB  Eyes: No icterus.  Oral cavity: Clear, no lesions  Neck: No neck vein distension noted  RS: Chest clear to auscultation bilaterally. No wheeze/rhonchi/crepitations.  CVS: S1, S2 heard. Regular rate and rhythm. No murmurs/rubs/gallops.  Abdomen: Soft. No guarding/rigidity/tenderness.  : No acute abnormalities  Extremities: Warm. No pedal edema  Skin: No lesions noted  Vascular: No evidence of phlebitis  Neuro: Alert, oriented to time/place/person    WBC Count: 20.24 K/uL (05.04.20 @ 06:45)  WBC Count: 22.21 K/uL (05.03.20 @ 05:03)  WBC Count: 20.57 K/uL (05.02.20 @ 05:06)  WBC Count: 19.88 K/uL (05.01.20 @ 06:15)  WBC Count: 18.37 K/uL (04.30.20 @ 04:40)  WBC Count: 16.58 K/uL (04.29.20 @ 06:10)  WBC Count: 14.82 K/uL (04.28.20 @ 06:30)  WBC Count: 13.36 K/uL (04.27.20 @ 06:20)                          7.9    20.24 )-----------( 512      ( 04 May 2020 06:45 )             27.5   05-04    138  |  96<L>  |  4<L>  ----------------------------<  104<H>  3.9   |  30  |  0.47<L>    Ca    9.7      04 May 2020 06:45  Phos  4.3     05-04  Mg     1.8     05-04    TPro  8.2  /  Alb  3.4  /  TBili  < 0.2<L>  /  DBili  x   /  AST  14  /  ALT  16  /  AlkPhos  80  05-04      LIVER FUNCTIONS - ( 04 May 2020 06:45 )  Alb: 3.4 g/dL / Pro: 8.2 g/dL / ALK PHOS: 80 u/L / ALT: 16 u/L / AST: 14 u/L / GGT: x         Ferritin, Serum: 90.71 ng/mL (05.03.20 @ 06:13)  Ferritin, Serum: 106.5 ng/mL (05.01.20 @ 06:15)  Ferritin, Serum: 81.14 ng/mL (04.29.20 @ 06:10)  Ferritin, Serum: 112.2 ng/mL (04.22.20 @ 05:00)  Ferritin, Serum: 229.8 ng/mL (04.10.20 @ 05:05)    C-Reactive Protein, Serum: 94.5 mg/L (05.01.20 @ 05:45)  C-Reactive Protein, Serum: 79.8 mg/L (04.29.20 @ 06:10)  C-Reactive Protein, Serum: < 4.0 mg/L (04.17.20 @ 09:00)  C-Reactive Protein, Serum: 180.8 mg/L (04.01.20 @ 05:01)    D-Dimer Assay, Quantitative: < 150:  (05.03.20 @ 06:13)  D-Dimer Assay, Quantitative (05.01.20 @ 06:15) D-Dimer Assay, Quantitative: 170:   D-Dimer Assay, Quantitative: 920: (04.19.20 @ 05:52)  D-Dimer Assay, Quantitative: 3780:  04.09.20 @ 08:47)        RECENT CULTURES:    Culture - Blood (04.30.20 @ 18:31)    Specimen Source: .Blood Blood-Venous    Culture Results:   No growth to date.    Culture - Blood (04.30.20 @ 18:31)    Specimen Source: .Blood Blood-Peripheral    Culture Results:   No growth to date.    HIV-1/2 Ag/Ab Combo (04.15.20 @ 14:46)    HIV Combo Result: NonReact:     COVID-19 PCR . (03.28.20 @ 17:24)    COVID-19 PCR: Detected:     Radiology:  < from: CT Abdomen and Pelvis w/ IV Cont (05.04.20 @ 12:28) >  FINDINGS:    LOWER CHEST: Bilateral patchy and groundglass opacities. Soft tissue nodule in the rightinferior breast measuring 1.2 cm (3:144).    LIVER: Within normal limits.  BILE DUCTS: Normal caliber.  GALLBLADDER: Within normal limits.  SPLEEN: Within normal limits.  PANCREAS: Within normal limits.  ADRENALS: Within normal limits.  KIDNEYS/URETERS: No hydronephrosis.    BLADDER: Mild wall thickening may be related to underdistention.  REPRODUCTIVE ORGANS: Fibroid uterus including a subserosal left uterine fibroid measuring up to 5.5 cm.    BOWEL: No bowel obstruction. Appendix is normal.  PERITONEUM: No ascites. No drainable fluid collection.  VESSELS: Mild atherosclerotic disease of the abdominal aorta.  RETROPERITONEUM/LYMPH NODES: No lymphadenopathy.    ABDOMINAL WALL: Within normal limits.  BONES: Mild degenerative changes of the spine.    IMPRESSION:     Pattern of GGO at lung bases suggests infection including atypical pneumonia/viral infection from atypical agents including COVID-19 (C19V-1).    Fibroid uterus.    Soft tissue nodule in the right inferior breast measuring 1.2 cm, indeterminate. Correlate with dedicated breast imaging as clinically indicated.    < end of copied text >    < from: Xray Chest 1 View- PORTABLE-Routine (04.30.20 @ 15:26) >    Impression: Opacities within both lungs have slightly increased when compared to previous exam.    < end of copied text >    < from: CT Angio Chest w/ IV Cont (04.12.20 @ 11:22) >  IMPRESSION:     Pulmonary embolism within the superior segment left lower lobe pulmonary artery.    Pattern of GGO suggests infection including atypical pneumonia/viral infection from atypical agents including COVID-19 (C19V-1).    < end of copied text >

## 2020-05-04 NOTE — PROGRESS NOTE ADULT - PROBLEM SELECTOR PLAN 1
- Continue with supplemental O2 via Non-rebreather  - Weaning off non-rebreather as tolerated -- will try light exertion at current supplemental O2 amount before trying to wean O2 level again  - c/w incentive spirometry as tolerated  - s/p plaquenil, solumedrol, and ACTEMRA, and plasma convalescent transfusion.   - Prone position has not been needed   - c/w robitussin q6 standing, tessalon perles to help with cough  - Obtain AM labs (CBC, CMP, Mag, Phos, CRP, Procalcitonin, and Ferritin)
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
## 2020-05-05 LAB
ALBUMIN SERPL ELPH-MCNC: 3.5 G/DL — SIGNIFICANT CHANGE UP (ref 3.3–5)
ALP SERPL-CCNC: 74 U/L — SIGNIFICANT CHANGE UP (ref 40–120)
ALT FLD-CCNC: 11 U/L — SIGNIFICANT CHANGE UP (ref 4–33)
ANION GAP SERPL CALC-SCNC: 13 MMO/L — SIGNIFICANT CHANGE UP (ref 7–14)
AST SERPL-CCNC: 12 U/L — SIGNIFICANT CHANGE UP (ref 4–32)
BASOPHILS # BLD AUTO: 0.09 K/UL — SIGNIFICANT CHANGE UP (ref 0–0.2)
BASOPHILS NFR BLD AUTO: 0.5 % — SIGNIFICANT CHANGE UP (ref 0–2)
BILIRUB SERPL-MCNC: 0.2 MG/DL — SIGNIFICANT CHANGE UP (ref 0.2–1.2)
BLD GP AB SCN SERPL QL: NEGATIVE — SIGNIFICANT CHANGE UP
BUN SERPL-MCNC: 6 MG/DL — LOW (ref 7–23)
CALCIUM SERPL-MCNC: 9.6 MG/DL — SIGNIFICANT CHANGE UP (ref 8.4–10.5)
CHLORIDE SERPL-SCNC: 96 MMOL/L — LOW (ref 98–107)
CO2 SERPL-SCNC: 29 MMOL/L — SIGNIFICANT CHANGE UP (ref 22–31)
CREAT SERPL-MCNC: 0.49 MG/DL — LOW (ref 0.5–1.3)
CRP SERPL-MCNC: 50.9 MG/L — HIGH
CULTURE RESULTS: SIGNIFICANT CHANGE UP
CULTURE RESULTS: SIGNIFICANT CHANGE UP
EOSINOPHIL # BLD AUTO: 0.98 K/UL — HIGH (ref 0–0.5)
EOSINOPHIL NFR BLD AUTO: 5.1 % — SIGNIFICANT CHANGE UP (ref 0–6)
FERRITIN SERPL-MCNC: 87.3 NG/ML — SIGNIFICANT CHANGE UP (ref 15–150)
GLUCOSE SERPL-MCNC: 114 MG/DL — HIGH (ref 70–99)
HCT VFR BLD CALC: 26.3 % — LOW (ref 34.5–45)
HGB BLD-MCNC: 7.8 G/DL — LOW (ref 11.5–15.5)
IMM GRANULOCYTES NFR BLD AUTO: 2.8 % — HIGH (ref 0–1.5)
LYMPHOCYTES # BLD AUTO: 12.3 % — LOW (ref 13–44)
LYMPHOCYTES # BLD AUTO: 2.35 K/UL — SIGNIFICANT CHANGE UP (ref 1–3.3)
MAGNESIUM SERPL-MCNC: 1.6 MG/DL — SIGNIFICANT CHANGE UP (ref 1.6–2.6)
MCHC RBC-ENTMCNC: 24 PG — LOW (ref 27–34)
MCHC RBC-ENTMCNC: 29.7 % — LOW (ref 32–36)
MCV RBC AUTO: 80.9 FL — SIGNIFICANT CHANGE UP (ref 80–100)
MONOCYTES # BLD AUTO: 1.21 K/UL — HIGH (ref 0–0.9)
MONOCYTES NFR BLD AUTO: 6.3 % — SIGNIFICANT CHANGE UP (ref 2–14)
NEUTROPHILS # BLD AUTO: 13.99 K/UL — HIGH (ref 1.8–7.4)
NEUTROPHILS NFR BLD AUTO: 73 % — SIGNIFICANT CHANGE UP (ref 43–77)
NRBC # FLD: 0.02 K/UL — SIGNIFICANT CHANGE UP (ref 0–0)
PHOSPHATE SERPL-MCNC: 4.5 MG/DL — SIGNIFICANT CHANGE UP (ref 2.5–4.5)
PLATELET # BLD AUTO: 477 K/UL — HIGH (ref 150–400)
PMV BLD: 11 FL — SIGNIFICANT CHANGE UP (ref 7–13)
POTASSIUM SERPL-MCNC: 3.7 MMOL/L — SIGNIFICANT CHANGE UP (ref 3.5–5.3)
POTASSIUM SERPL-SCNC: 3.7 MMOL/L — SIGNIFICANT CHANGE UP (ref 3.5–5.3)
PROCALCITONIN SERPL-MCNC: 0.05 NG/ML — SIGNIFICANT CHANGE UP (ref 0.02–0.1)
PROT SERPL-MCNC: 8 G/DL — SIGNIFICANT CHANGE UP (ref 6–8.3)
RBC # BLD: 3.25 M/UL — LOW (ref 3.8–5.2)
RBC # FLD: 26 % — HIGH (ref 10.3–14.5)
RH IG SCN BLD-IMP: NEGATIVE — SIGNIFICANT CHANGE UP
SODIUM SERPL-SCNC: 138 MMOL/L — SIGNIFICANT CHANGE UP (ref 135–145)
SPECIMEN SOURCE: SIGNIFICANT CHANGE UP
SPECIMEN SOURCE: SIGNIFICANT CHANGE UP
WBC # BLD: 19.16 K/UL — HIGH (ref 3.8–10.5)
WBC # FLD AUTO: 19.16 K/UL — HIGH (ref 3.8–10.5)

## 2020-05-05 PROCEDURE — 99232 SBSQ HOSP IP/OBS MODERATE 35: CPT

## 2020-05-05 PROCEDURE — 99233 SBSQ HOSP IP/OBS HIGH 50: CPT

## 2020-05-05 RX ADMIN — Medication 325 MILLIGRAM(S): at 12:38

## 2020-05-05 RX ADMIN — Medication 500 MILLIGRAM(S): at 05:29

## 2020-05-05 RX ADMIN — Medication 100 MILLIGRAM(S): at 21:31

## 2020-05-05 RX ADMIN — Medication 200 MILLIGRAM(S): at 12:39

## 2020-05-05 RX ADMIN — Medication 100 MILLIGRAM(S): at 12:38

## 2020-05-05 RX ADMIN — Medication 100 MILLIGRAM(S): at 05:29

## 2020-05-05 RX ADMIN — AMLODIPINE BESYLATE 5 MILLIGRAM(S): 2.5 TABLET ORAL at 05:29

## 2020-05-05 RX ADMIN — Medication 1 TABLET(S): at 12:39

## 2020-05-05 RX ADMIN — Medication 200 MILLIGRAM(S): at 21:31

## 2020-05-05 RX ADMIN — Medication 500 MILLIGRAM(S): at 17:26

## 2020-05-05 RX ADMIN — SIMVASTATIN 5 MILLIGRAM(S): 20 TABLET, FILM COATED ORAL at 21:31

## 2020-05-05 RX ADMIN — Medication 325 MILLIGRAM(S): at 05:29

## 2020-05-05 RX ADMIN — Medication 200 MILLIGRAM(S): at 05:29

## 2020-05-05 RX ADMIN — Medication 1 MILLIGRAM(S): at 12:39

## 2020-05-05 RX ADMIN — ZINC SULFATE TAB 220 MG (50 MG ZINC EQUIVALENT) 220 MILLIGRAM(S): 220 (50 ZN) TAB at 12:39

## 2020-05-05 RX ADMIN — RIVAROXABAN 15 MILLIGRAM(S): KIT at 08:58

## 2020-05-05 RX ADMIN — Medication 325 MILLIGRAM(S): at 21:31

## 2020-05-05 RX ADMIN — Medication 200 MILLIGRAM(S): at 17:26

## 2020-05-05 RX ADMIN — RIVAROXABAN 15 MILLIGRAM(S): KIT at 17:26

## 2020-05-05 NOTE — PROGRESS NOTE ADULT - SUBJECTIVE AND OBJECTIVE BOX
44yPatient is a 44y old  Female who presents with a chief complaint of Acute Hypoxemic Respiratory Failure and Pneumonia (04 May 2020 11:59)      Interval history:  In bed.  Reports some nausea, vomiting at times. Feels unsettled in her stomach.  Coughing  No fevers  CT a/p not revealing of infection    ROS: otherwise negative    Antimicrobials: none    MEDICATIONS  (STANDING):  acetaminophen   Tablet .. 650 every 6 hours PRN  ALBUTerol    90 MICROgram(s) HFA Inhaler 2 every 6 hours PRN  amLODIPine   Tablet 5 daily  benzonatate 100 three times a day  guaiFENesin   Syrup  (Sugar-Free) 200 every 6 hours  influenza   Vaccine 0.5 once  rivaroxaban 15 two times a day with meals  simvastatin 5 at bedtime    Vital Signs Last 24 Hrs  T(F): 98.2 (05-05-20 @ 12:09), Max: 98.2 (05-05-20 @ 12:09)  HR: 94 (05-05-20 @ 12:09)  BP: 111/76 (05-05-20 @ 12:09)  RR: 18 (05-05-20 @ 12:09)  SpO2: 97% (05-05-20 @ 12:09) (97% - 100%)  Wt(kg): --    EXAM:  Constitutional: Not in acute distress. On NRB  Eyes: No icterus.  Neck: No neck vein distension noted  RS: equal b/l  CVS: tachy  Abdomen: Soft. No guarding/rigidity/tenderness.  Extremities: Warm. No pedal edema  Skin: No lesions noted  Vascular: No evidence of phlebitis  Neuro: Alert, oriented to time/place/person    WBC Count: 19.16 K/uL (05.05.20 @ 06:50)  WBC Count: 20.24 K/uL (05.04.20 @ 06:45)  WBC Count: 22.21 K/uL (05.03.20 @ 05:03)  WBC Count: 20.57 K/uL (05.02.20 @ 05:06)  WBC Count: 19.88 K/uL (05.01.20 @ 06:15)  WBC Count: 18.37 K/uL (04.30.20 @ 04:40)  WBC Count: 16.58 K/uL (04.29.20 @ 06:10)  WBC Count: 14.82 K/uL (04.28.20 @ 06:30)  WBC Count: 13.36 K/uL (04.27.20 @ 06:20)                                       7.8    19.16 )-----------( 477      ( 05 May 2020 06:50 )             26.3 05-05    138  |  96  |  6   ----------------------------<  114  3.7   |  29  |  0.49  Ca    9.6      05 May 2020 06:50Phos  4.5     05-05Mg     1.6     05-05  TPro  8.0  /  Alb  3.5  /  TBili  0.2  /  DBili  x   /  AST  12  /  ALT  11  /  AlkPhos  74  05-05              Ferritin, Serum: 87.30 ng/mL (05.05.20 @ 06:50)  Ferritin, Serum: 90.71 ng/mL (05.03.20 @ 06:13)  Ferritin, Serum: 106.5 ng/mL (05.01.20 @ 06:15)  Ferritin, Serum: 81.14 ng/mL (04.29.20 @ 06:10)  Ferritin, Serum: 112.2 ng/mL (04.22.20 @ 05:00)  Ferritin, Serum: 229.8 ng/mL (04.10.20 @ 05:05)    C-Reactive Protein, Serum: 50.9 mg/L (05.05.20 @ 06:50)  C-Reactive Protein, Serum: 94.5 mg/L (05.01.20 @ 05:45)  C-Reactive Protein, Serum: 79.8 mg/L (04.29.20 @ 06:10)  C-Reactive Protein, Serum: < 4.0 mg/L (04.17.20 @ 09:00)  C-Reactive Protein, Serum: 180.8 mg/L (04.01.20 @ 05:01)      D-Dimer Assay, Quantitative: < 150:  (05.03.20 @ 06:13)  D-Dimer Assay, Quantitative (05.01.20 @ 06:15) D-Dimer Assay, Quantitative: 170:   D-Dimer Assay, Quantitative: 920: (04.19.20 @ 05:52)  D-Dimer Assay, Quantitative: 3780:  04.09.20 @ 08:47)        RECENT CULTURES:      Culture - Blood (04.30.20 @ 18:31)    Specimen Source: .Blood Blood-Venous    Culture Results:   No growth to date.    Culture - Blood (04.30.20 @ 18:31)    Specimen Source: .Blood Blood-Peripheral    Culture Results:   No growth to date.    HIV-1/2 Ag/Ab Combo (04.15.20 @ 14:46)    HIV Combo Result: NonReact:     COVID-19 PCR . (03.28.20 @ 17:24)    COVID-19 PCR: Detected:     Radiology:  < from: CT Abdomen and Pelvis w/ IV Cont (05.04.20 @ 12:28) >  FINDINGS:    LOWER CHEST: Bilateral patchy and groundglass opacities. Soft tissue nodule in the rightinferior breast measuring 1.2 cm (3:144).    LIVER: Within normal limits.  BILE DUCTS: Normal caliber.  GALLBLADDER: Within normal limits.  SPLEEN: Within normal limits.  PANCREAS: Within normal limits.  ADRENALS: Within normal limits.  KIDNEYS/URETERS: No hydronephrosis.    BLADDER: Mild wall thickening may be related to underdistention.  REPRODUCTIVE ORGANS: Fibroid uterus including a subserosal left uterine fibroid measuring up to 5.5 cm.    BOWEL: No bowel obstruction. Appendix is normal.  PERITONEUM: No ascites. No drainable fluid collection.  VESSELS: Mild atherosclerotic disease of the abdominal aorta.  RETROPERITONEUM/LYMPH NODES: No lymphadenopathy.    ABDOMINAL WALL: Within normal limits.  BONES: Mild degenerative changes of the spine.    IMPRESSION:     Pattern of GGO at lung bases suggests infection including atypical pneumonia/viral infection from atypical agents including COVID-19 (C19V-1).    Fibroid uterus.    Soft tissue nodule in the right inferior breast measuring 1.2 cm, indeterminate. Correlate with dedicated breast imaging as clinically indicated.    < end of copied text >    < from: Xray Chest 1 View- PORTABLE-Routine (04.30.20 @ 15:26) >    Impression: Opacities within both lungs have slightly increased when compared to previous exam.    < end of copied text >    < from: CT Angio Chest w/ IV Cont (04.12.20 @ 11:22) >  IMPRESSION:     Pulmonary embolism within the superior segment left lower lobe pulmonary artery.    Pattern of GGO suggests infection including atypical pneumonia/viral infection from atypical agents including COVID-19 (C19V-1).    < end of copied text >

## 2020-05-05 NOTE — PROGRESS NOTE ADULT - SUBJECTIVE AND OBJECTIVE BOX
SUBJECTIVE / OVERNIGHT EVENTS:  No overnight events. NO new AM complaints.  Ongoing SOB    MEDICATIONS  (STANDING):  amLODIPine   Tablet 5 milliGRAM(s) Oral daily  ascorbic acid 500 milliGRAM(s) Oral two times a day  benzonatate 100 milliGRAM(s) Oral three times a day  ferrous    sulfate 325 milliGRAM(s) Oral three times a day  folic acid 1 milliGRAM(s) Oral daily  guaiFENesin   Syrup  (Sugar-Free) 200 milliGRAM(s) Oral every 6 hours  influenza   Vaccine 0.5 milliLiter(s) IntraMuscular once  multivitamin 1 Tablet(s) Oral daily  rivaroxaban 15 milliGRAM(s) Oral two times a day with meals  simvastatin 5 milliGRAM(s) Oral at bedtime  zinc sulfate 220 milliGRAM(s) Oral daily    MEDICATIONS  (PRN):  acetaminophen   Tablet .. 650 milliGRAM(s) Oral every 6 hours PRN Temp greater or equal to 38C (100.4F), Mild Pain (1 - 3)  ALBUTerol    90 MICROgram(s) HFA Inhaler 2 Puff(s) Inhalation every 6 hours PRN Shortness of Breath and/or Wheezing    CAPILLARY BLOOD GLUCOSE    I&O's Summary    T(C): 36.8 (05-05-20 @ 12:09), Max: 36.8 (05-05-20 @ 12:09)  HR: 94 (05-05-20 @ 12:09) (94 - 102)  BP: 111/76 (05-05-20 @ 12:09) (101/64 - 112/71)  RR: 18 (05-05-20 @ 12:09) (17 - 18)  SpO2: 97% (05-05-20 @ 12:09) (97% - 100%)  100% non-rebreather face mask in place    PHYSICAL EXAM:  GENERAL: NAD, well-developed, Obese  HEAD:  Atraumatic, Normocephalic  EYES: EOMI, conjunctiva and sclera clear  NECK: Supple, No JVD  ENT: 100% non-rebreather face mask in place  CHEST/LUNG: Labored respiration  HEART: Regular rate and rhythm  ABDOMEN: Soft, Nontender, Nondistended  EXTREMITIES: Non-edematous LEs.   PSYCH: AAOx3  NEUROLOGY: non-focal  SKIN: No rashes or lesions    LABS:                        7.8    19.16 )-----------( 477      ( 05 May 2020 06:50 )             26.3     05-05    138  |  96<L>  |  6<L>  ----------------------------<  114<H>  3.7   |  29  |  0.49<L>    Ca    9.6      05 May 2020 06:50  Phos  4.5     05-05  Mg     1.6     05-05    TPro  8.0  /  Alb  3.5  /  TBili  0.2  /  DBili  x   /  AST  12  /  ALT  11  /  AlkPhos  74  05-05    < from: CT Abdomen and Pelvis w/ IV Cont (05.04.20 @ 12:28) >  Impression  Pattern of GGO at lung bases suggests infection including atypical pneumonia/viral infection from atypical agents including COVID-19 (C19V-1).    Fibroid uterus.    Soft tissue nodule in the right inferior breast measuring 1.2 cm, indeterminate. Correlate with dedicated breast imaging as clinically indicated.    < end of copied text >
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
## 2020-05-06 ENCOUNTER — TRANSCRIPTION ENCOUNTER (OUTPATIENT)
Age: 45
End: 2020-05-06

## 2020-05-06 PROCEDURE — 99232 SBSQ HOSP IP/OBS MODERATE 35: CPT

## 2020-05-06 PROCEDURE — 99233 SBSQ HOSP IP/OBS HIGH 50: CPT

## 2020-05-06 RX ADMIN — AMLODIPINE BESYLATE 5 MILLIGRAM(S): 2.5 TABLET ORAL at 06:36

## 2020-05-06 RX ADMIN — Medication 1 TABLET(S): at 11:45

## 2020-05-06 RX ADMIN — Medication 200 MILLIGRAM(S): at 11:45

## 2020-05-06 RX ADMIN — Medication 500 MILLIGRAM(S): at 16:54

## 2020-05-06 RX ADMIN — Medication 100 MILLIGRAM(S): at 11:45

## 2020-05-06 RX ADMIN — RIVAROXABAN 15 MILLIGRAM(S): KIT at 16:53

## 2020-05-06 RX ADMIN — Medication 325 MILLIGRAM(S): at 21:25

## 2020-05-06 RX ADMIN — Medication 1 MILLIGRAM(S): at 11:45

## 2020-05-06 RX ADMIN — ZINC SULFATE TAB 220 MG (50 MG ZINC EQUIVALENT) 220 MILLIGRAM(S): 220 (50 ZN) TAB at 11:45

## 2020-05-06 RX ADMIN — Medication 200 MILLIGRAM(S): at 16:53

## 2020-05-06 RX ADMIN — Medication 200 MILLIGRAM(S): at 06:36

## 2020-05-06 RX ADMIN — Medication 325 MILLIGRAM(S): at 11:45

## 2020-05-06 RX ADMIN — Medication 100 MILLIGRAM(S): at 06:36

## 2020-05-06 RX ADMIN — SIMVASTATIN 5 MILLIGRAM(S): 20 TABLET, FILM COATED ORAL at 21:25

## 2020-05-06 RX ADMIN — Medication 100 MILLIGRAM(S): at 21:25

## 2020-05-06 RX ADMIN — RIVAROXABAN 15 MILLIGRAM(S): KIT at 06:37

## 2020-05-06 RX ADMIN — Medication 325 MILLIGRAM(S): at 06:36

## 2020-05-06 RX ADMIN — Medication 500 MILLIGRAM(S): at 06:36

## 2020-05-06 NOTE — CHART NOTE - NSCHARTNOTEFT_GEN_A_CORE
Spoke with Pulm fellow regarding patient status.  Patient still requiring 100% NRB @ 15L. Attempted to wean to 50% Venti Mask today, however, desaturated to 80's.  Patient continues to have cough. As per pulm fellow, patient with severe lung disease on imaging.  At this time there are no further interventions. Should continue with 100% NRB and attempt to wean off daily.  Continue to trend Inflammatory Markers - trending down, but still elevated. Continue Anticoagulation for PE/DVT.

## 2020-05-06 NOTE — DISCHARGE NOTE PROVIDER - NSDCMRMEDTOKEN_GEN_ALL_CORE_FT
hydroCHLOROthiazide 25 mg oral tablet: 1 tab(s) orally once a day  losartan 50 mg oral tablet: 1 tab(s) orally once a day  Multiple Vitamins oral tablet: 1 tab(s) orally once a day  simvastatin:   Xarelto Starter Pack 15 mg-20 mg oral kit: Take as directed. amLODIPine 5 mg oral tablet: 1 tab(s) orally once a day  ascorbic acid 500 mg oral tablet: 1 tab(s) orally 2 times a day  fluticasone 50 mcg/inh nasal spray: 1 spray(s) nasal 2 times a day  folic acid 1 mg oral tablet: 1 tab(s) orally once a day  Multiple Vitamins oral tablet: 1 tab(s) orally once a day  rivaroxaban 20 mg oral tablet: 1 tab(s) orally once a day (before a meal)  simvastatin 5 mg oral tablet: 1 tab(s) orally once a day (at bedtime) amLODIPine 5 mg oral tablet: 1 tab(s) orally once a day. meds to bed. for discharge today.  ascorbic acid 500 mg oral tablet: 1 tab(s) orally 2 times a day. meds to bed. for discharge today.  cholecalciferol oral tablet: 3000 unit(s) orally once a day. meds to bed. for discharge today.  ergocalciferol 50,000 intl units (1.25 mg) oral capsule: 1 cap(s) orally once a week. meds to bed. for discharge today.  ferrous sulfate 325 mg (65 mg elemental iron) oral delayed release tablet: 1 tab(s) orally 3 times a day. meds to bed. for discharge today.  fluticasone 50 mcg/inh nasal spray: 1 spray(s) nasal 2 times a day. meds to bed. for discharge today.  folic acid 1 mg oral tablet: 1 tab(s) orally once a day. meds to bed. for discharge today.  guaiFENesin 100 mg/5 mL oral liquid: 10 milliliter(s) orally every 6 hours as needed for cough. meds to bed. for discharge today.  Multiple Vitamins oral tablet: 1 tab(s) orally once a day  rivaroxaban 20 mg oral tablet: 1 tab(s) orally once a day (before a meal). meds to bed. for discharge today.  simvastatin 5 mg oral tablet: 1 tab(s) orally once a day (at bedtime). meds to bed. for discharge today.  zinc sulfate 220 mg oral capsule: 1 cap(s) orally once a day. meds to bed. for discharge today.

## 2020-05-06 NOTE — DISCHARGE NOTE PROVIDER - CARE PROVIDER_API CALL
Ollie Metcalf (DO)  Family Medicine  [id number] [place] [address]  Phone: [phone]  Fax: [phone]  Established Patient  Follow Up Time: 2 weeks Ollie Metcalf  [facility]  [address] [place] [address]  Phone: [phone]  Fax: [phone]  Established Patient  Follow Up Time: 2 weeks    [facility] medicine clinic,   Phone: [phone]  Fax: (   )    -  Follow Up Time:     [facility] gynecology clinic,   Phone: [phone]  Fax: (   )    -  Follow Up Time:
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
## 2020-05-06 NOTE — DISCHARGE NOTE PROVIDER - HOSPITAL COURSE
44 y.o. woman with HTN, pre-dm, obesity now with acute PE and acute respiratory failure secondary to COVID-19 infection. Pt was admitted to the hospital and was found to have COVID 19. Pt was treated with plaquenil, solumedrol, and ACTEMRA, and plasma convalescent transfusion. Pt was managed supportively with oxygen supplementation.  CT Angio of the Chest also found pulmonary embolism within the superior segment left lower lobe pulmonary artery. You were noted to have an acute drop in platelets after starting Lovenox.  You were switched to argatroban due to concern for HIT. Labs reviewed by hematology, Thrombocytopenia likely secondary to acute infection.  Argatroban D/C'd and started on Xarelto loading dose on 4/25/20 (continue through 5/16/20 for 21 days, then 20mg daily).        Patient was weaned off supplemental oxygen and was saturating well on room air prior to discharge.        Pt medically stable for discharge on ------, as per discussion with -----.        *Last updated 5/6/20 44 y.o. woman with HTN, pre-dm, obesity now with acute PE and acute respiratory failure secondary to COVID-19 infection. Pt was admitted to the hospital and was found to have COVID 19. Pt was treated with HCQ 3/29-4/2, Solumedrol 3/30-4/4, Tocilizumab 3/31, 4/1, Ceftriaxone 3/28-4/1, Azithromycin 3/28-4/1, and Convalescent plasma 4/29. Pt was managed supportively with oxygen supplementation.  CT Angio of the Chest also found pulmonary embolism within the superior segment left lower lobe pulmonary artery. You were noted to have an acute drop in platelets after starting Lovenox.  You were switched to argatroban due to concern for HIT. Labs reviewed by hematology, Thrombocytopenia likely secondary to acute infection.  Argatroban D/C'd and started on Xarelto loading dose on 4/25/20 (continue through 5/16/20 for 21 days, then 20mg daily).        Patient was weaned off supplemental oxygen and was saturating well on room air prior to discharge.        Pt medically stable for discharge on ------, as per discussion with -----.        *Last updated 5/6/20 45 y/o female, with a PmHx of Hypertension, HLD, and Pre-DM, presented w/ shortness of breath, ETIENNE and non-productive cough for the ~ 3 days.        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, discontinued lovenox & started argatroban, now changed to xarelto    + Multifocal PNA - s/p CTX/Azithro x 5 days    + Anemia - stabilized, transfuse PRBC for Hg < 7, started on ferrous sulfate & folic acid        5/9 RRT for hypoxia - improved with proning        COVID 19 Positive 3/28     - s/p mult RRTs - not intubated    - 3/28 CXR - bilateral patchy opacities, L > R concerning for multifocal pna    - s/p Plaquenil, steroids, Tocilizumab, plasma 4/29    - 4/26 Repeat CXR - unchanged from previous CXR; lungs are both still 67%-100% w/ opacification     - [facility] pulmonary following    - 98% on 5LNC, will c/w weaning her off    - 6/7: now on 4LNC, 98%         PE    - 4/9 B/L LE US - neg for DVT    - 4/12 CTA Chest - PE within the superior segment left lower pulmonary artery    - Full AC w/ LVX--> transitioned to Xarelto         Leukocytosis    - improving--5/2 House ID Consulted - watch off abx    - 5/4 CT A/P- +GGO, fibroid uterus. Mild bladder wall thickening    - UA was negative, BCx 4/30 - NGTD        Anemia     - Hgb-10.1 slowly trended down to 7s (stabilized) (7.9)    - started on Ferrous sulfate and Folic acid    - Iron studies normal    - 5/20 s/p 1U PRBC, pt on menses**        RIGHT BREAST 1.2 CM INFERIOR NODULE    - Seen on CT scan     - She does not have PCP or GYN doctor, she has never had mamogram, she needs PCP, GYN, mammogram and US of breast on discharge, apparently these tests can not be done inpatient 45 y/o female, with a PmHx of Hypertension, HLD, and Pre-DM, presented w/ shortness of breath, ETIENNE and non-productive cough for the ~ 3 days.        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    - s/p Plaquenil, IL6 trial, steroids, actemra, plasma trial 4/29, s/p tocilizumab, Ceftriaxone 3/28-4/1, Azithromycin 3/28-4/1    - house pulmonary following    - 98% on 5LNC, titrated down to 3L NC     - patient was initially desaturating on exertion even with oxygen on    - on 6/8, patient was saturating 94% on exertion.     - home oxygen set up by         PE    - 4/9 B/L LE US - neg for DVT    - 4/12 CTA Chest - PE within the superior segment left lower pulmonary artery    - Full AC w/ LVX--> transitioned to Xarelto         Leukocytosis    - improving--5/2 House ID Consulted - watch off abx    - 5/4 CT A/P- +GGO, fibroid uterus. Mild bladder wall thickening    - UA was negative, BCx 4/30 - NGTD    - Procalcitonin low - reassuring pointing away from a bacterial infection    - ID on board - monitor off antibiotics         Anemia     - Hgb-10.1 slowly trended down to 7s (stabilized) (7.9)    - started on Ferrous sulfate and Folic acid    - Iron studies normal    - 5/20 s/p 1U PRBC, pt on menses**        RIGHT BREAST 1.2 CM INFERIOR NODULE    - Seen on CT scan     - She does not have PCP or GYN doctor, she has never had mamogram, she needs PCP, GYN, mammogram and US of breast on discharge.         On ___ this case was reviewed with  ____, the patient is medically stable and optimized for discharge. All medications were reviewed and prescriptions were sent to mutually agreed upon pharmacy. 45 y/o female, with a PmHx of Hypertension, HLD, and Pre-DM, presented w/ shortness of breath, ETIENNE and non-productive cough for the ~ 3 days.        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    - s/p Plaquenil, IL6 trial, steroids, actemra, plasma trial 4/29, s/p tocilizumab, Ceftriaxone 3/28-4/1, Azithromycin 3/28-4/1    - house pulmonary following    - 98% on 5LNC, titrated down to 3L NC     - patient was initially desaturating on exertion even with oxygen on    - on 6/8, patient was saturating 94% on exertion.     - home oxygen set up by         PE    - 4/9 B/L LE US - neg for DVT    - 4/12 CTA Chest - PE within the superior segment left lower pulmonary artery    - Full AC w/ LVX--> transitioned to Xarelto     - xarelto requiring prior authorization                         Leukocytosis    - improving--5/2 House ID Consulted - watch off abx    - 5/4 CT A/P- +GGO, fibroid uterus. Mild bladder wall thickening    - UA was negative, BCx 4/30 - NGTD    - Procalcitonin low - reassuring pointing away from a bacterial infection    - ID on board - monitor off antibiotics         Anemia     - Hgb-10.1 slowly trended down to 7s (stabilized) (7.9)    - started on Ferrous sulfate and Folic acid    - Iron studies normal    - 5/20 s/p 1U PRBC, pt on menses**        RIGHT BREAST 1.2 CM INFERIOR NODULE    - Seen on CT scan     - She does not have PCP or GYN doctor, she has never had mamogram, she needs PCP, GYN, mammogram and US of breast on discharge.         This case was reviewed with Dr. Álvarez. The patient is medically stable and optimized for discharge. All medications were reviewed and prescriptions were sent to [facility]. 45 y/o female, with a PmHx of Hypertension, HLD, and Pre-DM, presented w/ shortness of breath, ETIENNE and non-productive cough for the ~ 3 days.        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    - s/p Plaquenil, IL6 trial, steroids, actemra, plasma trial 4/29, s/p tocilizumab, Ceftriaxone 3/28-4/1, Azithromycin 3/28-4/1    - house pulmonary following    - 98% on 5LNC, titrated down to 3L NC     - patient was initially desaturating on exertion even with oxygen on    - on 6/8, patient was saturating 94% on exertion.     - home oxygen set up by [facility]        PE    - 4/9 B/L LE US - neg for DVT    - 4/12 CTA Chest - PE within the superior segment left lower pulmonary artery    - Full AC w/ LVX--> transitioned to Xarelto     - xarelto requiring prior authorization     - called [phone] and spoke with an agent and patient is approved for 1 year coverage        Leukocytosis    - improving--5/2 House ID Consulted - watch off abx    - 5/4 CT A/P- +GGO, fibroid uterus. Mild bladder wall thickening    - UA was negative, BCx 4/30 - NGTD    - Procalcitonin low - reassuring pointing away from a bacterial infection    - ID on board - monitor off antibiotics         Anemia     - Hgb-10.1 slowly trended down to 7s (stabilized) (7.9)    - started on Ferrous sulfate and Folic acid    - Iron studies normal    - 5/20 s/p 1U PRBC, pt on menses**        RIGHT BREAST 1.2 CM INFERIOR NODULE    - Seen on CT scan     - patient informed to f/u with her PCP or with [facility] medicine or [facility] gyn clinic.        This case was reviewed with Dr. Álvarez. The patient is medically stable and optimized for discharge. All medications were reviewed and prescriptions were sent to [facility].     Spoke with patient at length regarding her medications and her need to follow up with her PCP. All her questions and concerns were addressed. 45 y/o female, with a PmHx of Hypertension, HLD, and Pre-DM, presented w/ shortness of breath, ETIENNE and non-productive cough for the last 3 days.        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    - s/p Plaquenil, IL6 trial, steroids, Actemra, convalescent plasma trial 4/29, s/p tocilizumab, Ceftriaxone 3/28-4/1, Azithromycin 3/28-4/1    - house pulmonary following    - 98% on 5LNC, titrated down to 3L NC     - patient was initially desaturating on exertion even with oxygen on    - on 6/8, patient was saturating 94% on exertion.     - home oxygen set up by [facility]        PE    - 4/9 B/L LE US - neg for DVT    - 4/12 CTA Chest - PE within the superior segment left lower pulmonary artery    - Full AC w/ enoxaparin--> transitioned to Xarelto     - xarelto requiring prior authorization     - called [phone] and spoke with an agent and patient is approved for 1 year coverage        Leukocytosis    - improving--5/2 House ID Consulted - observed off abx    - 5/4 CT A/P- +GGO, fibroid uterus. Mild bladder wall thickening    - UA was negative, BCx 4/30 - NGTD    - Procalcitonin low - reassuring pointing away from a bacterial infection    - ID on board - monitor off antibiotics         Anemia     - Hgb-10.1 slowly trended down to 7s (stabilized) (7.9)    - started on Ferrous sulfate and Folic acid    - Iron studies normal    - 5/20 s/p 1U PRBC, pt with menses        RIGHT BREAST 1.2 CM INFERIOR NODULE    - Seen on CT scan     - patient informed to f/u with her PCP or with [facility] medicine or [facility] gyn clinic.        This case was reviewed with Dr. Álvarez. The patient is medically stable and optimized for discharge. All medications were reviewed and prescriptions were sent to [facility].     Spoke with patient at length regarding her medications and her need to follow up with her PCP. All her questions and concerns were addressed.

## 2020-05-06 NOTE — DISCHARGE NOTE PROVIDER - NSDCCPCAREPLAN_GEN_ALL_CORE_FT
PRINCIPAL DISCHARGE DIAGNOSIS  Diagnosis: Acute hypoxemic respiratory failure  Assessment and Plan of Treatment: You have been diagnosed with the COVID-19 virus during your hospital stay. You must self quarantine to complete a 14 day time period.  Monitor for fevers, shortness of breath and cough primarily.  Monitor your temperature daily to not any changes and increases.    It has been determined that you no longer need hospitalization and can recover while remaining in self-quarantine at home. You should follow the prevention steps below until a healthcare provider or local or state health department says you can return to your normal activities.  1. You should restrict activities outside your home, except for getting medical care.  2. Do not go to work, school, or public areas.  3. Avoid using public transportation, ride-sharing, or taxis.  4. Separate yourself from other people and animals in your home.  5. Call ahead before visiting your doctor.  6. Wear a facemask.  7. Cover your coughs and sneezes.  8. Clean your hands often.  9. Avoid sharing personal household items.  10. Clean all “high-touch” surfaces everyday.  11. Monitor your symptoms.  If you have a medical emergency and need to call 911, notify the dispatch personnel that you have COVID-19 If possible, put on a facemask before emergency medical services arrive.  12. Stopping home isolation.  Patients with confirmed COVID-19 should remain under home isolation precautions for 14 days since the positive COVID-19 test and until the risk of secondary transmission to others is thought to be low. The decision to discontinue home isolation precautions should be made on a case-by-case basis, in consultation with healthcare providers and state and local health departments. [facility] Department of Health can be reached at [phone] for further information about COVID-19.      SECONDARY DISCHARGE DIAGNOSES  Diagnosis: Acute pulmonary embolism without acute cor pulmonale, unspecified pulmonary embolism type  Assessment and Plan of Treatment: You were diagnosed with a blood clot in your lungs.  Continue blood thinner as directed and monitor for signs and symptoms of bleeding. Follow up with your PMD within 1-2 weeks. Call to schedule appointment.    Diagnosis: Thrombocytopenia  Assessment and Plan of Treatment: Resolved, Follow up with PMD for further monitoring of labwork PRINCIPAL DISCHARGE DIAGNOSIS  Diagnosis: Acute hypoxemic respiratory failure  Assessment and Plan of Treatment: You have been diagnosed with the COVID-19 virus during your hospital stay.   You are COVID negative as of 6/8/2020.   It has been determined that you no longer need hospitalization and can recover at home.  Continue home oxygen.   Please follow up with your primary care doctor within 1 week of discharge. You can also follow up [facility] medicine clinic at [phone], call to make an appointment.      SECONDARY DISCHARGE DIAGNOSES  Diagnosis: Essential hypertension  Assessment and Plan of Treatment: You blood pressure is stable.   You are prescribed amlodipine on discharge. Stop taking all your other blood pressure medications.    Diagnosis: Breast nodule  Assessment and Plan of Treatment: There is a right inferior breast measuring 1.2 cm    Diagnosis: Anemia, unspecified type  Assessment and Plan of Treatment: You received one unit of blood while you were in hospital. Continue taking your iron supplements.    Diagnosis: Acute pulmonary embolism without acute cor pulmonale, unspecified pulmonary embolism type  Assessment and Plan of Treatment: You were diagnosed with blood clot in your lungs.  Continue xarelto as ordered. Monitor for signs and symptoms of bleeding. Follow up with your primary care physician within 1-2 weeks. Call to schedule appointment. You can also follow up with the [facility] medicine clinic at [phone], call to make an appointment. PRINCIPAL DISCHARGE DIAGNOSIS  Diagnosis: Acute hypoxemic respiratory failure  Assessment and Plan of Treatment: You have been diagnosed with the COVID-19 virus during your hospital stay. You are COVID negative as of 6/8/2020.   It has been determined that you no longer need hospitalization and can recover at home.   Continue home oxygen.   Please follow up with your primary care doctor within 1 week of discharge. You can also follow up at [facility] medicine clinic at [phone], call to make an appointment.      SECONDARY DISCHARGE DIAGNOSES  Diagnosis: Essential hypertension  Assessment and Plan of Treatment: You blood pressure is stable.   You are prescribed amlodipine on discharge. Stop taking all your other blood pressure medications.    Diagnosis: Breast nodule  Assessment and Plan of Treatment: Nodule on right inferior breast measuring 1.2 cm. Please follow up with your primary care physician for further work up. You can also follow up with either [facility] medicine clinic at [phone] or gynecology clinic at [phone]. Call to make an appointment.    Diagnosis: Anemia, unspecified type  Assessment and Plan of Treatment: You received one unit of blood while you were in hospital. Continue taking your iron supplements.    Diagnosis: Acute pulmonary embolism without acute cor pulmonale, unspecified pulmonary embolism type  Assessment and Plan of Treatment: You were diagnosed with blood clot in your lungs.  Continue xarelto as ordered. Monitor for signs and symptoms of bleeding. Follow up with your primary care physician within 1-2 weeks. Call to schedule appointment. You can also follow up with the [facility] medicine clinic at [phone], call to make an appointment.

## 2020-05-06 NOTE — PROGRESS NOTE ADULT - SUBJECTIVE AND OBJECTIVE BOX
CC: Patient is a 44y old  Female who presents with a chief complaint of Acute Hypoxemic Respiratory Failure and Pneumonia (06 May 2020 13:10)    ID following for leukocytosis    Interval History/ROS: Patient remains on NRB. No fevers. Remains with cough. No abd pain. No diarrhea.    Rest of ROS negative.    Allergies  No Known Drug Allergies  Numbness (Other)    ANTIMICROBIALS:      OTHER MEDS:  acetaminophen   Tablet .. 650 milliGRAM(s) Oral every 6 hours PRN  ALBUTerol    90 MICROgram(s) HFA Inhaler 2 Puff(s) Inhalation every 6 hours PRN  amLODIPine   Tablet 5 milliGRAM(s) Oral daily  ascorbic acid 500 milliGRAM(s) Oral two times a day  benzonatate 100 milliGRAM(s) Oral three times a day  ferrous    sulfate 325 milliGRAM(s) Oral three times a day  folic acid 1 milliGRAM(s) Oral daily  guaiFENesin   Syrup  (Sugar-Free) 200 milliGRAM(s) Oral every 6 hours  influenza   Vaccine 0.5 milliLiter(s) IntraMuscular once  multivitamin 1 Tablet(s) Oral daily  rivaroxaban 15 milliGRAM(s) Oral two times a day with meals  simvastatin 5 milliGRAM(s) Oral at bedtime  zinc sulfate 220 milliGRAM(s) Oral daily    PE:    Vital Signs Last 24 Hrs  T(C): 36.7 (05 May 2020 21:30), Max: 36.7 (05 May 2020 21:30)  T(F): 98 (05 May 2020 21:30), Max: 98 (05 May 2020 21:30)  HR: 99 (05 May 2020 21:30) (99 - 99)  BP: 132/89 (05 May 2020 21:30) (132/89 - 132/89)  BP(mean): --  RR: 19 (05 May 2020 21:30) (19 - 19)  SpO2: 100% (05 May 2020 21:30) (100% - 100%)    Gen: AOx3, NAD  CV: S1+S2 normal, no murmurs  Resp: Bibasilar crackles  Abd: Soft, nontender, +BS  Ext: No LE edema, no wounds  : No Tamayo  IV/Skin: No thrombophlebitis  Neuro: no focal deficits    LABS:                          7.8    19.16 )-----------( 477      ( 05 May 2020 06:50 )             26.3       05-05    138  |  96<L>  |  6<L>  ----------------------------<  114<H>  3.7   |  29  |  0.49<L>    Ca    9.6      05 May 2020 06:50  Phos  4.5     05-05  Mg     1.6     05-05    TPro  8.0  /  Alb  3.5  /  TBili  0.2  /  DBili  x   /  AST  12  /  ALT  11  /  AlkPhos  74  05-05    MICROBIOLOGY:  v  .Blood Blood-Venous  04-30-20   No Growth Final  --  --      .Blood Blood-Peripheral  04-30-20   No Growth Final  --  --    RADIOLOGY:    < from: CT Abdomen and Pelvis w/ IV Cont (05.04.20 @ 12:28) >  IMPRESSION:     Pattern of GGO at lung bases suggests infection including atypical pneumonia/viral infection from atypical agents including COVID-19 (C19V-1).    Fibroid uterus.    Soft tissue nodule in the right inferior breast measuring 1.2 cm, indeterminate. Correlate with dedicated breast imaging as clinically indicated.    < end of copied text >

## 2020-05-06 NOTE — PROGRESS NOTE ADULT - SUBJECTIVE AND OBJECTIVE BOX
SUBJECTIVE / OVERNIGHT EVENTS:  No overnight events. In AM patient reports ongoing non-productive cough. No other significant complaints at present. She remains hypoxic and requires ongoing use of non-rebreather face mask.    MEDICATIONS  (STANDING):  amLODIPine   Tablet 5 milliGRAM(s) Oral daily  ascorbic acid 500 milliGRAM(s) Oral two times a day  benzonatate 100 milliGRAM(s) Oral three times a day  ferrous    sulfate 325 milliGRAM(s) Oral three times a day  folic acid 1 milliGRAM(s) Oral daily  guaiFENesin   Syrup  (Sugar-Free) 200 milliGRAM(s) Oral every 6 hours  influenza   Vaccine 0.5 milliLiter(s) IntraMuscular once  multivitamin 1 Tablet(s) Oral daily  rivaroxaban 15 milliGRAM(s) Oral two times a day with meals  simvastatin 5 milliGRAM(s) Oral at bedtime  zinc sulfate 220 milliGRAM(s) Oral daily    MEDICATIONS  (PRN):  acetaminophen   Tablet .. 650 milliGRAM(s) Oral every 6 hours PRN Temp greater or equal to 38C (100.4F), Mild Pain (1 - 3)  ALBUTerol    90 MICROgram(s) HFA Inhaler 2 Puff(s) Inhalation every 6 hours PRN Shortness of Breath and/or Wheezing    CAPILLARY BLOOD GLUCOSE  I&O's Summary    05 May 2020 07:01  -  06 May 2020 07:00  --------------------------------------------------------  IN: 480 mL / OUT: 0 mL / NET: 480 mL    T(C): 36.7 (05-05-20 @ 21:30), Max: 36.7 (05-05-20 @ 21:30)  HR: 99 (05-05-20 @ 21:30) (99 - 99)  BP: 132/89 (05-05-20 @ 21:30) (132/89 - 132/89)  RR: 19 (05-05-20 @ 21:30) (19 - 19)  SpO2: 100% (05-05-20 @ 21:30) (100% - 100%)  100% non-rebreather face mask in place    PHYSICAL EXAM:  GENERAL: NAD, well-developed  HEAD:  Atraumatic, Normocephalic  EYES: EOMI, conjunctiva and sclera clear  NECK: Supple, No JVD  ENT: 100% non-rebreather face mask in place  CHEST/LUNG: Labored respiration  HEART: Regular rate and rhythm  ABDOMEN: Soft, Nontender, Nondistended  EXTREMITIES: Non-edematous b/l LEs  PSYCH: AAOx3  NEUROLOGY: non-focal  SKIN: No rashes or lesions    LABS:                        7.8    19.16 )-----------( 477      ( 05 May 2020 06:50 )             26.3     05-05    138  |  96<L>  |  6<L>  ----------------------------<  114<H>  3.7   |  29  |  0.49<L>    Ca    9.6      05 May 2020 06:50  Phos  4.5     05-05  Mg     1.6     05-05    TPro  8.0  /  Alb  3.5  /  TBili  0.2  /  DBili  x   /  AST  12  /  ALT  11  /  AlkPhos  74  05-05

## 2020-05-06 NOTE — DISCHARGE NOTE PROVIDER - PROVIDER TOKENS
PROVIDER:[TOKEN:[[id number]],FOLLOWUP:[2 weeks],ESTABLISHEDPATIENT:[T]] PROVIDER:[TOKEN:[[id number]],FOLLOWUP:[2 weeks],ESTABLISHEDPATIENT:[T]],FREE:[LAST:[[facility] medicine clinic],PHONE:[phone]],FAX:[(   )    -]],FREE:[LAST:[[facility] gynecology clinic],PHONE:[phone]],FAX:[(   )    -]]

## 2020-05-06 NOTE — PROGRESS NOTE ADULT - PROBLEM SELECTOR PLAN 1
- No evidence of improvements despite use of azithromycin, hydroxychloroquine, actemra, solumedrol, and plasma convalescent study.  - Continue with supplemental O2 via Non-rebreather  - Weaning off non-rebreather as tolerated -- will try light exertion at current supplemental O2 amount before trying to wean O2 level again  - c/w incentive spirometry as tolerated  - Prone position has not been needed   - c/w robitussin q6 standing, tessalon perles to help with cough  - will obtain pulmonary evaluation in view of ongoing hypoxia  - Obtain AM labs (CBC, CMP, Mag, Phos, CRP, Procalcitonin, and Ferritin)

## 2020-05-07 LAB
ALBUMIN SERPL ELPH-MCNC: 3.4 G/DL — SIGNIFICANT CHANGE UP (ref 3.3–5)
ALP SERPL-CCNC: 75 U/L — SIGNIFICANT CHANGE UP (ref 40–120)
ALT FLD-CCNC: 9 U/L — SIGNIFICANT CHANGE UP (ref 4–33)
ANION GAP SERPL CALC-SCNC: 17 MMO/L — HIGH (ref 7–14)
AST SERPL-CCNC: 13 U/L — SIGNIFICANT CHANGE UP (ref 4–32)
BASOPHILS # BLD AUTO: 0.05 K/UL — SIGNIFICANT CHANGE UP (ref 0–0.2)
BASOPHILS NFR BLD AUTO: 0.3 % — SIGNIFICANT CHANGE UP (ref 0–2)
BILIRUB SERPL-MCNC: < 0.2 MG/DL — LOW (ref 0.2–1.2)
BUN SERPL-MCNC: 5 MG/DL — LOW (ref 7–23)
CALCIUM SERPL-MCNC: 9.5 MG/DL — SIGNIFICANT CHANGE UP (ref 8.4–10.5)
CHLORIDE SERPL-SCNC: 104 MMOL/L — SIGNIFICANT CHANGE UP (ref 98–107)
CO2 SERPL-SCNC: 29 MMOL/L — SIGNIFICANT CHANGE UP (ref 22–31)
CREAT SERPL-MCNC: 0.45 MG/DL — LOW (ref 0.5–1.3)
EOSINOPHIL # BLD AUTO: 0.76 K/UL — HIGH (ref 0–0.5)
EOSINOPHIL NFR BLD AUTO: 4.4 % — SIGNIFICANT CHANGE UP (ref 0–6)
GLUCOSE SERPL-MCNC: 118 MG/DL — HIGH (ref 70–99)
HCT VFR BLD CALC: 26.8 % — LOW (ref 34.5–45)
HGB BLD-MCNC: 7.9 G/DL — LOW (ref 11.5–15.5)
IMM GRANULOCYTES NFR BLD AUTO: 1.7 % — HIGH (ref 0–1.5)
LYMPHOCYTES # BLD AUTO: 12.9 % — LOW (ref 13–44)
LYMPHOCYTES # BLD AUTO: 2.24 K/UL — SIGNIFICANT CHANGE UP (ref 1–3.3)
MAGNESIUM SERPL-MCNC: 1.8 MG/DL — SIGNIFICANT CHANGE UP (ref 1.6–2.6)
MCHC RBC-ENTMCNC: 23.5 PG — LOW (ref 27–34)
MCHC RBC-ENTMCNC: 29.5 % — LOW (ref 32–36)
MCV RBC AUTO: 79.8 FL — LOW (ref 80–100)
MONOCYTES # BLD AUTO: 1.17 K/UL — HIGH (ref 0–0.9)
MONOCYTES NFR BLD AUTO: 6.8 % — SIGNIFICANT CHANGE UP (ref 2–14)
NEUTROPHILS # BLD AUTO: 12.8 K/UL — HIGH (ref 1.8–7.4)
NEUTROPHILS NFR BLD AUTO: 73.9 % — SIGNIFICANT CHANGE UP (ref 43–77)
NRBC # FLD: 0 K/UL — SIGNIFICANT CHANGE UP (ref 0–0)
PHOSPHATE SERPL-MCNC: 4.2 MG/DL — SIGNIFICANT CHANGE UP (ref 2.5–4.5)
PLATELET # BLD AUTO: 515 K/UL — HIGH (ref 150–400)
PMV BLD: 10.5 FL — SIGNIFICANT CHANGE UP (ref 7–13)
POTASSIUM SERPL-MCNC: 3.9 MMOL/L — SIGNIFICANT CHANGE UP (ref 3.5–5.3)
POTASSIUM SERPL-SCNC: 3.9 MMOL/L — SIGNIFICANT CHANGE UP (ref 3.5–5.3)
PROT SERPL-MCNC: 8.3 G/DL — SIGNIFICANT CHANGE UP (ref 6–8.3)
RBC # BLD: 3.36 M/UL — LOW (ref 3.8–5.2)
RBC # FLD: 26 % — HIGH (ref 10.3–14.5)
SODIUM SERPL-SCNC: 150 MMOL/L — HIGH (ref 135–145)
WBC # BLD: 17.32 K/UL — HIGH (ref 3.8–10.5)
WBC # FLD AUTO: 17.32 K/UL — HIGH (ref 3.8–10.5)

## 2020-05-07 PROCEDURE — 99232 SBSQ HOSP IP/OBS MODERATE 35: CPT

## 2020-05-07 PROCEDURE — 99233 SBSQ HOSP IP/OBS HIGH 50: CPT | Mod: GC

## 2020-05-07 RX ADMIN — RIVAROXABAN 15 MILLIGRAM(S): KIT at 16:30

## 2020-05-07 RX ADMIN — Medication 500 MILLIGRAM(S): at 04:04

## 2020-05-07 RX ADMIN — RIVAROXABAN 15 MILLIGRAM(S): KIT at 10:11

## 2020-05-07 RX ADMIN — Medication 1 TABLET(S): at 10:11

## 2020-05-07 RX ADMIN — Medication 200 MILLIGRAM(S): at 16:30

## 2020-05-07 RX ADMIN — ZINC SULFATE TAB 220 MG (50 MG ZINC EQUIVALENT) 220 MILLIGRAM(S): 220 (50 ZN) TAB at 10:11

## 2020-05-07 RX ADMIN — Medication 500 MILLIGRAM(S): at 16:30

## 2020-05-07 RX ADMIN — AMLODIPINE BESYLATE 5 MILLIGRAM(S): 2.5 TABLET ORAL at 04:04

## 2020-05-07 RX ADMIN — Medication 325 MILLIGRAM(S): at 04:04

## 2020-05-07 RX ADMIN — Medication 325 MILLIGRAM(S): at 23:21

## 2020-05-07 RX ADMIN — Medication 325 MILLIGRAM(S): at 12:27

## 2020-05-07 RX ADMIN — Medication 100 MILLIGRAM(S): at 23:21

## 2020-05-07 RX ADMIN — Medication 200 MILLIGRAM(S): at 10:11

## 2020-05-07 RX ADMIN — SIMVASTATIN 5 MILLIGRAM(S): 20 TABLET, FILM COATED ORAL at 23:21

## 2020-05-07 RX ADMIN — Medication 100 MILLIGRAM(S): at 12:27

## 2020-05-07 RX ADMIN — Medication 1 MILLIGRAM(S): at 10:11

## 2020-05-07 RX ADMIN — Medication 100 MILLIGRAM(S): at 04:04

## 2020-05-07 NOTE — PROGRESS NOTE ADULT - SUBJECTIVE AND OBJECTIVE BOX
CC: Patient is a 44y old  Female who presents with a chief complaint of Acute Hypoxemic Respiratory Failure and Pneumonia (06 May 2020 14:47)    ID following for COVID 19 infection    Interval History/ROS: Patient remains on NRB, remains with cough. Saturating 98% on NRB. No fevers.    Rest of ROS negative.    Allergies  No Known Drug Allergies  Numbness (Other)    ANTIMICROBIALS:      OTHER MEDS:  acetaminophen   Tablet .. 650 milliGRAM(s) Oral every 6 hours PRN  ALBUTerol    90 MICROgram(s) HFA Inhaler 2 Puff(s) Inhalation every 6 hours PRN  amLODIPine   Tablet 5 milliGRAM(s) Oral daily  ascorbic acid 500 milliGRAM(s) Oral two times a day  benzonatate 100 milliGRAM(s) Oral three times a day  ferrous    sulfate 325 milliGRAM(s) Oral three times a day  folic acid 1 milliGRAM(s) Oral daily  guaiFENesin   Syrup  (Sugar-Free) 200 milliGRAM(s) Oral every 6 hours  influenza   Vaccine 0.5 milliLiter(s) IntraMuscular once  multivitamin 1 Tablet(s) Oral daily  rivaroxaban 15 milliGRAM(s) Oral two times a day with meals  simvastatin 5 milliGRAM(s) Oral at bedtime  zinc sulfate 220 milliGRAM(s) Oral daily      PE:    Vital Signs Last 24 Hrs  T(C): 36.9 (07 May 2020 04:31), Max: 36.9 (06 May 2020 21:40)  T(F): 98.4 (07 May 2020 04:31), Max: 98.5 (06 May 2020 21:40)  HR: 95 (07 May 2020 04:31) (95 - 97)  BP: 115/75 (07 May 2020 04:31) (110/71 - 115/75)  BP(mean): --  RR: 16 (07 May 2020 04:31) (16 - 18)  SpO2: 98% (07 May 2020 04:31) (98% - 99%)    Gen: AOx3, on NRB  CV: S1+S2 normal, no murmurs  Resp: Clear bilat, no resp distress  Abd: Soft, nontender, +BS  Ext: No LE edema, no wounds  : No Tamayo  IV/Skin: No thrombophlebitis  Neuro: no focal deficits    LABS:                          7.9    17.32 )-----------( 515      ( 07 May 2020 07:20 )             26.8       05-07    150<H>  |  104  |  5<L>  ----------------------------<  118<H>  3.9   |  29  |  0.45<L>    Ca    9.5      07 May 2020 07:20  Phos  4.2     05-07  Mg     1.8     05-07    TPro  8.3  /  Alb  3.4  /  TBili  < 0.2<L>  /  DBili  x   /  AST  13  /  ALT  9   /  AlkPhos  75  05-07    MICROBIOLOGY:  v  .Blood Blood-Venous  04-30-20   No Growth Final  --  --      .Blood Blood-Peripheral  04-30-20   No Growth Final  --  --    RADIOLOGY:    < from: CT Abdomen and Pelvis w/ IV Cont (05.04.20 @ 12:28) >    IMPRESSION:     Pattern of GGO at lung bases suggests infection including atypical pneumonia/viral infection from atypical agents including COVID-19 (C19V-1).    Fibroid uterus.    Soft tissue nodule in the right inferior breast measuring 1.2 cm, indeterminate. Correlate with dedicated breast imaging as clinically indicated.        < end of copied text >

## 2020-05-07 NOTE — PROGRESS NOTE ADULT - PROBLEM SELECTOR PLAN 1
Despite multiple therapies continues to need high levels of 02 for adequate oxygenation, have attempted multiple trials of weaning and failed, will try again to decrease to 13 L , await Pulm eval for possible additional recommendations.

## 2020-05-07 NOTE — PROGRESS NOTE ADULT - SUBJECTIVE AND OBJECTIVE BOX
Patient is a 44y old  Female who presents with a chief complaint of Acute Hypoxemic Respiratory Failure and Pneumonia (07 May 2020 11:24)  with persistent SOB and respiratory failure, persistent requirements for 15L NRB     SUBJECTIVE / OVERNIGHT EVENTS: No acute events overnight. Continues to be SOB, persistent coughing     MEDICATIONS  (STANDING):  amLODIPine   Tablet 5 milliGRAM(s) Oral daily  ascorbic acid 500 milliGRAM(s) Oral two times a day  benzonatate 100 milliGRAM(s) Oral three times a day  ferrous    sulfate 325 milliGRAM(s) Oral three times a day  folic acid 1 milliGRAM(s) Oral daily  guaiFENesin   Syrup  (Sugar-Free) 200 milliGRAM(s) Oral every 6 hours  influenza   Vaccine 0.5 milliLiter(s) IntraMuscular once  multivitamin 1 Tablet(s) Oral daily  rivaroxaban 15 milliGRAM(s) Oral two times a day with meals  simvastatin 5 milliGRAM(s) Oral at bedtime  zinc sulfate 220 milliGRAM(s) Oral daily    MEDICATIONS  (PRN):  acetaminophen   Tablet .. 650 milliGRAM(s) Oral every 6 hours PRN Temp greater or equal to 38C (100.4F), Mild Pain (1 - 3)  ALBUTerol    90 MICROgram(s) HFA Inhaler 2 Puff(s) Inhalation every 6 hours PRN Shortness of Breath and/or Wheezing      T(C): 36.9 (05-07-20 @ 04:31), Max: 36.9 (05-06-20 @ 21:40)  HR: 103 (05-07-20 @ 11:36) (95 - 103)  BP: 108/71 (05-07-20 @ 11:36) (108/71 - 115/75)  RR: 17 (05-07-20 @ 11:36) (16 - 18)  SpO2: 100% (05-07-20 @ 11:36) (98% - 100%)15L NRB   CAPILLARY BLOOD GLUCOSE        I&O's Summary    06 May 2020 07:01  -  07 May 2020 07:00  --------------------------------------------------------  IN: 1000 mL / OUT: 800 mL / NET: 200 mL        PHYSICAL EXAM:  GENERAL: Mild  distress, coughing  on15L   EYES: open, sclera clear b/l  CHEST/LUNG: normal respiratory effort, not tachypneic, speaking in full sentences without difficulty  HEART: Not tachycardic, no lower extremity edema b/l  ABDOMEN: Soft, Nontender, Nondistended  EXTREMITIES: Warm and well perfused  NEUROLOGY: awake, alert, responds to Qs appropriately, no gross deficits  PSYCH: calm, cooperative  SKIN: No visible rashes or lesions    LABS:                        7.9    17.32 )-----------( 515      ( 07 May 2020 07:20 )             26.8     05-07    150<H>  |  104  |  5<L>  ----------------------------<  118<H>  3.9   |  29  |  0.45<L>    Ca    9.5      07 May 2020 07:20  Phos  4.2     05-07  Mg     1.8     05-07    TPro  8.3  /  Alb  3.4  /  TBili  < 0.2<L>  /  DBili  x   /  AST  13  /  ALT  9   /  AlkPhos  75  05-07              RADIOLOGY & ADDITIONAL TESTS:non new images

## 2020-05-07 NOTE — PROGRESS NOTE ADULT - ASSESSMENT
44 year old  with Covid pna, with multiple therapies, last with plasma and no significant improvement
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
## 2020-05-07 NOTE — PROGRESS NOTE ADULT - SUBJECTIVE AND OBJECTIVE BOX
Interval Events:  Pulmonary asked to re-evaluate patient for persistent hypoxemia  Overall she is feeling better  Feels as if she has a mild cold  Frustrated she is still requiring high degree of O2    REVIEW OF SYSTEMS:  [ ] All other systems negative  [ ] Unable to assess ROS because ________    OBJECTIVE:  ICU Vital Signs Last 24 Hrs  T(C): 36.9 (07 May 2020 04:31), Max: 36.9 (06 May 2020 21:40)  T(F): 98.4 (07 May 2020 04:31), Max: 98.5 (06 May 2020 21:40)  HR: 103 (07 May 2020 11:36) (95 - 103)  BP: 108/71 (07 May 2020 11:36) (108/71 - 115/75)  BP(mean): --  ABP: --  ABP(mean): --  RR: 17 (07 May 2020 11:36) (16 - 17)  SpO2: 100% (07 May 2020 11:36) (98% - 100%)        05-06 @ 07:01  -  05-07 @ 07:00  --------------------------------------------------------  IN: 1000 mL / OUT: 800 mL / NET: 200 mL      CAPILLARY BLOOD GLUCOSE          PHYSICAL EXAM:  General: NAD  HEENT: NCAT  Respiratory: Decreased breath sounds  Cardiovascular: RRR  Abdomen: soft, non-tender  Extremities: No LE  Skin: Warm  Neurological: AAOx3, answering questions appropriately, no focal deficits    HOSPITAL MEDICATIONS:  rivaroxaban 15 milliGRAM(s) Oral two times a day with meals      amLODIPine   Tablet 5 milliGRAM(s) Oral daily    simvastatin 5 milliGRAM(s) Oral at bedtime    ALBUTerol    90 MICROgram(s) HFA Inhaler 2 Puff(s) Inhalation every 6 hours PRN  benzonatate 100 milliGRAM(s) Oral three times a day  guaiFENesin   Syrup  (Sugar-Free) 200 milliGRAM(s) Oral every 6 hours    acetaminophen   Tablet .. 650 milliGRAM(s) Oral every 6 hours PRN          ascorbic acid 500 milliGRAM(s) Oral two times a day  ferrous    sulfate 325 milliGRAM(s) Oral three times a day  folic acid 1 milliGRAM(s) Oral daily  multivitamin 1 Tablet(s) Oral daily  zinc sulfate 220 milliGRAM(s) Oral daily    influenza   Vaccine 0.5 milliLiter(s) IntraMuscular once          LABS:                        7.9    17.32 )-----------( 515      ( 07 May 2020 07:20 )             26.8     Hgb Trend: 7.9<--, 7.8<--, 7.9<--, 8.0<--, 7.8<--  05-07    150<H>  |  104  |  5<L>  ----------------------------<  118<H>  3.9   |  29  |  0.45<L>    Ca    9.5      07 May 2020 07:20  Phos  4.2     05-07  Mg     1.8     05-07    TPro  8.3  /  Alb  3.4  /  TBili  < 0.2<L>  /  DBili  x   /  AST  13  /  ALT  9   /  AlkPhos  75  05-07    Creatinine Trend: 0.45<--, 0.49<--, 0.47<--, 0.53<--, 0.57<--, 0.54<--      MICROBIOLOGY:     RADIOLOGY:  [ ] Reviewed and interpreted by me  < from: CT Angio Chest w/ IV Cont (04.12.20 @ 11:22) >    Pulmonary embolism within the superior segment left lower lobe pulmonary artery.    Pattern of GGO suggests infection including atypical pneumonia/viral infection from atypical agents including COVID-19 (C19V-1).    These findings were discussed by Dr. Mcnair with [facility] Womack at 4/12/2020 11:19 AM with read back confirmation.        < end of copied text >      ASSESSMENT AND RECOMMENDATION:    44 F with HTN, HLD, and obesity here with hypoxemic respiratory failure due to COVID pneumonia. Course c/b PE, on anticoagulation. She has received tocilizumab, azithromycin, and plaquenil.    She is persistently hypoxemic when attempts made to de-escalate from NRB. On NRB she is saturating in high 90s. She appears comfortable and is able to speak full sentences. Except that it will take a prolonged time for oxygenation to improve. Her disease is extensive. She also has PE and she is obese. Her inflammatory markers are downtrending and she is feeling better overall.    Patient can follow up with us as outpatient. When patient is nearing discharge please contact [email or web address] to schedule an appointment for follow up.    Pulmonary to sign off at this time  Please call or re-consult with questions    Rob Obregon MD  Pulmonary and Critical Care Fellow  [phone] Interval Events:  Pulmonary asked to re-evaluate patient for persistent hypoxemia  Overall she is feeling better  Feels as if she has a mild cold  Frustrated she is still requiring high degree of O2    REVIEW OF SYSTEMS:  [ ] All other systems negative  [ ] Unable to assess ROS because ________    OBJECTIVE:  ICU Vital Signs Last 24 Hrs  T(C): 36.9 (07 May 2020 04:31), Max: 36.9 (06 May 2020 21:40)  T(F): 98.4 (07 May 2020 04:31), Max: 98.5 (06 May 2020 21:40)  HR: 103 (07 May 2020 11:36) (95 - 103)  BP: 108/71 (07 May 2020 11:36) (108/71 - 115/75)  BP(mean): --  ABP: --  ABP(mean): --  RR: 17 (07 May 2020 11:36) (16 - 17)  SpO2: 100% (07 May 2020 11:36) (98% - 100%)        05-06 @ 07:01  -  05-07 @ 07:00  --------------------------------------------------------  IN: 1000 mL / OUT: 800 mL / NET: 200 mL      CAPILLARY BLOOD GLUCOSE          PHYSICAL EXAM:  General: NAD  HEENT: NCAT  Respiratory: Decreased breath sounds  Cardiovascular: RRR  Abdomen: soft, non-tender  Extremities: No LE  Skin: Warm  Neurological: AAOx3, answering questions appropriately, no focal deficits    HOSPITAL MEDICATIONS:  rivaroxaban 15 milliGRAM(s) Oral two times a day with meals      amLODIPine   Tablet 5 milliGRAM(s) Oral daily    simvastatin 5 milliGRAM(s) Oral at bedtime    ALBUTerol    90 MICROgram(s) HFA Inhaler 2 Puff(s) Inhalation every 6 hours PRN  benzonatate 100 milliGRAM(s) Oral three times a day  guaiFENesin   Syrup  (Sugar-Free) 200 milliGRAM(s) Oral every 6 hours    acetaminophen   Tablet .. 650 milliGRAM(s) Oral every 6 hours PRN          ascorbic acid 500 milliGRAM(s) Oral two times a day  ferrous    sulfate 325 milliGRAM(s) Oral three times a day  folic acid 1 milliGRAM(s) Oral daily  multivitamin 1 Tablet(s) Oral daily  zinc sulfate 220 milliGRAM(s) Oral daily    influenza   Vaccine 0.5 milliLiter(s) IntraMuscular once          LABS:                        7.9    17.32 )-----------( 515      ( 07 May 2020 07:20 )             26.8     Hgb Trend: 7.9<--, 7.8<--, 7.9<--, 8.0<--, 7.8<--  05-07    150<H>  |  104  |  5<L>  ----------------------------<  118<H>  3.9   |  29  |  0.45<L>    Ca    9.5      07 May 2020 07:20  Phos  4.2     05-07  Mg     1.8     05-07    TPro  8.3  /  Alb  3.4  /  TBili  < 0.2<L>  /  DBili  x   /  AST  13  /  ALT  9   /  AlkPhos  75  05-07    Creatinine Trend: 0.45<--, 0.49<--, 0.47<--, 0.53<--, 0.57<--, 0.54<--      MICROBIOLOGY:     RADIOLOGY:  [ ] Reviewed and interpreted by me  < from: CT Angio Chest w/ IV Cont (04.12.20 @ 11:22) >    Pulmonary embolism within the superior segment left lower lobe pulmonary artery.    Pattern of GGO suggests infection including atypical pneumonia/viral infection from atypical agents including COVID-19 (C19V-1).    These findings were discussed by Dr. Mcnair with [facility] Womack at 4/12/2020 11:19 AM with read back confirmation.        < end of copied text >      ASSESSMENT AND RECOMMENDATION:    44 F with HTN, HLD, and obesity here with hypoxemic respiratory failure due to COVID pneumonia. Course c/b PE, on anticoagulation. She has received tocilizumab, azithromycin, and plaquenil.    She is persistently hypoxemic when attempts made to de-escalate from NRB. On NRB she is saturating in high 90s. She appears comfortable and is able to speak full sentences. Except that it will take a prolonged time for oxygenation to improve. Her disease is extensive. She also has PE and she is obese. Her inflammatory markers are downtrending and she is feeling better overall. Of note, she has an eosiniphilia which is downtrending, and was not present on admission.     Patient can follow up with us as outpatient. When patient is nearing discharge please contact [email or web address] to schedule an appointment for follow up.    Pulmonary to sign off at this time  Please call or re-consult with questions    Rob Obregon MD  Pulmonary and Critical Care Fellow  [phone]

## 2020-05-08 LAB
ALBUMIN SERPL ELPH-MCNC: 3.3 G/DL — SIGNIFICANT CHANGE UP (ref 3.3–5)
ALP SERPL-CCNC: 76 U/L — SIGNIFICANT CHANGE UP (ref 40–120)
ALT FLD-CCNC: 10 U/L — SIGNIFICANT CHANGE UP (ref 4–33)
ANION GAP SERPL CALC-SCNC: 13 MMO/L — SIGNIFICANT CHANGE UP (ref 7–14)
AST SERPL-CCNC: 12 U/L — SIGNIFICANT CHANGE UP (ref 4–32)
BASOPHILS # BLD AUTO: 0.03 K/UL — SIGNIFICANT CHANGE UP (ref 0–0.2)
BASOPHILS NFR BLD AUTO: 0.2 % — SIGNIFICANT CHANGE UP (ref 0–2)
BILIRUB SERPL-MCNC: < 0.2 MG/DL — LOW (ref 0.2–1.2)
BUN SERPL-MCNC: 7 MG/DL — SIGNIFICANT CHANGE UP (ref 7–23)
CALCIUM SERPL-MCNC: 9.4 MG/DL — SIGNIFICANT CHANGE UP (ref 8.4–10.5)
CHLORIDE SERPL-SCNC: 97 MMOL/L — LOW (ref 98–107)
CO2 SERPL-SCNC: 26 MMOL/L — SIGNIFICANT CHANGE UP (ref 22–31)
CREAT SERPL-MCNC: 0.46 MG/DL — LOW (ref 0.5–1.3)
EOSINOPHIL # BLD AUTO: 0.8 K/UL — HIGH (ref 0–0.5)
EOSINOPHIL NFR BLD AUTO: 4.5 % — SIGNIFICANT CHANGE UP (ref 0–6)
GLUCOSE SERPL-MCNC: 154 MG/DL — HIGH (ref 70–99)
HCT VFR BLD CALC: 26.9 % — LOW (ref 34.5–45)
HGB BLD-MCNC: 7.9 G/DL — LOW (ref 11.5–15.5)
IMM GRANULOCYTES NFR BLD AUTO: 1.9 % — HIGH (ref 0–1.5)
LYMPHOCYTES # BLD AUTO: 13.7 % — SIGNIFICANT CHANGE UP (ref 13–44)
LYMPHOCYTES # BLD AUTO: 2.45 K/UL — SIGNIFICANT CHANGE UP (ref 1–3.3)
MAGNESIUM SERPL-MCNC: 1.7 MG/DL — SIGNIFICANT CHANGE UP (ref 1.6–2.6)
MCHC RBC-ENTMCNC: 23.8 PG — LOW (ref 27–34)
MCHC RBC-ENTMCNC: 29.4 % — LOW (ref 32–36)
MCV RBC AUTO: 81 FL — SIGNIFICANT CHANGE UP (ref 80–100)
MONOCYTES # BLD AUTO: 1.05 K/UL — HIGH (ref 0–0.9)
MONOCYTES NFR BLD AUTO: 5.9 % — SIGNIFICANT CHANGE UP (ref 2–14)
NEUTROPHILS # BLD AUTO: 13.26 K/UL — HIGH (ref 1.8–7.4)
NEUTROPHILS NFR BLD AUTO: 73.8 % — SIGNIFICANT CHANGE UP (ref 43–77)
NRBC # FLD: 0 K/UL — SIGNIFICANT CHANGE UP (ref 0–0)
PHOSPHATE SERPL-MCNC: 3.8 MG/DL — SIGNIFICANT CHANGE UP (ref 2.5–4.5)
PLATELET # BLD AUTO: 579 K/UL — HIGH (ref 150–400)
PMV BLD: 10.8 FL — SIGNIFICANT CHANGE UP (ref 7–13)
POTASSIUM SERPL-MCNC: 3.5 MMOL/L — SIGNIFICANT CHANGE UP (ref 3.5–5.3)
POTASSIUM SERPL-SCNC: 3.5 MMOL/L — SIGNIFICANT CHANGE UP (ref 3.5–5.3)
PROT SERPL-MCNC: 8.4 G/DL — HIGH (ref 6–8.3)
RBC # BLD: 3.32 M/UL — LOW (ref 3.8–5.2)
RBC # FLD: 26 % — HIGH (ref 10.3–14.5)
SODIUM SERPL-SCNC: 136 MMOL/L — SIGNIFICANT CHANGE UP (ref 135–145)
WBC # BLD: 17.93 K/UL — HIGH (ref 3.8–10.5)
WBC # FLD AUTO: 17.93 K/UL — HIGH (ref 3.8–10.5)

## 2020-05-08 PROCEDURE — 99232 SBSQ HOSP IP/OBS MODERATE 35: CPT

## 2020-05-08 RX ADMIN — AMLODIPINE BESYLATE 5 MILLIGRAM(S): 2.5 TABLET ORAL at 05:43

## 2020-05-08 RX ADMIN — Medication 100 MILLIGRAM(S): at 05:43

## 2020-05-08 RX ADMIN — Medication 200 MILLIGRAM(S): at 17:06

## 2020-05-08 RX ADMIN — RIVAROXABAN 15 MILLIGRAM(S): KIT at 10:40

## 2020-05-08 RX ADMIN — Medication 100 MILLIGRAM(S): at 21:35

## 2020-05-08 RX ADMIN — SIMVASTATIN 5 MILLIGRAM(S): 20 TABLET, FILM COATED ORAL at 21:35

## 2020-05-08 RX ADMIN — Medication 500 MILLIGRAM(S): at 05:43

## 2020-05-08 RX ADMIN — Medication 1 TABLET(S): at 10:40

## 2020-05-08 RX ADMIN — Medication 500 MILLIGRAM(S): at 17:06

## 2020-05-08 RX ADMIN — Medication 200 MILLIGRAM(S): at 05:43

## 2020-05-08 RX ADMIN — ZINC SULFATE TAB 220 MG (50 MG ZINC EQUIVALENT) 220 MILLIGRAM(S): 220 (50 ZN) TAB at 10:40

## 2020-05-08 RX ADMIN — Medication 325 MILLIGRAM(S): at 21:35

## 2020-05-08 RX ADMIN — Medication 325 MILLIGRAM(S): at 05:43

## 2020-05-08 RX ADMIN — Medication 200 MILLIGRAM(S): at 00:12

## 2020-05-08 RX ADMIN — Medication 325 MILLIGRAM(S): at 17:06

## 2020-05-08 RX ADMIN — RIVAROXABAN 15 MILLIGRAM(S): KIT at 17:06

## 2020-05-08 RX ADMIN — Medication 1 MILLIGRAM(S): at 10:40

## 2020-05-08 NOTE — PROGRESS NOTE ADULT - SUBJECTIVE AND OBJECTIVE BOX
Patient is a 44y old  Female who presents with a chief complaint of Acute Hypoxemic Respiratory Failure and Pneumonia (08 May 2020 11:23)      SUBJECTIVE / OVERNIGHT EVENTS: No acute events overnight. Remains on supplemental 02 15 LNRB    MEDICATIONS  (STANDING):  amLODIPine   Tablet 5 milliGRAM(s) Oral daily  ascorbic acid 500 milliGRAM(s) Oral two times a day  benzonatate 100 milliGRAM(s) Oral three times a day  ferrous    sulfate 325 milliGRAM(s) Oral three times a day  folic acid 1 milliGRAM(s) Oral daily  guaiFENesin   Syrup  (Sugar-Free) 200 milliGRAM(s) Oral every 6 hours  influenza   Vaccine 0.5 milliLiter(s) IntraMuscular once  multivitamin 1 Tablet(s) Oral daily  rivaroxaban 15 milliGRAM(s) Oral two times a day with meals  simvastatin 5 milliGRAM(s) Oral at bedtime  zinc sulfate 220 milliGRAM(s) Oral daily    MEDICATIONS  (PRN):  acetaminophen   Tablet .. 650 milliGRAM(s) Oral every 6 hours PRN Temp greater or equal to 38C (100.4F), Mild Pain (1 - 3)  ALBUTerol    90 MICROgram(s) HFA Inhaler 2 Puff(s) Inhalation every 6 hours PRN Shortness of Breath and/or Wheezing      T(C): 36.7 (05-08-20 @ 05:34), Max: 36.7 (05-08-20 @ 05:34)  HR: 89 (05-08-20 @ 11:31) (89 - 104)  BP: 143/91 (05-08-20 @ 11:31) (118/95 - 143/91)  RR: 18 (05-08-20 @ 11:31) (15 - 18)  SpO2: 100% (05-08-20 @ 11:31) (99% - 100%)  CAPILLARY BLOOD GLUCOSE        I&O's Summary      PHYSICAL EXAM:  GENERAL: mild distress on 15L NRB  EYES: open, sclera clear b/l  CHEST/LUNG:  tachypneic, speaking in full sentences with mild  difficulty  HEART: Not tachycardic, no lower extremity edema b/l  ABDOMEN: Soft, Nontender, Nondistended  EXTREMITIES: Warm and well perfused  NEUROLOGY: awake, alert, responds to Qs appropriately, no gross deficits  PSYCH: calm, cooperative  SKIN: No visible rashes or lesions    LABS:                        7.9    17.93 )-----------( 579      ( 08 May 2020 05:45 )             26.9     05-08    136  |  97<L>  |  7   ----------------------------<  154<H>  3.5   |  26  |  0.46<L>    Ca    9.4      08 May 2020 05:45  Phos  3.8     05-08  Mg     1.7     05-08    TPro  8.4<H>  /  Alb  3.3  /  TBili  < 0.2<L>  /  DBili  x   /  AST  12  /  ALT  10  /  AlkPhos  76  05-08              RADIOLOGY & ADDITIONAL TESTS: no new images

## 2020-05-08 NOTE — PROGRESS NOTE ADULT - SUBJECTIVE AND OBJECTIVE BOX
CC: Patient is a 44y old  Female who presents with a chief complaint of Acute Hypoxemic Respiratory Failure and Pneumonia (07 May 2020 12:57)    ID following for COVID 19 infection    Interval History/ROS: Patient remains on NRB. Appears comfortable. Leukocytosis improving. Ferritin improving. CRP improving. No fevers.    Rest of ROS negative.    Allergies  No Known Drug Allergies  Numbness (Other)    ANTIMICROBIALS:      OTHER MEDS:  acetaminophen   Tablet .. 650 milliGRAM(s) Oral every 6 hours PRN  ALBUTerol    90 MICROgram(s) HFA Inhaler 2 Puff(s) Inhalation every 6 hours PRN  amLODIPine   Tablet 5 milliGRAM(s) Oral daily  ascorbic acid 500 milliGRAM(s) Oral two times a day  benzonatate 100 milliGRAM(s) Oral three times a day  ferrous    sulfate 325 milliGRAM(s) Oral three times a day  folic acid 1 milliGRAM(s) Oral daily  guaiFENesin   Syrup  (Sugar-Free) 200 milliGRAM(s) Oral every 6 hours  influenza   Vaccine 0.5 milliLiter(s) IntraMuscular once  multivitamin 1 Tablet(s) Oral daily  rivaroxaban 15 milliGRAM(s) Oral two times a day with meals  simvastatin 5 milliGRAM(s) Oral at bedtime  zinc sulfate 220 milliGRAM(s) Oral daily    PE:    Vital Signs Last 24 Hrs  T(C): 36.7 (08 May 2020 05:34), Max: 36.7 (08 May 2020 05:34)  T(F): 98.1 (08 May 2020 05:34), Max: 98.1 (08 May 2020 05:34)  HR: 98 (08 May 2020 05:34) (98 - 104)  BP: 118/95 (08 May 2020 05:34) (108/71 - 127/92)  BP(mean): --  RR: 15 (08 May 2020 05:34) (15 - 17)  SpO2: 99% (08 May 2020 05:34) (99% - 100%)    Gen: AOx3, NAD  CV: S1+S2 normal, no murmurs  Resp: Clear bilat, no resp distress  Abd: Soft, nontender, +BS  Ext: No LE edema, no wounds  : No Tamayo  IV/Skin: No thrombophlebitis  Neuro: no focal deficits    LABS:                          7.9    17.93 )-----------( 579      ( 08 May 2020 05:45 )             26.9       05-08    136  |  97<L>  |  7   ----------------------------<  154<H>  3.5   |  26  |  0.46<L>    Ca    9.4      08 May 2020 05:45  Phos  3.8     05-08  Mg     1.7     05-08    TPro  8.4<H>  /  Alb  3.3  /  TBili  < 0.2<L>  /  DBili  x   /  AST  12  /  ALT  10  /  AlkPhos  76  05-08    MICROBIOLOGY:  v  .Blood Blood-Venous  04-30-20   No Growth Final  --  --      .Blood Blood-Peripheral  04-30-20   No Growth Final  --  --    RADIOLOGY:    < from: CT Abdomen and Pelvis w/ IV Cont (05.04.20 @ 12:28) >    IMPRESSION:     Pattern of GGO at lung bases suggests infection including atypical pneumonia/viral infection from atypical agents including COVID-19 (C19V-1).    Fibroid uterus.    Soft tissue nodule in the right inferior breast measuring 1.2 cm, indeterminate. Correlate with dedicated breast imaging as clinically indicated.    < end of copied text >

## 2020-05-08 NOTE — PROGRESS NOTE ADULT - PROBLEM SELECTOR PLAN 1
treated with multiple medications including toci, Rocephin , zithro, HCQ and convalescent plasma, with prolonged recovery, needs slow taper off 02 will try decreasing to 14 L today only as more in the past has caused xs desaturations
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
## 2020-05-09 LAB
ALBUMIN SERPL ELPH-MCNC: 3 G/DL — LOW (ref 3.3–5)
ALP SERPL-CCNC: 68 U/L — SIGNIFICANT CHANGE UP (ref 40–120)
ALT FLD-CCNC: 10 U/L — SIGNIFICANT CHANGE UP (ref 4–33)
ANION GAP SERPL CALC-SCNC: 13 MMO/L — SIGNIFICANT CHANGE UP (ref 7–14)
AST SERPL-CCNC: 14 U/L — SIGNIFICANT CHANGE UP (ref 4–32)
BILIRUB SERPL-MCNC: < 0.2 MG/DL — LOW (ref 0.2–1.2)
BUN SERPL-MCNC: 6 MG/DL — LOW (ref 7–23)
CALCIUM SERPL-MCNC: 9.1 MG/DL — SIGNIFICANT CHANGE UP (ref 8.4–10.5)
CHLORIDE SERPL-SCNC: 98 MMOL/L — SIGNIFICANT CHANGE UP (ref 98–107)
CO2 SERPL-SCNC: 26 MMOL/L — SIGNIFICANT CHANGE UP (ref 22–31)
CREAT SERPL-MCNC: 0.46 MG/DL — LOW (ref 0.5–1.3)
CRP SERPL-MCNC: 45.1 MG/L — HIGH
D DIMER BLD IA.RAPID-MCNC: 206 NG/ML — SIGNIFICANT CHANGE UP
FERRITIN SERPL-MCNC: 72.85 NG/ML — SIGNIFICANT CHANGE UP (ref 15–150)
GLUCOSE SERPL-MCNC: 103 MG/DL — HIGH (ref 70–99)
HCT VFR BLD CALC: 26.4 % — LOW (ref 34.5–45)
HGB BLD-MCNC: 7.8 G/DL — LOW (ref 11.5–15.5)
LDH SERPL L TO P-CCNC: 275 U/L — HIGH (ref 135–225)
MAGNESIUM SERPL-MCNC: 1.7 MG/DL — SIGNIFICANT CHANGE UP (ref 1.6–2.6)
MCHC RBC-ENTMCNC: 24.3 PG — LOW (ref 27–34)
MCHC RBC-ENTMCNC: 29.5 % — LOW (ref 32–36)
MCV RBC AUTO: 82.2 FL — SIGNIFICANT CHANGE UP (ref 80–100)
NRBC # FLD: 0 K/UL — SIGNIFICANT CHANGE UP (ref 0–0)
PHOSPHATE SERPL-MCNC: 4.2 MG/DL — SIGNIFICANT CHANGE UP (ref 2.5–4.5)
PLATELET # BLD AUTO: 503 K/UL — HIGH (ref 150–400)
PMV BLD: 11.3 FL — SIGNIFICANT CHANGE UP (ref 7–13)
POTASSIUM SERPL-MCNC: 3.7 MMOL/L — SIGNIFICANT CHANGE UP (ref 3.5–5.3)
POTASSIUM SERPL-SCNC: 3.7 MMOL/L — SIGNIFICANT CHANGE UP (ref 3.5–5.3)
PROCALCITONIN SERPL-MCNC: 0.03 NG/ML — SIGNIFICANT CHANGE UP (ref 0.02–0.1)
PROT SERPL-MCNC: 7.8 G/DL — SIGNIFICANT CHANGE UP (ref 6–8.3)
RBC # BLD: 3.21 M/UL — LOW (ref 3.8–5.2)
RBC # FLD: 26.2 % — HIGH (ref 10.3–14.5)
SODIUM SERPL-SCNC: 137 MMOL/L — SIGNIFICANT CHANGE UP (ref 135–145)
WBC # BLD: 16.65 K/UL — HIGH (ref 3.8–10.5)
WBC # FLD AUTO: 16.65 K/UL — HIGH (ref 3.8–10.5)

## 2020-05-09 PROCEDURE — 99233 SBSQ HOSP IP/OBS HIGH 50: CPT

## 2020-05-09 RX ADMIN — Medication 200 MILLIGRAM(S): at 00:34

## 2020-05-09 RX ADMIN — Medication 200 MILLIGRAM(S): at 17:48

## 2020-05-09 RX ADMIN — Medication 100 MILLIGRAM(S): at 22:53

## 2020-05-09 RX ADMIN — Medication 200 MILLIGRAM(S): at 06:03

## 2020-05-09 RX ADMIN — Medication 325 MILLIGRAM(S): at 22:54

## 2020-05-09 RX ADMIN — RIVAROXABAN 15 MILLIGRAM(S): KIT at 10:12

## 2020-05-09 RX ADMIN — Medication 1 MILLIGRAM(S): at 13:59

## 2020-05-09 RX ADMIN — ZINC SULFATE TAB 220 MG (50 MG ZINC EQUIVALENT) 220 MILLIGRAM(S): 220 (50 ZN) TAB at 13:59

## 2020-05-09 RX ADMIN — Medication 100 MILLIGRAM(S): at 06:02

## 2020-05-09 RX ADMIN — Medication 500 MILLIGRAM(S): at 17:48

## 2020-05-09 RX ADMIN — RIVAROXABAN 15 MILLIGRAM(S): KIT at 22:54

## 2020-05-09 RX ADMIN — Medication 500 MILLIGRAM(S): at 06:02

## 2020-05-09 RX ADMIN — Medication 100 MILLIGRAM(S): at 13:59

## 2020-05-09 RX ADMIN — Medication 200 MILLIGRAM(S): at 22:54

## 2020-05-09 RX ADMIN — Medication 200 MILLIGRAM(S): at 13:59

## 2020-05-09 RX ADMIN — Medication 1 TABLET(S): at 13:59

## 2020-05-09 RX ADMIN — Medication 325 MILLIGRAM(S): at 13:59

## 2020-05-09 RX ADMIN — Medication 325 MILLIGRAM(S): at 06:03

## 2020-05-09 RX ADMIN — AMLODIPINE BESYLATE 5 MILLIGRAM(S): 2.5 TABLET ORAL at 06:03

## 2020-05-09 NOTE — RAPID RESPONSE TEAM SUMMARY - NSSITUATIONBACKGROUNDRRT_GEN_ALL_CORE
45 yo F w/ hX HTN p/w ETIENNE and cough. Multifocal PNA treated with Abx (Ceftriaxone 3/28-4/1  Azithromycin 3/28-4/1),  HCQ 3/29-4/2, Solumedrol 3/30-4/4, Tocilizumab 3/31, 4/1, Convalescent plasma 4/29. complicated by PE question of HIT was seen by heme KEO neg on xarelto. Leukocytosis of unclear etiology with neg cx and CT for source currently monitoring off antibiotics.     RRT called for hypoxia. Per primary nurse at bedside states patient desaturated to 67% after a coughing fit. Upon arrival to [facility], patient in prone position on 100% NRB. Pulse ox reading 100%. Patient is AxOx3, able to speak in full sentences, Afebrile, VSS. NRB replaced due to lack of exhalation valve. Advised primary nurse to keep patient in prone position for next half hour, if O2 sat remains stable, can change position. Will need to allow patient to recover from coughing fit in future to determine oxygen level.    Primary team at bedside updated.

## 2020-05-09 NOTE — PROGRESS NOTE ADULT - SUBJECTIVE AND OBJECTIVE BOX
Patient is a 44y old  Female who presents with a chief complaint of Acute Hypoxemic Respiratory Failure and Pneumonia (08 May 2020 13:15)      SUBJECTIVE / OVERNIGHT EVENTS:  ADDITIONAL REVIEW OF SYSTEMS:    MEDICATIONS  (STANDING):  amLODIPine   Tablet 5 milliGRAM(s) Oral daily  ascorbic acid 500 milliGRAM(s) Oral two times a day  benzonatate 100 milliGRAM(s) Oral three times a day  ferrous    sulfate 325 milliGRAM(s) Oral three times a day  folic acid 1 milliGRAM(s) Oral daily  guaiFENesin   Syrup  (Sugar-Free) 200 milliGRAM(s) Oral every 6 hours  influenza   Vaccine 0.5 milliLiter(s) IntraMuscular once  multivitamin 1 Tablet(s) Oral daily  rivaroxaban 15 milliGRAM(s) Oral two times a day with meals  simvastatin 5 milliGRAM(s) Oral at bedtime  zinc sulfate 220 milliGRAM(s) Oral daily    MEDICATIONS  (PRN):  acetaminophen   Tablet .. 650 milliGRAM(s) Oral every 6 hours PRN Temp greater or equal to 38C (100.4F), Mild Pain (1 - 3)  ALBUTerol    90 MICROgram(s) HFA Inhaler 2 Puff(s) Inhalation every 6 hours PRN Shortness of Breath and/or Wheezing      CAPILLARY BLOOD GLUCOSE        I&O's Summary      PHYSICAL EXAM:  Vital Signs Last 24 Hrs  T(C): 37.1 (09 May 2020 06:00), Max: 37.1 (08 May 2020 22:09)  T(F): 98.7 (09 May 2020 06:00), Max: 98.7 (08 May 2020 22:09)  HR: 92 (09 May 2020 06:00) (89 - 92)  BP: 135/78 (09 May 2020 06:00) (135/78 - 143/91)  BP(mean): --  RR: 18 (09 May 2020 06:00) (18 - 18)  SpO2: 98% (09 May 2020 06:00) (98% - 100%)  CONSTITUTIONAL: NAD, well-developed, well-groomed  EYES: PERRLA; conjunctiva and sclera clear  ENMT: Moist oral mucosa, no pharyngeal injection or exudates; normal dentition  NECK: Supple, no palpable masses; no thyromegaly  RESPIRATORY: Normal respiratory effort; lungs are clear to auscultation bilaterally  CARDIOVASCULAR: Regular rate and rhythm, normal S1 and S2, no murmur/rub/gallop; No lower extremity edema; Peripheral pulses are 2+ bilaterally  ABDOMEN: Nontender to palpation, normoactive bowel sounds, no rebound/guarding; No hepatosplenomegaly  MUSCULOSKELETAL:  Normal gait; no clubbing or cyanosis of digits; no joint swelling or tenderness to palpation  PSYCH: A+O to person, place, and time; affect appropriate  NEUROLOGY: CN 2-12 are intact and symmetric; no gross sensory deficits   SKIN: No rashes; no palpable lesions    LABS:                        7.8    16.65 )-----------( 503      ( 09 May 2020 05:10 )             26.4     05-09    137  |  98  |  6<L>  ----------------------------<  103<H>  3.7   |  26  |  0.46<L>    Ca    9.1      09 May 2020 05:10  Phos  4.2     05-09  Mg     1.7     05-09    TPro  7.8  /  Alb  3.0<L>  /  TBili  < 0.2<L>  /  DBili  x   /  AST  14  /  ALT  10  /  AlkPhos  68  05-09                RADIOLOGY & ADDITIONAL TESTS:  Results Reviewed:   Imaging Personally Reviewed:  Electrocardiogram Personally Reviewed:    COORDINATION OF CARE:  Care Discussed with Consultants/Other Providers [Y/N]:  Prior or Outpatient Records Reviewed [Y/N]: Patient is a 44y old  Female who presents with a chief complaint of Acute Hypoxemic Respiratory Failure and Pneumonia (08 May 2020 13:15)      SUBJECTIVE / OVERNIGHT EVENTS: on NRB speaking full sentences. +coughing.   ADDITIONAL REVIEW OF SYSTEMS: denies CP/N/V    MEDICATIONS  (STANDING):  amLODIPine   Tablet 5 milliGRAM(s) Oral daily  ascorbic acid 500 milliGRAM(s) Oral two times a day  benzonatate 100 milliGRAM(s) Oral three times a day  ferrous    sulfate 325 milliGRAM(s) Oral three times a day  folic acid 1 milliGRAM(s) Oral daily  guaiFENesin   Syrup  (Sugar-Free) 200 milliGRAM(s) Oral every 6 hours  influenza   Vaccine 0.5 milliLiter(s) IntraMuscular once  multivitamin 1 Tablet(s) Oral daily  rivaroxaban 15 milliGRAM(s) Oral two times a day with meals  simvastatin 5 milliGRAM(s) Oral at bedtime  zinc sulfate 220 milliGRAM(s) Oral daily    MEDICATIONS  (PRN):  acetaminophen   Tablet .. 650 milliGRAM(s) Oral every 6 hours PRN Temp greater or equal to 38C (100.4F), Mild Pain (1 - 3)  ALBUTerol    90 MICROgram(s) HFA Inhaler 2 Puff(s) Inhalation every 6 hours PRN Shortness of Breath and/or Wheezing      CAPILLARY BLOOD GLUCOSE        I&O's Summary      PHYSICAL EXAM:  Vital Signs Last 24 Hrs  T(C): 37.1 (09 May 2020 06:00), Max: 37.1 (08 May 2020 22:09)  T(F): 98.7 (09 May 2020 06:00), Max: 98.7 (08 May 2020 22:09)  HR: 92 (09 May 2020 06:00) (89 - 92)  BP: 135/78 (09 May 2020 06:00) (135/78 - 143/91)  BP(mean): --  RR: 18 (09 May 2020 06:00) (18 - 18)  SpO2: 98% (09 May 2020 06:00) (98% - 100%)  CONSTITUTIONAL: on NRB   EYES:  conjunctiva and sclera clear  NECK: Supple, no palpable masses; no thyromegaly  RESPIRATORY: bi basilar cracakles  CARDIOVASCULAR: Regular rate and rhythm, normal S1 and S2,  ABDOMEN: Nontender to palpation, normoactive bowel sounds, no rebound/guarding; No hepatosplenomegaly  MUSCULOSKELETAL:  No c/c/e   PSYCH: A+O to person, place, and time; affect appropriate      LABS:                        7.8    16.65 )-----------( 503      ( 09 May 2020 05:10 )             26.4     05-09    137  |  98  |  6<L>  ----------------------------<  103<H>  3.7   |  26  |  0.46<L>    Ca    9.1      09 May 2020 05:10  Phos  4.2     05-09  Mg     1.7     05-09    TPro  7.8  /  Alb  3.0<L>  /  TBili  < 0.2<L>  /  DBili  x   /  AST  14  /  ALT  10  /  AlkPhos  68  05-09                RADIOLOGY & ADDITIONAL TESTS:  Results Reviewed:   Imaging Personally Reviewed:  Electrocardiogram Personally Reviewed:    COORDINATION OF CARE:  Care Discussed with Consultants/Other Providers [Y/N]:  Prior or Outpatient Records Reviewed [Y/N]:

## 2020-05-09 NOTE — PROGRESS NOTE ADULT - ASSESSMENT
43 yo F w/ hX HTN p/w ETIENNE and cough. Multifocal PNA treated with Abx (Ceftriaxone 3/28-4/1  Azithromycin 3/28-4/1),  HCQ 3/29-4/2, Solumedrol 3/30-4/4, Tocilizumab 3/31, 4/1, Convalescent plasma 4/29. complicated by PE question of HIT was seen by heme KEO neg on xarelto. Leukocytosis of unclear etiology with neg cx and CT for source currently monitoring off antibiotics.

## 2020-05-09 NOTE — CHART NOTE - NSCHARTNOTEFT_GEN_A_CORE
RRT called for hypoxemia to the 60's while on NRB. Patient's O2  sat decreased  after having a coughing fit. Upon arrival to rapid, patient in prone position on 100% NRB. Pulse ox reading 100%. Please refer to RRT note.    Tita Renner PA-C  pager [phone]

## 2020-05-09 NOTE — PROGRESS NOTE ADULT - PROBLEM SELECTOR PLAN 1
COVID with acute hypoxic respiratory failure  -symptomatic care antitussive meds  -vitamin C and zinc  -cont to wean off oxygen with goal spo2 92-95% currently on NRB 15 L

## 2020-05-10 LAB
ALBUMIN SERPL ELPH-MCNC: 2.9 G/DL — LOW (ref 3.3–5)
ALP SERPL-CCNC: 66 U/L — SIGNIFICANT CHANGE UP (ref 40–120)
ALT FLD-CCNC: 21 U/L — SIGNIFICANT CHANGE UP (ref 4–33)
ANION GAP SERPL CALC-SCNC: 14 MMO/L — SIGNIFICANT CHANGE UP (ref 7–14)
AST SERPL-CCNC: 70 U/L — HIGH (ref 4–32)
BILIRUB SERPL-MCNC: 0.3 MG/DL — SIGNIFICANT CHANGE UP (ref 0.2–1.2)
BUN SERPL-MCNC: 7 MG/DL — SIGNIFICANT CHANGE UP (ref 7–23)
CALCIUM SERPL-MCNC: 9.2 MG/DL — SIGNIFICANT CHANGE UP (ref 8.4–10.5)
CHLORIDE SERPL-SCNC: 95 MMOL/L — LOW (ref 98–107)
CO2 SERPL-SCNC: 25 MMOL/L — SIGNIFICANT CHANGE UP (ref 22–31)
CREAT SERPL-MCNC: 0.4 MG/DL — LOW (ref 0.5–1.3)
GLUCOSE SERPL-MCNC: 98 MG/DL — SIGNIFICANT CHANGE UP (ref 70–99)
HCT VFR BLD CALC: 27 % — LOW (ref 34.5–45)
HGB BLD-MCNC: 7.9 G/DL — LOW (ref 11.5–15.5)
MAGNESIUM SERPL-MCNC: 1.7 MG/DL — SIGNIFICANT CHANGE UP (ref 1.6–2.6)
MCHC RBC-ENTMCNC: 23.8 PG — LOW (ref 27–34)
MCHC RBC-ENTMCNC: 29.3 % — LOW (ref 32–36)
MCV RBC AUTO: 81.3 FL — SIGNIFICANT CHANGE UP (ref 80–100)
NRBC # FLD: 0 K/UL — SIGNIFICANT CHANGE UP (ref 0–0)
PHOSPHATE SERPL-MCNC: 4.9 MG/DL — HIGH (ref 2.5–4.5)
PLATELET # BLD AUTO: 528 K/UL — HIGH (ref 150–400)
PMV BLD: 10.4 FL — SIGNIFICANT CHANGE UP (ref 7–13)
POTASSIUM SERPL-MCNC: SIGNIFICANT CHANGE UP MMOL/L (ref 3.5–5.3)
POTASSIUM SERPL-SCNC: SIGNIFICANT CHANGE UP MMOL/L (ref 3.5–5.3)
PROT SERPL-MCNC: 8.5 G/DL — HIGH (ref 6–8.3)
RBC # BLD: 3.32 M/UL — LOW (ref 3.8–5.2)
RBC # FLD: 26.1 % — HIGH (ref 10.3–14.5)
SODIUM SERPL-SCNC: 134 MMOL/L — LOW (ref 135–145)
WBC # BLD: 14 K/UL — HIGH (ref 3.8–10.5)
WBC # FLD AUTO: 14 K/UL — HIGH (ref 3.8–10.5)

## 2020-05-10 PROCEDURE — 99233 SBSQ HOSP IP/OBS HIGH 50: CPT

## 2020-05-10 RX ADMIN — Medication 100 MILLIGRAM(S): at 12:19

## 2020-05-10 RX ADMIN — ALBUTEROL 2 PUFF(S): 90 AEROSOL, METERED ORAL at 21:55

## 2020-05-10 RX ADMIN — AMLODIPINE BESYLATE 5 MILLIGRAM(S): 2.5 TABLET ORAL at 06:03

## 2020-05-10 RX ADMIN — Medication 325 MILLIGRAM(S): at 06:04

## 2020-05-10 RX ADMIN — ZINC SULFATE TAB 220 MG (50 MG ZINC EQUIVALENT) 220 MILLIGRAM(S): 220 (50 ZN) TAB at 12:18

## 2020-05-10 RX ADMIN — Medication 100 MILLIGRAM(S): at 06:04

## 2020-05-10 RX ADMIN — RIVAROXABAN 15 MILLIGRAM(S): KIT at 17:12

## 2020-05-10 RX ADMIN — SIMVASTATIN 5 MILLIGRAM(S): 20 TABLET, FILM COATED ORAL at 21:52

## 2020-05-10 RX ADMIN — Medication 100 MILLIGRAM(S): at 21:52

## 2020-05-10 RX ADMIN — SIMVASTATIN 5 MILLIGRAM(S): 20 TABLET, FILM COATED ORAL at 02:45

## 2020-05-10 RX ADMIN — Medication 1 MILLIGRAM(S): at 12:19

## 2020-05-10 RX ADMIN — Medication 200 MILLIGRAM(S): at 06:04

## 2020-05-10 RX ADMIN — Medication 500 MILLIGRAM(S): at 17:11

## 2020-05-10 RX ADMIN — RIVAROXABAN 15 MILLIGRAM(S): KIT at 08:28

## 2020-05-10 RX ADMIN — Medication 325 MILLIGRAM(S): at 21:52

## 2020-05-10 RX ADMIN — Medication 200 MILLIGRAM(S): at 21:52

## 2020-05-10 RX ADMIN — Medication 500 MILLIGRAM(S): at 06:04

## 2020-05-10 RX ADMIN — Medication 325 MILLIGRAM(S): at 12:19

## 2020-05-10 RX ADMIN — Medication 200 MILLIGRAM(S): at 12:18

## 2020-05-10 RX ADMIN — Medication 200 MILLIGRAM(S): at 17:11

## 2020-05-10 RX ADMIN — Medication 1 TABLET(S): at 12:20
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
## 2020-05-10 NOTE — PROGRESS NOTE ADULT - NSHPATTENDINGPLANDISCUSS_GEN_ALL_CORE
ACP
ACP
Pt and ACP Jorge
acp
acp
PA
Pt, pulm, and ACP
Pt and ACP Jorge
Pt and ACP Jorge

## 2020-05-10 NOTE — PROGRESS NOTE ADULT - SUBJECTIVE AND OBJECTIVE BOX
Patient is a 44y old  Female who presents with a chief complaint of Acute Hypoxemic Respiratory Failure and Pneumonia (09 May 2020 10:19)      SUBJECTIVE / OVERNIGHT EVENTS: Had RRT for desaturation to 67% improved to 100% on prone positioning   ADDITIONAL REVIEW OF SYSTEMS: denies CP/N/V    MEDICATIONS  (STANDING):  amLODIPine   Tablet 5 milliGRAM(s) Oral daily  ascorbic acid 500 milliGRAM(s) Oral two times a day  benzonatate 100 milliGRAM(s) Oral three times a day  ferrous    sulfate 325 milliGRAM(s) Oral three times a day  folic acid 1 milliGRAM(s) Oral daily  guaiFENesin   Syrup  (Sugar-Free) 200 milliGRAM(s) Oral every 6 hours  influenza   Vaccine 0.5 milliLiter(s) IntraMuscular once  multivitamin 1 Tablet(s) Oral daily  rivaroxaban 15 milliGRAM(s) Oral two times a day with meals  simvastatin 5 milliGRAM(s) Oral at bedtime  zinc sulfate 220 milliGRAM(s) Oral daily    MEDICATIONS  (PRN):  acetaminophen   Tablet .. 650 milliGRAM(s) Oral every 6 hours PRN Temp greater or equal to 38C (100.4F), Mild Pain (1 - 3)  ALBUTerol    90 MICROgram(s) HFA Inhaler 2 Puff(s) Inhalation every 6 hours PRN Shortness of Breath and/or Wheezing      CAPILLARY BLOOD GLUCOSE        I&O's Summary      PHYSICAL EXAM:  Vital Signs Last 24 Hrs  T(C): 36.6 (10 May 2020 06:37), Max: 37 (09 May 2020 15:55)  T(F): 97.9 (10 May 2020 06:37), Max: 98.6 (09 May 2020 15:55)  HR: 102 (10 May 2020 06:37) (89 - 102)  BP: 129/92 (10 May 2020 06:37) (105/78 - 129/92)  BP(mean): --  RR: 22 (10 May 2020 06:37) (15 - 22)  SpO2: 96% (10 May 2020 06:37) (96% - 100%)    CONSTITUTIONAL: on NRB   EYES:  conjunctiva and sclera clear  NECK: Supple, no palpable masses; no thyromegaly  RESPIRATORY: bi basilar crackles  CARDIOVASCULAR: Regular rate and rhythm, normal S1 and S2,  ABDOMEN: Nontender to palpation, normoactive bowel sounds, no rebound/guarding; No hepatosplenomegaly  MUSCULOSKELETAL:  No c/c/e   PSYCH: A+O to person, place, and time; affect appropriate    LABS:                        7.9    14.00 )-----------( 528      ( 10 May 2020 07:10 )             27.0     05-10    134<L>  |  95<L>  |  7   ----------------------------<  98  Test not performed SPECIMEN GROSSLY HEMOLYZED   |  25  |  0.40<L>    Ca    9.2      10 May 2020 06:48  Phos  4.9     05-10  Mg     1.7     05-10    TPro  8.5<H>  /  Alb  2.9<L>  /  TBili  0.3  /  DBili  x   /  AST  70<H>  /  ALT  21  /  AlkPhos  66  05-10                RADIOLOGY & ADDITIONAL TESTS:  Results Reviewed:   Imaging Personally Reviewed:  Electrocardiogram Personally Reviewed:    COORDINATION OF CARE:  Care Discussed with Consultants/Other Providers [Y/N]:  Prior or Outpatient Records Reviewed [Y/N]:

## 2020-05-11 LAB
ALBUMIN SERPL ELPH-MCNC: 3 G/DL — LOW (ref 3.3–5)
ALP SERPL-CCNC: 73 U/L — SIGNIFICANT CHANGE UP (ref 40–120)
ALT FLD-CCNC: 10 U/L — SIGNIFICANT CHANGE UP (ref 4–33)
ANION GAP SERPL CALC-SCNC: 13 MMO/L — SIGNIFICANT CHANGE UP (ref 7–14)
AST SERPL-CCNC: 15 U/L — SIGNIFICANT CHANGE UP (ref 4–32)
BILIRUB SERPL-MCNC: < 0.2 MG/DL — LOW (ref 0.2–1.2)
BUN SERPL-MCNC: 5 MG/DL — LOW (ref 7–23)
CALCIUM SERPL-MCNC: 9.3 MG/DL — SIGNIFICANT CHANGE UP (ref 8.4–10.5)
CHLORIDE SERPL-SCNC: 98 MMOL/L — SIGNIFICANT CHANGE UP (ref 98–107)
CO2 SERPL-SCNC: 27 MMOL/L — SIGNIFICANT CHANGE UP (ref 22–31)
CREAT SERPL-MCNC: 0.47 MG/DL — LOW (ref 0.5–1.3)
GLUCOSE SERPL-MCNC: 96 MG/DL — SIGNIFICANT CHANGE UP (ref 70–99)
HCT VFR BLD CALC: 25.6 % — LOW (ref 34.5–45)
HGB BLD-MCNC: 7.6 G/DL — LOW (ref 11.5–15.5)
MAGNESIUM SERPL-MCNC: 1.6 MG/DL — SIGNIFICANT CHANGE UP (ref 1.6–2.6)
MCHC RBC-ENTMCNC: 23.8 PG — LOW (ref 27–34)
MCHC RBC-ENTMCNC: 29.7 % — LOW (ref 32–36)
MCV RBC AUTO: 80.3 FL — SIGNIFICANT CHANGE UP (ref 80–100)
NRBC # FLD: 0 K/UL — SIGNIFICANT CHANGE UP (ref 0–0)
PHOSPHATE SERPL-MCNC: 4 MG/DL — SIGNIFICANT CHANGE UP (ref 2.5–4.5)
PLATELET # BLD AUTO: 478 K/UL — HIGH (ref 150–400)
PMV BLD: 10.2 FL — SIGNIFICANT CHANGE UP (ref 7–13)
POTASSIUM SERPL-MCNC: 3.7 MMOL/L — SIGNIFICANT CHANGE UP (ref 3.5–5.3)
POTASSIUM SERPL-SCNC: 3.7 MMOL/L — SIGNIFICANT CHANGE UP (ref 3.5–5.3)
PROT SERPL-MCNC: 8 G/DL — SIGNIFICANT CHANGE UP (ref 6–8.3)
RBC # BLD: 3.19 M/UL — LOW (ref 3.8–5.2)
RBC # FLD: 26 % — HIGH (ref 10.3–14.5)
SODIUM SERPL-SCNC: 138 MMOL/L — SIGNIFICANT CHANGE UP (ref 135–145)
WBC # BLD: 13.6 K/UL — HIGH (ref 3.8–10.5)
WBC # FLD AUTO: 13.6 K/UL — HIGH (ref 3.8–10.5)

## 2020-05-11 PROCEDURE — 99232 SBSQ HOSP IP/OBS MODERATE 35: CPT

## 2020-05-11 PROCEDURE — 71046 X-RAY EXAM CHEST 2 VIEWS: CPT | Mod: 26

## 2020-05-11 RX ADMIN — Medication 100 MILLIGRAM(S): at 05:47

## 2020-05-11 RX ADMIN — Medication 200 MILLIGRAM(S): at 18:18

## 2020-05-11 RX ADMIN — AMLODIPINE BESYLATE 5 MILLIGRAM(S): 2.5 TABLET ORAL at 05:47

## 2020-05-11 RX ADMIN — Medication 1 TABLET(S): at 12:09

## 2020-05-11 RX ADMIN — Medication 325 MILLIGRAM(S): at 05:47

## 2020-05-11 RX ADMIN — Medication 100 MILLIGRAM(S): at 12:09

## 2020-05-11 RX ADMIN — ALBUTEROL 2 PUFF(S): 90 AEROSOL, METERED ORAL at 18:20

## 2020-05-11 RX ADMIN — RIVAROXABAN 15 MILLIGRAM(S): KIT at 22:26

## 2020-05-11 RX ADMIN — SIMVASTATIN 5 MILLIGRAM(S): 20 TABLET, FILM COATED ORAL at 22:26

## 2020-05-11 RX ADMIN — ALBUTEROL 2 PUFF(S): 90 AEROSOL, METERED ORAL at 04:25

## 2020-05-11 RX ADMIN — Medication 200 MILLIGRAM(S): at 05:47

## 2020-05-11 RX ADMIN — Medication 100 MILLIGRAM(S): at 22:26

## 2020-05-11 RX ADMIN — Medication 500 MILLIGRAM(S): at 18:18

## 2020-05-11 RX ADMIN — Medication 500 MILLIGRAM(S): at 05:47

## 2020-05-11 RX ADMIN — Medication 1 MILLIGRAM(S): at 12:08

## 2020-05-11 RX ADMIN — ZINC SULFATE TAB 220 MG (50 MG ZINC EQUIVALENT) 220 MILLIGRAM(S): 220 (50 ZN) TAB at 12:08

## 2020-05-11 RX ADMIN — Medication 325 MILLIGRAM(S): at 12:09

## 2020-05-11 RX ADMIN — Medication 200 MILLIGRAM(S): at 12:08

## 2020-05-11 RX ADMIN — RIVAROXABAN 15 MILLIGRAM(S): KIT at 12:02

## 2020-05-11 RX ADMIN — Medication 200 MILLIGRAM(S): at 22:26

## 2020-05-11 RX ADMIN — Medication 325 MILLIGRAM(S): at 22:24

## 2020-05-11 NOTE — PROGRESS NOTE ADULT - SUBJECTIVE AND OBJECTIVE BOX
Medicine Progress Note    Patient is a 44y old  Female who presents with a chief complaint of Acute Hypoxemic Respiratory Failure and Pneumonia (10 May 2020 10:58)      SUBJECTIVE / OVERNIGHT EVENTS:        Patient is a 44y old  Female who presents with a chief complaint of Acute Hypoxemic Respiratory Failure and Pneumonia      SUBJECTIVE / OVERNIGHT EVENTS: Had RRT for desaturation to 67% improved to 100% on prone positioning, 2 night ago    Prone, NRB    cough, can not breath well without 02 supplemt    s/p Cetriaxone, Azrithromycin, soulmedrol, Tolizimab    s/p convalecent plasma    Leukocytosis, unclear etiology, neg cxr, neg CT    Now off antibx      HCT 25, fe studies orderd     Pulmonary follow up requested       ADDITIONAL REVIEW OF SYSTEMS:    MEDICATIONS  (STANDING):  amLODIPine   Tablet 5 milliGRAM(s) Oral daily  ascorbic acid 500 milliGRAM(s) Oral two times a day  benzonatate 100 milliGRAM(s) Oral three times a day  ferrous    sulfate 325 milliGRAM(s) Oral three times a day  folic acid 1 milliGRAM(s) Oral daily  guaiFENesin   Syrup  (Sugar-Free) 200 milliGRAM(s) Oral every 6 hours  influenza   Vaccine 0.5 milliLiter(s) IntraMuscular once  multivitamin 1 Tablet(s) Oral daily  rivaroxaban 15 milliGRAM(s) Oral two times a day with meals  simvastatin 5 milliGRAM(s) Oral at bedtime  zinc sulfate 220 milliGRAM(s) Oral daily    MEDICATIONS  (PRN):  acetaminophen   Tablet .. 650 milliGRAM(s) Oral every 6 hours PRN Temp greater or equal to 38C (100.4F), Mild Pain (1 - 3)  ALBUTerol    90 MICROgram(s) HFA Inhaler 2 Puff(s) Inhalation every 6 hours PRN Shortness of Breath and/or Wheezing    CAPILLARY BLOOD GLUCOSE        I&O's Summary    10 May 2020 07:01  -  11 May 2020 07:00  --------------------------------------------------------  IN: 0 mL / OUT: 400 mL / NET: -400 mL        PHYSICAL EXAM:  Vital Signs Last 24 Hrs  T(C): 36.5 (11 May 2020 12:22), Max: 36.9 (11 May 2020 04:31)  T(F): 97.7 (11 May 2020 12:22), Max: 98.4 (11 May 2020 04:31)  HR: 108 (11 May 2020 12:22) (88 - 108)  BP: 114/70 (11 May 2020 12:22) (108/69 - 124/79)  BP(mean): 90 (11 May 2020 04:31) (78 - 90)  RR: 22 (11 May 2020 12:22) (22 - 22)  SpO2: 98% (11 May 2020 12:22) (85% - 100%)  CONSTITUTIONAL: NAD, well-developed, well-groomed  ENMT: Moist oral mucosa, no pharyngeal injection or exudates; normal dentition  RESPIRATORY: Normal respiratory effort; lungs are clear to auscultation bilaterally  CARDIOVASCULAR: Regular rate and rhythm, normal S1 and S2, no murmur/rub/gallop; No lower extremity edema; Peripheral pulses are 2+ bilaterally  ABDOMEN: Nontender to palpation, normoactive bowel sounds, no rebound/guarding; No hepatosplenomegaly  PSYCH: A+O to person, place, and time; affect appropriate  NEUROLOGY: CN 2-12 are intact and symmetric; no gross sensory deficits   SKIN: No rashes; no palpable lesions    LABS:                        7.6    13.60 )-----------( 478      ( 11 May 2020 05:40 )             25.6     05-11    138  |  98  |  5<L>  ----------------------------<  96  3.7   |  27  |  0.47<L>    Ca    9.3      11 May 2020 05:40  Phos  4.0     05-11  Mg     1.6     05-11    TPro  8.0  /  Alb  3.0<L>  /  TBili  < 0.2<L>  /  DBili  x   /  AST  15  /  ALT  10  /  AlkPhos  73  05-11              COVID-19 PCR: Detected (28 Mar 2020 17:24)      RADIOLOGY & ADDITIONAL TESTS:  Imaging from Last 24 Hours:    Electrocardiogram/QTc Interval:    COORDINATION OF CARE:  Care Discussed with Consultants/Other Providers:

## 2020-05-11 NOTE — PROGRESS NOTE ADULT - SUBJECTIVE AND OBJECTIVE BOX
CC: Patient is a 44y old  Female who presents with a chief complaint of Acute Hypoxemic Respiratory Failure and Pneumonia (11 May 2020 12:55)      Interval History/ROS:    Rest of ROS negative.    Allergies  No Known Drug Allergies  Numbness (Other)        ANTIMICROBIALS:      OTHER MEDS:  acetaminophen   Tablet .. 650 milliGRAM(s) Oral every 6 hours PRN  ALBUTerol    90 MICROgram(s) HFA Inhaler 2 Puff(s) Inhalation every 6 hours PRN  amLODIPine   Tablet 5 milliGRAM(s) Oral daily  ascorbic acid 500 milliGRAM(s) Oral two times a day  benzonatate 100 milliGRAM(s) Oral three times a day  ferrous    sulfate 325 milliGRAM(s) Oral three times a day  folic acid 1 milliGRAM(s) Oral daily  guaiFENesin   Syrup  (Sugar-Free) 200 milliGRAM(s) Oral every 6 hours  influenza   Vaccine 0.5 milliLiter(s) IntraMuscular once  multivitamin 1 Tablet(s) Oral daily  rivaroxaban 15 milliGRAM(s) Oral two times a day with meals  simvastatin 5 milliGRAM(s) Oral at bedtime  zinc sulfate 220 milliGRAM(s) Oral daily      PE:    Vital Signs Last 24 Hrs  T(C): 36.5 (11 May 2020 12:22), Max: 36.9 (11 May 2020 04:31)  T(F): 97.7 (11 May 2020 12:22), Max: 98.4 (11 May 2020 04:31)  HR: 108 (11 May 2020 12:22) (88 - 108)  BP: 114/70 (11 May 2020 12:22) (108/69 - 124/79)  BP(mean): 90 (11 May 2020 04:31) (78 - 90)  RR: 22 (11 May 2020 12:22) (22 - 22)  SpO2: 98% (11 May 2020 12:22) (85% - 100%)    Gen: AOx3, NAD, non-toxic, pleasant  CV: S1+S2 normal, no murmurs, nontachycardic  Resp: Clear bilat, no resp distress, no crackles/wheezes  Abd: Soft, nontender, +BS  Ext: No LE edema, no wounds  : No Tamayo  IV/Skin: No thrombophlebitis  Neuro: no focal deficits    LABS:                          7.6    13.60 )-----------( 478      ( 11 May 2020 05:40 )             25.6       05-11    138  |  98  |  5<L>  ----------------------------<  96  3.7   |  27  |  0.47<L>    Ca    9.3      11 May 2020 05:40  Phos  4.0     05-11  Mg     1.6     05-11    TPro  8.0  /  Alb  3.0<L>  /  TBili  < 0.2<L>  /  DBili  x   /  AST  15  /  ALT  10  /  AlkPhos  73  05-11          MICROBIOLOGY:  v  .Blood Blood-Venous  04-30-20   No Growth Final  --  --      .Blood Blood-Peripheral  04-30-20   No Growth Final  --  --                RADIOLOGY: CC: Patient is a 44y old  Female who presents with a chief complaint of Acute Hypoxemic Respiratory Failure and Pneumonia (11 May 2020 12:55)    ID following for COVID 19 infection    Interval History/ROS: Patient two nights ago had RRT for hypoxia, was proned. Now remains on NRB, sitting up in bed. Stated at that time, had a coughing fit and could not catch her breath. No fevers. Leukocytosis improving.    Rest of ROS negative.    Allergies  No Known Drug Allergies  Numbness (Other)    ANTIMICROBIALS:      OTHER MEDS:  acetaminophen   Tablet .. 650 milliGRAM(s) Oral every 6 hours PRN  ALBUTerol    90 MICROgram(s) HFA Inhaler 2 Puff(s) Inhalation every 6 hours PRN  amLODIPine   Tablet 5 milliGRAM(s) Oral daily  ascorbic acid 500 milliGRAM(s) Oral two times a day  benzonatate 100 milliGRAM(s) Oral three times a day  ferrous    sulfate 325 milliGRAM(s) Oral three times a day  folic acid 1 milliGRAM(s) Oral daily  guaiFENesin   Syrup  (Sugar-Free) 200 milliGRAM(s) Oral every 6 hours  influenza   Vaccine 0.5 milliLiter(s) IntraMuscular once  multivitamin 1 Tablet(s) Oral daily  rivaroxaban 15 milliGRAM(s) Oral two times a day with meals  simvastatin 5 milliGRAM(s) Oral at bedtime  zinc sulfate 220 milliGRAM(s) Oral daily      PE:    Vital Signs Last 24 Hrs  T(C): 36.5 (11 May 2020 12:22), Max: 36.9 (11 May 2020 04:31)  T(F): 97.7 (11 May 2020 12:22), Max: 98.4 (11 May 2020 04:31)  HR: 108 (11 May 2020 12:22) (88 - 108)  BP: 114/70 (11 May 2020 12:22) (108/69 - 124/79)  BP(mean): 90 (11 May 2020 04:31) (78 - 90)  RR: 22 (11 May 2020 12:22) (22 - 22)  SpO2: 98% (11 May 2020 12:22) (85% - 100%)    Gen: AOx3, NAD, remains on NRB  CV: S1+S2 normal, no murmurs  Resp: Clear bilat, no resp distress  Abd: Soft, nontender, +BS  Ext: No LE edema, no wounds  : No Tamayo  IV/Skin: No thrombophlebitis  Neuro: no focal deficits      LABS:                          7.6    13.60 )-----------( 478      ( 11 May 2020 05:40 )             25.6       05-11    138  |  98  |  5<L>  ----------------------------<  96  3.7   |  27  |  0.47<L>    Ca    9.3      11 May 2020 05:40  Phos  4.0     05-11  Mg     1.6     05-11    TPro  8.0  /  Alb  3.0<L>  /  TBili  < 0.2<L>  /  DBili  x   /  AST  15  /  ALT  10  /  AlkPhos  73  05-11          MICROBIOLOGY:  v  .Blood Blood-Venous  04-30-20   No Growth Final  --  --      .Blood Blood-Peripheral  04-30-20   No Growth Final  --  --    RADIOLOGY:  < from: Xray Chest 2 Views PA/Lat (05.11.20 @ 15:02) >  IMPRESSION:    Diffuse bilateral patchy opacities are unchanged.       < end of copied text >
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
## 2020-05-11 NOTE — PROGRESS NOTE ADULT - ASSESSMENT
Patient is a 44y old  Female who presents with a chief complaint of Acute Hypoxemic Respiratory Failure and Pneumonia      SUBJECTIVE / OVERNIGHT EVENTS: Had RRT for desaturation to 67% improved to 100% on prone positioning, 2 night ago    Prone, NRB    cough, can not breath well without 02 supplemt    s/p Cetriaxone, Azrithromycin, soulmedrol, Tolizimab    s/p convalecent plasma    Leukocytosis, unclear etiology    Now off antibx      HCT 25, fe studies orderd     Pulmonary follow up requested, I spoke with fellow, CXR and then they will see patient if no oimprovement on CXR or clinically    CXR ordered    PE on Xeralto      Assessment and Plan:   · Assessment	  43 yo F w/ hX HTN p/w ETIENNE and cough. Multifocal PNA treated with Abx (Ceftriaxone 3/28-4/1  Azithromycin 3/28-4/1),  HCQ 3/29-4/2, Solumedrol 3/30-4/4, Tocilizumab 3/31, 4/1, Convalescent plasma 4/29. complicated by PE question of HIT was seen by heme KEO neg on xarelto. Leukocytosis of unclear etiology with neg cx and CT for source currently monitoring off antibiotics.     CXR follow up ordered, discussed with pulm fellow     Problem/Plan - 1:  ·  Problem: Pneumonia due to 2019 novel coronavirus.  Plan: COVID with acute hypoxic respiratory failure  -symptomatic care antitussive meds  -vitamin C and zinc  -cont to wean off oxygen with goal spo2 92-95% currently on NRB 15 L.      Problem/Plan - 2:  ·  Problem: Acute pulmonary embolism without acute cor pulmonale, unspecified pulmonary embolism type.  Plan: on AC with xarelto.      Problem/Plan - 3:  ·  Problem: Leukocytosis, unspecified type.  Plan: remains elevated but downtrending ID following.      Problem/Plan - 4:  ·  Problem: Anemia, unspecified type.  Plan: monitor CBC.     Attending Attestation:   I was physically present for the key portions of the evaluation and management (E/M) service provided.  I agree with the above history, physical, and plan which I have reviewed and edited where appropriate.     Plan discussed with ACP.    Perfecto Tamez MD    acting hospitalist    discussed with ACP

## 2020-05-11 NOTE — PROGRESS NOTE ADULT - SUBJECTIVE AND OBJECTIVE BOX
Medicine Progress Note    Patient is a 44y old  Female who presents with a chief complaint of Acute Hypoxemic Respiratory Failure and Pneumonia (11 May 2020 12:47)      SUBJECTIVE / OVERNIGHT EVENTS:          Patient is a 44y old  Female who presents with a chief complaint of Acute Hypoxemic Respiratory Failure and Pneumonia      SUBJECTIVE / OVERNIGHT EVENTS: Had RRT for desaturation to 67% improved to 100% on prone positioning, 2 night ago    Prone, NRB    cough, can not breath well without 02 supplemt    s/p Cetriaxone, Azrithromycin, soulmedrol, Tolizimab    s/p convalecent plasma    Leukocytosis, unclear etiology    Now off antibx      HCT 25, fe studies orderd     Pulmonary follow up requested, I spoke with fellow, CXR and then they will see patient if no oimprovement on CXR or clinically    CXR ordered      ADDITIONAL REVIEW OF SYSTEMS:    MEDICATIONS  (STANDING):  amLODIPine   Tablet 5 milliGRAM(s) Oral daily  ascorbic acid 500 milliGRAM(s) Oral two times a day  benzonatate 100 milliGRAM(s) Oral three times a day  ferrous    sulfate 325 milliGRAM(s) Oral three times a day  folic acid 1 milliGRAM(s) Oral daily  guaiFENesin   Syrup  (Sugar-Free) 200 milliGRAM(s) Oral every 6 hours  influenza   Vaccine 0.5 milliLiter(s) IntraMuscular once  multivitamin 1 Tablet(s) Oral daily  rivaroxaban 15 milliGRAM(s) Oral two times a day with meals  simvastatin 5 milliGRAM(s) Oral at bedtime  zinc sulfate 220 milliGRAM(s) Oral daily    MEDICATIONS  (PRN):  acetaminophen   Tablet .. 650 milliGRAM(s) Oral every 6 hours PRN Temp greater or equal to 38C (100.4F), Mild Pain (1 - 3)  ALBUTerol    90 MICROgram(s) HFA Inhaler 2 Puff(s) Inhalation every 6 hours PRN Shortness of Breath and/or Wheezing    CAPILLARY BLOOD GLUCOSE        I&O's Summary    10 May 2020 07:01  -  11 May 2020 07:00  --------------------------------------------------------  IN: 0 mL / OUT: 400 mL / NET: -400 mL        PHYSICAL EXAM:  Vital Signs Last 24 Hrs  T(C): 36.5 (11 May 2020 12:22), Max: 36.9 (11 May 2020 04:31)  T(F): 97.7 (11 May 2020 12:22), Max: 98.4 (11 May 2020 04:31)  HR: 108 (11 May 2020 12:22) (88 - 108)  BP: 114/70 (11 May 2020 12:22) (108/69 - 124/79)  BP(mean): 90 (11 May 2020 04:31) (78 - 90)  RR: 22 (11 May 2020 12:22) (22 - 22)  SpO2: 98% (11 May 2020 12:22) (85% - 100%)  CONSTITUTIONAL: NAD, well-developed, well-groomed  ENMT: Moist oral mucosa, no pharyngeal injection or exudates; normal dentition  RESPIRATORY: Normal respiratory effort; lungs are clear to auscultation bilaterally  CARDIOVASCULAR: Regular rate and rhythm, normal S1 and S2, no murmur/rub/gallop; No lower extremity edema; Peripheral pulses are 2+ bilaterally  ABDOMEN: Nontender to palpation, normoactive bowel sounds, no rebound/guarding; No hepatosplenomegaly  PSYCH: A+O to person, place, and time; affect appropriate  NEUROLOGY: CN 2-12 are intact and symmetric; no gross sensory deficits   SKIN: No rashes; no palpable lesions    LABS:                        7.6    13.60 )-----------( 478      ( 11 May 2020 05:40 )             25.6     05-11    138  |  98  |  5<L>  ----------------------------<  96  3.7   |  27  |  0.47<L>    Ca    9.3      11 May 2020 05:40  Phos  4.0     05-11  Mg     1.6     05-11    TPro  8.0  /  Alb  3.0<L>  /  TBili  < 0.2<L>  /  DBili  x   /  AST  15  /  ALT  10  /  AlkPhos  73  05-11              COVID-19 PCR: Detected (28 Mar 2020 17:24)      RADIOLOGY & ADDITIONAL TESTS:  Imaging from Last 24 Hours:    Electrocardiogram/QTc Interval:    COORDINATION OF CARE:  Care Discussed with Consultants/Other Providers:

## 2020-05-12 LAB
ALBUMIN SERPL ELPH-MCNC: 3.3 G/DL — SIGNIFICANT CHANGE UP (ref 3.3–5)
ALP SERPL-CCNC: 71 U/L — SIGNIFICANT CHANGE UP (ref 40–120)
ALT FLD-CCNC: 9 U/L — SIGNIFICANT CHANGE UP (ref 4–33)
ANION GAP SERPL CALC-SCNC: 12 MMO/L — SIGNIFICANT CHANGE UP (ref 7–14)
ANISOCYTOSIS BLD QL: SIGNIFICANT CHANGE UP
AST SERPL-CCNC: 15 U/L — SIGNIFICANT CHANGE UP (ref 4–32)
BASOPHILS # BLD AUTO: 0.04 K/UL — SIGNIFICANT CHANGE UP (ref 0–0.2)
BASOPHILS NFR BLD AUTO: 0.3 % — SIGNIFICANT CHANGE UP (ref 0–2)
BILIRUB SERPL-MCNC: < 0.2 MG/DL — LOW (ref 0.2–1.2)
BUN SERPL-MCNC: 4 MG/DL — LOW (ref 7–23)
CALCIUM SERPL-MCNC: 9.5 MG/DL — SIGNIFICANT CHANGE UP (ref 8.4–10.5)
CHLORIDE SERPL-SCNC: 97 MMOL/L — LOW (ref 98–107)
CO2 SERPL-SCNC: 27 MMOL/L — SIGNIFICANT CHANGE UP (ref 22–31)
CREAT SERPL-MCNC: 0.48 MG/DL — LOW (ref 0.5–1.3)
DACRYOCYTES BLD QL SMEAR: SLIGHT — SIGNIFICANT CHANGE UP
EOSINOPHIL # BLD AUTO: 0.57 K/UL — HIGH (ref 0–0.5)
EOSINOPHIL NFR BLD AUTO: 4.3 % — SIGNIFICANT CHANGE UP (ref 0–6)
FERRITIN SERPL-MCNC: 83.13 NG/ML — SIGNIFICANT CHANGE UP (ref 15–150)
GIANT PLATELETS BLD QL SMEAR: PRESENT — SIGNIFICANT CHANGE UP
GLUCOSE SERPL-MCNC: 126 MG/DL — HIGH (ref 70–99)
HAPTOGLOB SERPL-MCNC: 403 MG/DL — HIGH (ref 34–200)
HCT VFR BLD CALC: 26.5 % — LOW (ref 34.5–45)
HGB BLD-MCNC: 7.8 G/DL — LOW (ref 11.5–15.5)
HYPOCHROMIA BLD QL: SLIGHT — SIGNIFICANT CHANGE UP
IMM GRANULOCYTES NFR BLD AUTO: 0.9 % — SIGNIFICANT CHANGE UP (ref 0–1.5)
IRON SATN MFR SERPL: 250 UG/DL — SIGNIFICANT CHANGE UP (ref 140–530)
IRON SATN MFR SERPL: 32 UG/DL — SIGNIFICANT CHANGE UP (ref 30–160)
LDH SERPL L TO P-CCNC: 291 U/L — HIGH (ref 135–225)
LYMPHOCYTES # BLD AUTO: 16.4 % — SIGNIFICANT CHANGE UP (ref 13–44)
LYMPHOCYTES # BLD AUTO: 2.18 K/UL — SIGNIFICANT CHANGE UP (ref 1–3.3)
MACROCYTES BLD QL: SLIGHT — SIGNIFICANT CHANGE UP
MAGNESIUM SERPL-MCNC: 1.6 MG/DL — SIGNIFICANT CHANGE UP (ref 1.6–2.6)
MANUAL SMEAR VERIFICATION: SIGNIFICANT CHANGE UP
MCHC RBC-ENTMCNC: 23.8 PG — LOW (ref 27–34)
MCHC RBC-ENTMCNC: 29.4 % — LOW (ref 32–36)
MCV RBC AUTO: 80.8 FL — SIGNIFICANT CHANGE UP (ref 80–100)
MICROCYTES BLD QL: SIGNIFICANT CHANGE UP
MONOCYTES # BLD AUTO: 0.92 K/UL — HIGH (ref 0–0.9)
MONOCYTES NFR BLD AUTO: 6.9 % — SIGNIFICANT CHANGE UP (ref 2–14)
NEUTROPHILS # BLD AUTO: 9.45 K/UL — HIGH (ref 1.8–7.4)
NEUTROPHILS NFR BLD AUTO: 71.2 % — SIGNIFICANT CHANGE UP (ref 43–77)
NRBC # FLD: 0 K/UL — SIGNIFICANT CHANGE UP (ref 0–0)
PLATELET # BLD AUTO: 406 K/UL — HIGH (ref 150–400)
PLATELET CLUMP BLD QL SMEAR: SLIGHT — SIGNIFICANT CHANGE UP
PLATELET COUNT - ESTIMATE: NORMAL — SIGNIFICANT CHANGE UP
PMV BLD: 10.8 FL — SIGNIFICANT CHANGE UP (ref 7–13)
POIKILOCYTOSIS BLD QL AUTO: SIGNIFICANT CHANGE UP
POLYCHROMASIA BLD QL SMEAR: SIGNIFICANT CHANGE UP
POTASSIUM SERPL-MCNC: 3.4 MMOL/L — LOW (ref 3.5–5.3)
POTASSIUM SERPL-SCNC: 3.4 MMOL/L — LOW (ref 3.5–5.3)
PROT SERPL-MCNC: 8.7 G/DL — HIGH (ref 6–8.3)
RBC # BLD: 3.28 M/UL — LOW (ref 3.8–5.2)
RBC # FLD: 25.7 % — HIGH (ref 10.3–14.5)
RETICS #: 85 K/UL — SIGNIFICANT CHANGE UP (ref 25–125)
RETICS/RBC NFR: 2.6 % — HIGH (ref 0.5–2.5)
REVIEW TO FOLLOW: YES — SIGNIFICANT CHANGE UP
SODIUM SERPL-SCNC: 136 MMOL/L — SIGNIFICANT CHANGE UP (ref 135–145)
SPHEROCYTES BLD QL SMEAR: SLIGHT — SIGNIFICANT CHANGE UP
TARGETS BLD QL SMEAR: SIGNIFICANT CHANGE UP
UIBC SERPL-MCNC: 217.7 UG/DL — SIGNIFICANT CHANGE UP (ref 110–370)
WBC # BLD: 13.28 K/UL — HIGH (ref 3.8–10.5)
WBC # FLD AUTO: 13.28 K/UL — HIGH (ref 3.8–10.5)

## 2020-05-12 PROCEDURE — 99232 SBSQ HOSP IP/OBS MODERATE 35: CPT

## 2020-05-12 RX ORDER — POTASSIUM CHLORIDE 20 MEQ
40 PACKET (EA) ORAL ONCE
Refills: 0 | Status: COMPLETED | OUTPATIENT
Start: 2020-05-12 | End: 2020-05-12

## 2020-05-12 RX ADMIN — Medication 40 MILLIEQUIVALENT(S): at 12:36

## 2020-05-12 RX ADMIN — Medication 325 MILLIGRAM(S): at 06:41

## 2020-05-12 RX ADMIN — Medication 100 MILLIGRAM(S): at 20:28

## 2020-05-12 RX ADMIN — Medication 500 MILLIGRAM(S): at 06:41

## 2020-05-12 RX ADMIN — Medication 650 MILLIGRAM(S): at 20:25

## 2020-05-12 RX ADMIN — Medication 200 MILLIGRAM(S): at 23:07

## 2020-05-12 RX ADMIN — Medication 1 MILLIGRAM(S): at 12:37

## 2020-05-12 RX ADMIN — Medication 200 MILLIGRAM(S): at 06:42

## 2020-05-12 RX ADMIN — ZINC SULFATE TAB 220 MG (50 MG ZINC EQUIVALENT) 220 MILLIGRAM(S): 220 (50 ZN) TAB at 12:37

## 2020-05-12 RX ADMIN — Medication 500 MILLIGRAM(S): at 16:47

## 2020-05-12 RX ADMIN — RIVAROXABAN 15 MILLIGRAM(S): KIT at 16:47

## 2020-05-12 RX ADMIN — Medication 100 MILLIGRAM(S): at 12:37

## 2020-05-12 RX ADMIN — Medication 325 MILLIGRAM(S): at 20:28

## 2020-05-12 RX ADMIN — Medication 325 MILLIGRAM(S): at 12:37

## 2020-05-12 RX ADMIN — SIMVASTATIN 5 MILLIGRAM(S): 20 TABLET, FILM COATED ORAL at 20:28

## 2020-05-12 RX ADMIN — Medication 100 MILLIGRAM(S): at 06:41

## 2020-05-12 RX ADMIN — Medication 1 TABLET(S): at 12:37

## 2020-05-12 RX ADMIN — RIVAROXABAN 15 MILLIGRAM(S): KIT at 09:03

## 2020-05-12 RX ADMIN — Medication 200 MILLIGRAM(S): at 12:37

## 2020-05-12 RX ADMIN — AMLODIPINE BESYLATE 5 MILLIGRAM(S): 2.5 TABLET ORAL at 06:41
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
## 2020-05-12 NOTE — PROGRESS NOTE ADULT - ASSESSMENT
Patient is a 44y old  Female who presents with a chief complaint of Acute Hypoxemic Respiratory Failure and Pneumonia      SUBJECTIVE / OVERNIGHT EVENTS: breathing comfortable supine, sitting on NRB 95 %, no cough, eating well      s/p Cetriaxone, Azrithromycin, soulmedrol, Tolizimab    s/p convalecent plasma    Leukocytosis, unclear etiology    Now off antibx      HCT up to 26, fe studies orderd , Fe studies normal, will order hemolysis, b 12    Pulmonary follow up requested, I spoke with fellow, CXR and then they will see patient if no oimprovement on CXR or clinically    CXR yesterday, no changed      Assessment and Plan:   · Assessment	  45 yo F w/ hX HTN p/w ETIENNE and cough. Multifocal PNA treated with Abx (Ceftriaxone 3/28-4/1  Azithromycin 3/28-4/1),  HCQ 3/29-4/2, Solumedrol 3/30-4/4, Tocilizumab 3/31, 4/1, Convalescent plasma 4/29. complicated by PE question of HIT was seen by heme KEO neg on xarelto. Leukocytosis of unclear etiology with neg cx and CT for source currently monitoring off antibiotics.     CXR unchanged     Problem/Plan - 1:  ·  Problem: Pneumonia due to 2019 novel coronavirus.  Plan: COVID with acute hypoxic respiratory failure  -symptomatic care antitussive meds  -vitamin C and zinc  -cont to wean off oxygen with goal spo2 92-95% currently on NRB 15 L.  Today try 10 L NC     Problem/Plan - 2:  ·  Problem: Acute pulmonary embolism without acute cor pulmonale, unspecified pulmonary embolism type.  Plan: on AC with xarelto.      Problem/Plan - 3:  ·  Problem: Leukocytosis, unspecified type.  Plan: remains elevated but downtrending ID following.      Problem/Plan - 4:  ·  Problem: Anemia, unspecified type.  Plan: monitor CBC.     Attending Attestation:   I was physically present for the key portions of the evaluation and management (E/M) service provided.  I agree with the above history, physical, and plan which I have reviewed and edited where appropriate.     Plan discussed with ACP.    Perfecto Tamez MD    acting hospitalist    discussed with ACP Patient is a 44y old  Female who presents with a chief complaint of Acute Hypoxemic Respiratory Failure and Pneumonia      SUBJECTIVE / OVERNIGHT EVENTS: breathing comfortable supine, sitting on NRB 95 %, no cough, eating well      s/p Cetriaxone, Azrithromycin, soulmedrol, Tolizimab    s/p convalecent plasma    Leukocytosis, unclear etiology    Now off antibx      HCT up to 26, fe studies orderd , Fe studies normal, will order hemolysis, b 12    Pulmonary follow up requested, I spoke with fellow, CXR and then they will see patient if no oimprovement on CXR or clinically    CXR yesterday, no changed      Assessment and Plan:   · Assessment	  45 yo F w/ hX HTN p/w ETIENNE and cough. Multifocal PNA treated with Abx (Ceftriaxone 3/28-4/1  Azithromycin 3/28-4/1),  HCQ 3/29-4/2, Solumedrol 3/30-4/4, Tocilizumab 3/31, 4/1, Convalescent plasma 4/29. complicated by PE question of HIT was seen by heme KEO neg on xarelto. Leukocytosis of unclear etiology with neg cx and CT for source currently monitoring off antibiotics.     CXR unchanged     Problem/Plan - 1:  ·  Problem: Pneumonia due to 2019 novel coronavirus.  Plan: COVID with acute hypoxic respiratory failure  -symptomatic care antitussive meds  -vitamin C and zinc  -cont to wean off oxygen with goal spo2 92-95% currently on NRB 15 L.  Today try 10 L NC     Problem/Plan - 2:  ·  Problem: Acute pulmonary embolism without acute cor pulmonale, unspecified pulmonary embolism type.  Plan: on AC with xarelto.      Problem/Plan - 3:  ·  Problem: Leukocytosis, unspecified type.  Plan: remains elevated but downtrending ID following.      Problem/Plan - 4:  ·  Problem: Anemia, unspecified type.  Plan: monitor CBC.     Attending Attestation:   I was physically present for the key portions of the evaluation and management (E/M) service provided.  I agree with the above history, physical, and plan which I have reviewed and edited where appropriate.     Plan discussed with ACP.    Perfecto Tamez MD    acting hospitalist    I spoke with father [phone]    discussed with ACP
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
## 2020-05-12 NOTE — PROGRESS NOTE ADULT - SUBJECTIVE AND OBJECTIVE BOX
CC: Patient is a 44y old  Female who presents with a chief complaint of Acute Hypoxemic Respiratory Failure and Pneumonia (11 May 2020 15:40)    ID following for COVID 19 infection    Interval History/ROS: Patient remains on NRB. Comfortable while sitting, desaturates into the 80s when she speaks. No fevers, no chills.    Rest of ROS negative.    Allergies  No Known Drug Allergies  Numbness (Other)    ANTIMICROBIALS:      OTHER MEDS:  acetaminophen   Tablet .. 650 milliGRAM(s) Oral every 6 hours PRN  ALBUTerol    90 MICROgram(s) HFA Inhaler 2 Puff(s) Inhalation every 6 hours PRN  amLODIPine   Tablet 5 milliGRAM(s) Oral daily  ascorbic acid 500 milliGRAM(s) Oral two times a day  benzonatate 100 milliGRAM(s) Oral three times a day  ferrous    sulfate 325 milliGRAM(s) Oral three times a day  folic acid 1 milliGRAM(s) Oral daily  guaiFENesin   Syrup  (Sugar-Free) 200 milliGRAM(s) Oral every 6 hours  influenza   Vaccine 0.5 milliLiter(s) IntraMuscular once  multivitamin 1 Tablet(s) Oral daily  potassium chloride   Powder 40 milliEquivalent(s) Oral once  rivaroxaban 15 milliGRAM(s) Oral two times a day with meals  simvastatin 5 milliGRAM(s) Oral at bedtime  zinc sulfate 220 milliGRAM(s) Oral daily      PE:    Vital Signs Last 24 Hrs  T(C): 37.2 (11 May 2020 20:55), Max: 37.2 (11 May 2020 20:55)  T(F): 99 (11 May 2020 20:55), Max: 99 (11 May 2020 20:55)  HR: 107 (12 May 2020 05:43) (100 - 108)  BP: 126/70 (12 May 2020 05:43) (114/70 - 126/70)  BP(mean): 84 (12 May 2020 05:43) (84 - 97)  RR: 22 (12 May 2020 05:43) (22 - 22)  SpO2: 95% (12 May 2020 05:43) (95% - 98%)    Gen: AOx3, on NRB  CV: S1+S2 normal, no murmurs  Resp: Clear bilat, no resp distress  Abd: Soft, nontender, +BS  Ext: No LE edema, no wounds  : No Tamayo  IV/Skin: No thrombophlebitis  Neuro: no focal deficits    LABS:                          7.8    13.28 )-----------( 406      ( 12 May 2020 10:18 )             26.5       05-12    136  |  97<L>  |  4<L>  ----------------------------<  126<H>  3.4<L>   |  27  |  0.48<L>    Ca    9.5      12 May 2020 10:18  Phos  4.0     05-11  Mg     1.6     05-12    TPro  8.7<H>  /  Alb  3.3  /  TBili  < 0.2<L>  /  DBili  x   /  AST  15  /  ALT  9   /  AlkPhos  71  05-12    MICROBIOLOGY:  v  .Blood Blood-Venous  04-30-20   No Growth Final  --  --      .Blood Blood-Peripheral  04-30-20   No Growth Final  --  --    RADIOLOGY:    < from: Xray Chest 2 Views PA/Lat (05.11.20 @ 15:02) >  IMPRESSION:    Diffuse bilateral patchy opacities are unchanged.     < end of copied text >

## 2020-05-12 NOTE — PROGRESS NOTE ADULT - SUBJECTIVE AND OBJECTIVE BOX
Medicine Progress Note    Patient is a 44y old  Female who presents with a chief complaint of Acute Hypoxemic Respiratory Failure and Pneumonia (11 May 2020 15:40)      SUBJECTIVE / OVERNIGHT EVENTS:      Patient is a 44y old  Female who presents with a chief complaint of Acute Hypoxemic Respiratory Failure and Pneumonia      SUBJECTIVE / OVERNIGHT EVENTS: breathing comfortable supine, sitting on NRB 95 %, no cough, eating well      s/p Cetriaxone, Azrithromycin, soulmedrol, Tolizimab    s/p convalecent plasma    Leukocytosis, unclear etiology    Now off antibx      HCT up to 26, fe studies orderd , Fe studies normal, will order hemolysis, b 12    Pulmonary follow up requested, I spoke with fellow, CXR and then they will see patient if no oimprovement on CXR or clinically    CXR yesterday, no cahnged        ADDITIONAL REVIEW OF SYSTEMS:    MEDICATIONS  (STANDING):  amLODIPine   Tablet 5 milliGRAM(s) Oral daily  ascorbic acid 500 milliGRAM(s) Oral two times a day  benzonatate 100 milliGRAM(s) Oral three times a day  ferrous    sulfate 325 milliGRAM(s) Oral three times a day  folic acid 1 milliGRAM(s) Oral daily  guaiFENesin   Syrup  (Sugar-Free) 200 milliGRAM(s) Oral every 6 hours  influenza   Vaccine 0.5 milliLiter(s) IntraMuscular once  multivitamin 1 Tablet(s) Oral daily  potassium chloride   Powder 40 milliEquivalent(s) Oral once  rivaroxaban 15 milliGRAM(s) Oral two times a day with meals  simvastatin 5 milliGRAM(s) Oral at bedtime  zinc sulfate 220 milliGRAM(s) Oral daily    MEDICATIONS  (PRN):  acetaminophen   Tablet .. 650 milliGRAM(s) Oral every 6 hours PRN Temp greater or equal to 38C (100.4F), Mild Pain (1 - 3)  ALBUTerol    90 MICROgram(s) HFA Inhaler 2 Puff(s) Inhalation every 6 hours PRN Shortness of Breath and/or Wheezing    CAPILLARY BLOOD GLUCOSE        I&O's Summary    11 May 2020 07:01  -  12 May 2020 07:00  --------------------------------------------------------  IN: 0 mL / OUT: 1200 mL / NET: -1200 mL        PHYSICAL EXAM:  Vital Signs Last 24 Hrs  T(C): 37.2 (11 May 2020 20:55), Max: 37.2 (11 May 2020 20:55)  T(F): 99 (11 May 2020 20:55), Max: 99 (11 May 2020 20:55)  HR: 107 (12 May 2020 05:43) (100 - 108)  BP: 126/70 (12 May 2020 05:43) (114/70 - 126/70)  BP(mean): 84 (12 May 2020 05:43) (84 - 97)  RR: 22 (12 May 2020 05:43) (22 - 22)  SpO2: 95% (12 May 2020 05:43) (95% - 98%)  CONSTITUTIONAL: NAD, well-developed, well-groomed  ENMT: Moist oral mucosa, no pharyngeal injection or exudates; normal dentition  RESPIRATORY: Normal respiratory effort; lungs are clear to auscultation bilaterally  CARDIOVASCULAR: Regular rate and rhythm, normal S1 and S2, no murmur/rub/gallop; No lower extremity edema; Peripheral pulses are 2+ bilaterally  ABDOMEN: Nontender to palpation, normoactive bowel sounds, no rebound/guarding; No hepatosplenomegaly  PSYCH: A+O to person, place, and time; affect appropriate  NEUROLOGY: CN 2-12 are intact and symmetric; no gross sensory deficits   SKIN: No rashes; no palpable lesions    LABS:                        7.8    13.28 )-----------( 406      ( 12 May 2020 10:18 )             26.5     05-12    136  |  97<L>  |  4<L>  ----------------------------<  126<H>  3.4<L>   |  27  |  0.48<L>    Ca    9.5      12 May 2020 10:18  Phos  4.0     05-11  Mg     1.6     05-12    TPro  8.7<H>  /  Alb  3.3  /  TBili  < 0.2<L>  /  DBili  x   /  AST  15  /  ALT  9   /  AlkPhos  71  05-12              COVID-19 PCR: Detected (28 Mar 2020 17:24)      RADIOLOGY & ADDITIONAL TESTS:  Imaging from Last 24 Hours:    Electrocardiogram/QTc Interval:    COORDINATION OF CARE:  Care Discussed with Consultants/Other Providers:

## 2020-05-13 LAB
ALBUMIN SERPL ELPH-MCNC: 2.9 G/DL — LOW (ref 3.3–5)
ALP SERPL-CCNC: 69 U/L — SIGNIFICANT CHANGE UP (ref 40–120)
ALT FLD-CCNC: 11 U/L — SIGNIFICANT CHANGE UP (ref 4–33)
ANION GAP SERPL CALC-SCNC: 12 MMO/L — SIGNIFICANT CHANGE UP (ref 7–14)
ANISOCYTOSIS BLD QL: SLIGHT — SIGNIFICANT CHANGE UP
AST SERPL-CCNC: 16 U/L — SIGNIFICANT CHANGE UP (ref 4–32)
BASOPHILS # BLD AUTO: 0.03 K/UL — SIGNIFICANT CHANGE UP (ref 0–0.2)
BASOPHILS NFR BLD AUTO: 0.3 % — SIGNIFICANT CHANGE UP (ref 0–2)
BASOPHILS NFR SPEC: 0.9 % — SIGNIFICANT CHANGE UP (ref 0–2)
BILIRUB SERPL-MCNC: < 0.2 MG/DL — LOW (ref 0.2–1.2)
BLASTS # FLD: 0 % — SIGNIFICANT CHANGE UP (ref 0–0)
BUN SERPL-MCNC: 4 MG/DL — LOW (ref 7–23)
CALCIUM SERPL-MCNC: 9.2 MG/DL — SIGNIFICANT CHANGE UP (ref 8.4–10.5)
CHLORIDE SERPL-SCNC: 98 MMOL/L — SIGNIFICANT CHANGE UP (ref 98–107)
CO2 SERPL-SCNC: 27 MMOL/L — SIGNIFICANT CHANGE UP (ref 22–31)
CREAT SERPL-MCNC: 0.42 MG/DL — LOW (ref 0.5–1.3)
EOSINOPHIL # BLD AUTO: 0.43 K/UL — SIGNIFICANT CHANGE UP (ref 0–0.5)
EOSINOPHIL NFR BLD AUTO: 3.9 % — SIGNIFICANT CHANGE UP (ref 0–6)
EOSINOPHIL NFR FLD: 5.4 % — SIGNIFICANT CHANGE UP (ref 0–6)
GIANT PLATELETS BLD QL SMEAR: PRESENT — SIGNIFICANT CHANGE UP
GLUCOSE SERPL-MCNC: 101 MG/DL — HIGH (ref 70–99)
HAPTOGLOB SERPL-MCNC: 375 MG/DL — HIGH (ref 34–200)
HCT VFR BLD CALC: 26 % — LOW (ref 34.5–45)
HGB BLD-MCNC: 7.6 G/DL — LOW (ref 11.5–15.5)
HYPOCHROMIA BLD QL: SLIGHT — SIGNIFICANT CHANGE UP
IMM GRANULOCYTES NFR BLD AUTO: 0.9 % — SIGNIFICANT CHANGE UP (ref 0–1.5)
LYMPHOCYTES # BLD AUTO: 18.3 % — SIGNIFICANT CHANGE UP (ref 13–44)
LYMPHOCYTES # BLD AUTO: 2.01 K/UL — SIGNIFICANT CHANGE UP (ref 1–3.3)
LYMPHOCYTES NFR SPEC AUTO: 8.9 % — LOW (ref 13–44)
MACROCYTES BLD QL: SLIGHT — SIGNIFICANT CHANGE UP
MAGNESIUM SERPL-MCNC: 1.7 MG/DL — SIGNIFICANT CHANGE UP (ref 1.6–2.6)
MCHC RBC-ENTMCNC: 23.7 PG — LOW (ref 27–34)
MCHC RBC-ENTMCNC: 29.2 % — LOW (ref 32–36)
MCV RBC AUTO: 81 FL — SIGNIFICANT CHANGE UP (ref 80–100)
METAMYELOCYTES # FLD: 0.9 % — SIGNIFICANT CHANGE UP (ref 0–1)
MICROCYTES BLD QL: SLIGHT — SIGNIFICANT CHANGE UP
MONOCYTES # BLD AUTO: 0.86 K/UL — SIGNIFICANT CHANGE UP (ref 0–0.9)
MONOCYTES NFR BLD AUTO: 7.8 % — SIGNIFICANT CHANGE UP (ref 2–14)
MONOCYTES NFR BLD: 7.1 % — SIGNIFICANT CHANGE UP (ref 2–9)
MYELOCYTES NFR BLD: 0 % — SIGNIFICANT CHANGE UP (ref 0–0)
NEUTROPHIL AB SER-ACNC: 72.3 % — SIGNIFICANT CHANGE UP (ref 43–77)
NEUTROPHILS # BLD AUTO: 7.57 K/UL — HIGH (ref 1.8–7.4)
NEUTROPHILS NFR BLD AUTO: 68.8 % — SIGNIFICANT CHANGE UP (ref 43–77)
NEUTS BAND # BLD: 4.5 % — SIGNIFICANT CHANGE UP (ref 0–6)
NRBC # BLD: 2 /100WBC — SIGNIFICANT CHANGE UP
NRBC # FLD: 0 K/UL — SIGNIFICANT CHANGE UP (ref 0–0)
OTHER - HEMATOLOGY %: 0 — SIGNIFICANT CHANGE UP
OVALOCYTES BLD QL SMEAR: SLIGHT — SIGNIFICANT CHANGE UP
PLATELET # BLD AUTO: 187 K/UL — SIGNIFICANT CHANGE UP (ref 150–400)
PLATELET COUNT - ESTIMATE: NORMAL — SIGNIFICANT CHANGE UP
PMV BLD: SIGNIFICANT CHANGE UP FL (ref 7–13)
POIKILOCYTOSIS BLD QL AUTO: SLIGHT — SIGNIFICANT CHANGE UP
POLYCHROMASIA BLD QL SMEAR: SIGNIFICANT CHANGE UP
POTASSIUM SERPL-MCNC: 4 MMOL/L — SIGNIFICANT CHANGE UP (ref 3.5–5.3)
POTASSIUM SERPL-SCNC: 4 MMOL/L — SIGNIFICANT CHANGE UP (ref 3.5–5.3)
PROMYELOCYTES # FLD: 0 % — SIGNIFICANT CHANGE UP (ref 0–0)
PROT SERPL-MCNC: 7.8 G/DL — SIGNIFICANT CHANGE UP (ref 6–8.3)
RBC # BLD: 3.21 M/UL — LOW (ref 3.8–5.2)
RBC # FLD: 25.9 % — HIGH (ref 10.3–14.5)
RETICS #: 92 K/UL — SIGNIFICANT CHANGE UP (ref 25–125)
RETICS/RBC NFR: 2.9 % — HIGH (ref 0.5–2.5)
SCHISTOCYTES BLD QL AUTO: SLIGHT — SIGNIFICANT CHANGE UP
SODIUM SERPL-SCNC: 137 MMOL/L — SIGNIFICANT CHANGE UP (ref 135–145)
TARGETS BLD QL SMEAR: SLIGHT — SIGNIFICANT CHANGE UP
VARIANT LYMPHS # BLD: 0 % — SIGNIFICANT CHANGE UP
VIT B12 SERPL-MCNC: 391 PG/ML — SIGNIFICANT CHANGE UP (ref 200–900)
WBC # BLD: 11 K/UL — HIGH (ref 3.8–10.5)
WBC # FLD AUTO: 11 K/UL — HIGH (ref 3.8–10.5)

## 2020-05-13 PROCEDURE — 99232 SBSQ HOSP IP/OBS MODERATE 35: CPT

## 2020-05-13 RX ADMIN — Medication 200 MILLIGRAM(S): at 12:06

## 2020-05-13 RX ADMIN — ZINC SULFATE TAB 220 MG (50 MG ZINC EQUIVALENT) 220 MILLIGRAM(S): 220 (50 ZN) TAB at 12:06

## 2020-05-13 RX ADMIN — SIMVASTATIN 5 MILLIGRAM(S): 20 TABLET, FILM COATED ORAL at 22:11

## 2020-05-13 RX ADMIN — Medication 325 MILLIGRAM(S): at 22:11

## 2020-05-13 RX ADMIN — Medication 200 MILLIGRAM(S): at 05:54

## 2020-05-13 RX ADMIN — Medication 200 MILLIGRAM(S): at 17:28

## 2020-05-13 RX ADMIN — AMLODIPINE BESYLATE 5 MILLIGRAM(S): 2.5 TABLET ORAL at 05:54

## 2020-05-13 RX ADMIN — Medication 500 MILLIGRAM(S): at 05:55

## 2020-05-13 RX ADMIN — RIVAROXABAN 15 MILLIGRAM(S): KIT at 17:28

## 2020-05-13 RX ADMIN — Medication 325 MILLIGRAM(S): at 12:06

## 2020-05-13 RX ADMIN — Medication 1 MILLIGRAM(S): at 12:06

## 2020-05-13 RX ADMIN — Medication 500 MILLIGRAM(S): at 17:28

## 2020-05-13 RX ADMIN — RIVAROXABAN 15 MILLIGRAM(S): KIT at 08:34

## 2020-05-13 RX ADMIN — Medication 200 MILLIGRAM(S): at 22:11

## 2020-05-13 RX ADMIN — Medication 100 MILLIGRAM(S): at 12:06

## 2020-05-13 RX ADMIN — Medication 325 MILLIGRAM(S): at 05:56

## 2020-05-13 RX ADMIN — Medication 1 TABLET(S): at 12:06

## 2020-05-13 RX ADMIN — Medication 100 MILLIGRAM(S): at 05:54

## 2020-05-13 RX ADMIN — Medication 100 MILLIGRAM(S): at 22:11

## 2020-05-13 NOTE — PROGRESS NOTE ADULT - SUBJECTIVE AND OBJECTIVE BOX
CC: Patient is a 44y old  Female who presents with a chief complaint of Acute Hypoxemic Respiratory Failure and Pneumonia (13 May 2020 12:26)    ID following for COVID19 infection    Interval History/ROS: Patient remains on NRB. Remains dyspneic when speaking. No fevers. WBC continues to improve.    Rest of ROS negative.    Allergies  No Known Drug Allergies  Numbness (Other)    ANTIMICROBIALS:      OTHER MEDS:  acetaminophen   Tablet .. 650 milliGRAM(s) Oral every 6 hours PRN  ALBUTerol    90 MICROgram(s) HFA Inhaler 2 Puff(s) Inhalation every 6 hours PRN  amLODIPine   Tablet 5 milliGRAM(s) Oral daily  ascorbic acid 500 milliGRAM(s) Oral two times a day  benzonatate 100 milliGRAM(s) Oral three times a day  ferrous    sulfate 325 milliGRAM(s) Oral three times a day  folic acid 1 milliGRAM(s) Oral daily  guaiFENesin   Syrup  (Sugar-Free) 200 milliGRAM(s) Oral every 6 hours  influenza   Vaccine 0.5 milliLiter(s) IntraMuscular once  multivitamin 1 Tablet(s) Oral daily  rivaroxaban 15 milliGRAM(s) Oral two times a day with meals  simvastatin 5 milliGRAM(s) Oral at bedtime  zinc sulfate 220 milliGRAM(s) Oral daily    PE:    Vital Signs Last 24 Hrs  T(C): 37.2 (13 May 2020 12:59), Max: 37.2 (12 May 2020 20:38)  T(F): 99 (13 May 2020 12:59), Max: 99 (12 May 2020 20:38)  HR: 100 (13 May 2020 12:59) (100 - 108)  BP: 138/88 (13 May 2020 12:59) (110/70 - 138/88)  BP(mean): --  RR: 20 (13 May 2020 12:59) (20 - 22)  SpO2: 96% (13 May 2020 12:59) (92% - 96%)    Gen: AOx3, NAD  CV: S1+S2 normal, no murmurs  Resp: Clear bilat, no resp distress  Abd: Soft, nontender, +BS  Ext: No LE edema, no wounds  : No Tamayo  IV/Skin: No thrombophlebitis  Neuro: no focal deficits    LABS:                          7.6    11.00 )-----------( 187      ( 13 May 2020 05:20 )             26.0       05-13    137  |  98  |  4<L>  ----------------------------<  101<H>  4.0   |  27  |  0.42<L>    Ca    9.2      13 May 2020 05:20  Mg     1.7     05-13    TPro  7.8  /  Alb  2.9<L>  /  TBili  < 0.2<L>  /  DBili  x   /  AST  16  /  ALT  11  /  AlkPhos  69  05-13      MICROBIOLOGY:  v  .Blood Blood-Venous  04-30-20   No Growth Final  --  --      .Blood Blood-Peripheral  04-30-20   No Growth Final  --  --    RADIOLOGY:    < from: Xray Chest 2 Views PA/Lat (05.11.20 @ 15:02) >  IMPRESSION:    Diffuse bilateral patchy opacities are unchanged.     < end of copied text >

## 2020-05-13 NOTE — PROGRESS NOTE ADULT - SUBJECTIVE AND OBJECTIVE BOX
Medicine Progress Note    Patient is a 44y old  Female who presents with a chief complaint of Acute Hypoxemic Respiratory Failure and Pneumonia (12 May 2020 12:18)      SUBJECTIVE / OVERNIGHT EVENTS:        Patient is a 44y old  Female who presents with a chief complaint of Acute Hypoxemic Respiratory Failure and Pneumonia (11 May 2020 15:40)      SUBJECTIVE / OVERNIGHT EVENTS:      Patient is a 44y old  Female who presents with a chief complaint of Acute Hypoxemic Respiratory Failure and Pneumonia      SUBJECTIVE / OVERNIGHT EVENTS: breathing comfortable supine, sitting on NRB 95 %, no cough, eating well      s/p Cetriaxone, Azrithromycin, soulmedrol, Tolizimab    s/p convalecent plasma    Leukocytosis, unclear etiology    Now off antibx      HCT up to 26, fe studies  normal    On Xeralto    NRB comfortable, will try 10 NL again , did not tolerate for more than 30 min yesterday    CXR 5/11 no change      ADDITIONAL REVIEW OF SYSTEMS:    MEDICATIONS  (STANDING):  amLODIPine   Tablet 5 milliGRAM(s) Oral daily  ascorbic acid 500 milliGRAM(s) Oral two times a day  benzonatate 100 milliGRAM(s) Oral three times a day  ferrous    sulfate 325 milliGRAM(s) Oral three times a day  folic acid 1 milliGRAM(s) Oral daily  guaiFENesin   Syrup  (Sugar-Free) 200 milliGRAM(s) Oral every 6 hours  influenza   Vaccine 0.5 milliLiter(s) IntraMuscular once  multivitamin 1 Tablet(s) Oral daily  rivaroxaban 15 milliGRAM(s) Oral two times a day with meals  simvastatin 5 milliGRAM(s) Oral at bedtime  zinc sulfate 220 milliGRAM(s) Oral daily    MEDICATIONS  (PRN):  acetaminophen   Tablet .. 650 milliGRAM(s) Oral every 6 hours PRN Temp greater or equal to 38C (100.4F), Mild Pain (1 - 3)  ALBUTerol    90 MICROgram(s) HFA Inhaler 2 Puff(s) Inhalation every 6 hours PRN Shortness of Breath and/or Wheezing    CAPILLARY BLOOD GLUCOSE        I&O's Summary    12 May 2020 07:01  -  13 May 2020 07:00  --------------------------------------------------------  IN: 0 mL / OUT: 300 mL / NET: -300 mL        PHYSICAL EXAM:  Vital Signs Last 24 Hrs  T(C): 36.7 (13 May 2020 05:51), Max: 37.2 (12 May 2020 20:38)  T(F): 98 (13 May 2020 05:51), Max: 99 (12 May 2020 20:38)  HR: 101 (13 May 2020 05:51) (101 - 108)  BP: 126/73 (13 May 2020 05:51) (110/70 - 136/78)  BP(mean): --  RR: 20 (13 May 2020 05:51) (20 - 22)  SpO2: 94% (13 May 2020 05:51) (92% - 94%)  CONSTITUTIONAL: NAD, well-developed, well-groomed  ENMT: Moist oral mucosa, no pharyngeal injection or exudates; normal dentition  RESPIRATORY: Normal respiratory effort; lungs are clear to auscultation bilaterally  CARDIOVASCULAR: Regular rate and rhythm, normal S1 and S2, no murmur/rub/gallop; No lower extremity edema; Peripheral pulses are 2+ bilaterally  ABDOMEN: Nontender to palpation, normoactive bowel sounds, no rebound/guarding; No hepatosplenomegaly  PSYCH: A+O to person, place, and time; affect appropriate  NEUROLOGY: CN 2-12 are intact and symmetric; no gross sensory deficits   SKIN: No rashes; no palpable lesions    LABS:                        7.6    11.00 )-----------( 187      ( 13 May 2020 05:20 )             26.0     05-13    137  |  98  |  4<L>  ----------------------------<  101<H>  4.0   |  27  |  0.42<L>    Ca    9.2      13 May 2020 05:20  Mg     1.7     05-13    TPro  7.8  /  Alb  2.9<L>  /  TBili  < 0.2<L>  /  DBili  x   /  AST  16  /  ALT  11  /  AlkPhos  69  05-13              COVID-19 PCR: Detected (28 Mar 2020 17:24)      RADIOLOGY & ADDITIONAL TESTS:  Imaging from Last 24 Hours:    Electrocardiogram/QTc Interval:    COORDINATION OF CARE:  Care Discussed with Consultants/Other Providers:
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
## 2020-05-13 NOTE — PROGRESS NOTE ADULT - ASSESSMENT
Patient is a 44y old  Female who presents with a chief complaint of Acute Hypoxemic Respiratory Failure and Pneumonia (11 May 2020 15:40)      SUBJECTIVE / OVERNIGHT EVENTS:      Patient is a 44y old  Female who presents with a chief complaint of Acute Hypoxemic Respiratory Failure and Pneumonia      SUBJECTIVE / OVERNIGHT EVENTS: breathing comfortable supine, sitting on NRB 95 %, no cough, eating well      s/p Cetriaxone, Azrithromycin, soulmedrol, Tolizimab    s/p convalecent plasma    Leukocytosis, unclear etiology    Now off antibx      HCT up to 26, fe studies  normal    On Xeralto    NRB comfortable, will try 10 NL again , did not tolerate for more than 30 min yesterday    CXR 5/11 no change Patient is a 44y old  Female who presents with a chief complaint of Acute Hypoxemic Respiratory Failure and Pneumonia (11 May 2020 15:40)      SUBJECTIVE / OVERNIGHT EVENTS:      Patient is a 44y old  Female who presents with a chief complaint of Acute Hypoxemic Respiratory Failure and Pneumonia      SUBJECTIVE / OVERNIGHT EVENTS: breathing comfortable supine, sitting on NRB 95 %, no cough, eating well      s/p Cetriaxone, Azrithromycin, soulmedrol, Tolizimab    s/p convalecent plasma    Leukocytosis, unclear etiology      Assessment and Plan:   · Assessment	  45 yo F w/ hX HTN p/w ETIENNE and cough. Multifocal PNA treated with Abx (Ceftriaxone 3/28-4/1  Azithromycin 3/28-4/1),  HCQ 3/29-4/2, Solumedrol 3/30-4/4, Tocilizumab 3/31, 4/1, Convalescent plasma 4/29. complicated by PE question of HIT was seen by heme KEO neg on xarelto. Leukocytosis of unclear etiology with neg cx and CT for source currently monitoring off antibiotics.     CXR unchanged     Problem/Plan - 1:  ·  Problem: Pneumonia due to 2019 novel coronavirus.  Plan: COVID with acute hypoxic respiratory failure  -symptomatic care antitussive meds  -vitamin C and zinc  -cont to wean off oxygen with goal spo2 92-95% currently on NRB 15 L.  Today try 10 L NC again     Problem/Plan - 2:  ·  Problem: Acute pulmonary embolism without acute cor pulmonale, unspecified pulmonary embolism type.  Plan: on AC with xarelto.      Problem/Plan - 3:  ·  Problem: Leukocytosis, unspecified type.  Plan: remains elevated but downtrending ID following.      Problem/Plan - 4:  ·  Problem: Anemia, unspecified type.  Plan: monitor CBC.          Plan discussed with ACP.    Perfecto Tamez MD    acting hospitalist    I spoke with father [phone]    discussed with ACP            Now off antibx      HCT up to 26, fe studies  normal    On Xeralto    NRB comfortable, will try 10 NL again , did not tolerate for more than 30 min yesterday    CXR 5/11 no change

## 2020-05-14 LAB
ANION GAP SERPL CALC-SCNC: 12 MMO/L — SIGNIFICANT CHANGE UP (ref 7–14)
BASOPHILS # BLD AUTO: 0.03 K/UL — SIGNIFICANT CHANGE UP (ref 0–0.2)
BASOPHILS NFR BLD AUTO: 0.2 % — SIGNIFICANT CHANGE UP (ref 0–2)
BUN SERPL-MCNC: 5 MG/DL — LOW (ref 7–23)
CALCIUM SERPL-MCNC: 9.4 MG/DL — SIGNIFICANT CHANGE UP (ref 8.4–10.5)
CHLORIDE SERPL-SCNC: 98 MMOL/L — SIGNIFICANT CHANGE UP (ref 98–107)
CO2 SERPL-SCNC: 25 MMOL/L — SIGNIFICANT CHANGE UP (ref 22–31)
CREAT SERPL-MCNC: 0.46 MG/DL — LOW (ref 0.5–1.3)
EOSINOPHIL # BLD AUTO: 0.63 K/UL — HIGH (ref 0–0.5)
EOSINOPHIL NFR BLD AUTO: 5.2 % — SIGNIFICANT CHANGE UP (ref 0–6)
GLUCOSE SERPL-MCNC: 109 MG/DL — HIGH (ref 70–99)
HCT VFR BLD CALC: 28 % — LOW (ref 34.5–45)
HGB BLD-MCNC: 8.3 G/DL — LOW (ref 11.5–15.5)
IMM GRANULOCYTES NFR BLD AUTO: 0.9 % — SIGNIFICANT CHANGE UP (ref 0–1.5)
LYMPHOCYTES # BLD AUTO: 1.93 K/UL — SIGNIFICANT CHANGE UP (ref 1–3.3)
LYMPHOCYTES # BLD AUTO: 16 % — SIGNIFICANT CHANGE UP (ref 13–44)
MAGNESIUM SERPL-MCNC: 1.6 MG/DL — SIGNIFICANT CHANGE UP (ref 1.6–2.6)
MCHC RBC-ENTMCNC: 24 PG — LOW (ref 27–34)
MCHC RBC-ENTMCNC: 29.6 % — LOW (ref 32–36)
MCV RBC AUTO: 80.9 FL — SIGNIFICANT CHANGE UP (ref 80–100)
MONOCYTES # BLD AUTO: 0.84 K/UL — SIGNIFICANT CHANGE UP (ref 0–0.9)
MONOCYTES NFR BLD AUTO: 7 % — SIGNIFICANT CHANGE UP (ref 2–14)
NEUTROPHILS # BLD AUTO: 8.52 K/UL — HIGH (ref 1.8–7.4)
NEUTROPHILS NFR BLD AUTO: 70.7 % — SIGNIFICANT CHANGE UP (ref 43–77)
NRBC # FLD: 0 K/UL — SIGNIFICANT CHANGE UP (ref 0–0)
PHOSPHATE SERPL-MCNC: 4.3 MG/DL — SIGNIFICANT CHANGE UP (ref 2.5–4.5)
PLATELET # BLD AUTO: 230 K/UL — SIGNIFICANT CHANGE UP (ref 150–400)
PMV BLD: SIGNIFICANT CHANGE UP FL (ref 7–13)
POTASSIUM SERPL-MCNC: 4 MMOL/L — SIGNIFICANT CHANGE UP (ref 3.5–5.3)
POTASSIUM SERPL-SCNC: 4 MMOL/L — SIGNIFICANT CHANGE UP (ref 3.5–5.3)
RBC # BLD: 3.46 M/UL — LOW (ref 3.8–5.2)
RBC # FLD: 26.5 % — HIGH (ref 10.3–14.5)
SODIUM SERPL-SCNC: 135 MMOL/L — SIGNIFICANT CHANGE UP (ref 135–145)
WBC # BLD: 12.06 K/UL — HIGH (ref 3.8–10.5)
WBC # FLD AUTO: 12.06 K/UL — HIGH (ref 3.8–10.5)

## 2020-05-14 PROCEDURE — 99232 SBSQ HOSP IP/OBS MODERATE 35: CPT

## 2020-05-14 RX ADMIN — Medication 325 MILLIGRAM(S): at 12:47

## 2020-05-14 RX ADMIN — Medication 200 MILLIGRAM(S): at 12:47

## 2020-05-14 RX ADMIN — Medication 325 MILLIGRAM(S): at 06:03

## 2020-05-14 RX ADMIN — ZINC SULFATE TAB 220 MG (50 MG ZINC EQUIVALENT) 220 MILLIGRAM(S): 220 (50 ZN) TAB at 12:47

## 2020-05-14 RX ADMIN — Medication 100 MILLIGRAM(S): at 12:47

## 2020-05-14 RX ADMIN — AMLODIPINE BESYLATE 5 MILLIGRAM(S): 2.5 TABLET ORAL at 06:03

## 2020-05-14 RX ADMIN — Medication 100 MILLIGRAM(S): at 06:03

## 2020-05-14 RX ADMIN — Medication 500 MILLIGRAM(S): at 06:03

## 2020-05-14 RX ADMIN — Medication 325 MILLIGRAM(S): at 22:11

## 2020-05-14 RX ADMIN — Medication 100 MILLIGRAM(S): at 22:11

## 2020-05-14 RX ADMIN — Medication 1 MILLIGRAM(S): at 12:47

## 2020-05-14 RX ADMIN — RIVAROXABAN 15 MILLIGRAM(S): KIT at 09:05

## 2020-05-14 RX ADMIN — Medication 200 MILLIGRAM(S): at 06:03

## 2020-05-14 RX ADMIN — SIMVASTATIN 5 MILLIGRAM(S): 20 TABLET, FILM COATED ORAL at 22:11

## 2020-05-14 RX ADMIN — RIVAROXABAN 15 MILLIGRAM(S): KIT at 17:50

## 2020-05-14 RX ADMIN — Medication 200 MILLIGRAM(S): at 17:50

## 2020-05-14 RX ADMIN — Medication 1 TABLET(S): at 12:47

## 2020-05-14 RX ADMIN — Medication 500 MILLIGRAM(S): at 17:50

## 2020-05-14 NOTE — PROGRESS NOTE ADULT - ASSESSMENT
breathing comfortable supine, sitting on  %, no cough, eating well    I spoke with ACP, she will havee RN try to wean to NC 10 L as tolerated      s/p Cetriaxone, Azrithromycin, soulmedrol, Tolizimab    s/p convalecent plasma    Leukocytosis, unclear etiology    Now off antibx      HCT up to 26, fe studies  normal    On Xeralto    NRB comfortable, will try 10 NL again , did not tolerate for more than 30 min yesterday    CXR 5/11 no change      Assessment and Plan:   · Assessment	  Patient is a 44y old  Female who presents with a chief complaint of Acute Hypoxemic Respiratory Failure and Pneumonia (11 May 2020 15:40)      SUBJECTIVE / OVERNIGHT EVENTS:      Patient is a 44y old  Female who presents with a chief complaint of Acute Hypoxemic Respiratory Failure and Pneumonia      SUBJECTIVE / OVERNIGHT EVENTS: breathing comfortable supine, sitting on NRB 95 %, no cough, eating well      s/p Cetriaxone, Azrithromycin, soulmedrol, Tolizimab    s/p convalecent plasma    Leukocytosis, unclear etiology      Assessment and Plan:   · Assessment	  43 yo F w/ hX HTN p/w ETIENNE and cough. Multifocal PNA treated with Abx (Ceftriaxone 3/28-4/1  Azithromycin 3/28-4/1),  HCQ 3/29-4/2, Solumedrol 3/30-4/4, Tocilizumab 3/31, 4/1, Convalescent plasma 4/29. complicated by PE question of HIT was seen by heme KEO neg on xarelto. Leukocytosis of unclear etiology with neg cx and CT for source currently monitoring off antibiotics.     CXR unchanged     Problem/Plan - 1:  ·  Problem: Pneumonia due to 2019 novel coronavirus.  Plan: COVID with acute hypoxic respiratory failure  -symptomatic care antitussive meds  -vitamin C and zinc  -cont to wean off oxygen with goal spo2 92-95% currently on NRB 15 L.  Today try 10 L NC again     Problem/Plan - 2:  ·  Problem: Acute pulmonary embolism without acute cor pulmonale, unspecified pulmonary embolism type.  Plan: on AC with xarelto.      Problem/Plan - 3:  ·  Problem: Leukocytosis, unspecified type.  Plan: remains elevated but downtrending ID following.      Problem/Plan - 4:  ·  Problem: Anemia, unspecified type.  Plan: monitor CBC.          Plan discussed with ACP.    Perfecto Tamez MD    acting hospitalist    I spoke with father [phone]    discussed with ACP            Now off antibx      HCT up to 26, fe studies  normal    On Xeralto    NRB comfortable, will try 10 NL again , did not tolerate for more than 30 min yesterday    CXR 5/11 no change

## 2020-05-14 NOTE — CHART NOTE - NSCHARTNOTEFT_GEN_A_CORE
Attempted to place pt on NC at 10 l/min however pt desats. Pt placed on NRB at 10l/min and is sating in low 90s and high 80s on movement. At this time pt cannot tolerate anything less than 10 l/min NRB

## 2020-05-14 NOTE — PROGRESS NOTE ADULT - SUBJECTIVE AND OBJECTIVE BOX
Medicine Progress Note    Patient is a 44y old  Female who presents with a chief complaint of Acute Hypoxemic Respiratory Failure and Pneumonia (13 May 2020 13:38)      SUBJECTIVE / OVERNIGHT EVENTS:      breathing comfortable supine, sitting on  %, no cough, eating well    I spoke with ACP, she will havee RN try to wean to NC 10 L as tolerated      s/p Cetriaxone, Azrithromycin, soulmedrol, Tolizimab    s/p convalecent plasma    Leukocytosis, unclear etiology    Now off antibx      HCT up to 26, fe studies  normal    On Xeralto    NRB comfortable, will try 10 NL again , did not tolerate for more than 30 min yesterday    CXR 5/11 no change        ADDITIONAL REVIEW OF SYSTEMS:    MEDICATIONS  (STANDING):  amLODIPine   Tablet 5 milliGRAM(s) Oral daily  ascorbic acid 500 milliGRAM(s) Oral two times a day  benzonatate 100 milliGRAM(s) Oral three times a day  ferrous    sulfate 325 milliGRAM(s) Oral three times a day  folic acid 1 milliGRAM(s) Oral daily  guaiFENesin   Syrup  (Sugar-Free) 200 milliGRAM(s) Oral every 6 hours  influenza   Vaccine 0.5 milliLiter(s) IntraMuscular once  multivitamin 1 Tablet(s) Oral daily  rivaroxaban 15 milliGRAM(s) Oral two times a day with meals  simvastatin 5 milliGRAM(s) Oral at bedtime  zinc sulfate 220 milliGRAM(s) Oral daily    MEDICATIONS  (PRN):  acetaminophen   Tablet .. 650 milliGRAM(s) Oral every 6 hours PRN Temp greater or equal to 38C (100.4F), Mild Pain (1 - 3)  ALBUTerol    90 MICROgram(s) HFA Inhaler 2 Puff(s) Inhalation every 6 hours PRN Shortness of Breath and/or Wheezing    CAPILLARY BLOOD GLUCOSE        I&O's Summary    13 May 2020 07:01  -  14 May 2020 07:00  --------------------------------------------------------  IN: 0 mL / OUT: 400 mL / NET: -400 mL        PHYSICAL EXAM:  Vital Signs Last 24 Hrs  T(C): 36.6 (14 May 2020 05:04), Max: 37.2 (13 May 2020 12:59)  T(F): 97.9 (14 May 2020 05:04), Max: 99 (13 May 2020 12:59)  HR: 85 (14 May 2020 05:04) (85 - 105)  BP: 114/81 (13 May 2020 22:46) (114/81 - 138/88)  BP(mean): --  RR: 18 (14 May 2020 05:04) (18 - 20)  SpO2: 100% (14 May 2020 05:04) (95% - 100%)  CONSTITUTIONAL: NAD, well-developed, well-groomed  ENMT: Moist oral mucosa, no pharyngeal injection or exudates; normal dentition  RESPIRATORY: Normal respiratory effort; lungs are clear to auscultation bilaterally  CARDIOVASCULAR: Regular rate and rhythm, normal S1 and S2, no murmur/rub/gallop; No lower extremity edema; Peripheral pulses are 2+ bilaterally  ABDOMEN: Nontender to palpation, normoactive bowel sounds, no rebound/guarding; No hepatosplenomegaly  PSYCH: A+O to person, place, and time; affect appropriate  NEUROLOGY: CN 2-12 are intact and symmetric; no gross sensory deficits   SKIN: No rashes; no palpable lesions    LABS:                        8.3    12.06 )-----------( 230      ( 14 May 2020 07:12 )             28.0     05-14    135  |  98  |  5<L>  ----------------------------<  109<H>  4.0   |  25  |  0.46<L>    Ca    9.4      14 May 2020 07:12  Phos  4.3     05-14  Mg     1.6     05-14    TPro  7.8  /  Alb  2.9<L>  /  TBili  < 0.2<L>  /  DBili  x   /  AST  16  /  ALT  11  /  AlkPhos  69  05-13              COVID-19 PCR: Detected (28 Mar 2020 17:24)      RADIOLOGY & ADDITIONAL TESTS:  Imaging from Last 24 Hours:    Electrocardiogram/QTc Interval:    COORDINATION OF CARE:  Care Discussed with Consultants/Other Providers:

## 2020-05-15 LAB
ANION GAP SERPL CALC-SCNC: 12 MMO/L — SIGNIFICANT CHANGE UP (ref 7–14)
BUN SERPL-MCNC: 6 MG/DL — LOW (ref 7–23)
CALCIUM SERPL-MCNC: 9.2 MG/DL — SIGNIFICANT CHANGE UP (ref 8.4–10.5)
CHLORIDE SERPL-SCNC: 98 MMOL/L — SIGNIFICANT CHANGE UP (ref 98–107)
CO2 SERPL-SCNC: 27 MMOL/L — SIGNIFICANT CHANGE UP (ref 22–31)
CREAT SERPL-MCNC: 0.49 MG/DL — LOW (ref 0.5–1.3)
GLUCOSE SERPL-MCNC: 109 MG/DL — HIGH (ref 70–99)
HCT VFR BLD CALC: 26.8 % — LOW (ref 34.5–45)
HGB BLD-MCNC: 7.9 G/DL — LOW (ref 11.5–15.5)
MAGNESIUM SERPL-MCNC: 1.7 MG/DL — SIGNIFICANT CHANGE UP (ref 1.6–2.6)
MCHC RBC-ENTMCNC: 23.6 PG — LOW (ref 27–34)
MCHC RBC-ENTMCNC: 29.5 % — LOW (ref 32–36)
MCV RBC AUTO: 80 FL — SIGNIFICANT CHANGE UP (ref 80–100)
NRBC # FLD: 0 K/UL — SIGNIFICANT CHANGE UP (ref 0–0)
PLATELET # BLD AUTO: 388 K/UL — SIGNIFICANT CHANGE UP (ref 150–400)
PMV BLD: 10 FL — SIGNIFICANT CHANGE UP (ref 7–13)
POTASSIUM SERPL-MCNC: 3.9 MMOL/L — SIGNIFICANT CHANGE UP (ref 3.5–5.3)
POTASSIUM SERPL-SCNC: 3.9 MMOL/L — SIGNIFICANT CHANGE UP (ref 3.5–5.3)
RBC # BLD: 3.35 M/UL — LOW (ref 3.8–5.2)
RBC # FLD: 26.3 % — HIGH (ref 10.3–14.5)
SODIUM SERPL-SCNC: 137 MMOL/L — SIGNIFICANT CHANGE UP (ref 135–145)
WBC # BLD: 13.18 K/UL — HIGH (ref 3.8–10.5)
WBC # FLD AUTO: 13.18 K/UL — HIGH (ref 3.8–10.5)

## 2020-05-15 PROCEDURE — 99232 SBSQ HOSP IP/OBS MODERATE 35: CPT

## 2020-05-15 RX ORDER — ONDANSETRON 8 MG/1
4 TABLET, FILM COATED ORAL ONCE
Refills: 0 | Status: COMPLETED | OUTPATIENT
Start: 2020-05-15 | End: 2020-05-15

## 2020-05-15 RX ADMIN — RIVAROXABAN 15 MILLIGRAM(S): KIT at 09:12

## 2020-05-15 RX ADMIN — Medication 325 MILLIGRAM(S): at 06:15

## 2020-05-15 RX ADMIN — Medication 1 MILLIGRAM(S): at 12:00

## 2020-05-15 RX ADMIN — Medication 100 MILLIGRAM(S): at 12:00

## 2020-05-15 RX ADMIN — Medication 325 MILLIGRAM(S): at 12:00

## 2020-05-15 RX ADMIN — ALBUTEROL 2 PUFF(S): 90 AEROSOL, METERED ORAL at 12:00

## 2020-05-15 RX ADMIN — Medication 200 MILLIGRAM(S): at 06:14

## 2020-05-15 RX ADMIN — Medication 100 MILLIGRAM(S): at 06:15

## 2020-05-15 RX ADMIN — Medication 500 MILLIGRAM(S): at 17:12

## 2020-05-15 RX ADMIN — Medication 100 MILLIGRAM(S): at 21:15

## 2020-05-15 RX ADMIN — AMLODIPINE BESYLATE 5 MILLIGRAM(S): 2.5 TABLET ORAL at 06:15

## 2020-05-15 RX ADMIN — Medication 325 MILLIGRAM(S): at 21:15

## 2020-05-15 RX ADMIN — Medication 1 TABLET(S): at 12:00

## 2020-05-15 RX ADMIN — Medication 500 MILLIGRAM(S): at 06:15

## 2020-05-15 RX ADMIN — Medication 200 MILLIGRAM(S): at 01:31

## 2020-05-15 RX ADMIN — ONDANSETRON 4 MILLIGRAM(S): 8 TABLET, FILM COATED ORAL at 13:05

## 2020-05-15 RX ADMIN — RIVAROXABAN 15 MILLIGRAM(S): KIT at 17:12

## 2020-05-15 RX ADMIN — SIMVASTATIN 5 MILLIGRAM(S): 20 TABLET, FILM COATED ORAL at 21:16

## 2020-05-15 RX ADMIN — ZINC SULFATE TAB 220 MG (50 MG ZINC EQUIVALENT) 220 MILLIGRAM(S): 220 (50 ZN) TAB at 12:00

## 2020-05-15 RX ADMIN — Medication 200 MILLIGRAM(S): at 17:12

## 2020-05-15 RX ADMIN — Medication 200 MILLIGRAM(S): at 12:00

## 2020-05-15 RX ADMIN — Medication 200 MILLIGRAM(S): at 23:06
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
## 2020-05-15 NOTE — PROGRESS NOTE ADULT - SUBJECTIVE AND OBJECTIVE BOX
Medicine Progress Note    Patient is a 44y old  Female who presents with a chief complaint of Acute Hypoxemic Respiratory Failure and Pneumonia (14 May 2020 12:45)      SUBJECTIVE / OVERNIGHT EVENTS:      breathing comfortable supine, sitting on  %, no cough, eating well    Patient has not tolerated attempts at wean yesterday or the day prior    I spoke with ACP, she will havee RN try to wean to NC 10 L as tolerated    REVIEW OF CAT SCAN ABDOMEN, RIGHT BREAST 1.2 CM INFERIOR NODULE    Physical exam by me and ACP, no mass felt    She does not have PCP or GYN doctor, she has never had mamogram, I spoke with patient, ACP, father, she needs PCP, GYN, mamaoraram and US of breast on discharge, apparently these tests can not be done inpatient, discussed with other full time hospitalits      s/p Cetriaxone, Azrithromycin, soulmedrol, Tolizimab    s/p convalecent plasma    Leukocytosis, unclear etiology    Now off antibx      HCT up to 26, fe studies  normal    On Xeralto    NRB comfortable, will try 10 NL again , did not tolerate for more than 30 min yesterday    CXR 5/11 no change        ADDITIONAL REVIEW OF SYSTEMS:    MEDICATIONS  (STANDING):  amLODIPine   Tablet 5 milliGRAM(s) Oral daily  ascorbic acid 500 milliGRAM(s) Oral two times a day  benzonatate 100 milliGRAM(s) Oral three times a day  ferrous    sulfate 325 milliGRAM(s) Oral three times a day  folic acid 1 milliGRAM(s) Oral daily  guaiFENesin   Syrup  (Sugar-Free) 200 milliGRAM(s) Oral every 6 hours  influenza   Vaccine 0.5 milliLiter(s) IntraMuscular once  multivitamin 1 Tablet(s) Oral daily  rivaroxaban 15 milliGRAM(s) Oral two times a day with meals  simvastatin 5 milliGRAM(s) Oral at bedtime  zinc sulfate 220 milliGRAM(s) Oral daily    MEDICATIONS  (PRN):  acetaminophen   Tablet .. 650 milliGRAM(s) Oral every 6 hours PRN Temp greater or equal to 38C (100.4F), Mild Pain (1 - 3)  ALBUTerol    90 MICROgram(s) HFA Inhaler 2 Puff(s) Inhalation every 6 hours PRN Shortness of Breath and/or Wheezing    CAPILLARY BLOOD GLUCOSE        I&O's Summary      PHYSICAL EXAM:  Vital Signs Last 24 Hrs  T(C): 36.6 (15 May 2020 11:59), Max: 37 (14 May 2020 13:38)  T(F): 97.9 (15 May 2020 11:59), Max: 98.6 (14 May 2020 13:38)  HR: 104 (15 May 2020 11:59) (97 - 110)  BP: 133/84 (15 May 2020 11:59) (102/68 - 133/84)  BP(mean): --  RR: 20 (15 May 2020 11:59) (18 - 20)  SpO2: 97% (15 May 2020 11:59) (92% - 98%)  CONSTITUTIONAL: NAD, well-developed, well-groomed  ENMT: Moist oral mucosa, no pharyngeal injection or exudates; normal dentition  RESPIRATORY: Normal respiratory effort; lungs are clear to auscultation bilaterally  CARDIOVASCULAR: Regular rate and rhythm, normal S1 and S2, no murmur/rub/gallop; No lower extremity edema; Peripheral pulses are 2+ bilaterally  ABDOMEN: Nontender to palpation, normoactive bowel sounds, no rebound/guarding; No hepatosplenomegaly  PSYCH: A+O to person, place, and time; affect appropriate  NEUROLOGY: CN 2-12 are intact and symmetric; no gross sensory deficits   SKIN: No rashes; no palpable lesions    LABS:                        8.3    12.06 )-----------( 230      ( 14 May 2020 07:12 )             28.0     05-14    135  |  98  |  5<L>  ----------------------------<  109<H>  4.0   |  25  |  0.46<L>    Ca    9.4      14 May 2020 07:12  Phos  4.3     05-14  Mg     1.6     05-14                COVID-19 PCR: Detected (28 Mar 2020 17:24)      RADIOLOGY & ADDITIONAL TESTS:  Imaging from Last 24 Hours:    Electrocardiogram/QTc Interval:    COORDINATION OF CARE:  Care Discussed with Consultants/Other Providers:

## 2020-05-15 NOTE — PROGRESS NOTE ADULT - ASSESSMENT
breathing comfortable supine, sitting on  %, no cough, eating well    Patient has not tolerated attempts at wean yesterday or the day prior    I spoke with ACP, she will havee RN try to wean to NC 10 L as tolerated    REVIEW OF CAT SCAN ABDOMEN, RIGHT BREAST 1.2 CM INFERIOR NODULE    Physical exam by me and ACP, no mass felt    She does not have PCP or GYN doctor, she has never had mamogram, I spoke with patient, ACP, father, she needs PCP, GYN, mamaoraram and US of breast on discharge, apparently these tests can not be done inpatient, discussed with other full time hospitalits      s/p Cetriaxone, Azrithromycin, soulmedrol, Tolizimab    s/p convalecent plasma    Leukocytosis, unclear etiology    Now off antibx      HCT up to 26, fe studies  normal    On Xeralto    NRB comfortable, will try 10 NL again , did not tolerate for more than 30 min yesterday    CXR 5/11 no change      Assessment and Plan:   · Assessment	  43 yo F w/ hX HTN p/w ETIENNE and cough. Multifocal PNA treated with Abx (Ceftriaxone 3/28-4/1  Azithromycin 3/28-4/1),  HCQ 3/29-4/2, Solumedrol 3/30-4/4, Tocilizumab 3/31, 4/1, Convalescent plasma 4/29. complicated by PE question of HIT was seen by heme KEO neg on xarelto. Leukocytosis of unclear etiology with neg cx and CT for source currently monitoring off antibiotics.     CXR unchanged     Problem/Plan - 1:  ·  Problem: Pneumonia due to 2019 novel coronavirus.  Plan: COVID with acute hypoxic respiratory failure  -symptomatic care antitussive meds  -vitamin C and zinc  -cont to wean off oxygen with goal spo2 92-95% currently on NRB 15 L.  Today try 10 L NC again     Problem/Plan - 2:  ·  Problem: Acute pulmonary embolism without acute cor pulmonale, unspecified pulmonary embolism type.  Plan: on AC with xarelto.      Problem/Plan - 3:  ·  Problem: Leukocytosis, unspecified type.  Plan: remains elevated but downtrending ID following.      Problem/Plan - 4:  ·  Problem: Anemia, unspecified type.  Plan: monitor CBC.     Problem/plan - 5    REVIEW OF CAT SCAN ABDOMEN, RIGHT BREAST 1.2 CM INFERIOR NODULE    Physical exam by me and ACP, no mass felt    She does not have PCP or GYN doctor, she has never had mamogram, I spoke with patient, ACP, father, she needs PCP, GYN, mamaoraram and US of breast on discharge, apparently these tests can not be done inpatient, discussed with other full time hospitalits               Plan discussed with ACP.    Perfecto Tamez MD    acting hospitalist    I spoke with father [phone]    discussed with ACP

## 2020-05-15 NOTE — PROGRESS NOTE ADULT - SUBJECTIVE AND OBJECTIVE BOX
CC: Patient is a 44y old  Female who presents with a chief complaint of Acute Hypoxemic Respiratory Failure and Pneumonia (15 May 2020 12:34)    ID following for COVID infection    Interval History/ROS: Patient remains on NRB. Occasional cough. No fevers, no chills.    Rest of ROS negative.    Allergies  No Known Drug Allergies  Numbness (Other)    ANTIMICROBIALS:      OTHER MEDS:  acetaminophen   Tablet .. 650 milliGRAM(s) Oral every 6 hours PRN  ALBUTerol    90 MICROgram(s) HFA Inhaler 2 Puff(s) Inhalation every 6 hours PRN  amLODIPine   Tablet 5 milliGRAM(s) Oral daily  ascorbic acid 500 milliGRAM(s) Oral two times a day  benzonatate 100 milliGRAM(s) Oral three times a day  ferrous    sulfate 325 milliGRAM(s) Oral three times a day  folic acid 1 milliGRAM(s) Oral daily  guaiFENesin   Syrup  (Sugar-Free) 200 milliGRAM(s) Oral every 6 hours  influenza   Vaccine 0.5 milliLiter(s) IntraMuscular once  multivitamin 1 Tablet(s) Oral daily  rivaroxaban 15 milliGRAM(s) Oral two times a day with meals  simvastatin 5 milliGRAM(s) Oral at bedtime  zinc sulfate 220 milliGRAM(s) Oral daily    PE:    Vital Signs Last 24 Hrs  T(C): 36.6 (15 May 2020 11:59), Max: 37 (14 May 2020 13:38)  T(F): 97.9 (15 May 2020 11:59), Max: 98.6 (14 May 2020 13:38)  HR: 104 (15 May 2020 11:59) (97 - 110)  BP: 133/84 (15 May 2020 11:59) (102/68 - 133/84)  BP(mean): --  RR: 20 (15 May 2020 11:59) (18 - 20)  SpO2: 97% (15 May 2020 11:59) (92% - 98%)    Gen: AOx3, NAD  CV: S1+S2 normal, no murmurs  Resp: Clear bilat, no resp distress  Abd: Soft, nontender, +BS  Ext: No LE edema, no wounds  : No Tamayo  IV/Skin: No thrombophlebitis  Neuro: no focal deficits    LABS:                          8.3    12.06 )-----------( 230      ( 14 May 2020 07:12 )             28.0       05-14    135  |  98  |  5<L>  ----------------------------<  109<H>  4.0   |  25  |  0.46<L>    Ca    9.4      14 May 2020 07:12  Phos  4.3     05-14  Mg     1.6     05-14    MICROBIOLOGY:  v  .Blood Blood-Venous  04-30-20   No Growth Final  --  --      .Blood Blood-Peripheral  04-30-20   No Growth Final  --  --    RADIOLOGY:    < from: Xray Chest 2 Views PA/Lat (05.11.20 @ 15:02) >  IMPRESSION:    Diffuse bilateral patchy opacities are unchanged.     < end of copied text >

## 2020-05-16 LAB
ALBUMIN SERPL ELPH-MCNC: 3.5 G/DL — SIGNIFICANT CHANGE UP (ref 3.3–5)
ALP SERPL-CCNC: 85 U/L — SIGNIFICANT CHANGE UP (ref 40–120)
ALT FLD-CCNC: 9 U/L — SIGNIFICANT CHANGE UP (ref 4–33)
ANION GAP SERPL CALC-SCNC: 12 MMO/L — SIGNIFICANT CHANGE UP (ref 7–14)
ANTIBODY ID 1_1: SIGNIFICANT CHANGE UP
AST SERPL-CCNC: 14 U/L — SIGNIFICANT CHANGE UP (ref 4–32)
BASOPHILS # BLD AUTO: 0.03 K/UL — SIGNIFICANT CHANGE UP (ref 0–0.2)
BASOPHILS NFR BLD AUTO: 0.2 % — SIGNIFICANT CHANGE UP (ref 0–2)
BILIRUB SERPL-MCNC: < 0.2 MG/DL — LOW (ref 0.2–1.2)
BLD GP AB SCN SERPL QL: POSITIVE — SIGNIFICANT CHANGE UP
BUN SERPL-MCNC: 5 MG/DL — LOW (ref 7–23)
CALCIUM SERPL-MCNC: 9.6 MG/DL — SIGNIFICANT CHANGE UP (ref 8.4–10.5)
CHLORIDE SERPL-SCNC: 95 MMOL/L — LOW (ref 98–107)
CO2 SERPL-SCNC: 29 MMOL/L — SIGNIFICANT CHANGE UP (ref 22–31)
CREAT SERPL-MCNC: 0.5 MG/DL — SIGNIFICANT CHANGE UP (ref 0.5–1.3)
CRP SERPL-MCNC: 38.7 MG/L — HIGH
D DIMER BLD IA.RAPID-MCNC: 182 NG/ML — SIGNIFICANT CHANGE UP
DAT C3-SP REAG RBC QL: NEGATIVE — SIGNIFICANT CHANGE UP
DAT POLY-SP REAG RBC QL: POSITIVE — SIGNIFICANT CHANGE UP
DIRECT COOMBS IGG: POSITIVE — SIGNIFICANT CHANGE UP
ELUATE ANTIBODY 1: SIGNIFICANT CHANGE UP
EOSINOPHIL # BLD AUTO: 0.63 K/UL — HIGH (ref 0–0.5)
EOSINOPHIL NFR BLD AUTO: 4.7 % — SIGNIFICANT CHANGE UP (ref 0–6)
FERRITIN SERPL-MCNC: 79.68 NG/ML — SIGNIFICANT CHANGE UP (ref 15–150)
GLUCOSE SERPL-MCNC: 115 MG/DL — HIGH (ref 70–99)
HCT VFR BLD CALC: 28.4 % — LOW (ref 34.5–45)
HGB BLD-MCNC: 8.3 G/DL — LOW (ref 11.5–15.5)
IMM GRANULOCYTES NFR BLD AUTO: 1 % — SIGNIFICANT CHANGE UP (ref 0–1.5)
LYMPHOCYTES # BLD AUTO: 1.8 K/UL — SIGNIFICANT CHANGE UP (ref 1–3.3)
LYMPHOCYTES # BLD AUTO: 13.3 % — SIGNIFICANT CHANGE UP (ref 13–44)
MAGNESIUM SERPL-MCNC: 1.7 MG/DL — SIGNIFICANT CHANGE UP (ref 1.6–2.6)
MCHC RBC-ENTMCNC: 24.3 PG — LOW (ref 27–34)
MCHC RBC-ENTMCNC: 29.2 % — LOW (ref 32–36)
MCV RBC AUTO: 83.3 FL — SIGNIFICANT CHANGE UP (ref 80–100)
MONOCYTES # BLD AUTO: 0.84 K/UL — SIGNIFICANT CHANGE UP (ref 0–0.9)
MONOCYTES NFR BLD AUTO: 6.2 % — SIGNIFICANT CHANGE UP (ref 2–14)
NEUTROPHILS # BLD AUTO: 10.07 K/UL — HIGH (ref 1.8–7.4)
NEUTROPHILS NFR BLD AUTO: 74.6 % — SIGNIFICANT CHANGE UP (ref 43–77)
NRBC # FLD: 0 K/UL — SIGNIFICANT CHANGE UP (ref 0–0)
PHOSPHATE SERPL-MCNC: 4.4 MG/DL — SIGNIFICANT CHANGE UP (ref 2.5–4.5)
PLATELET # BLD AUTO: 238 K/UL — SIGNIFICANT CHANGE UP (ref 150–400)
PMV BLD: SIGNIFICANT CHANGE UP FL (ref 7–13)
POTASSIUM SERPL-MCNC: 3.9 MMOL/L — SIGNIFICANT CHANGE UP (ref 3.5–5.3)
POTASSIUM SERPL-SCNC: 3.9 MMOL/L — SIGNIFICANT CHANGE UP (ref 3.5–5.3)
PROT SERPL-MCNC: 8.7 G/DL — HIGH (ref 6–8.3)
RBC # BLD: 3.41 M/UL — LOW (ref 3.8–5.2)
RBC # FLD: 26.2 % — HIGH (ref 10.3–14.5)
RH IG SCN BLD-IMP: NEGATIVE — SIGNIFICANT CHANGE UP
SODIUM SERPL-SCNC: 136 MMOL/L — SIGNIFICANT CHANGE UP (ref 135–145)
WBC # BLD: 13.51 K/UL — HIGH (ref 3.8–10.5)
WBC # FLD AUTO: 13.51 K/UL — HIGH (ref 3.8–10.5)

## 2020-05-16 PROCEDURE — 86077 PHYS BLOOD BANK SERV XMATCH: CPT

## 2020-05-16 PROCEDURE — 99232 SBSQ HOSP IP/OBS MODERATE 35: CPT

## 2020-05-16 RX ORDER — RIVAROXABAN 15 MG-20MG
20 KIT ORAL
Refills: 0 | Status: DISCONTINUED | OUTPATIENT
Start: 2020-05-16 | End: 2020-06-09

## 2020-05-16 RX ADMIN — Medication 200 MILLIGRAM(S): at 18:28

## 2020-05-16 RX ADMIN — Medication 650 MILLIGRAM(S): at 20:48

## 2020-05-16 RX ADMIN — Medication 200 MILLIGRAM(S): at 12:47

## 2020-05-16 RX ADMIN — Medication 100 MILLIGRAM(S): at 13:59

## 2020-05-16 RX ADMIN — Medication 1 TABLET(S): at 12:47

## 2020-05-16 RX ADMIN — Medication 200 MILLIGRAM(S): at 05:17

## 2020-05-16 RX ADMIN — RIVAROXABAN 15 MILLIGRAM(S): KIT at 08:11

## 2020-05-16 RX ADMIN — SIMVASTATIN 5 MILLIGRAM(S): 20 TABLET, FILM COATED ORAL at 20:48

## 2020-05-16 RX ADMIN — AMLODIPINE BESYLATE 5 MILLIGRAM(S): 2.5 TABLET ORAL at 05:17

## 2020-05-16 RX ADMIN — Medication 325 MILLIGRAM(S): at 05:17

## 2020-05-16 RX ADMIN — ZINC SULFATE TAB 220 MG (50 MG ZINC EQUIVALENT) 220 MILLIGRAM(S): 220 (50 ZN) TAB at 12:47

## 2020-05-16 RX ADMIN — Medication 100 MILLIGRAM(S): at 05:17

## 2020-05-16 RX ADMIN — Medication 100 MILLIGRAM(S): at 20:48

## 2020-05-16 RX ADMIN — Medication 500 MILLIGRAM(S): at 18:28

## 2020-05-16 RX ADMIN — Medication 325 MILLIGRAM(S): at 14:00

## 2020-05-16 RX ADMIN — Medication 1 MILLIGRAM(S): at 12:47

## 2020-05-16 RX ADMIN — Medication 500 MILLIGRAM(S): at 05:17

## 2020-05-16 RX ADMIN — Medication 325 MILLIGRAM(S): at 20:48

## 2020-05-16 NOTE — PROGRESS NOTE ADULT - SUBJECTIVE AND OBJECTIVE BOX
Medicine Progress Note    Patient is a 44y old  Female who presents with a chief complaint of Acute Hypoxemic Respiratory Failure and Pneumonia (15 May 2020 13:08)      SUBJECTIVE / OVERNIGHT EVENTS:      breathing comfortable supine, sitting on  %, no cough, eating well    Patient has not tolerated attempts at wean yesterday or the day prior    I spoke with ACP, she will havee RN try to wean to NC 10 L as tolerated again today, as tolerated    REVIEW OF CAT SCAN ABDOMEN, RIGHT BREAST 1.2 CM INFERIOR NODULE    Physical exam by me and ACP, no mass felt    She does not have PCP or GYN doctor, she has never had mamogram, I spoke with patient, ACP, father, she needs PCP, GYN, mamaoraram and US of breast on discharge, apparently these tests can not be done inpatient, discussed with other full time hospitalits      s/p Cetriaxone, Azrithromycin, soulmedrol, Tolizimab    s/p convalecent plasma    Leukocytosis, unclear etiology    Now off antibx      HCT up to 26, fe studies  normal    On Xeralto    NRB comfortable, will try 10 NL again , did not tolerate for more than 30 min yesterday    CXR 5/11 no change        ADDITIONAL REVIEW OF SYSTEMS:    MEDICATIONS  (STANDING):  amLODIPine   Tablet 5 milliGRAM(s) Oral daily  ascorbic acid 500 milliGRAM(s) Oral two times a day  benzonatate 100 milliGRAM(s) Oral three times a day  ferrous    sulfate 325 milliGRAM(s) Oral three times a day  folic acid 1 milliGRAM(s) Oral daily  guaiFENesin   Syrup  (Sugar-Free) 200 milliGRAM(s) Oral every 6 hours  influenza   Vaccine 0.5 milliLiter(s) IntraMuscular once  multivitamin 1 Tablet(s) Oral daily  simvastatin 5 milliGRAM(s) Oral at bedtime  zinc sulfate 220 milliGRAM(s) Oral daily    MEDICATIONS  (PRN):  acetaminophen   Tablet .. 650 milliGRAM(s) Oral every 6 hours PRN Temp greater or equal to 38C (100.4F), Mild Pain (1 - 3)  ALBUTerol    90 MICROgram(s) HFA Inhaler 2 Puff(s) Inhalation every 6 hours PRN Shortness of Breath and/or Wheezing    CAPILLARY BLOOD GLUCOSE        I&O's Summary    15 May 2020 07:01  -  16 May 2020 07:00  --------------------------------------------------------  IN: 500 mL / OUT: 400 mL / NET: 100 mL        PHYSICAL EXAM:  Vital Signs Last 24 Hrs  T(C): 36.7 (16 May 2020 05:10), Max: 36.7 (16 May 2020 05:10)  T(F): 98.1 (16 May 2020 05:10), Max: 98.1 (16 May 2020 05:10)  HR: 86 (16 May 2020 05:10) (86 - 90)  BP: 114/75 (16 May 2020 05:10) (107/68 - 114/75)  BP(mean): --  RR: 20 (16 May 2020 05:10) (20 - 20)  SpO2: 100% (16 May 2020 05:10) (96% - 100%)  CONSTITUTIONAL: NAD, well-developed, well-groomed  ENMT: Moist oral mucosa, no pharyngeal injection or exudates; normal dentition  RESPIRATORY: Normal respiratory effort; lungs are clear to auscultation bilaterally  CARDIOVASCULAR: Regular rate and rhythm, normal S1 and S2, no murmur/rub/gallop; No lower extremity edema; Peripheral pulses are 2+ bilaterally  ABDOMEN: Nontender to palpation, normoactive bowel sounds, no rebound/guarding; No hepatosplenomegaly  PSYCH: A+O to person, place, and time; affect appropriate  NEUROLOGY: CN 2-12 are intact and symmetric; no gross sensory deficits   SKIN: No rashes; no palpable lesions    LABS:                        8.3    13.51 )-----------( 238      ( 16 May 2020 06:20 )             28.4     05-16    136  |  95<L>  |  5<L>  ----------------------------<  115<H>  3.9   |  29  |  0.50    Ca    9.6      16 May 2020 06:20  Phos  4.4     05-16  Mg     1.7     05-16    TPro  8.7<H>  /  Alb  3.5  /  TBili  < 0.2<L>  /  DBili  x   /  AST  14  /  ALT  9   /  AlkPhos  85  05-16              COVID-19 PCR: Detected (28 Mar 2020 17:24)      RADIOLOGY & ADDITIONAL TESTS:  Imaging from Last 24 Hours:    Electrocardiogram/QTc Interval:    COORDINATION OF CARE:  Care Discussed with Consultants/Other Providers:

## 2020-05-16 NOTE — PROGRESS NOTE ADULT - ASSESSMENT
breathing comfortable supine, sitting on  %, no cough, eating well    Patient has not tolerated attempts at wean yesterday or the day prior    I spoke with ACP, she will havee RN try to wean to NC 10 L as tolerated again today, as tolerated    REVIEW OF CAT SCAN ABDOMEN, RIGHT BREAST 1.2 CM INFERIOR NODULE    Physical exam by me and ACP, no mass felt    She does not have PCP or GYN doctor, she has never had mamogram, I spoke with patient, ACP, father, she needs PCP, GYN, mamaoraram and US of breast on discharge, apparently these tests can not be done inpatient, discussed with other full time hospitalits      s/p Cetriaxone, Azrithromycin, soulmedrol, Tolizimab    s/p convalecent plasma    Leukocytosis, unclear etiology    Now off antibx      HCT up to 26, fe studies  normal    On Xeralto    NRB comfortable, will try 10 NL again , did not tolerate for more than 30 min yesterday    CXR 5/11 no change        Assessment and Plan:   · Assessment	  43 yo F w/ hX HTN p/w ETIENNE and cough. Multifocal PNA treated with Abx (Ceftriaxone 3/28-4/1  Azithromycin 3/28-4/1),  HCQ 3/29-4/2, Solumedrol 3/30-4/4, Tocilizumab 3/31, 4/1, Convalescent plasma 4/29. complicated by PE question of HIT was seen by heme KEO neg on xarelto. Leukocytosis of unclear etiology with neg cx and CT for source currently monitoring off antibiotics.     CXR unchanged     Problem/Plan - 1:  ·  Problem: Pneumonia due to 2019 novel coronavirus.  Plan: COVID with acute hypoxic respiratory failure  -symptomatic care antitussive meds  -vitamin C and zinc  -cont to wean off oxygen with goal spo2 92-95% currently on NRB 15 L.  Today try 10 L NC again     Problem/Plan - 2:  ·  Problem: Acute pulmonary embolism without acute cor pulmonale, unspecified pulmonary embolism type.  Plan: on AC with xarelto.      Problem/Plan - 3:  ·  Problem: Leukocytosis, unspecified type.  Plan: remains elevated but downtrending ID following.      Problem/Plan - 4:  ·  Problem: Anemia, unspecified type.  Plan: monitor CBC.     Problem/plan - 5    REVIEW OF CAT SCAN ABDOMEN, RIGHT BREAST 1.2 CM INFERIOR NODULE    Physical exam by me and ACP, no mass felt    She does not have PCP or GYN doctor, she has never had mammogram I spoke with patient, ACP, father, she needs PCP, GYN, mammogram and US of breast on discharge, apparently these tests can not be done inpatient, discussed with other full time hospitalist               Plan discussed with ACP.    Perfecto Tamez MD    acting hospitalist    I spoke with father [phone]    discussed with ACP

## 2020-05-17 LAB
ANION GAP SERPL CALC-SCNC: 12 MMO/L — SIGNIFICANT CHANGE UP (ref 7–14)
BASOPHILS # BLD AUTO: 0.04 K/UL — SIGNIFICANT CHANGE UP (ref 0–0.2)
BASOPHILS NFR BLD AUTO: 0.3 % — SIGNIFICANT CHANGE UP (ref 0–2)
BUN SERPL-MCNC: 6 MG/DL — LOW (ref 7–23)
CALCIUM SERPL-MCNC: 9.2 MG/DL — SIGNIFICANT CHANGE UP (ref 8.4–10.5)
CHLORIDE SERPL-SCNC: 96 MMOL/L — LOW (ref 98–107)
CO2 SERPL-SCNC: 28 MMOL/L — SIGNIFICANT CHANGE UP (ref 22–31)
CREAT SERPL-MCNC: 0.47 MG/DL — LOW (ref 0.5–1.3)
EOSINOPHIL # BLD AUTO: 0.71 K/UL — HIGH (ref 0–0.5)
EOSINOPHIL NFR BLD AUTO: 4.9 % — SIGNIFICANT CHANGE UP (ref 0–6)
GLUCOSE SERPL-MCNC: 113 MG/DL — HIGH (ref 70–99)
HCT VFR BLD CALC: 26.1 % — LOW (ref 34.5–45)
HGB BLD-MCNC: 7.8 G/DL — LOW (ref 11.5–15.5)
IMM GRANULOCYTES NFR BLD AUTO: 0.8 % — SIGNIFICANT CHANGE UP (ref 0–1.5)
LYMPHOCYTES # BLD AUTO: 1.74 K/UL — SIGNIFICANT CHANGE UP (ref 1–3.3)
LYMPHOCYTES # BLD AUTO: 12 % — LOW (ref 13–44)
MAGNESIUM SERPL-MCNC: 1.7 MG/DL — SIGNIFICANT CHANGE UP (ref 1.6–2.6)
MCHC RBC-ENTMCNC: 24.1 PG — LOW (ref 27–34)
MCHC RBC-ENTMCNC: 29.9 % — LOW (ref 32–36)
MCV RBC AUTO: 80.8 FL — SIGNIFICANT CHANGE UP (ref 80–100)
MONOCYTES # BLD AUTO: 1.06 K/UL — HIGH (ref 0–0.9)
MONOCYTES NFR BLD AUTO: 7.3 % — SIGNIFICANT CHANGE UP (ref 2–14)
NEUTROPHILS # BLD AUTO: 10.82 K/UL — HIGH (ref 1.8–7.4)
NEUTROPHILS NFR BLD AUTO: 74.7 % — SIGNIFICANT CHANGE UP (ref 43–77)
NRBC # FLD: 0 K/UL — SIGNIFICANT CHANGE UP (ref 0–0)
PHOSPHATE SERPL-MCNC: 4.6 MG/DL — HIGH (ref 2.5–4.5)
PLATELET # BLD AUTO: 339 K/UL — SIGNIFICANT CHANGE UP (ref 150–400)
PMV BLD: 10.3 FL — SIGNIFICANT CHANGE UP (ref 7–13)
POTASSIUM SERPL-MCNC: 4.5 MMOL/L — SIGNIFICANT CHANGE UP (ref 3.5–5.3)
POTASSIUM SERPL-SCNC: 4.5 MMOL/L — SIGNIFICANT CHANGE UP (ref 3.5–5.3)
RBC # BLD: 3.23 M/UL — LOW (ref 3.8–5.2)
RBC # FLD: 25.5 % — HIGH (ref 10.3–14.5)
SODIUM SERPL-SCNC: 136 MMOL/L — SIGNIFICANT CHANGE UP (ref 135–145)
WBC # BLD: 14.49 K/UL — HIGH (ref 3.8–10.5)
WBC # FLD AUTO: 14.49 K/UL — HIGH (ref 3.8–10.5)

## 2020-05-17 PROCEDURE — 99232 SBSQ HOSP IP/OBS MODERATE 35: CPT

## 2020-05-17 RX ADMIN — Medication 100 MILLIGRAM(S): at 05:38

## 2020-05-17 RX ADMIN — Medication 200 MILLIGRAM(S): at 21:27

## 2020-05-17 RX ADMIN — Medication 1 MILLIGRAM(S): at 12:20

## 2020-05-17 RX ADMIN — Medication 200 MILLIGRAM(S): at 12:20

## 2020-05-17 RX ADMIN — Medication 325 MILLIGRAM(S): at 05:38

## 2020-05-17 RX ADMIN — Medication 200 MILLIGRAM(S): at 05:39

## 2020-05-17 RX ADMIN — RIVAROXABAN 20 MILLIGRAM(S): KIT at 17:18

## 2020-05-17 RX ADMIN — Medication 325 MILLIGRAM(S): at 21:27

## 2020-05-17 RX ADMIN — Medication 500 MILLIGRAM(S): at 17:18

## 2020-05-17 RX ADMIN — Medication 100 MILLIGRAM(S): at 13:46

## 2020-05-17 RX ADMIN — Medication 100 MILLIGRAM(S): at 21:27

## 2020-05-17 RX ADMIN — Medication 1 TABLET(S): at 12:20

## 2020-05-17 RX ADMIN — Medication 325 MILLIGRAM(S): at 13:46

## 2020-05-17 RX ADMIN — AMLODIPINE BESYLATE 5 MILLIGRAM(S): 2.5 TABLET ORAL at 05:38

## 2020-05-17 RX ADMIN — SIMVASTATIN 5 MILLIGRAM(S): 20 TABLET, FILM COATED ORAL at 21:27

## 2020-05-17 RX ADMIN — Medication 200 MILLIGRAM(S): at 17:18

## 2020-05-17 RX ADMIN — ZINC SULFATE TAB 220 MG (50 MG ZINC EQUIVALENT) 220 MILLIGRAM(S): 220 (50 ZN) TAB at 12:20

## 2020-05-17 RX ADMIN — Medication 500 MILLIGRAM(S): at 05:38

## 2020-05-17 RX ADMIN — RIVAROXABAN 20 MILLIGRAM(S): KIT at 00:24

## 2020-05-17 RX ADMIN — Medication 200 MILLIGRAM(S): at 00:24

## 2020-05-17 NOTE — PROGRESS NOTE ADULT - SUBJECTIVE AND OBJECTIVE BOX
Medicine Progress Note    Patient is a 44y old  Female who presents with a chief complaint of Acute Hypoxemic Respiratory Failure and Pneumonia (16 May 2020 12:37)      SUBJECTIVE / OVERNIGHT EVENTS:      breathing comfortable supine, sitting on  %, no cough, eating well    Patient has not tolerated attempts at wean yesterday or the day prior, will continue to try    I spoke with ACP, she will havee RN try to wean to NC 10 L as tolerated again today, as tolerated    REVIEW OF CAT SCAN ABDOMEN, RIGHT BREAST 1.2 CM INFERIOR NODULE    Physical exam by me and ACP, no mass felt    She does not have PCP or GYN doctor, she has never had mamogram, I spoke with patient, ACP, father, she needs PCP, GYN, mamaoraram and US of breast on discharge, apparently these tests can not be done inpatient, discussed with other full time hospitalits    Anemia, normal iron studies      s/p Cetriaxone, Azrithromycin, soulmedrol, Tolizimab    s/p convalecent plasma    Leukocytosis, unclear etiology    Now off antibx      On Xeralto      CXR 5/11 no change        ADDITIONAL REVIEW OF SYSTEMS:    MEDICATIONS  (STANDING):  amLODIPine   Tablet 5 milliGRAM(s) Oral daily  ascorbic acid 500 milliGRAM(s) Oral two times a day  benzonatate 100 milliGRAM(s) Oral three times a day  ferrous    sulfate 325 milliGRAM(s) Oral three times a day  folic acid 1 milliGRAM(s) Oral daily  guaiFENesin   Syrup  (Sugar-Free) 200 milliGRAM(s) Oral every 6 hours  influenza   Vaccine 0.5 milliLiter(s) IntraMuscular once  multivitamin 1 Tablet(s) Oral daily  rivaroxaban 20 milliGRAM(s) Oral with dinner  simvastatin 5 milliGRAM(s) Oral at bedtime  zinc sulfate 220 milliGRAM(s) Oral daily    MEDICATIONS  (PRN):  acetaminophen   Tablet .. 650 milliGRAM(s) Oral every 6 hours PRN Temp greater or equal to 38C (100.4F), Mild Pain (1 - 3)  ALBUTerol    90 MICROgram(s) HFA Inhaler 2 Puff(s) Inhalation every 6 hours PRN Shortness of Breath and/or Wheezing    CAPILLARY BLOOD GLUCOSE        I&O's Summary      PHYSICAL EXAM:  Vital Signs Last 24 Hrs  T(C): 36.7 (17 May 2020 05:31), Max: 36.8 (16 May 2020 20:56)  T(F): 98.1 (17 May 2020 05:31), Max: 98.3 (16 May 2020 20:56)  HR: 91 (17 May 2020 05:31) (90 - 91)  BP: 132/84 (17 May 2020 05:31) (132/84 - 140/88)  BP(mean): --  RR: 20 (17 May 2020 05:31) (20 - 20)  SpO2: 99% (17 May 2020 05:31) (98% - 99%)  CONSTITUTIONAL: NAD, well-developed, well-groomed  ENMT: Moist oral mucosa, no pharyngeal injection or exudates; normal dentition  RESPIRATORY: Normal respiratory effort; lungs are clear to auscultation bilaterally  CARDIOVASCULAR: Regular rate and rhythm, normal S1 and S2, no murmur/rub/gallop; No lower extremity edema; Peripheral pulses are 2+ bilaterally  ABDOMEN: Nontender to palpation, normoactive bowel sounds, no rebound/guarding; No hepatosplenomegaly  PSYCH: A+O to person, place, and time; affect appropriate  NEUROLOGY: CN 2-12 are intact and symmetric; no gross sensory deficits   SKIN: No rashes; no palpable lesions    LABS:                        8.3    13.51 )-----------( 238      ( 16 May 2020 06:20 )             28.4     05-16    136  |  95<L>  |  5<L>  ----------------------------<  115<H>  3.9   |  29  |  0.50    Ca    9.6      16 May 2020 06:20  Phos  4.4     05-16  Mg     1.7     05-16    TPro  8.7<H>  /  Alb  3.5  /  TBili  < 0.2<L>  /  DBili  x   /  AST  14  /  ALT  9   /  AlkPhos  85  05-16              COVID-19 PCR: Detected (28 Mar 2020 17:24)      RADIOLOGY & ADDITIONAL TESTS:  Imaging from Last 24 Hours:    Electrocardiogram/QTc Interval:    COORDINATION OF CARE:  Care Discussed with Consultants/Other Providers:

## 2020-05-17 NOTE — PROGRESS NOTE ADULT - ASSESSMENT
breathing comfortable supine, sitting on  %, no cough, eating well    Patient has not tolerated attempts at wean yesterday or the day prior, will continue to try    I spoke with ACP, she will havee RN try to wean to NC 10 L as tolerated again today, as tolerated    REVIEW OF CAT SCAN ABDOMEN, RIGHT BREAST 1.2 CM INFERIOR NODULE    Physical exam by me and ACP, no mass felt    She does not have PCP or GYN doctor, she has never had mamogram, I spoke with patient, ACP, father, she needs PCP, GYN, mamaoraram and US of breast on discharge, apparently these tests can not be done inpatient, discussed with other full time hospitalits    Anemia, normal iron studies      s/p Cetriaxone, Azrithromycin, soulmedrol, Tolizimab    s/p convalecent plasma    Leukocytosis, unclear etiology    Now off antibx  Assessment and Plan:   · Assessment	  breathing comfortable supine, sitting on  %, no cough, eating well    Patient has not tolerated attempts at wean yesterday or the day prior    I spoke with ACP, she will havee RN try to wean to NC 10 L as tolerated again today, as tolerated    REVIEW OF CAT SCAN ABDOMEN, RIGHT BREAST 1.2 CM INFERIOR NODULE    Physical exam by me and ACP, no mass felt    She does not have PCP or GYN doctor, she has never had mamogram, I spoke with patient, ACP, father, she needs PCP, GYN, mamaoraram and US of breast on discharge, apparently these tests can not be done inpatient, discussed with other full time hospitalits      s/p Cetriaxone, Azrithromycin, soulmedrol, Tolizimab    s/p convalecent plasma    Leukocytosis, unclear etiology    Now off antibx      HCT up to 26, fe studies  normal    On Xeralto    NRB comfortable, will try 10 NL again , did not tolerate for more than 30 min yesterday    CXR 5/11 no change        Assessment and Plan:   · Assessment	  45 yo F w/ hX HTN p/w ETIENNE and cough. Multifocal PNA treated with Abx (Ceftriaxone 3/28-4/1  Azithromycin 3/28-4/1),  HCQ 3/29-4/2, Solumedrol 3/30-4/4, Tocilizumab 3/31, 4/1, Convalescent plasma 4/29. complicated by PE question of HIT was seen by heme KEO neg on xarelto. Leukocytosis of unclear etiology with neg cx and CT for source currently monitoring off antibiotics.     CXR unchanged     Problem/Plan - 1:  ·  Problem: Pneumonia due to 2019 novel coronavirus.  Plan: COVID with acute hypoxic respiratory failure  -symptomatic care antitussive meds  -vitamin C and zinc  -cont to wean off oxygen with goal spo2 92-95% currently on NRB 15 L.  Today try 10 L NC again     Problem/Plan - 2:  ·  Problem: Acute pulmonary embolism without acute cor pulmonale, unspecified pulmonary embolism type.  Plan: on AC with xarelto.      Problem/Plan - 3:  ·  Problem: Leukocytosis, unspecified type.  Plan: remains elevated but downtrending ID following.      Problem/Plan - 4:  ·  Problem: Anemia, unspecified type.  Plan: monitor CBC.     Problem/plan - 5    REVIEW OF CAT SCAN ABDOMEN, RIGHT BREAST 1.2 CM INFERIOR NODULE    Physical exam by me and ACP, no mass felt    She does not have PCP or GYN doctor, she has never had mammogram I spoke with patient, ACP, father, she needs PCP, GYN, mammogram and US of breast on discharge, apparently these tests can not be done inpatient, discussed with other full time hospitalist               Plan discussed with ACP.    Perfecto Tamez MD    acting hospitalist              On Xeralto      CXR 5/11 no change

## 2020-05-18 LAB
ALBUMIN SERPL ELPH-MCNC: 3.4 G/DL — SIGNIFICANT CHANGE UP (ref 3.3–5)
ALP SERPL-CCNC: 77 U/L — SIGNIFICANT CHANGE UP (ref 40–120)
ALT FLD-CCNC: 10 U/L — SIGNIFICANT CHANGE UP (ref 4–33)
ANION GAP SERPL CALC-SCNC: 10 MMO/L — SIGNIFICANT CHANGE UP (ref 7–14)
ANTIBODY ID 1_1: SIGNIFICANT CHANGE UP
ANTIBODY ID 1_2: SIGNIFICANT CHANGE UP
AST SERPL-CCNC: 14 U/L — SIGNIFICANT CHANGE UP (ref 4–32)
BASOPHILS # BLD AUTO: 0.03 K/UL — SIGNIFICANT CHANGE UP (ref 0–0.2)
BASOPHILS NFR BLD AUTO: 0.2 % — SIGNIFICANT CHANGE UP (ref 0–2)
BILIRUB SERPL-MCNC: < 0.2 MG/DL — LOW (ref 0.2–1.2)
BUN SERPL-MCNC: 7 MG/DL — SIGNIFICANT CHANGE UP (ref 7–23)
CALCIUM SERPL-MCNC: 9 MG/DL — SIGNIFICANT CHANGE UP (ref 8.4–10.5)
CHLORIDE SERPL-SCNC: 97 MMOL/L — LOW (ref 98–107)
CO2 SERPL-SCNC: 30 MMOL/L — SIGNIFICANT CHANGE UP (ref 22–31)
CREAT SERPL-MCNC: 0.53 MG/DL — SIGNIFICANT CHANGE UP (ref 0.5–1.3)
CRP SERPL HS-MCNC: 39.64 MG/L — SIGNIFICANT CHANGE UP
D DIMER BLD IA.RAPID-MCNC: 195 NG/ML — SIGNIFICANT CHANGE UP
EOSINOPHIL # BLD AUTO: 0.71 K/UL — HIGH (ref 0–0.5)
EOSINOPHIL NFR BLD AUTO: 5.2 % — SIGNIFICANT CHANGE UP (ref 0–6)
FERRITIN SERPL-MCNC: 66.63 NG/ML — SIGNIFICANT CHANGE UP (ref 15–150)
GLUCOSE SERPL-MCNC: 160 MG/DL — HIGH (ref 70–99)
HCT VFR BLD CALC: 25.6 % — LOW (ref 34.5–45)
HGB BLD-MCNC: 7.5 G/DL — LOW (ref 11.5–15.5)
IMM GRANULOCYTES NFR BLD AUTO: 1 % — SIGNIFICANT CHANGE UP (ref 0–1.5)
LYMPHOCYTES # BLD AUTO: 1.68 K/UL — SIGNIFICANT CHANGE UP (ref 1–3.3)
LYMPHOCYTES # BLD AUTO: 12.3 % — LOW (ref 13–44)
MAGNESIUM SERPL-MCNC: 1.6 MG/DL — SIGNIFICANT CHANGE UP (ref 1.6–2.6)
MCHC RBC-ENTMCNC: 23.9 PG — LOW (ref 27–34)
MCHC RBC-ENTMCNC: 29.3 % — LOW (ref 32–36)
MCV RBC AUTO: 81.5 FL — SIGNIFICANT CHANGE UP (ref 80–100)
MONOCYTES # BLD AUTO: 0.96 K/UL — HIGH (ref 0–0.9)
MONOCYTES NFR BLD AUTO: 7 % — SIGNIFICANT CHANGE UP (ref 2–14)
NEUTROPHILS # BLD AUTO: 10.16 K/UL — HIGH (ref 1.8–7.4)
NEUTROPHILS NFR BLD AUTO: 74.3 % — SIGNIFICANT CHANGE UP (ref 43–77)
NRBC # FLD: 0 K/UL — SIGNIFICANT CHANGE UP (ref 0–0)
PHOSPHATE SERPL-MCNC: 4.1 MG/DL — SIGNIFICANT CHANGE UP (ref 2.5–4.5)
PLATELET # BLD AUTO: 321 K/UL — SIGNIFICANT CHANGE UP (ref 150–400)
PMV BLD: 10.3 FL — SIGNIFICANT CHANGE UP (ref 7–13)
POTASSIUM SERPL-MCNC: 3.5 MMOL/L — SIGNIFICANT CHANGE UP (ref 3.5–5.3)
POTASSIUM SERPL-SCNC: 3.5 MMOL/L — SIGNIFICANT CHANGE UP (ref 3.5–5.3)
PROCALCITONIN SERPL-MCNC: 0.06 NG/ML — SIGNIFICANT CHANGE UP (ref 0.02–0.1)
PROT SERPL-MCNC: 7.9 G/DL — SIGNIFICANT CHANGE UP (ref 6–8.3)
RBC # BLD: 3.14 M/UL — LOW (ref 3.8–5.2)
RBC # FLD: 25.8 % — HIGH (ref 10.3–14.5)
SODIUM SERPL-SCNC: 137 MMOL/L — SIGNIFICANT CHANGE UP (ref 135–145)
WBC # BLD: 13.67 K/UL — HIGH (ref 3.8–10.5)
WBC # FLD AUTO: 13.67 K/UL — HIGH (ref 3.8–10.5)

## 2020-05-18 PROCEDURE — 99233 SBSQ HOSP IP/OBS HIGH 50: CPT

## 2020-05-18 PROCEDURE — 99232 SBSQ HOSP IP/OBS MODERATE 35: CPT

## 2020-05-18 RX ADMIN — Medication 325 MILLIGRAM(S): at 13:16

## 2020-05-18 RX ADMIN — Medication 325 MILLIGRAM(S): at 05:08

## 2020-05-18 RX ADMIN — Medication 200 MILLIGRAM(S): at 22:46

## 2020-05-18 RX ADMIN — RIVAROXABAN 20 MILLIGRAM(S): KIT at 17:13

## 2020-05-18 RX ADMIN — Medication 200 MILLIGRAM(S): at 05:08

## 2020-05-18 RX ADMIN — SIMVASTATIN 5 MILLIGRAM(S): 20 TABLET, FILM COATED ORAL at 22:47

## 2020-05-18 RX ADMIN — Medication 1 MILLIGRAM(S): at 11:33

## 2020-05-18 RX ADMIN — ZINC SULFATE TAB 220 MG (50 MG ZINC EQUIVALENT) 220 MILLIGRAM(S): 220 (50 ZN) TAB at 12:27

## 2020-05-18 RX ADMIN — Medication 325 MILLIGRAM(S): at 22:47

## 2020-05-18 RX ADMIN — Medication 200 MILLIGRAM(S): at 17:13

## 2020-05-18 RX ADMIN — Medication 100 MILLIGRAM(S): at 05:08

## 2020-05-18 RX ADMIN — Medication 500 MILLIGRAM(S): at 17:14

## 2020-05-18 RX ADMIN — Medication 200 MILLIGRAM(S): at 11:33

## 2020-05-18 RX ADMIN — Medication 1 TABLET(S): at 11:33

## 2020-05-18 RX ADMIN — Medication 100 MILLIGRAM(S): at 13:15

## 2020-05-18 RX ADMIN — AMLODIPINE BESYLATE 5 MILLIGRAM(S): 2.5 TABLET ORAL at 05:08

## 2020-05-18 RX ADMIN — Medication 100 MILLIGRAM(S): at 22:47

## 2020-05-18 RX ADMIN — Medication 500 MILLIGRAM(S): at 05:08

## 2020-05-18 NOTE — PROGRESS NOTE ADULT - SUBJECTIVE AND OBJECTIVE BOX
CC: Patient is a 44y old  Female who presents with a chief complaint of Acute Hypoxemic Respiratory Failure and Pneumonia (17 May 2020 12:42)    ID following for COVID infection    Interval History/ROS: Patient remains on NRB. Dyspnea with movement. No fevers, no chills.    Rest of ROS negative.    Allergies  No Known Drug Allergies  Numbness (Other)    ANTIMICROBIALS:      OTHER MEDS:  acetaminophen   Tablet .. 650 milliGRAM(s) Oral every 6 hours PRN  ALBUTerol    90 MICROgram(s) HFA Inhaler 2 Puff(s) Inhalation every 6 hours PRN  amLODIPine   Tablet 5 milliGRAM(s) Oral daily  ascorbic acid 500 milliGRAM(s) Oral two times a day  benzonatate 100 milliGRAM(s) Oral three times a day  ferrous    sulfate 325 milliGRAM(s) Oral three times a day  folic acid 1 milliGRAM(s) Oral daily  guaiFENesin   Syrup  (Sugar-Free) 200 milliGRAM(s) Oral every 6 hours  influenza   Vaccine 0.5 milliLiter(s) IntraMuscular once  multivitamin 1 Tablet(s) Oral daily  rivaroxaban 20 milliGRAM(s) Oral with dinner  simvastatin 5 milliGRAM(s) Oral at bedtime  zinc sulfate 220 milliGRAM(s) Oral daily    PE:    Vital Signs Last 24 Hrs  T(C): 37 (18 May 2020 12:50), Max: 37 (18 May 2020 12:50)  T(F): 98.6 (18 May 2020 12:50), Max: 98.6 (18 May 2020 12:50)  HR: 102 (18 May 2020 12:50) (82 - 109)  BP: 137/90 (18 May 2020 12:50) (110/73 - 137/90)  BP(mean): --  RR: 20 (18 May 2020 12:50) (20 - 20)  SpO2: 99% (18 May 2020 12:50) (99% - 100%)    Gen: AOx3, NAD  CV: S1+S2 normal, no murmurs  Resp: Clear bilat, no resp distress  Abd: Soft, nontender, +BS  Ext: No LE edema, no wounds  : No Tamayo  IV/Skin: No thrombophlebitis  Neuro: no focal deficits    LABS:                          7.5    13.67 )-----------( 321      ( 18 May 2020 07:30 )             25.6       05-18    137  |  97<L>  |  7   ----------------------------<  160<H>  3.5   |  30  |  0.53    Ca    9.0      18 May 2020 07:30  Phos  4.1     05-18  Mg     1.6     05-18    TPro  7.9  /  Alb  3.4  /  TBili  < 0.2<L>  /  DBili  x   /  AST  14  /  ALT  10  /  AlkPhos  77  05-18    MICROBIOLOGY:  v  .Blood Blood-Venous  04-30-20   No Growth Final  --  --      .Blood Blood-Peripheral  04-30-20   No Growth Final  --  --    RADIOLOGY:    < from: Xray Chest 2 Views PA/Lat (05.11.20 @ 15:02) >  IMPRESSION:    Diffuse bilateral patchy opacities are unchanged.     < end of copied text >

## 2020-05-18 NOTE — PROGRESS NOTE ADULT - PROBLEM SELECTOR PLAN 4
- BP well controlled  - Continue with low sodium diet  - Continue with amlodipine  - Unclear why patient is on statin therapy  - Will obtain lipid profile in AM

## 2020-05-18 NOTE — PROGRESS NOTE ADULT - ASSESSMENT
44 y.o. woman with morbid obesity, PE, and ongoing acute respiratory failure with hypoxia secondary to COVID-19 infection

## 2020-05-18 NOTE — PROGRESS NOTE ADULT - SUBJECTIVE AND OBJECTIVE BOX
SUBJECTIVE / OVERNIGHT EVENTS:  No overnight events. No new AM complaints. Ongoing cough and SOB. Symptoms improve with use of NRB face mask.     MEDICATIONS  (STANDING):  amLODIPine   Tablet 5 milliGRAM(s) Oral daily  ascorbic acid 500 milliGRAM(s) Oral two times a day  benzonatate 100 milliGRAM(s) Oral three times a day  ferrous    sulfate 325 milliGRAM(s) Oral three times a day  folic acid 1 milliGRAM(s) Oral daily  guaiFENesin   Syrup  (Sugar-Free) 200 milliGRAM(s) Oral every 6 hours  influenza   Vaccine 0.5 milliLiter(s) IntraMuscular once  multivitamin 1 Tablet(s) Oral daily  rivaroxaban 20 milliGRAM(s) Oral with dinner  simvastatin 5 milliGRAM(s) Oral at bedtime  zinc sulfate 220 milliGRAM(s) Oral daily    MEDICATIONS  (PRN):  acetaminophen   Tablet .. 650 milliGRAM(s) Oral every 6 hours PRN Temp greater or equal to 38C (100.4F), Mild Pain (1 - 3)  ALBUTerol    90 MICROgram(s) HFA Inhaler 2 Puff(s) Inhalation every 6 hours PRN Shortness of Breath and/or Wheezing    CAPILLARY BLOOD GLUCOSE    I&O's Summary    T(C): 37 (05-18-20 @ 12:50), Max: 37 (05-18-20 @ 12:50)  HR: 102 (05-18-20 @ 12:50) (82 - 109)  BP: 137/90 (05-18-20 @ 12:50) (110/73 - 137/90)  RR: 20 (05-18-20 @ 12:50) (20 - 20)  SpO2: 99% (05-18-20 @ 12:50) (99% - 100%)  100% non-rebreather face mask in place    PHYSICAL EXAM:  GENERAL: NAD, well-developed, obese  HEAD:  Atraumatic, Normocephalic  EYES: EOMI, conjunctiva and sclera clear  NECK: Supple, No JVD  ENT: 100% non-rebreather face mask in place  CHEST/LUNG: Coarse breath sounds noted in b/l lung bases.   HEART: NL S1-S2, Regular rate and rhythm  ABDOMEN: Soft, Nontender, Nondistended  EXTREMITIES: Non-edematous b/l LEs  PSYCH: AAOx3  NEUROLOGY: non-focal  SKIN: No rashes or lesions    LABS:                        7.5    13.67 )-----------( 321      ( 18 May 2020 07:30 )             25.6     05-18    137  |  97<L>  |  7   ----------------------------<  160<H>  3.5   |  30  |  0.53    Ca    9.0      18 May 2020 07:30  Phos  4.1     05-18  Mg     1.6     05-18    TPro  7.9  /  Alb  3.4  /  TBili  < 0.2<L>  /  DBili  x   /  AST  14  /  ALT  10  /  AlkPhos  77  05-18

## 2020-05-18 NOTE — PROGRESS NOTE ADULT - PROBLEM SELECTOR PLAN 1
- Continue with supplemental O2 via face mask  - Wean off as tolerated  - Obtain AM labs (CBC, CMP, Mag, Phos, CRP, Procalcitonin, and Ferritin)
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
## 2020-05-18 NOTE — PROGRESS NOTE ADULT - PROBLEM SELECTOR PLAN 3
- Likely multifactorial   - Continue with ferrous sulfate, MVI, land folate  - CBC in AM  - Transfuse for Hgb < 7

## 2020-05-19 LAB
ALBUMIN SERPL ELPH-MCNC: 3.7 G/DL — SIGNIFICANT CHANGE UP (ref 3.3–5)
ALP SERPL-CCNC: 80 U/L — SIGNIFICANT CHANGE UP (ref 40–120)
ALT FLD-CCNC: 10 U/L — SIGNIFICANT CHANGE UP (ref 4–33)
ANION GAP SERPL CALC-SCNC: 13 MMO/L — SIGNIFICANT CHANGE UP (ref 7–14)
AST SERPL-CCNC: 14 U/L — SIGNIFICANT CHANGE UP (ref 4–32)
BASOPHILS # BLD AUTO: 0.03 K/UL — SIGNIFICANT CHANGE UP (ref 0–0.2)
BASOPHILS NFR BLD AUTO: 0.2 % — SIGNIFICANT CHANGE UP (ref 0–2)
BILIRUB SERPL-MCNC: < 0.2 MG/DL — LOW (ref 0.2–1.2)
BUN SERPL-MCNC: 4 MG/DL — LOW (ref 7–23)
CALCIUM SERPL-MCNC: 9.5 MG/DL — SIGNIFICANT CHANGE UP (ref 8.4–10.5)
CHLORIDE SERPL-SCNC: 95 MMOL/L — LOW (ref 98–107)
CHOLEST SERPL-MCNC: 206 MG/DL — HIGH (ref 120–199)
CO2 SERPL-SCNC: 29 MMOL/L — SIGNIFICANT CHANGE UP (ref 22–31)
CREAT SERPL-MCNC: 0.51 MG/DL — SIGNIFICANT CHANGE UP (ref 0.5–1.3)
CRP SERPL HS-MCNC: 53.55 MG/L — SIGNIFICANT CHANGE UP
D DIMER BLD IA.RAPID-MCNC: < 150 NG/ML — SIGNIFICANT CHANGE UP
EOSINOPHIL # BLD AUTO: 0.7 K/UL — HIGH (ref 0–0.5)
EOSINOPHIL NFR BLD AUTO: 5.8 % — SIGNIFICANT CHANGE UP (ref 0–6)
FERRITIN SERPL-MCNC: 79.21 NG/ML — SIGNIFICANT CHANGE UP (ref 15–150)
GLUCOSE SERPL-MCNC: 123 MG/DL — HIGH (ref 70–99)
HCT VFR BLD CALC: 28.1 % — LOW (ref 34.5–45)
HDLC SERPL-MCNC: 33 MG/DL — LOW (ref 45–65)
HGB BLD-MCNC: 8.2 G/DL — LOW (ref 11.5–15.5)
IMM GRANULOCYTES NFR BLD AUTO: 0.9 % — SIGNIFICANT CHANGE UP (ref 0–1.5)
LIPID PNL WITH DIRECT LDL SERPL: 154 MG/DL — SIGNIFICANT CHANGE UP
LYMPHOCYTES # BLD AUTO: 1.84 K/UL — SIGNIFICANT CHANGE UP (ref 1–3.3)
LYMPHOCYTES # BLD AUTO: 15.2 % — SIGNIFICANT CHANGE UP (ref 13–44)
MAGNESIUM SERPL-MCNC: 1.8 MG/DL — SIGNIFICANT CHANGE UP (ref 1.6–2.6)
MCHC RBC-ENTMCNC: 24.2 PG — LOW (ref 27–34)
MCHC RBC-ENTMCNC: 29.2 % — LOW (ref 32–36)
MCV RBC AUTO: 82.9 FL — SIGNIFICANT CHANGE UP (ref 80–100)
MONOCYTES # BLD AUTO: 0.81 K/UL — SIGNIFICANT CHANGE UP (ref 0–0.9)
MONOCYTES NFR BLD AUTO: 6.7 % — SIGNIFICANT CHANGE UP (ref 2–14)
NEUTROPHILS # BLD AUTO: 8.59 K/UL — HIGH (ref 1.8–7.4)
NEUTROPHILS NFR BLD AUTO: 71.2 % — SIGNIFICANT CHANGE UP (ref 43–77)
NRBC # FLD: 0 K/UL — SIGNIFICANT CHANGE UP (ref 0–0)
PHOSPHATE SERPL-MCNC: 4.2 MG/DL — SIGNIFICANT CHANGE UP (ref 2.5–4.5)
PLATELET # BLD AUTO: 235 K/UL — SIGNIFICANT CHANGE UP (ref 150–400)
PMV BLD: SIGNIFICANT CHANGE UP FL (ref 7–13)
POTASSIUM SERPL-MCNC: 3.6 MMOL/L — SIGNIFICANT CHANGE UP (ref 3.5–5.3)
POTASSIUM SERPL-SCNC: 3.6 MMOL/L — SIGNIFICANT CHANGE UP (ref 3.5–5.3)
PROCALCITONIN SERPL-MCNC: 0.05 NG/ML — SIGNIFICANT CHANGE UP (ref 0.02–0.1)
PROT SERPL-MCNC: 9.1 G/DL — HIGH (ref 6–8.3)
RBC # BLD: 3.39 M/UL — LOW (ref 3.8–5.2)
RBC # FLD: 26.1 % — HIGH (ref 10.3–14.5)
SODIUM SERPL-SCNC: 137 MMOL/L — SIGNIFICANT CHANGE UP (ref 135–145)
TRIGL SERPL-MCNC: 91 MG/DL — SIGNIFICANT CHANGE UP (ref 10–149)
WBC # BLD: 12.08 K/UL — HIGH (ref 3.8–10.5)
WBC # FLD AUTO: 12.08 K/UL — HIGH (ref 3.8–10.5)

## 2020-05-19 PROCEDURE — 99233 SBSQ HOSP IP/OBS HIGH 50: CPT

## 2020-05-19 RX ORDER — ERGOCALCIFEROL 1.25 MG/1
50000 CAPSULE ORAL
Refills: 0 | Status: DISCONTINUED | OUTPATIENT
Start: 2020-05-19 | End: 2020-06-09

## 2020-05-19 RX ORDER — CHOLECALCIFEROL (VITAMIN D3) 125 MCG
3000 CAPSULE ORAL DAILY
Refills: 0 | Status: DISCONTINUED | OUTPATIENT
Start: 2020-05-19 | End: 2020-06-09

## 2020-05-19 RX ADMIN — Medication 1 MILLIGRAM(S): at 12:20

## 2020-05-19 RX ADMIN — Medication 200 MILLIGRAM(S): at 17:56

## 2020-05-19 RX ADMIN — ALBUTEROL 2 PUFF(S): 90 AEROSOL, METERED ORAL at 05:22

## 2020-05-19 RX ADMIN — Medication 100 MILLIGRAM(S): at 05:21

## 2020-05-19 RX ADMIN — Medication 100 MILLIGRAM(S): at 23:05

## 2020-05-19 RX ADMIN — Medication 100 MILLIGRAM(S): at 12:20

## 2020-05-19 RX ADMIN — Medication 1 TABLET(S): at 12:21

## 2020-05-19 RX ADMIN — Medication 200 MILLIGRAM(S): at 12:20

## 2020-05-19 RX ADMIN — Medication 325 MILLIGRAM(S): at 12:20

## 2020-05-19 RX ADMIN — ALBUTEROL 2 PUFF(S): 90 AEROSOL, METERED ORAL at 23:05

## 2020-05-19 RX ADMIN — Medication 200 MILLIGRAM(S): at 23:06

## 2020-05-19 RX ADMIN — Medication 500 MILLIGRAM(S): at 17:56

## 2020-05-19 RX ADMIN — Medication 500 MILLIGRAM(S): at 05:22

## 2020-05-19 RX ADMIN — Medication 200 MILLIGRAM(S): at 05:22

## 2020-05-19 RX ADMIN — SIMVASTATIN 5 MILLIGRAM(S): 20 TABLET, FILM COATED ORAL at 23:05

## 2020-05-19 RX ADMIN — ZINC SULFATE TAB 220 MG (50 MG ZINC EQUIVALENT) 220 MILLIGRAM(S): 220 (50 ZN) TAB at 12:20

## 2020-05-19 RX ADMIN — Medication 325 MILLIGRAM(S): at 23:05

## 2020-05-19 RX ADMIN — AMLODIPINE BESYLATE 5 MILLIGRAM(S): 2.5 TABLET ORAL at 05:21

## 2020-05-19 RX ADMIN — Medication 325 MILLIGRAM(S): at 05:21

## 2020-05-19 RX ADMIN — RIVAROXABAN 20 MILLIGRAM(S): KIT at 17:56

## 2020-05-19 NOTE — PROGRESS NOTE ADULT - PROBLEM SELECTOR PLAN 1
- Continue with supplemental O2 via face mask  - Wean off as tolerated  - Trial of venturi mask in AM (60% FiO2)  - Obtain AM labs (CBC, and CMP)

## 2020-05-19 NOTE — CHART NOTE - NSCHARTNOTEFT_GEN_A_CORE
Source: Patient [x] Spoke to patient via phone call to mobile phone. Medical record reviewed. Length of stay nutrition follow-up. Per EMR, patient remains on NRB. Patient reports okay appetite and PO intake, consuming at least 50% of meals. Drinking Glucerna Shake in the morning to supplement PO intake. No GI distress (nausea/vomiting/diarrhea/constipation) noted at this time. Encourage PO intake as tolerated.     Of note patient with Vitamin level D 5.6 on 3/29 - indicates vitamin D deficiency. Patient would benefit from supplementation with D2 and D3 for repletion and maintenance.     Current Diet : Diet, Regular:   Consistent Carbohydrate {Evening Snack} (CSTCHOSN)  Low Sodium  No Pork  No Shellfish  Supplement Feeding Modality:  Oral  Glucerna Shake Cans or Servings Per Day:  2       Frequency:  Two Times a day (05-12-20 @ 14:16)  PO intake:  < 50% [ ] 50-75% [x]   % [ ]  other :  Current Weight: no new weight to assess, 106.8kg (3/29)    __________________ Pertinent Medications__________________   MEDICATIONS  (STANDING):  amLODIPine   Tablet 5 milliGRAM(s) Oral daily  ascorbic acid 500 milliGRAM(s) Oral two times a day  benzonatate 100 milliGRAM(s) Oral three times a day  ferrous    sulfate 325 milliGRAM(s) Oral three times a day  folic acid 1 milliGRAM(s) Oral daily  guaiFENesin   Syrup  (Sugar-Free) 200 milliGRAM(s) Oral every 6 hours  influenza   Vaccine 0.5 milliLiter(s) IntraMuscular once  multivitamin 1 Tablet(s) Oral daily  rivaroxaban 20 milliGRAM(s) Oral with dinner  simvastatin 5 milliGRAM(s) Oral at bedtime  zinc sulfate 220 milliGRAM(s) Oral daily    MEDICATIONS  (PRN):  acetaminophen   Tablet .. 650 milliGRAM(s) Oral every 6 hours PRN Temp greater or equal to 38C (100.4F), Mild Pain (1 - 3)  ALBUTerol    90 MICROgram(s) HFA Inhaler 2 Puff(s) Inhalation every 6 hours PRN Shortness of Breath and/or Wheezing      __________________ Pertinent Labs__________________   05-19 Na137 mmol/L Glu 123 mg/dL<H> K+ 3.6 mmol/L Cr  0.51 mg/dL BUN 4 mg/dL<L> 05-19 Phos 4.2 mg/dL 05-19 Alb 3.7 g/dL 05-19 Chol 206 mg/dL<H>  mg/dL HDL 33 mg/dL<L> Trig 91 mg/dL    Skin: No pressure ulcers/DTI noted in flowsheets.   Edema: none noted    Estimated Needs:   [x] no change since previous assessment  [ ] recalculated:     Previous Nutrition Diagnosis: [X] Inadequate Oral Intake   Nutrition Diagnosis is [X] ongoing, however patient reports some improvement    New Nutrition Diagnosis: [ ] not applicable  [x] Altered nutrition related labs related to physiological causes As evidenced by vitamin D deficiency     Goal(s):  1. Patient to meet > 75% estimated pro/kcal needs.   2. Tolerance to diet.     Interventions:   1. Continue Consistent Carbohydrate, Low Sodium diet, No Pork, No shellfish.   2. Continue GlucerLobster Therapeutic Nutrition Shake 240mls 2x daily (440kcals, 20g protein).   3. Consider Vitamin D supplementation: 50,000IU D2 weekly and 3000IU D3 daily.   4. Continue multivitamin, zinc sulfate, vitamin C, iron and folic acid as clinically indicated.   5. Please Encourage po intake, assist with meals and menu selections, provide alternatives PRN.   6. Please obtain current weight and document % PO intake as feasible to assess adequacy of PO.     Monitoring and Evaluation:   1. Monitor weights, labs, BM's, skin integrity, p.o. intake.  Follow-up per department protocol.   RD to remain available, Patricia Cole RD, CDN - Pager #[phone]

## 2020-05-19 NOTE — PROGRESS NOTE ADULT - SUBJECTIVE AND OBJECTIVE BOX
SUBJECTIVE / OVERNIGHT EVENTS:  No overnight events. No new AM complaints. Ongoing cough and SOB.     MEDICATIONS  (STANDING):  amLODIPine   Tablet 5 milliGRAM(s) Oral daily  ascorbic acid 500 milliGRAM(s) Oral two times a day  benzonatate 100 milliGRAM(s) Oral three times a day  ferrous    sulfate 325 milliGRAM(s) Oral three times a day  folic acid 1 milliGRAM(s) Oral daily  guaiFENesin   Syrup  (Sugar-Free) 200 milliGRAM(s) Oral every 6 hours  influenza   Vaccine 0.5 milliLiter(s) IntraMuscular once  multivitamin 1 Tablet(s) Oral daily  rivaroxaban 20 milliGRAM(s) Oral with dinner  simvastatin 5 milliGRAM(s) Oral at bedtime  zinc sulfate 220 milliGRAM(s) Oral daily    MEDICATIONS  (PRN):  acetaminophen   Tablet .. 650 milliGRAM(s) Oral every 6 hours PRN Temp greater or equal to 38C (100.4F), Mild Pain (1 - 3)  ALBUTerol    90 MICROgram(s) HFA Inhaler 2 Puff(s) Inhalation every 6 hours PRN Shortness of Breath and/or Wheezing    CAPILLARY BLOOD GLUCOSE    I&O's Summary    T(C): 37 (05-19-20 @ 12:43), Max: 37 (05-19-20 @ 12:43)  HR: 105 (05-19-20 @ 12:43) (81 - 105)  BP: 122/88 (05-19-20 @ 12:43) (115/70 - 122/88)  RR: 21 (05-19-20 @ 12:43) (20 - 21)  SpO2: 100% (05-19-20 @ 12:43) (97% - 100%)  Non-rebreather face mask in place at 9 L/min    PHYSICAL EXAM:  GENERAL: NAD, well-developed, Mobid obesity  HEAD:  Atraumatic, Normocephalic  EYES: EOMI, conjunctiva and sclera clear  NECK: Supple, No JVD  ENT: Non-rebreather face mask in place at 9 L/min  CHEST/LUNG: Coarse breath sounds in all lung fields  HEART: NL S1-S2 without m/r/g  ABDOMEN: Soft, Nontender, Nondistended  EXTREMITIES: Non-edematous b/l LEs  PSYCH: AAOx3  NEUROLOGY: non-focal  SKIN: No rashes or lesions    LABS:                        8.2    12.08 )-----------( 235      ( 19 May 2020 06:45 )             28.1     05-19    137  |  95<L>  |  4<L>  ----------------------------<  123<H>  3.6   |  29  |  0.51    Ca    9.5      19 May 2020 06:45  Phos  4.2     05-19  Mg     1.8     05-19    TPro  9.1<H>  /  Alb  3.7  /  TBili  < 0.2<L>  /  DBili  x   /  AST  14  /  ALT  10  /  AlkPhos  80  05-19

## 2020-05-20 LAB
ANION GAP SERPL CALC-SCNC: 10 MMO/L — SIGNIFICANT CHANGE UP (ref 7–14)
BASOPHILS # BLD AUTO: 0.02 K/UL — SIGNIFICANT CHANGE UP (ref 0–0.2)
BASOPHILS NFR BLD AUTO: 0.2 % — SIGNIFICANT CHANGE UP (ref 0–2)
BLD GP AB SCN SERPL QL: NEGATIVE — SIGNIFICANT CHANGE UP
BUN SERPL-MCNC: 4 MG/DL — LOW (ref 7–23)
CALCIUM SERPL-MCNC: 9.3 MG/DL — SIGNIFICANT CHANGE UP (ref 8.4–10.5)
CHLORIDE SERPL-SCNC: 97 MMOL/L — LOW (ref 98–107)
CO2 SERPL-SCNC: 29 MMOL/L — SIGNIFICANT CHANGE UP (ref 22–31)
CREAT SERPL-MCNC: 0.48 MG/DL — LOW (ref 0.5–1.3)
EOSINOPHIL # BLD AUTO: 0.63 K/UL — HIGH (ref 0–0.5)
EOSINOPHIL NFR BLD AUTO: 5.4 % — SIGNIFICANT CHANGE UP (ref 0–6)
GLUCOSE SERPL-MCNC: 103 MG/DL — HIGH (ref 70–99)
HCT VFR BLD CALC: 23 % — LOW (ref 34.5–45)
HCT VFR BLD CALC: 23.4 % — LOW (ref 34.5–45)
HCT VFR BLD CALC: 29.1 % — LOW (ref 34.5–45)
HGB BLD-MCNC: 6.8 G/DL — CRITICAL LOW (ref 11.5–15.5)
HGB BLD-MCNC: 6.9 G/DL — CRITICAL LOW (ref 11.5–15.5)
HGB BLD-MCNC: 8.7 G/DL — LOW (ref 11.5–15.5)
IMM GRANULOCYTES NFR BLD AUTO: 0.9 % — SIGNIFICANT CHANGE UP (ref 0–1.5)
LYMPHOCYTES # BLD AUTO: 1.78 K/UL — SIGNIFICANT CHANGE UP (ref 1–3.3)
LYMPHOCYTES # BLD AUTO: 15.2 % — SIGNIFICANT CHANGE UP (ref 13–44)
MAGNESIUM SERPL-MCNC: 1.7 MG/DL — SIGNIFICANT CHANGE UP (ref 1.6–2.6)
MCHC RBC-ENTMCNC: 23.8 PG — LOW (ref 27–34)
MCHC RBC-ENTMCNC: 24 PG — LOW (ref 27–34)
MCHC RBC-ENTMCNC: 24.2 PG — LOW (ref 27–34)
MCHC RBC-ENTMCNC: 29.5 % — LOW (ref 32–36)
MCHC RBC-ENTMCNC: 29.6 % — LOW (ref 32–36)
MCHC RBC-ENTMCNC: 29.9 % — LOW (ref 32–36)
MCV RBC AUTO: 80.4 FL — SIGNIFICANT CHANGE UP (ref 80–100)
MCV RBC AUTO: 81.1 FL — SIGNIFICANT CHANGE UP (ref 80–100)
MCV RBC AUTO: 81.3 FL — SIGNIFICANT CHANGE UP (ref 80–100)
MONOCYTES # BLD AUTO: 0.85 K/UL — SIGNIFICANT CHANGE UP (ref 0–0.9)
MONOCYTES NFR BLD AUTO: 7.3 % — SIGNIFICANT CHANGE UP (ref 2–14)
NEUTROPHILS # BLD AUTO: 8.3 K/UL — HIGH (ref 1.8–7.4)
NEUTROPHILS NFR BLD AUTO: 71 % — SIGNIFICANT CHANGE UP (ref 43–77)
NRBC # FLD: 0 K/UL — SIGNIFICANT CHANGE UP (ref 0–0)
PHOSPHATE SERPL-MCNC: 4.5 MG/DL — SIGNIFICANT CHANGE UP (ref 2.5–4.5)
PLATELET # BLD AUTO: 250 K/UL — SIGNIFICANT CHANGE UP (ref 150–400)
PLATELET # BLD AUTO: 300 K/UL — SIGNIFICANT CHANGE UP (ref 150–400)
PLATELET # BLD AUTO: 323 K/UL — SIGNIFICANT CHANGE UP (ref 150–400)
PMV BLD: 10.5 FL — SIGNIFICANT CHANGE UP (ref 7–13)
PMV BLD: 10.8 FL — SIGNIFICANT CHANGE UP (ref 7–13)
PMV BLD: 10.8 FL — SIGNIFICANT CHANGE UP (ref 7–13)
POTASSIUM SERPL-MCNC: 3.7 MMOL/L — SIGNIFICANT CHANGE UP (ref 3.5–5.3)
POTASSIUM SERPL-SCNC: 3.7 MMOL/L — SIGNIFICANT CHANGE UP (ref 3.5–5.3)
RBC # BLD: 2.86 M/UL — LOW (ref 3.8–5.2)
RBC # BLD: 2.88 M/UL — LOW (ref 3.8–5.2)
RBC # BLD: 3.59 M/UL — LOW (ref 3.8–5.2)
RBC # FLD: 24.1 % — HIGH (ref 10.3–14.5)
RBC # FLD: 25.6 % — HIGH (ref 10.3–14.5)
RBC # FLD: 25.9 % — HIGH (ref 10.3–14.5)
RH IG SCN BLD-IMP: NEGATIVE — SIGNIFICANT CHANGE UP
SODIUM SERPL-SCNC: 136 MMOL/L — SIGNIFICANT CHANGE UP (ref 135–145)
WBC # BLD: 11.16 K/UL — HIGH (ref 3.8–10.5)
WBC # BLD: 11.68 K/UL — HIGH (ref 3.8–10.5)
WBC # BLD: 14.61 K/UL — HIGH (ref 3.8–10.5)
WBC # FLD AUTO: 11.16 K/UL — HIGH (ref 3.8–10.5)
WBC # FLD AUTO: 11.68 K/UL — HIGH (ref 3.8–10.5)
WBC # FLD AUTO: 14.61 K/UL — HIGH (ref 3.8–10.5)

## 2020-05-20 PROCEDURE — 99233 SBSQ HOSP IP/OBS HIGH 50: CPT

## 2020-05-20 RX ADMIN — Medication 200 MILLIGRAM(S): at 17:08

## 2020-05-20 RX ADMIN — Medication 1 MILLIGRAM(S): at 11:44

## 2020-05-20 RX ADMIN — Medication 500 MILLIGRAM(S): at 17:08

## 2020-05-20 RX ADMIN — ZINC SULFATE TAB 220 MG (50 MG ZINC EQUIVALENT) 220 MILLIGRAM(S): 220 (50 ZN) TAB at 11:44

## 2020-05-20 RX ADMIN — SIMVASTATIN 5 MILLIGRAM(S): 20 TABLET, FILM COATED ORAL at 20:34

## 2020-05-20 RX ADMIN — Medication 500 MILLIGRAM(S): at 06:12

## 2020-05-20 RX ADMIN — RIVAROXABAN 20 MILLIGRAM(S): KIT at 17:08

## 2020-05-20 RX ADMIN — Medication 325 MILLIGRAM(S): at 06:12

## 2020-05-20 RX ADMIN — ERGOCALCIFEROL 50000 UNIT(S): 1.25 CAPSULE ORAL at 00:04

## 2020-05-20 RX ADMIN — Medication 100 MILLIGRAM(S): at 14:24

## 2020-05-20 RX ADMIN — ALBUTEROL 2 PUFF(S): 90 AEROSOL, METERED ORAL at 06:11

## 2020-05-20 RX ADMIN — AMLODIPINE BESYLATE 5 MILLIGRAM(S): 2.5 TABLET ORAL at 06:12

## 2020-05-20 RX ADMIN — Medication 325 MILLIGRAM(S): at 14:24

## 2020-05-20 RX ADMIN — Medication 200 MILLIGRAM(S): at 11:43

## 2020-05-20 RX ADMIN — Medication 325 MILLIGRAM(S): at 20:34

## 2020-05-20 RX ADMIN — ERGOCALCIFEROL 50000 UNIT(S): 1.25 CAPSULE ORAL at 15:21

## 2020-05-20 RX ADMIN — Medication 100 MILLIGRAM(S): at 20:34

## 2020-05-20 RX ADMIN — Medication 200 MILLIGRAM(S): at 20:34

## 2020-05-20 RX ADMIN — Medication 100 MILLIGRAM(S): at 06:12

## 2020-05-20 RX ADMIN — Medication 200 MILLIGRAM(S): at 06:11

## 2020-05-20 RX ADMIN — Medication 3000 UNIT(S): at 14:25

## 2020-05-20 RX ADMIN — Medication 3000 UNIT(S): at 00:03

## 2020-05-20 RX ADMIN — Medication 1 TABLET(S): at 11:44

## 2020-05-20 NOTE — PROVIDER CONTACT NOTE (CRITICAL VALUE NOTIFICATION) - ACTION/TREATMENT ORDERED:
Awaiting PA recommendations
pa notified, pa stated she wants repeat cbc and type and screen
pa notified, pa will get blood transfusion consent

## 2020-05-20 NOTE — PROGRESS NOTE ADULT - PROBLEM SELECTOR PLAN 1
- Continue with supplemental O2 via face mask  - Wean off as tolerated  - Trial of venturi mask in AM (60% FiO2)  - Obtain AM labs (CBC, and CMP) - Continue with supplemental O2 via face mask  - Wean off as tolerated  - Trial of venturi mask (60% FiO2)  - Obtain AM labs (CBC, and CMP)

## 2020-05-20 NOTE — PROGRESS NOTE ADULT - SUBJECTIVE AND OBJECTIVE BOX
SUBJECTIVE / OVERNIGHT EVENTS:  No overnight events. No new AM complaints. Patient reports heavy bleeding during the first 3 days of her menstrual cycle. Presently, she is on day #4. She reports improvements in her SOB, however, she still has SOB when not on supplemental oxygen.     MEDICATIONS  (STANDING):  amLODIPine   Tablet 5 milliGRAM(s) Oral daily  ascorbic acid 500 milliGRAM(s) Oral two times a day  benzonatate 100 milliGRAM(s) Oral three times a day  cholecalciferol 3000 Unit(s) Oral daily  ergocalciferol 29139 Unit(s) Oral every week  ferrous    sulfate 325 milliGRAM(s) Oral three times a day  folic acid 1 milliGRAM(s) Oral daily  guaiFENesin   Syrup  (Sugar-Free) 200 milliGRAM(s) Oral every 6 hours  influenza   Vaccine 0.5 milliLiter(s) IntraMuscular once  multivitamin 1 Tablet(s) Oral daily  rivaroxaban 20 milliGRAM(s) Oral with dinner  simvastatin 5 milliGRAM(s) Oral at bedtime  zinc sulfate 220 milliGRAM(s) Oral daily    MEDICATIONS  (PRN):  acetaminophen   Tablet .. 650 milliGRAM(s) Oral every 6 hours PRN Temp greater or equal to 38C (100.4F), Mild Pain (1 - 3)  ALBUTerol    90 MICROgram(s) HFA Inhaler 2 Puff(s) Inhalation every 6 hours PRN Shortness of Breath and/or Wheezing      CAPILLARY BLOOD GLUCOSE        I&O's Summary    T(C): 36.6 (05-20-20 @ 13:00), Max: 36.9 (05-19-20 @ 20:52)  HR: 99 (05-20-20 @ 13:00) (89 - 99)  BP: 123/80 (05-20-20 @ 13:00) (123/80 - 127/81)  RR: 22 (05-20-20 @ 13:00) (21 - 22)  SpO2: 100% (05-20-20 @ 13:00) (100% - 100%)    PHYSICAL EXAM:  GENERAL: NAD, well-developed  HEAD:  Atraumatic, Normocephalic  EYES: EOMI, conjunctiva and sclera clear  NECK: Supple, No JVD  ENT:   CHEST/LUNG:   HEART: Regular rate and rhythm  ABDOMEN: Soft, Nontender, Nondistended  EXTREMITIES:  PSYCH: AAOx3  NEUROLOGY: non-focal  SKIN: No rashes or lesions    LABS:                        6.8    11.68 )-----------( 300      ( 20 May 2020 10:15 )             23.0     05-20    136  |  97<L>  |  4<L>  ----------------------------<  103<H>  3.7   |  29  |  0.48<L>    Ca    9.3      20 May 2020 07:00  Phos  4.5     05-20  Mg     1.7     05-20    TPro  9.1<H>  /  Alb  3.7  /  TBili  < 0.2<L>  /  DBili  x   /  AST  14  /  ALT  10  /  AlkPhos  80  05-19 SUBJECTIVE / OVERNIGHT EVENTS:  No overnight events. No new AM complaints. Patient reports heavy bleeding during the first 3 days of her menstrual cycle. Presently, she is on day #4. She reports improvements in her SOB, however, she still has SOB when not on supplemental oxygen.     MEDICATIONS  (STANDING):  amLODIPine   Tablet 5 milliGRAM(s) Oral daily  ascorbic acid 500 milliGRAM(s) Oral two times a day  benzonatate 100 milliGRAM(s) Oral three times a day  cholecalciferol 3000 Unit(s) Oral daily  ergocalciferol 95206 Unit(s) Oral every week  ferrous    sulfate 325 milliGRAM(s) Oral three times a day  folic acid 1 milliGRAM(s) Oral daily  guaiFENesin   Syrup  (Sugar-Free) 200 milliGRAM(s) Oral every 6 hours  influenza   Vaccine 0.5 milliLiter(s) IntraMuscular once  multivitamin 1 Tablet(s) Oral daily  rivaroxaban 20 milliGRAM(s) Oral with dinner  simvastatin 5 milliGRAM(s) Oral at bedtime  zinc sulfate 220 milliGRAM(s) Oral daily    MEDICATIONS  (PRN):  acetaminophen   Tablet .. 650 milliGRAM(s) Oral every 6 hours PRN Temp greater or equal to 38C (100.4F), Mild Pain (1 - 3)  ALBUTerol    90 MICROgram(s) HFA Inhaler 2 Puff(s) Inhalation every 6 hours PRN Shortness of Breath and/or Wheezing    CAPILLARY BLOOD GLUCOSE    I&O's Summary    T(C): 36.6 (05-20-20 @ 13:00), Max: 36.9 (05-19-20 @ 20:52)  HR: 99 (05-20-20 @ 13:00) (89 - 99)  BP: 123/80 (05-20-20 @ 13:00) (123/80 - 127/81)  RR: 22 (05-20-20 @ 13:00) (21 - 22)  SpO2: 100% (05-20-20 @ 13:00) (100% - 100%)    PHYSICAL EXAM:  GENERAL: NAD, well-developed  HEAD:  Atraumatic, Normocephalic  EYES: EOMI, conjunctiva and sclera clear  NECK: Supple, No JVD  ENT:   CHEST/LUNG:   HEART: Regular rate and rhythm  ABDOMEN: Soft, Nontender, Nondistended  EXTREMITIES:  PSYCH: AAOx3  NEUROLOGY: non-focal  SKIN: No rashes or lesions    LABS:                        6.8    11.68 )-----------( 300      ( 20 May 2020 10:15 )             23.0     05-20    136  |  97<L>  |  4<L>  ----------------------------<  103<H>  3.7   |  29  |  0.48<L>    Ca    9.3      20 May 2020 07:00  Phos  4.5     05-20  Mg     1.7     05-20    TPro  9.1<H>  /  Alb  3.7  /  TBili  < 0.2<L>  /  DBili  x   /  AST  14  /  ALT  10  /  AlkPhos  80  05-19

## 2020-05-20 NOTE — PROGRESS NOTE ADULT - PROBLEM SELECTOR PLAN 3
- Likely multifactorial   - Continue with ferrous sulfate, MVI, land folate  - CBC in AM  - Transfuse for Hgb < 7 - Likely multifactorial, including acute blood loss  - Continue with ferrous sulfate, MVI, land folate  - Transfuse 1 unit PRBC  - Post transfusion CBC and repeat CBC in AM

## 2020-05-20 NOTE — PROVIDER CONTACT NOTE (CRITICAL VALUE NOTIFICATION) - ASSESSMENT
Troponin level 58
pt is a&ox4, no acute distress noted, patient is on her period
pt is a&ox4, no acute distress, pt is on her menses

## 2020-05-21 DIAGNOSIS — R53.81 OTHER MALAISE: ICD-10-CM

## 2020-05-21 LAB
ALBUMIN SERPL ELPH-MCNC: 3.5 G/DL — SIGNIFICANT CHANGE UP (ref 3.3–5)
ALP SERPL-CCNC: 78 U/L — SIGNIFICANT CHANGE UP (ref 40–120)
ALT FLD-CCNC: 9 U/L — SIGNIFICANT CHANGE UP (ref 4–33)
ANION GAP SERPL CALC-SCNC: 12 MMO/L — SIGNIFICANT CHANGE UP (ref 7–14)
AST SERPL-CCNC: 16 U/L — SIGNIFICANT CHANGE UP (ref 4–32)
BASOPHILS # BLD AUTO: 0.04 K/UL — SIGNIFICANT CHANGE UP (ref 0–0.2)
BASOPHILS NFR BLD AUTO: 0.3 % — SIGNIFICANT CHANGE UP (ref 0–2)
BILIRUB SERPL-MCNC: 0.2 MG/DL — SIGNIFICANT CHANGE UP (ref 0.2–1.2)
BUN SERPL-MCNC: 4 MG/DL — LOW (ref 7–23)
CALCIUM SERPL-MCNC: 9.2 MG/DL — SIGNIFICANT CHANGE UP (ref 8.4–10.5)
CHLORIDE SERPL-SCNC: 96 MMOL/L — LOW (ref 98–107)
CO2 SERPL-SCNC: 28 MMOL/L — SIGNIFICANT CHANGE UP (ref 22–31)
CREAT SERPL-MCNC: 0.46 MG/DL — LOW (ref 0.5–1.3)
D DIMER BLD IA.RAPID-MCNC: < 150 NG/ML — SIGNIFICANT CHANGE UP
EOSINOPHIL # BLD AUTO: 0.68 K/UL — HIGH (ref 0–0.5)
EOSINOPHIL NFR BLD AUTO: 5.4 % — SIGNIFICANT CHANGE UP (ref 0–6)
GLUCOSE SERPL-MCNC: 168 MG/DL — HIGH (ref 70–99)
HCT VFR BLD CALC: 27.7 % — LOW (ref 34.5–45)
HGB BLD-MCNC: 8.7 G/DL — LOW (ref 11.5–15.5)
IMM GRANULOCYTES NFR BLD AUTO: 0.8 % — SIGNIFICANT CHANGE UP (ref 0–1.5)
LYMPHOCYTES # BLD AUTO: 1.86 K/UL — SIGNIFICANT CHANGE UP (ref 1–3.3)
LYMPHOCYTES # BLD AUTO: 14.9 % — SIGNIFICANT CHANGE UP (ref 13–44)
MAGNESIUM SERPL-MCNC: 1.6 MG/DL — SIGNIFICANT CHANGE UP (ref 1.6–2.6)
MCHC RBC-ENTMCNC: 25.3 PG — LOW (ref 27–34)
MCHC RBC-ENTMCNC: 31.4 % — LOW (ref 32–36)
MCV RBC AUTO: 80.5 FL — SIGNIFICANT CHANGE UP (ref 80–100)
MONOCYTES # BLD AUTO: 0.81 K/UL — SIGNIFICANT CHANGE UP (ref 0–0.9)
MONOCYTES NFR BLD AUTO: 6.5 % — SIGNIFICANT CHANGE UP (ref 2–14)
NEUTROPHILS # BLD AUTO: 9.02 K/UL — HIGH (ref 1.8–7.4)
NEUTROPHILS NFR BLD AUTO: 72.1 % — SIGNIFICANT CHANGE UP (ref 43–77)
NRBC # FLD: 0 K/UL — SIGNIFICANT CHANGE UP (ref 0–0)
PHOSPHATE SERPL-MCNC: 4 MG/DL — SIGNIFICANT CHANGE UP (ref 2.5–4.5)
PLATELET # BLD AUTO: 310 K/UL — SIGNIFICANT CHANGE UP (ref 150–400)
PMV BLD: 10.8 FL — SIGNIFICANT CHANGE UP (ref 7–13)
POTASSIUM SERPL-MCNC: 3.5 MMOL/L — SIGNIFICANT CHANGE UP (ref 3.5–5.3)
POTASSIUM SERPL-SCNC: 3.5 MMOL/L — SIGNIFICANT CHANGE UP (ref 3.5–5.3)
PROT SERPL-MCNC: 8.3 G/DL — SIGNIFICANT CHANGE UP (ref 6–8.3)
RBC # BLD: 3.44 M/UL — LOW (ref 3.8–5.2)
RBC # FLD: 24 % — HIGH (ref 10.3–14.5)
SODIUM SERPL-SCNC: 136 MMOL/L — SIGNIFICANT CHANGE UP (ref 135–145)
WBC # BLD: 12.51 K/UL — HIGH (ref 3.8–10.5)
WBC # FLD AUTO: 12.51 K/UL — HIGH (ref 3.8–10.5)

## 2020-05-21 PROCEDURE — 99233 SBSQ HOSP IP/OBS HIGH 50: CPT

## 2020-05-21 RX ADMIN — Medication 100 MILLIGRAM(S): at 21:33

## 2020-05-21 RX ADMIN — AMLODIPINE BESYLATE 5 MILLIGRAM(S): 2.5 TABLET ORAL at 05:31

## 2020-05-21 RX ADMIN — Medication 500 MILLIGRAM(S): at 17:58

## 2020-05-21 RX ADMIN — Medication 325 MILLIGRAM(S): at 21:33

## 2020-05-21 RX ADMIN — Medication 200 MILLIGRAM(S): at 17:58

## 2020-05-21 RX ADMIN — Medication 325 MILLIGRAM(S): at 05:31

## 2020-05-21 RX ADMIN — Medication 100 MILLIGRAM(S): at 05:30

## 2020-05-21 RX ADMIN — Medication 500 MILLIGRAM(S): at 05:30

## 2020-05-21 RX ADMIN — Medication 1 TABLET(S): at 12:10

## 2020-05-21 RX ADMIN — Medication 1 MILLIGRAM(S): at 12:10

## 2020-05-21 RX ADMIN — SIMVASTATIN 5 MILLIGRAM(S): 20 TABLET, FILM COATED ORAL at 21:33

## 2020-05-21 RX ADMIN — Medication 100 MILLIGRAM(S): at 14:13

## 2020-05-21 RX ADMIN — ZINC SULFATE TAB 220 MG (50 MG ZINC EQUIVALENT) 220 MILLIGRAM(S): 220 (50 ZN) TAB at 12:10

## 2020-05-21 RX ADMIN — RIVAROXABAN 20 MILLIGRAM(S): KIT at 17:58

## 2020-05-21 RX ADMIN — Medication 200 MILLIGRAM(S): at 21:33

## 2020-05-21 RX ADMIN — Medication 325 MILLIGRAM(S): at 14:14

## 2020-05-21 RX ADMIN — Medication 200 MILLIGRAM(S): at 12:10

## 2020-05-21 RX ADMIN — Medication 200 MILLIGRAM(S): at 05:30

## 2020-05-21 RX ADMIN — Medication 3000 UNIT(S): at 12:10

## 2020-05-21 NOTE — PROGRESS NOTE ADULT - PROBLEM SELECTOR PLAN 1
- Continue with venturi mask (60% FiO2) and titrate down as tolerated.   - Obtain AM labs (CBC, CMP, Mag, Phos, CRP, Procalcitonin, and Ferritin)

## 2020-05-21 NOTE — PROGRESS NOTE ADULT - SUBJECTIVE AND OBJECTIVE BOX
SUBJECTIVE / OVERNIGHT EVENTS:  No overnight events. no new AM complaints.     MEDICATIONS  (STANDING):  amLODIPine   Tablet 5 milliGRAM(s) Oral daily  ascorbic acid 500 milliGRAM(s) Oral two times a day  benzonatate 100 milliGRAM(s) Oral three times a day  cholecalciferol 3000 Unit(s) Oral daily  ergocalciferol 34595 Unit(s) Oral every week  ferrous    sulfate 325 milliGRAM(s) Oral three times a day  folic acid 1 milliGRAM(s) Oral daily  guaiFENesin   Syrup  (Sugar-Free) 200 milliGRAM(s) Oral every 6 hours  influenza   Vaccine 0.5 milliLiter(s) IntraMuscular once  multivitamin 1 Tablet(s) Oral daily  rivaroxaban 20 milliGRAM(s) Oral with dinner  simvastatin 5 milliGRAM(s) Oral at bedtime  zinc sulfate 220 milliGRAM(s) Oral daily    MEDICATIONS  (PRN):  acetaminophen   Tablet .. 650 milliGRAM(s) Oral every 6 hours PRN Temp greater or equal to 38C (100.4F), Mild Pain (1 - 3)  ALBUTerol    90 MICROgram(s) HFA Inhaler 2 Puff(s) Inhalation every 6 hours PRN Shortness of Breath and/or Wheezing    CAPILLARY BLOOD GLUCOSE    I&O's Summary    T(C): 37.2 (05-21-20 @ 12:59), Max: 37.2 (05-21-20 @ 12:59)  HR: 105 (05-21-20 @ 12:59) (100 - 105)  BP: 127/86 (05-21-20 @ 12:59) (127/86 - 133/88)  RR: 20 (05-21-20 @ 12:59) (20 - 22)  SpO2: 98% (05-21-20 @ 12:59) (94% - 98%)  on 60% venturi face mask    PHYSICAL EXAM:  GENERAL: NAD, well-developed, Obese  HEAD:  Atraumatic, Normocephalic  EYES: EOMI, conjunctiva and sclera clear  NECK: Supple, No JVD  ENT: on 60% venturi face mask  CHEST/LUNG: Coarse breath sounds in all lung fields  HEART: Normal S1-S2, No M/R/G  ABDOMEN: Soft, Nontender, Nondistended  EXTREMITIES: Non-edematous b/l LEs  PSYCH: AAOx3  NEUROLOGY: non-focal  SKIN: No rashes or lesions    LABS:                        8.7    12.51 )-----------( 310      ( 21 May 2020 06:34 )             27.7     05-21    136  |  96<L>  |  4<L>  ----------------------------<  168<H>  3.5   |  28  |  0.46<L>    Ca    9.2      21 May 2020 06:34  Phos  4.0     05-21  Mg     1.6     05-21    TPro  8.3  /  Alb  3.5  /  TBili  0.2  /  DBili  x   /  AST  16  /  ALT  9   /  AlkPhos  78  05-21

## 2020-05-21 NOTE — PROGRESS NOTE ADULT - PROBLEM SELECTOR PLAN 3
- Likely multifactorial, including acute blood loss  - Continue with ferrous sulfate, MVI, land folate  - Daily CBC

## 2020-05-22 LAB
ANION GAP SERPL CALC-SCNC: 14 MMO/L — SIGNIFICANT CHANGE UP (ref 7–14)
BUN SERPL-MCNC: 5 MG/DL — LOW (ref 7–23)
CALCIUM SERPL-MCNC: 9.3 MG/DL — SIGNIFICANT CHANGE UP (ref 8.4–10.5)
CHLORIDE SERPL-SCNC: 95 MMOL/L — LOW (ref 98–107)
CO2 SERPL-SCNC: 27 MMOL/L — SIGNIFICANT CHANGE UP (ref 22–31)
CREAT SERPL-MCNC: 0.5 MG/DL — SIGNIFICANT CHANGE UP (ref 0.5–1.3)
CRP SERPL-MCNC: 38.8 MG/L — HIGH
D DIMER BLD IA.RAPID-MCNC: 155 NG/ML — SIGNIFICANT CHANGE UP
FERRITIN SERPL-MCNC: 70.6 NG/ML — SIGNIFICANT CHANGE UP (ref 15–150)
GLUCOSE SERPL-MCNC: 109 MG/DL — HIGH (ref 70–99)
HCT VFR BLD CALC: 28.9 % — LOW (ref 34.5–45)
HGB BLD-MCNC: 8.7 G/DL — LOW (ref 11.5–15.5)
MAGNESIUM SERPL-MCNC: 1.7 MG/DL — SIGNIFICANT CHANGE UP (ref 1.6–2.6)
MCHC RBC-ENTMCNC: 24.2 PG — LOW (ref 27–34)
MCHC RBC-ENTMCNC: 30.1 % — LOW (ref 32–36)
MCV RBC AUTO: 80.3 FL — SIGNIFICANT CHANGE UP (ref 80–100)
NRBC # FLD: 0 K/UL — SIGNIFICANT CHANGE UP (ref 0–0)
PHOSPHATE SERPL-MCNC: 4.4 MG/DL — SIGNIFICANT CHANGE UP (ref 2.5–4.5)
PLATELET # BLD AUTO: 239 K/UL — SIGNIFICANT CHANGE UP (ref 150–400)
PMV BLD: 10.1 FL — SIGNIFICANT CHANGE UP (ref 7–13)
POTASSIUM SERPL-MCNC: 3.6 MMOL/L — SIGNIFICANT CHANGE UP (ref 3.5–5.3)
POTASSIUM SERPL-SCNC: 3.6 MMOL/L — SIGNIFICANT CHANGE UP (ref 3.5–5.3)
RBC # BLD: 3.6 M/UL — LOW (ref 3.8–5.2)
RBC # FLD: 24.5 % — HIGH (ref 10.3–14.5)
SODIUM SERPL-SCNC: 136 MMOL/L — SIGNIFICANT CHANGE UP (ref 135–145)
WBC # BLD: 13.01 K/UL — HIGH (ref 3.8–10.5)
WBC # FLD AUTO: 13.01 K/UL — HIGH (ref 3.8–10.5)

## 2020-05-22 PROCEDURE — 99233 SBSQ HOSP IP/OBS HIGH 50: CPT

## 2020-05-22 RX ADMIN — ZINC SULFATE TAB 220 MG (50 MG ZINC EQUIVALENT) 220 MILLIGRAM(S): 220 (50 ZN) TAB at 12:27

## 2020-05-22 RX ADMIN — AMLODIPINE BESYLATE 5 MILLIGRAM(S): 2.5 TABLET ORAL at 05:45

## 2020-05-22 RX ADMIN — Medication 325 MILLIGRAM(S): at 05:45

## 2020-05-22 RX ADMIN — RIVAROXABAN 20 MILLIGRAM(S): KIT at 19:23

## 2020-05-22 RX ADMIN — Medication 200 MILLIGRAM(S): at 12:26

## 2020-05-22 RX ADMIN — Medication 325 MILLIGRAM(S): at 14:43

## 2020-05-22 RX ADMIN — Medication 100 MILLIGRAM(S): at 14:43

## 2020-05-22 RX ADMIN — Medication 3000 UNIT(S): at 14:44

## 2020-05-22 RX ADMIN — Medication 100 MILLIGRAM(S): at 21:50

## 2020-05-22 RX ADMIN — Medication 200 MILLIGRAM(S): at 17:22

## 2020-05-22 RX ADMIN — Medication 1 TABLET(S): at 12:26

## 2020-05-22 RX ADMIN — SIMVASTATIN 5 MILLIGRAM(S): 20 TABLET, FILM COATED ORAL at 21:50

## 2020-05-22 RX ADMIN — Medication 200 MILLIGRAM(S): at 23:21

## 2020-05-22 RX ADMIN — Medication 1 MILLIGRAM(S): at 12:26

## 2020-05-22 RX ADMIN — Medication 325 MILLIGRAM(S): at 21:50

## 2020-05-22 RX ADMIN — Medication 500 MILLIGRAM(S): at 17:22

## 2020-05-22 RX ADMIN — Medication 200 MILLIGRAM(S): at 05:45

## 2020-05-22 RX ADMIN — Medication 100 MILLIGRAM(S): at 05:45

## 2020-05-22 RX ADMIN — Medication 500 MILLIGRAM(S): at 05:45

## 2020-05-22 NOTE — PROVIDER CONTACT NOTE (OTHER) - ASSESSMENT
patient sleeping comfortably in bed (on back), denies SOB, no s/s of respiratory distress/discomfort, denies pain

## 2020-05-22 NOTE — PHYSICAL THERAPY INITIAL EVALUATION ADULT - GENERAL OBSERVATIONS, REHAB EVAL
Patient found supine in bed in NAD; +venti mask at 15L/min. Patient found supine in bed in NAD; +venti mask at 15L/min; patient has to perform all movement slowly due to desaturation with small movements, patient recovers with rest.

## 2020-05-22 NOTE — PHYSICAL THERAPY INITIAL EVALUATION ADULT - PLANNED THERAPY INTERVENTIONS, PT EVAL
strengthening/Patient left supine in bed in NAD, call bell in reach, all lines intact.  02 sat 91% venti mask at 15L/min; HR 121bpm./balance training/gait training/transfer training/bed mobility training

## 2020-05-22 NOTE — PROGRESS NOTE ADULT - PROBLEM SELECTOR PLAN 1
- Continue with venturi mask (40% FiO2) and titrate down as tolerated.   - Obtain AM labs (CBC, CMP, Mag, Phos, CRP, Procalcitonin, and Ferritin)

## 2020-05-22 NOTE — PHYSICAL THERAPY INITIAL EVALUATION ADULT - PRECAUTIONS/LIMITATIONS, REHAB EVAL
[facility]/CONTACT isolation precautions for +COVID maintained throughout./isolation precautions/oxygen therapy device and L/min
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
## 2020-05-22 NOTE — PROVIDER CONTACT NOTE (OTHER) - ACTION/TREATMENT ORDERED:
n/a
Continue to monitor,
Dr. Dougherty notified. Give tylenol. Try lying the patient prone.
continue to monitor, no intervention for now, if O2 sat <90% notify provider
Awaiting for further orders
Pt laying prone and O2 is about the same. ACP notified. MICU consult pending.
keep pts o2 stat above 90% and continue to monitor. adjust mask and perform chest PT as needed. Will cont to monitor
keep pts o2 stat above 90% and continue to monitor. adjust mask and perform chest PT as needed. Will cont to monitor
will cont to monitor

## 2020-05-22 NOTE — PROGRESS NOTE ADULT - SUBJECTIVE AND OBJECTIVE BOX
SUBJECTIVE / OVERNIGHT EVENTS:  No overnight events. No new AM complaints. Tolerating 40% venturi face mask.     MEDICATIONS  (STANDING):  amLODIPine   Tablet 5 milliGRAM(s) Oral daily  ascorbic acid 500 milliGRAM(s) Oral two times a day  benzonatate 100 milliGRAM(s) Oral three times a day  cholecalciferol 3000 Unit(s) Oral daily  ergocalciferol 73666 Unit(s) Oral <User Schedule>  ferrous    sulfate 325 milliGRAM(s) Oral three times a day  folic acid 1 milliGRAM(s) Oral daily  guaiFENesin   Syrup  (Sugar-Free) 200 milliGRAM(s) Oral every 6 hours  influenza   Vaccine 0.5 milliLiter(s) IntraMuscular once  multivitamin 1 Tablet(s) Oral daily  rivaroxaban 20 milliGRAM(s) Oral with dinner  simvastatin 5 milliGRAM(s) Oral at bedtime  zinc sulfate 220 milliGRAM(s) Oral daily    MEDICATIONS  (PRN):  acetaminophen   Tablet .. 650 milliGRAM(s) Oral every 6 hours PRN Temp greater or equal to 38C (100.4F), Mild Pain (1 - 3)  ALBUTerol    90 MICROgram(s) HFA Inhaler 2 Puff(s) Inhalation every 6 hours PRN Shortness of Breath and/or Wheezing      CAPILLARY BLOOD GLUCOSE    I&O's Summary    T(C): 36.8 (05-22-20 @ 10:00), Max: 36.8 (05-22-20 @ 10:00)  HR: 100 (05-22-20 @ 10:00) (98 - 104)  BP: 131/82 (05-22-20 @ 10:00) (131/82 - 132/91)  RR: 20 (05-22-20 @ 10:00) (20 - 20)  SpO2: 96% (05-22-20 @ 10:37) (95% - 98%)  On 40% venturi face mask    PHYSICAL EXAM:  GENERAL: NAD, well-developed, Obese  HEAD:  Atraumatic, Normocephalic  EYES: EOMI, conjunctiva and sclera clear  NECK: Supple, No JVD  ENT: On 40% venturi face mask  CHEST/LUNG: Coarse breath sounds in all lung fields  HEART: NL S1-S2, no m/r/g  ABDOMEN: Soft, Nontender, Nondistended  EXTREMITIES: Non-edematous b/l LEs  PSYCH: AAOx3  NEUROLOGY: non-focal  SKIN: No rashes or lesions    LABS:                        8.7    13.01 )-----------( 239      ( 22 May 2020 06:37 )             28.9     05-22    136  |  95<L>  |  5<L>  ----------------------------<  109<H>  3.6   |  27  |  0.50    Ca    9.3      22 May 2020 06:37  Phos  4.4     05-22  Mg     1.7     05-22    TPro  8.3  /  Alb  3.5  /  TBili  0.2  /  DBili  x   /  AST  16  /  ALT  9   /  AlkPhos  78  05-21

## 2020-05-23 LAB
ALBUMIN SERPL ELPH-MCNC: 3.5 G/DL — SIGNIFICANT CHANGE UP (ref 3.3–5)
ALP SERPL-CCNC: 76 U/L — SIGNIFICANT CHANGE UP (ref 40–120)
ALT FLD-CCNC: 11 U/L — SIGNIFICANT CHANGE UP (ref 4–33)
ANION GAP SERPL CALC-SCNC: 15 MMO/L — HIGH (ref 7–14)
AST SERPL-CCNC: 16 U/L — SIGNIFICANT CHANGE UP (ref 4–32)
BILIRUB SERPL-MCNC: 0.2 MG/DL — SIGNIFICANT CHANGE UP (ref 0.2–1.2)
BUN SERPL-MCNC: 5 MG/DL — LOW (ref 7–23)
CALCIUM SERPL-MCNC: 9.7 MG/DL — SIGNIFICANT CHANGE UP (ref 8.4–10.5)
CHLORIDE SERPL-SCNC: 96 MMOL/L — LOW (ref 98–107)
CO2 SERPL-SCNC: 24 MMOL/L — SIGNIFICANT CHANGE UP (ref 22–31)
CREAT SERPL-MCNC: 0.52 MG/DL — SIGNIFICANT CHANGE UP (ref 0.5–1.3)
CRP SERPL-MCNC: 35.5 MG/L — HIGH
D DIMER BLD IA.RAPID-MCNC: < 150 NG/ML — SIGNIFICANT CHANGE UP
FERRITIN SERPL-MCNC: 65.55 NG/ML — SIGNIFICANT CHANGE UP (ref 15–150)
GLUCOSE SERPL-MCNC: 135 MG/DL — HIGH (ref 70–99)
HCT VFR BLD CALC: 29.7 % — LOW (ref 34.5–45)
HGB BLD-MCNC: 8.9 G/DL — LOW (ref 11.5–15.5)
MCHC RBC-ENTMCNC: 24.2 PG — LOW (ref 27–34)
MCHC RBC-ENTMCNC: 30 % — LOW (ref 32–36)
MCV RBC AUTO: 80.7 FL — SIGNIFICANT CHANGE UP (ref 80–100)
NRBC # FLD: 0 K/UL — SIGNIFICANT CHANGE UP (ref 0–0)
PLATELET # BLD AUTO: 317 K/UL — SIGNIFICANT CHANGE UP (ref 150–400)
PMV BLD: 10.3 FL — SIGNIFICANT CHANGE UP (ref 7–13)
POTASSIUM SERPL-MCNC: 3.5 MMOL/L — SIGNIFICANT CHANGE UP (ref 3.5–5.3)
POTASSIUM SERPL-SCNC: 3.5 MMOL/L — SIGNIFICANT CHANGE UP (ref 3.5–5.3)
PROT SERPL-MCNC: 8.6 G/DL — HIGH (ref 6–8.3)
RBC # BLD: 3.68 M/UL — LOW (ref 3.8–5.2)
RBC # FLD: 24.2 % — HIGH (ref 10.3–14.5)
SODIUM SERPL-SCNC: 135 MMOL/L — SIGNIFICANT CHANGE UP (ref 135–145)
WBC # BLD: 16.09 K/UL — HIGH (ref 3.8–10.5)
WBC # FLD AUTO: 16.09 K/UL — HIGH (ref 3.8–10.5)

## 2020-05-23 PROCEDURE — 99233 SBSQ HOSP IP/OBS HIGH 50: CPT

## 2020-05-23 RX ADMIN — Medication 3000 UNIT(S): at 12:51

## 2020-05-23 RX ADMIN — Medication 200 MILLIGRAM(S): at 18:23

## 2020-05-23 RX ADMIN — Medication 100 MILLIGRAM(S): at 22:20

## 2020-05-23 RX ADMIN — Medication 200 MILLIGRAM(S): at 22:20

## 2020-05-23 RX ADMIN — Medication 325 MILLIGRAM(S): at 22:22

## 2020-05-23 RX ADMIN — Medication 100 MILLIGRAM(S): at 12:51

## 2020-05-23 RX ADMIN — Medication 200 MILLIGRAM(S): at 12:51

## 2020-05-23 RX ADMIN — Medication 200 MILLIGRAM(S): at 06:04

## 2020-05-23 RX ADMIN — Medication 500 MILLIGRAM(S): at 06:04

## 2020-05-23 RX ADMIN — Medication 100 MILLIGRAM(S): at 06:04

## 2020-05-23 RX ADMIN — ZINC SULFATE TAB 220 MG (50 MG ZINC EQUIVALENT) 220 MILLIGRAM(S): 220 (50 ZN) TAB at 12:51

## 2020-05-23 RX ADMIN — Medication 325 MILLIGRAM(S): at 12:51

## 2020-05-23 RX ADMIN — Medication 1 TABLET(S): at 12:51

## 2020-05-23 RX ADMIN — Medication 500 MILLIGRAM(S): at 18:23

## 2020-05-23 RX ADMIN — AMLODIPINE BESYLATE 5 MILLIGRAM(S): 2.5 TABLET ORAL at 06:04

## 2020-05-23 RX ADMIN — Medication 1 MILLIGRAM(S): at 12:51

## 2020-05-23 RX ADMIN — Medication 325 MILLIGRAM(S): at 06:04

## 2020-05-23 RX ADMIN — SIMVASTATIN 5 MILLIGRAM(S): 20 TABLET, FILM COATED ORAL at 22:20

## 2020-05-23 RX ADMIN — RIVAROXABAN 20 MILLIGRAM(S): KIT at 18:24

## 2020-05-23 NOTE — PROGRESS NOTE ADULT - PROBLEM SELECTOR PLAN 6
- OOB to chair with assistance  - PT evaluation note appreciated  - DC planning to home with home PT

## 2020-05-23 NOTE — PROGRESS NOTE ADULT - PROBLEM SELECTOR PLAN 1
- Continue with venturi mask (50% FiO2) and titrate down as tolerated.   - Obtain AM labs (CBC, CMP, Mag, Phos, CRP, Procalcitonin, and Ferritin)  - Patient was encouraged to use incentive spirometry 6 times hourly

## 2020-05-23 NOTE — PROGRESS NOTE ADULT - SUBJECTIVE AND OBJECTIVE BOX
SUBJECTIVE / OVERNIGHT EVENTS:  No significant overnight events. No new AM complaints.     MEDICATIONS  (STANDING):  amLODIPine   Tablet 5 milliGRAM(s) Oral daily  ascorbic acid 500 milliGRAM(s) Oral two times a day  benzonatate 100 milliGRAM(s) Oral three times a day  cholecalciferol 3000 Unit(s) Oral daily  ergocalciferol 78808 Unit(s) Oral <User Schedule>  ferrous    sulfate 325 milliGRAM(s) Oral three times a day  folic acid 1 milliGRAM(s) Oral daily  guaiFENesin   Syrup  (Sugar-Free) 200 milliGRAM(s) Oral every 6 hours  influenza   Vaccine 0.5 milliLiter(s) IntraMuscular once  multivitamin 1 Tablet(s) Oral daily  rivaroxaban 20 milliGRAM(s) Oral with dinner  simvastatin 5 milliGRAM(s) Oral at bedtime  zinc sulfate 220 milliGRAM(s) Oral daily    MEDICATIONS  (PRN):  acetaminophen   Tablet .. 650 milliGRAM(s) Oral every 6 hours PRN Temp greater or equal to 38C (100.4F), Mild Pain (1 - 3)  ALBUTerol    90 MICROgram(s) HFA Inhaler 2 Puff(s) Inhalation every 6 hours PRN Shortness of Breath and/or Wheezing    CAPILLARY BLOOD GLUCOSE    I&O's Summary    T(C): 36.7 (05-23-20 @ 06:02), Max: 37 (05-22-20 @ 22:05)  HR: 101 (05-23-20 @ 06:02) (100 - 101)  BP: 139/80 (05-23-20 @ 06:02) (139/80 - 152/90)  RR: 20 (05-23-20 @ 06:02) (20 - 20)  SpO2: 95% (05-23-20 @ 06:02) (94% - 95%)  On 50% venturi face mask    PHYSICAL EXAM:  GENERAL: NAD, well-developed, Obese  HEAD:  Atraumatic, Normocephalic  EYES: EOMI, conjunctiva and sclera clear  NECK: Supple, No JVD  ENT: On 50% venturi face mask  CHEST/LUNG: Coarse breath sounds in all lung fields  HEART: Normal S1-S2 no m/r/g  ABDOMEN: Soft, Nontender, Nondistended  EXTREMITIES: Non-edematous b/l LEs  PSYCH: AAOx3  NEUROLOGY: non-focal  SKIN: No rashes or lesions    LABS:                        8.7    13.01 )-----------( 239      ( 22 May 2020 06:37 )             28.9     05-22    136  |  95<L>  |  5<L>  ----------------------------<  109<H>  3.6   |  27  |  0.50    Ca    9.3      22 May 2020 06:37  Phos  4.4     05-22  Mg     1.7     05-22

## 2020-05-24 LAB
ALBUMIN SERPL ELPH-MCNC: 3.5 G/DL — SIGNIFICANT CHANGE UP (ref 3.3–5)
ALP SERPL-CCNC: 77 U/L — SIGNIFICANT CHANGE UP (ref 40–120)
ALT FLD-CCNC: 9 U/L — SIGNIFICANT CHANGE UP (ref 4–33)
ANION GAP SERPL CALC-SCNC: 15 MMO/L — HIGH (ref 7–14)
AST SERPL-CCNC: 15 U/L — SIGNIFICANT CHANGE UP (ref 4–32)
BILIRUB SERPL-MCNC: < 0.2 MG/DL — LOW (ref 0.2–1.2)
BUN SERPL-MCNC: 5 MG/DL — LOW (ref 7–23)
CALCIUM SERPL-MCNC: 9.4 MG/DL — SIGNIFICANT CHANGE UP (ref 8.4–10.5)
CHLORIDE SERPL-SCNC: 97 MMOL/L — LOW (ref 98–107)
CO2 SERPL-SCNC: 25 MMOL/L — SIGNIFICANT CHANGE UP (ref 22–31)
CREAT SERPL-MCNC: 0.47 MG/DL — LOW (ref 0.5–1.3)
GLUCOSE SERPL-MCNC: 108 MG/DL — HIGH (ref 70–99)
HCT VFR BLD CALC: 30.7 % — LOW (ref 34.5–45)
HGB BLD-MCNC: 9.2 G/DL — LOW (ref 11.5–15.5)
MCHC RBC-ENTMCNC: 24 PG — LOW (ref 27–34)
MCHC RBC-ENTMCNC: 30 % — LOW (ref 32–36)
MCV RBC AUTO: 79.9 FL — LOW (ref 80–100)
NRBC # FLD: 0 K/UL — SIGNIFICANT CHANGE UP (ref 0–0)
PLATELET # BLD AUTO: 339 K/UL — SIGNIFICANT CHANGE UP (ref 150–400)
PMV BLD: 10.2 FL — SIGNIFICANT CHANGE UP (ref 7–13)
POTASSIUM SERPL-MCNC: 3.7 MMOL/L — SIGNIFICANT CHANGE UP (ref 3.5–5.3)
POTASSIUM SERPL-SCNC: 3.7 MMOL/L — SIGNIFICANT CHANGE UP (ref 3.5–5.3)
PROT SERPL-MCNC: 8.1 G/DL — SIGNIFICANT CHANGE UP (ref 6–8.3)
RBC # BLD: 3.84 M/UL — SIGNIFICANT CHANGE UP (ref 3.8–5.2)
RBC # FLD: 24.4 % — HIGH (ref 10.3–14.5)
SODIUM SERPL-SCNC: 137 MMOL/L — SIGNIFICANT CHANGE UP (ref 135–145)
WBC # BLD: 14.22 K/UL — HIGH (ref 3.8–10.5)
WBC # FLD AUTO: 14.22 K/UL — HIGH (ref 3.8–10.5)

## 2020-05-24 PROCEDURE — 99233 SBSQ HOSP IP/OBS HIGH 50: CPT

## 2020-05-24 RX ADMIN — Medication 500 MILLIGRAM(S): at 06:29

## 2020-05-24 RX ADMIN — Medication 325 MILLIGRAM(S): at 22:58

## 2020-05-24 RX ADMIN — Medication 1 MILLIGRAM(S): at 13:07

## 2020-05-24 RX ADMIN — Medication 200 MILLIGRAM(S): at 17:30

## 2020-05-24 RX ADMIN — Medication 325 MILLIGRAM(S): at 06:29

## 2020-05-24 RX ADMIN — Medication 200 MILLIGRAM(S): at 06:30

## 2020-05-24 RX ADMIN — Medication 200 MILLIGRAM(S): at 13:04

## 2020-05-24 RX ADMIN — RIVAROXABAN 20 MILLIGRAM(S): KIT at 17:30

## 2020-05-24 RX ADMIN — Medication 1 TABLET(S): at 13:07

## 2020-05-24 RX ADMIN — Medication 3000 UNIT(S): at 13:07

## 2020-05-24 RX ADMIN — Medication 200 MILLIGRAM(S): at 23:03

## 2020-05-24 RX ADMIN — ZINC SULFATE TAB 220 MG (50 MG ZINC EQUIVALENT) 220 MILLIGRAM(S): 220 (50 ZN) TAB at 13:04

## 2020-05-24 RX ADMIN — Medication 325 MILLIGRAM(S): at 13:09

## 2020-05-24 RX ADMIN — Medication 500 MILLIGRAM(S): at 17:30

## 2020-05-24 RX ADMIN — Medication 100 MILLIGRAM(S): at 13:09

## 2020-05-24 RX ADMIN — AMLODIPINE BESYLATE 5 MILLIGRAM(S): 2.5 TABLET ORAL at 06:30

## 2020-05-24 RX ADMIN — Medication 100 MILLIGRAM(S): at 06:29

## 2020-05-24 RX ADMIN — SIMVASTATIN 5 MILLIGRAM(S): 20 TABLET, FILM COATED ORAL at 22:58

## 2020-05-24 NOTE — PROGRESS NOTE ADULT - PROBLEM SELECTOR PLAN 1
- Continue with venturi mask (40% FiO2) and titrate down as tolerated.   - Obtain AM labs (CBC, CMP, Mag, Phos, CRP, Procalcitonin, and Ferritin)  - Patient was encouraged to use incentive spirometry 6 times hourly  - Titrate down venturi mask to 35% and keep O2 saturation > 92%  - OOB to chair  - Incentive spiromety

## 2020-05-24 NOTE — PROGRESS NOTE ADULT - SUBJECTIVE AND OBJECTIVE BOX
SUBJECTIVE / OVERNIGHT EVENTS:  No overnight events. No new AM complaints.     MEDICATIONS  (STANDING):  amLODIPine   Tablet 5 milliGRAM(s) Oral daily  ascorbic acid 500 milliGRAM(s) Oral two times a day  benzonatate 100 milliGRAM(s) Oral three times a day  cholecalciferol 3000 Unit(s) Oral daily  ergocalciferol 39614 Unit(s) Oral <User Schedule>  ferrous    sulfate 325 milliGRAM(s) Oral three times a day  folic acid 1 milliGRAM(s) Oral daily  guaiFENesin   Syrup  (Sugar-Free) 200 milliGRAM(s) Oral every 6 hours  influenza   Vaccine 0.5 milliLiter(s) IntraMuscular once  multivitamin 1 Tablet(s) Oral daily  rivaroxaban 20 milliGRAM(s) Oral with dinner  simvastatin 5 milliGRAM(s) Oral at bedtime  zinc sulfate 220 milliGRAM(s) Oral daily    MEDICATIONS  (PRN):  acetaminophen   Tablet .. 650 milliGRAM(s) Oral every 6 hours PRN Temp greater or equal to 38C (100.4F), Mild Pain (1 - 3)  ALBUTerol    90 MICROgram(s) HFA Inhaler 2 Puff(s) Inhalation every 6 hours PRN Shortness of Breath and/or Wheezing    CAPILLARY BLOOD GLUCOSE    I&O's Summary    T(C): 36.9 (05-24-20 @ 06:12), Max: 37 (05-23-20 @ 12:52)  HR: 104 (05-24-20 @ 06:12) (100 - 113)  BP: 129/87 (05-24-20 @ 06:12) (129/87 - 132/91)  RR: 20 (05-24-20 @ 06:12) (20 - 20)  SpO2: 95% (05-24-20 @ 06:12) (95% - 98%)  on 40% venturi mask    PHYSICAL EXAM:  GENERAL: NAD, well-developed  HEAD:  Atraumatic, Normocephalic  EYES: EOMI, conjunctiva and sclera clear  NECK: Supple, No JVD  ENT: on 40% venturi mask  CHEST/LUNG: Coarse breath sounds in all lung fields.   HEART: NL S1-S2, no m/r/g  ABDOMEN: Soft, Nontender, Nondistended  EXTREMITIES: Non-edematous b/l LEs  PSYCH: AAOx3  NEUROLOGY: non-focal  SKIN: No rashes or lesions    LABS:                        9.2    14.22 )-----------( 339      ( 24 May 2020 06:11 )             30.7     05-24    137  |  97<L>  |  5<L>  ----------------------------<  108<H>  3.7   |  25  |  0.47<L>    Ca    9.4      24 May 2020 06:11    TPro  8.1  /  Alb  3.5  /  TBili  < 0.2<L>  /  DBili  x   /  AST  15  /  ALT  9   /  AlkPhos  77  05-24

## 2020-05-25 DIAGNOSIS — N63.0 UNSPECIFIED LUMP IN UNSPECIFIED BREAST: ICD-10-CM

## 2020-05-25 LAB
ALBUMIN SERPL ELPH-MCNC: 3.5 G/DL — SIGNIFICANT CHANGE UP (ref 3.3–5)
ALP SERPL-CCNC: 79 U/L — SIGNIFICANT CHANGE UP (ref 40–120)
ALT FLD-CCNC: 13 U/L — SIGNIFICANT CHANGE UP (ref 4–33)
ANION GAP SERPL CALC-SCNC: 14 MMO/L — SIGNIFICANT CHANGE UP (ref 7–14)
AST SERPL-CCNC: 16 U/L — SIGNIFICANT CHANGE UP (ref 4–32)
BILIRUB SERPL-MCNC: < 0.2 MG/DL — LOW (ref 0.2–1.2)
BUN SERPL-MCNC: 6 MG/DL — LOW (ref 7–23)
CALCIUM SERPL-MCNC: 9.3 MG/DL — SIGNIFICANT CHANGE UP (ref 8.4–10.5)
CHLORIDE SERPL-SCNC: 99 MMOL/L — SIGNIFICANT CHANGE UP (ref 98–107)
CO2 SERPL-SCNC: 24 MMOL/L — SIGNIFICANT CHANGE UP (ref 22–31)
CREAT SERPL-MCNC: 0.58 MG/DL — SIGNIFICANT CHANGE UP (ref 0.5–1.3)
GLUCOSE SERPL-MCNC: 123 MG/DL — HIGH (ref 70–99)
HCT VFR BLD CALC: 30.2 % — LOW (ref 34.5–45)
HGB BLD-MCNC: 9.2 G/DL — LOW (ref 11.5–15.5)
MCHC RBC-ENTMCNC: 25.1 PG — LOW (ref 27–34)
MCHC RBC-ENTMCNC: 30.5 % — LOW (ref 32–36)
MCV RBC AUTO: 82.3 FL — SIGNIFICANT CHANGE UP (ref 80–100)
NRBC # FLD: 0 K/UL — SIGNIFICANT CHANGE UP (ref 0–0)
PLATELET # BLD AUTO: 302 K/UL — SIGNIFICANT CHANGE UP (ref 150–400)
PMV BLD: 10.4 FL — SIGNIFICANT CHANGE UP (ref 7–13)
POTASSIUM SERPL-MCNC: 3.9 MMOL/L — SIGNIFICANT CHANGE UP (ref 3.5–5.3)
POTASSIUM SERPL-SCNC: 3.9 MMOL/L — SIGNIFICANT CHANGE UP (ref 3.5–5.3)
PROT SERPL-MCNC: 8.7 G/DL — HIGH (ref 6–8.3)
RBC # BLD: 3.67 M/UL — LOW (ref 3.8–5.2)
RBC # FLD: 24.5 % — HIGH (ref 10.3–14.5)
SODIUM SERPL-SCNC: 137 MMOL/L — SIGNIFICANT CHANGE UP (ref 135–145)
WBC # BLD: 11.88 K/UL — HIGH (ref 3.8–10.5)
WBC # FLD AUTO: 11.88 K/UL — HIGH (ref 3.8–10.5)

## 2020-05-25 PROCEDURE — 99233 SBSQ HOSP IP/OBS HIGH 50: CPT

## 2020-05-25 RX ADMIN — Medication 200 MILLIGRAM(S): at 22:37

## 2020-05-25 RX ADMIN — Medication 500 MILLIGRAM(S): at 17:55

## 2020-05-25 RX ADMIN — Medication 325 MILLIGRAM(S): at 16:35

## 2020-05-25 RX ADMIN — Medication 200 MILLIGRAM(S): at 17:55

## 2020-05-25 RX ADMIN — Medication 200 MILLIGRAM(S): at 12:40

## 2020-05-25 RX ADMIN — RIVAROXABAN 20 MILLIGRAM(S): KIT at 16:35

## 2020-05-25 RX ADMIN — Medication 3000 UNIT(S): at 12:41

## 2020-05-25 RX ADMIN — Medication 200 MILLIGRAM(S): at 05:39

## 2020-05-25 RX ADMIN — AMLODIPINE BESYLATE 5 MILLIGRAM(S): 2.5 TABLET ORAL at 05:39

## 2020-05-25 RX ADMIN — Medication 1 MILLIGRAM(S): at 12:40

## 2020-05-25 RX ADMIN — Medication 500 MILLIGRAM(S): at 05:39

## 2020-05-25 RX ADMIN — Medication 325 MILLIGRAM(S): at 22:37

## 2020-05-25 RX ADMIN — SIMVASTATIN 5 MILLIGRAM(S): 20 TABLET, FILM COATED ORAL at 22:37

## 2020-05-25 RX ADMIN — ZINC SULFATE TAB 220 MG (50 MG ZINC EQUIVALENT) 220 MILLIGRAM(S): 220 (50 ZN) TAB at 12:40

## 2020-05-25 RX ADMIN — ALBUTEROL 2 PUFF(S): 90 AEROSOL, METERED ORAL at 20:25

## 2020-05-25 RX ADMIN — Medication 325 MILLIGRAM(S): at 05:39

## 2020-05-25 RX ADMIN — Medication 1 TABLET(S): at 12:40

## 2020-05-25 NOTE — PROGRESS NOTE ADULT - PROBLEM SELECTOR PLAN 7
- OOB to chair with assistance  - PT evaluation note appreciated  - DC planning to home with home PT when stable

## 2020-05-25 NOTE — PROGRESS NOTE ADULT - SUBJECTIVE AND OBJECTIVE BOX
Patient is a 44y old  Female who presents with a chief complaint of Acute Hypoxemic Respiratory Failure and Pneumonia (24 May 2020 12:43)      SUBJECTIVE / OVERNIGHT EVENTS: Pt seen and examined earlier today, no overnight events, pt reports cough, breathing comfortably on ventimask, per nsg desats upon position changes and movement. No other new issues reported.    MEDICATIONS  (STANDING):  amLODIPine   Tablet 5 milliGRAM(s) Oral daily  ascorbic acid 500 milliGRAM(s) Oral two times a day  cholecalciferol 3000 Unit(s) Oral daily  ergocalciferol 34717 Unit(s) Oral <User Schedule>  ferrous    sulfate 325 milliGRAM(s) Oral three times a day  folic acid 1 milliGRAM(s) Oral daily  guaiFENesin   Syrup  (Sugar-Free) 200 milliGRAM(s) Oral every 6 hours  influenza   Vaccine 0.5 milliLiter(s) IntraMuscular once  multivitamin 1 Tablet(s) Oral daily  rivaroxaban 20 milliGRAM(s) Oral with dinner  simvastatin 5 milliGRAM(s) Oral at bedtime  zinc sulfate 220 milliGRAM(s) Oral daily    MEDICATIONS  (PRN):  acetaminophen   Tablet .. 650 milliGRAM(s) Oral every 6 hours PRN Temp greater or equal to 38C (100.4F), Mild Pain (1 - 3)  ALBUTerol    90 MICROgram(s) HFA Inhaler 2 Puff(s) Inhalation every 6 hours PRN Shortness of Breath and/or Wheezing      Vital Signs Last 24 Hrs  T(C): 36.5 (25 May 2020 12:55), Max: 36.6 (24 May 2020 21:41)  T(F): 97.7 (25 May 2020 12:55), Max: 97.9 (24 May 2020 21:41)  HR: 100 (25 May 2020 12:55) (100 - 110)  BP: 120/80 (25 May 2020 12:55) (118/53 - 120/80)  BP(mean): --  RR: 20 (25 May 2020 12:55) (20 - 20)  SpO2: 96% (25 May 2020 12:55) (95% - 96%)  CAPILLARY BLOOD GLUCOSE        I&O's Summary      PHYSICAL EXAM:  GENERAL: NAD, well-developed  CHEST/LUNG: Bibasilar crackles at bases+  HEART: Tachycardic to 102/mt  ABDOMEN: Soft, Nontender, Nondistended  EXTREMITIES: no LE edema  PSYCH: Calm  NEUROLOGY: AAOx3  SKIN: No rashes or lesions    LABS:                        9.2    11.88 )-----------( 302      ( 25 May 2020 06:30 )             30.2     05-25    137  |  99  |  6<L>  ----------------------------<  123<H>  3.9   |  24  |  0.58    Ca    9.3      25 May 2020 06:30    TPro  8.7<H>  /  Alb  3.5  /  TBili  < 0.2<L>  /  DBili  x   /  AST  16  /  ALT  13  /  AlkPhos  79  05-25              RADIOLOGY & ADDITIONAL TESTS:    Imaging Personally Reviewed:    Consultant(s) Notes Reviewed:      Care Discussed with Consultants/Other Providers:

## 2020-05-25 NOTE — PROGRESS NOTE ADULT - PROBLEM SELECTOR PLAN 1
- Continue with venturi mask (40% FiO2) and titrate down as tolerated.   -monitor inflammatory markers  - Patient was encouraged to use incentive spirometry 6 times hourly  - Titrate down venturi mask to 35% and keep O2 saturation > 92%  - OOB to chair  - Incentive spiromety

## 2020-05-26 PROCEDURE — 99233 SBSQ HOSP IP/OBS HIGH 50: CPT

## 2020-05-26 RX ORDER — SODIUM CHLORIDE 0.65 %
1 AEROSOL, SPRAY (ML) NASAL DAILY
Refills: 0 | Status: DISCONTINUED | OUTPATIENT
Start: 2020-05-26 | End: 2020-06-09

## 2020-05-26 RX ADMIN — Medication 1 MILLIGRAM(S): at 11:28

## 2020-05-26 RX ADMIN — ZINC SULFATE TAB 220 MG (50 MG ZINC EQUIVALENT) 220 MILLIGRAM(S): 220 (50 ZN) TAB at 11:28

## 2020-05-26 RX ADMIN — AMLODIPINE BESYLATE 5 MILLIGRAM(S): 2.5 TABLET ORAL at 06:02

## 2020-05-26 RX ADMIN — Medication 1 SPRAY(S): at 23:00

## 2020-05-26 RX ADMIN — Medication 1 TABLET(S): at 11:28

## 2020-05-26 RX ADMIN — Medication 325 MILLIGRAM(S): at 22:52

## 2020-05-26 RX ADMIN — Medication 500 MILLIGRAM(S): at 17:01

## 2020-05-26 RX ADMIN — Medication 3000 UNIT(S): at 11:28

## 2020-05-26 RX ADMIN — Medication 200 MILLIGRAM(S): at 11:28

## 2020-05-26 RX ADMIN — Medication 500 MILLIGRAM(S): at 06:02

## 2020-05-26 RX ADMIN — Medication 200 MILLIGRAM(S): at 16:57

## 2020-05-26 RX ADMIN — Medication 325 MILLIGRAM(S): at 06:02

## 2020-05-26 RX ADMIN — RIVAROXABAN 20 MILLIGRAM(S): KIT at 16:57

## 2020-05-26 RX ADMIN — Medication 325 MILLIGRAM(S): at 11:29

## 2020-05-26 RX ADMIN — Medication 200 MILLIGRAM(S): at 06:02

## 2020-05-26 NOTE — PROGRESS NOTE ADULT - SUBJECTIVE AND OBJECTIVE BOX
Patient is a 44y old  Female who presents with a chief complaint of Acute Hypoxemic Respiratory Failure and Pneumonia (24 May 2020 12:43)      SUBJECTIVE / OVERNIGHT EVENTS: Pt seen and examined. On 6L NC today down from venti mask. Overall feels better. No new complaints.     MEDICATIONS  (STANDING):  amLODIPine   Tablet 5 milliGRAM(s) Oral daily  ascorbic acid 500 milliGRAM(s) Oral two times a day  cholecalciferol 3000 Unit(s) Oral daily  ergocalciferol 70020 Unit(s) Oral <User Schedule>  ferrous    sulfate 325 milliGRAM(s) Oral three times a day  folic acid 1 milliGRAM(s) Oral daily  guaiFENesin   Syrup  (Sugar-Free) 200 milliGRAM(s) Oral every 6 hours  influenza   Vaccine 0.5 milliLiter(s) IntraMuscular once  multivitamin 1 Tablet(s) Oral daily  rivaroxaban 20 milliGRAM(s) Oral with dinner  simvastatin 5 milliGRAM(s) Oral at bedtime  zinc sulfate 220 milliGRAM(s) Oral daily    MEDICATIONS  (PRN):  acetaminophen   Tablet .. 650 milliGRAM(s) Oral every 6 hours PRN Temp greater or equal to 38C (100.4F), Mild Pain (1 - 3)  ALBUTerol    90 MICROgram(s) HFA Inhaler 2 Puff(s) Inhalation every 6 hours PRN Shortness of Breath and/or Wheezing      Vital Signs Last 24 Hrs  T(C): 36.7 (26 May 2020 05:58), Max: 36.7 (26 May 2020 05:58)  T(F): 98 (26 May 2020 05:58), Max: 98 (26 May 2020 05:58)  HR: 100 (26 May 2020 05:58) (100 - 110)  BP: 110/69 (26 May 2020 05:58) (110/69 - 121/79)  BP(mean): --  RR: 22 (26 May 2020 08:42) (20 - 22)  SpO2: 85% (26 May 2020 08:42) (85% - 96%)        I&O's Summary      PHYSICAL EXAM:  GENERAL: NAD, well-developed  CHEST/LUNG: Bibasilar crackles at bases+  HEART: Tachycardia  ABDOMEN: Soft, Nontender, Nondistended  EXTREMITIES: no LE edema  PSYCH: Calm  NEUROLOGY: AAOx3  SKIN: No rashes or lesions    LABS:                                   9.2    11.88 )-----------( 302      ( 25 May 2020 06:30 )             30.2   05-25    137  |  99  |  6<L>  ----------------------------<  123<H>  3.9   |  24  |  0.58    Ca    9.3      25 May 2020 06:30    TPro  8.7<H>  /  Alb  3.5  /  TBili  < 0.2<L>  /  DBili  x   /  AST  16  /  ALT  13  /  AlkPhos  79  05-25        RADIOLOGY & ADDITIONAL TESTS:    Imaging Personally Reviewed:    Consultant(s) Notes Reviewed:      Care Discussed with Consultants/Other Providers:

## 2020-05-26 NOTE — PROGRESS NOTE ADULT - PROBLEM SELECTOR PLAN 1
-on NC 6L today, continue to downtitrate as tolerated. keep spo2>92%  -monitor inflammatory markers  - Patient was encouraged to use incentive spirometry 6 times hourly  - OOB to chair  - Incentive spirometry

## 2020-05-27 LAB
ALBUMIN SERPL ELPH-MCNC: 3.3 G/DL — SIGNIFICANT CHANGE UP (ref 3.3–5)
ALP SERPL-CCNC: 79 U/L — SIGNIFICANT CHANGE UP (ref 40–120)
ALT FLD-CCNC: 10 U/L — SIGNIFICANT CHANGE UP (ref 4–33)
ANION GAP SERPL CALC-SCNC: 14 MMO/L — SIGNIFICANT CHANGE UP (ref 7–14)
AST SERPL-CCNC: 15 U/L — SIGNIFICANT CHANGE UP (ref 4–32)
BILIRUB SERPL-MCNC: < 0.2 MG/DL — LOW (ref 0.2–1.2)
BUN SERPL-MCNC: 6 MG/DL — LOW (ref 7–23)
CALCIUM SERPL-MCNC: 9.1 MG/DL — SIGNIFICANT CHANGE UP (ref 8.4–10.5)
CHLORIDE SERPL-SCNC: 100 MMOL/L — SIGNIFICANT CHANGE UP (ref 98–107)
CO2 SERPL-SCNC: 24 MMOL/L — SIGNIFICANT CHANGE UP (ref 22–31)
CREAT SERPL-MCNC: 0.53 MG/DL — SIGNIFICANT CHANGE UP (ref 0.5–1.3)
GLUCOSE SERPL-MCNC: 105 MG/DL — HIGH (ref 70–99)
HCT VFR BLD CALC: 29 % — LOW (ref 34.5–45)
HGB BLD-MCNC: 8.7 G/DL — LOW (ref 11.5–15.5)
MCHC RBC-ENTMCNC: 24.7 PG — LOW (ref 27–34)
MCHC RBC-ENTMCNC: 30 % — LOW (ref 32–36)
MCV RBC AUTO: 82.4 FL — SIGNIFICANT CHANGE UP (ref 80–100)
NRBC # FLD: 0 K/UL — SIGNIFICANT CHANGE UP (ref 0–0)
PLATELET # BLD AUTO: 328 K/UL — SIGNIFICANT CHANGE UP (ref 150–400)
PMV BLD: 11.4 FL — SIGNIFICANT CHANGE UP (ref 7–13)
POTASSIUM SERPL-MCNC: 3.7 MMOL/L — SIGNIFICANT CHANGE UP (ref 3.5–5.3)
POTASSIUM SERPL-SCNC: 3.7 MMOL/L — SIGNIFICANT CHANGE UP (ref 3.5–5.3)
PROT SERPL-MCNC: 8.3 G/DL — SIGNIFICANT CHANGE UP (ref 6–8.3)
RBC # BLD: 3.52 M/UL — LOW (ref 3.8–5.2)
RBC # FLD: 23.9 % — HIGH (ref 10.3–14.5)
SODIUM SERPL-SCNC: 138 MMOL/L — SIGNIFICANT CHANGE UP (ref 135–145)
WBC # BLD: 11.74 K/UL — HIGH (ref 3.8–10.5)
WBC # FLD AUTO: 11.74 K/UL — HIGH (ref 3.8–10.5)

## 2020-05-27 PROCEDURE — 99233 SBSQ HOSP IP/OBS HIGH 50: CPT

## 2020-05-27 RX ADMIN — Medication 3000 UNIT(S): at 13:37

## 2020-05-27 RX ADMIN — Medication 1 MILLIGRAM(S): at 13:37

## 2020-05-27 RX ADMIN — Medication 500 MILLIGRAM(S): at 18:00

## 2020-05-27 RX ADMIN — Medication 200 MILLIGRAM(S): at 13:37

## 2020-05-27 RX ADMIN — Medication 325 MILLIGRAM(S): at 06:19

## 2020-05-27 RX ADMIN — Medication 200 MILLIGRAM(S): at 00:56

## 2020-05-27 RX ADMIN — Medication 200 MILLIGRAM(S): at 06:19

## 2020-05-27 RX ADMIN — RIVAROXABAN 20 MILLIGRAM(S): KIT at 18:00

## 2020-05-27 RX ADMIN — Medication 1 TABLET(S): at 13:37

## 2020-05-27 RX ADMIN — Medication 200 MILLIGRAM(S): at 18:00

## 2020-05-27 RX ADMIN — ZINC SULFATE TAB 220 MG (50 MG ZINC EQUIVALENT) 220 MILLIGRAM(S): 220 (50 ZN) TAB at 13:37

## 2020-05-27 RX ADMIN — SIMVASTATIN 5 MILLIGRAM(S): 20 TABLET, FILM COATED ORAL at 01:49

## 2020-05-27 RX ADMIN — Medication 325 MILLIGRAM(S): at 13:37

## 2020-05-27 RX ADMIN — Medication 200 MILLIGRAM(S): at 22:05

## 2020-05-27 RX ADMIN — SIMVASTATIN 5 MILLIGRAM(S): 20 TABLET, FILM COATED ORAL at 22:05

## 2020-05-27 RX ADMIN — Medication 325 MILLIGRAM(S): at 22:05

## 2020-05-27 RX ADMIN — ERGOCALCIFEROL 50000 UNIT(S): 1.25 CAPSULE ORAL at 13:37

## 2020-05-27 RX ADMIN — AMLODIPINE BESYLATE 5 MILLIGRAM(S): 2.5 TABLET ORAL at 06:19

## 2020-05-27 RX ADMIN — Medication 500 MILLIGRAM(S): at 06:19

## 2020-05-27 NOTE — CHART NOTE - NSCHARTNOTEFT_GEN_A_CORE
RD follow-up for length of stay.  No noted nutritional issues since last RD follow-up.  Patient noted on 6L NC today, down from venti mask.  No noted GI distress i.e. nausea, vomiting, diarrhea.  No edema currently noted per nursing flowsheets.  Patient started on Vitamin D2 and D3 supplementation.      Source: Patient [ ]    Family [ ]     other [X ] Chart Note; RD unable to have face to face encounter with patient to perform nutrition interview and/or nutrition-focused physical exam due to contact/isolation precautions related to COVID-19.     Diet, Regular:   Consistent Carbohydrate {Evening Snack} (CSTCHOSN)  Low Sodium  No Pork  No Shellfish  Supplement Feeding Modality:  Oral  Glucerna Shake Cans or Servings Per Day:  2       Frequency:  Two Times a day (05-12-20 @ 14:16)    Current Weight: No current weights recorded per nursing flowsheets    Pertinent Medications:   amLODIPine   Tablet  ascorbic acid  cholecalciferol  ergocalciferol  ferrous    sulfate  folic acid  guaiFENesin   Syrup  (Sugar-Free)  multivitamin  simvastatin  zinc sulfate    Pertinent Labs:  05-27 Na138 mmol/L Glu 105 mg/dL<H> K+ 3.7 mmol/L Cr  0.53 mg/dL BUN 6 mg/dL<L> 05-22 Phos 4.4 mg/dL 05-27 Alb 3.3 g/dL 05-19 Chol 206 mg/dL<H>  mg/dL HDL 33 mg/dL<L> Trig 91 mg/dL      Skin: No pressure injuries/no edema per flowsheets    Estimated Needs:   [X] no change since previous assessment  [ ] recalculated:     Additional Recommendations:  1) Continue diet prescription and oral nutritional supplements as ordered.  2) Please obtain and record current weight to better assess for acute changes in nutritional status.

## 2020-05-27 NOTE — PROGRESS NOTE ADULT - SUBJECTIVE AND OBJECTIVE BOX
Patient is a 44y old  Female who presents with a chief complaint of Acute Hypoxemic Respiratory Failure and Pneumonia (24 May 2020 12:43)      SUBJECTIVE / OVERNIGHT EVENTS: Pt seen and examined. On 6L NC today down from venti mask. Overall feels better. No new complaints. Encouraged incentive spirometry use.     MEDICATIONS  (STANDING):  amLODIPine   Tablet 5 milliGRAM(s) Oral daily  ascorbic acid 500 milliGRAM(s) Oral two times a day  cholecalciferol 3000 Unit(s) Oral daily  ergocalciferol 56038 Unit(s) Oral <User Schedule>  ferrous    sulfate 325 milliGRAM(s) Oral three times a day  folic acid 1 milliGRAM(s) Oral daily  guaiFENesin   Syrup  (Sugar-Free) 200 milliGRAM(s) Oral every 6 hours  influenza   Vaccine 0.5 milliLiter(s) IntraMuscular once  multivitamin 1 Tablet(s) Oral daily  rivaroxaban 20 milliGRAM(s) Oral with dinner  simvastatin 5 milliGRAM(s) Oral at bedtime  zinc sulfate 220 milliGRAM(s) Oral daily    MEDICATIONS  (PRN):  acetaminophen   Tablet .. 650 milliGRAM(s) Oral every 6 hours PRN Temp greater or equal to 38C (100.4F), Mild Pain (1 - 3)  ALBUTerol    90 MICROgram(s) HFA Inhaler 2 Puff(s) Inhalation every 6 hours PRN Shortness of Breath and/or Wheezing      Vital Signs Last 24 Hrs  T(C): 36.4 (27 May 2020 09:00), Max: 36.7 (26 May 2020 12:25)  T(F): 97.6 (27 May 2020 09:00), Max: 98 (26 May 2020 12:25)  HR: 102 (27 May 2020 09:00) (93 - 104)  BP: 115/80 (27 May 2020 09:00) (111/79 - 122/82)  BP(mean): --  RR: 20 (27 May 2020 09:00) (19 - 20)  SpO2: 97% (27 May 2020 09:00) (96% - 100%)      I&O's Summary      PHYSICAL EXAM:  GENERAL: NAD, well-developed  CHEST/LUNG: Bibasilar crackles at bases+  HEART: Tachycardia  ABDOMEN: Soft, Nontender, Nondistended  EXTREMITIES: no LE edema  PSYCH: Calm  NEUROLOGY: AAOx3  SKIN: No rashes or lesions    LABS:                                   8.7    11.74 )-----------( 328      ( 27 May 2020 06:00 )             29.0   05-27    138  |  100  |  6<L>  ----------------------------<  105<H>  3.7   |  24  |  0.53    Ca    9.1      27 May 2020 06:00    TPro  8.3  /  Alb  3.3  /  TBili  < 0.2<L>  /  DBili  x   /  AST  15  /  ALT  10  /  AlkPhos  79  05-27      RADIOLOGY & ADDITIONAL TESTS:    Imaging Personally Reviewed:    Consultant(s) Notes Reviewed:      Care Discussed with Consultants/Other Providers:

## 2020-05-28 LAB
ALBUMIN SERPL ELPH-MCNC: 3.5 G/DL — SIGNIFICANT CHANGE UP (ref 3.3–5)
ALP SERPL-CCNC: 79 U/L — SIGNIFICANT CHANGE UP (ref 40–120)
ALT FLD-CCNC: 11 U/L — SIGNIFICANT CHANGE UP (ref 4–33)
ANION GAP SERPL CALC-SCNC: 14 MMO/L — SIGNIFICANT CHANGE UP (ref 7–14)
AST SERPL-CCNC: 13 U/L — SIGNIFICANT CHANGE UP (ref 4–32)
BILIRUB SERPL-MCNC: 0.2 MG/DL — SIGNIFICANT CHANGE UP (ref 0.2–1.2)
BUN SERPL-MCNC: 5 MG/DL — LOW (ref 7–23)
CALCIUM SERPL-MCNC: 9.3 MG/DL — SIGNIFICANT CHANGE UP (ref 8.4–10.5)
CHLORIDE SERPL-SCNC: 98 MMOL/L — SIGNIFICANT CHANGE UP (ref 98–107)
CO2 SERPL-SCNC: 24 MMOL/L — SIGNIFICANT CHANGE UP (ref 22–31)
CREAT SERPL-MCNC: 0.56 MG/DL — SIGNIFICANT CHANGE UP (ref 0.5–1.3)
CRP SERPL-MCNC: 35.4 MG/L — HIGH
D DIMER BLD IA.RAPID-MCNC: < 150 NG/ML — SIGNIFICANT CHANGE UP
FERRITIN SERPL-MCNC: 58.84 NG/ML — SIGNIFICANT CHANGE UP (ref 15–150)
GLUCOSE SERPL-MCNC: 110 MG/DL — HIGH (ref 70–99)
HCT VFR BLD CALC: 30.5 % — LOW (ref 34.5–45)
HGB BLD-MCNC: 9 G/DL — LOW (ref 11.5–15.5)
MCHC RBC-ENTMCNC: 24.3 PG — LOW (ref 27–34)
MCHC RBC-ENTMCNC: 29.5 % — LOW (ref 32–36)
MCV RBC AUTO: 82.2 FL — SIGNIFICANT CHANGE UP (ref 80–100)
NRBC # FLD: 0 K/UL — SIGNIFICANT CHANGE UP (ref 0–0)
PLATELET # BLD AUTO: 220 K/UL — SIGNIFICANT CHANGE UP (ref 150–400)
PMV BLD: 11.4 FL — SIGNIFICANT CHANGE UP (ref 7–13)
POTASSIUM SERPL-MCNC: 3.6 MMOL/L — SIGNIFICANT CHANGE UP (ref 3.5–5.3)
POTASSIUM SERPL-SCNC: 3.6 MMOL/L — SIGNIFICANT CHANGE UP (ref 3.5–5.3)
PROT SERPL-MCNC: 7.9 G/DL — SIGNIFICANT CHANGE UP (ref 6–8.3)
RBC # BLD: 3.71 M/UL — LOW (ref 3.8–5.2)
RBC # FLD: 24.4 % — HIGH (ref 10.3–14.5)
SODIUM SERPL-SCNC: 136 MMOL/L — SIGNIFICANT CHANGE UP (ref 135–145)
WBC # BLD: 13.69 K/UL — HIGH (ref 3.8–10.5)
WBC # FLD AUTO: 13.69 K/UL — HIGH (ref 3.8–10.5)

## 2020-05-28 PROCEDURE — 99233 SBSQ HOSP IP/OBS HIGH 50: CPT

## 2020-05-28 RX ADMIN — Medication 3000 UNIT(S): at 12:06

## 2020-05-28 RX ADMIN — RIVAROXABAN 20 MILLIGRAM(S): KIT at 17:27

## 2020-05-28 RX ADMIN — Medication 200 MILLIGRAM(S): at 17:27

## 2020-05-28 RX ADMIN — AMLODIPINE BESYLATE 5 MILLIGRAM(S): 2.5 TABLET ORAL at 06:14

## 2020-05-28 RX ADMIN — Medication 1 TABLET(S): at 12:06

## 2020-05-28 RX ADMIN — Medication 200 MILLIGRAM(S): at 12:06

## 2020-05-28 RX ADMIN — Medication 200 MILLIGRAM(S): at 23:42

## 2020-05-28 RX ADMIN — ZINC SULFATE TAB 220 MG (50 MG ZINC EQUIVALENT) 220 MILLIGRAM(S): 220 (50 ZN) TAB at 12:06

## 2020-05-28 RX ADMIN — Medication 200 MILLIGRAM(S): at 06:14

## 2020-05-28 RX ADMIN — Medication 325 MILLIGRAM(S): at 12:06

## 2020-05-28 RX ADMIN — Medication 500 MILLIGRAM(S): at 17:27

## 2020-05-28 RX ADMIN — Medication 1 MILLIGRAM(S): at 12:06

## 2020-05-28 RX ADMIN — SIMVASTATIN 5 MILLIGRAM(S): 20 TABLET, FILM COATED ORAL at 22:06

## 2020-05-28 RX ADMIN — Medication 500 MILLIGRAM(S): at 06:14

## 2020-05-28 RX ADMIN — Medication 325 MILLIGRAM(S): at 22:06

## 2020-05-28 RX ADMIN — Medication 325 MILLIGRAM(S): at 06:14

## 2020-05-28 NOTE — PROGRESS NOTE ADULT - SUBJECTIVE AND OBJECTIVE BOX
Patient is a 44y old  Female who presents with a chief complaint of Acute Hypoxemic Respiratory Failure and Pneumonia (27 May 2020 12:03)    SUBJECTIVE / OVERNIGHT EVENTS: Pt seen and examined earlier this am, no overnight events, still On 6L NC, still with some cough and sob, No new complaints. Encouraged incentive spirometry use.       MEDICATIONS  (STANDING):  amLODIPine   Tablet 5 milliGRAM(s) Oral daily  ascorbic acid 500 milliGRAM(s) Oral two times a day  cholecalciferol 3000 Unit(s) Oral daily  ergocalciferol 07437 Unit(s) Oral <User Schedule>  ferrous    sulfate 325 milliGRAM(s) Oral three times a day  folic acid 1 milliGRAM(s) Oral daily  guaiFENesin   Syrup  (Sugar-Free) 200 milliGRAM(s) Oral every 6 hours  influenza   Vaccine 0.5 milliLiter(s) IntraMuscular once  multivitamin 1 Tablet(s) Oral daily  rivaroxaban 20 milliGRAM(s) Oral with dinner  simvastatin 5 milliGRAM(s) Oral at bedtime  zinc sulfate 220 milliGRAM(s) Oral daily    MEDICATIONS  (PRN):  acetaminophen   Tablet .. 650 milliGRAM(s) Oral every 6 hours PRN Temp greater or equal to 38C (100.4F), Mild Pain (1 - 3)  ALBUTerol    90 MICROgram(s) HFA Inhaler 2 Puff(s) Inhalation every 6 hours PRN Shortness of Breath and/or Wheezing  sodium chloride 0.65% Nasal 1 Spray(s) Both Nostrils daily PRN Nasal Congestion      Vital Signs Last 24 Hrs  T(C): 36.9 (28 May 2020 12:26), Max: 36.9 (28 May 2020 12:26)  T(F): 98.4 (28 May 2020 12:26), Max: 98.4 (28 May 2020 12:26)  HR: 104 (28 May 2020 12:26) (104 - 115)  BP: 121/92 (28 May 2020 12:26) (111/72 - 121/92)  BP(mean): --  RR: 20 (28 May 2020 12:26) (19 - 20)  SpO2: 97% (28 May 2020 12:26) (91% - 97%)  CAPILLARY BLOOD GLUCOSE        I&O's Summary      PHYSICAL EXAM:  GENERAL: NAD, well-developed  CHEST/LUNG: Bibasilar crackles at bases+  HEART: Tachycardia to 107/mt  ABDOMEN: Soft, Nontender, Nondistended  EXTREMITIES: no LE edema  PSYCH: Calm  NEUROLOGY: AAOx3  SKIN: No rashes or lesions    LABS:                        9.0    13.69 )-----------( 220      ( 28 May 2020 06:04 )             30.5     05-28    136  |  98  |  5<L>  ----------------------------<  110<H>  3.6   |  24  |  0.56    Ca    9.3      28 May 2020 06:04    TPro  7.9  /  Alb  3.5  /  TBili  0.2  /  DBili  x   /  AST  13  /  ALT  11  /  AlkPhos  79  05-28                C-Reactive Protein, Serum: 35.4 mg/L (05-28-20 @ 06:04)  C-Reactive Protein, Serum: 35.5 mg/L (05-23-20 @ 17:23)  C-Reactive Protein, Serum: 38.8 mg/L (05-22-20 @ 06:37)      D-Dimer Assay, Quantitative: < 150 ng/mL (05-28-20 @ 06:04)  D-Dimer Assay, Quantitative: < 150 ng/mL (05-23-20 @ 17:23)  D-Dimer Assay, Quantitative: 155 ng/mL (05-22-20 @ 06:37)    Ferritin, Serum: 58.84 ng/mL (05-28-20 @ 06:04)  Ferritin, Serum: 65.55 ng/mL (05-23-20 @ 17:23)  Ferritin, Serum: 70.60 ng/mL (05-22-20 @ 06:37)      RADIOLOGY & ADDITIONAL TESTS:    Imaging Personally Reviewed:    Care Discussed with Consultants/Other Providers:

## 2020-05-28 NOTE — PROGRESS NOTE ADULT - PROBLEM SELECTOR PLAN 1
-on NC 6L today, continue to downtitrate as tolerated. keep spo2>92%  -monitor inflammatory markers  - Patient was encouraged to use incentive spirometry 6 times hourly  - OOB to chair  - Incentive spirometry  - discusssed with nsg to wean to 5 lits, cont to monitor

## 2020-05-29 LAB
ANION GAP SERPL CALC-SCNC: 14 MMO/L — SIGNIFICANT CHANGE UP (ref 7–14)
BUN SERPL-MCNC: 5 MG/DL — LOW (ref 7–23)
CALCIUM SERPL-MCNC: 9.1 MG/DL — SIGNIFICANT CHANGE UP (ref 8.4–10.5)
CHLORIDE SERPL-SCNC: 96 MMOL/L — LOW (ref 98–107)
CO2 SERPL-SCNC: 22 MMOL/L — SIGNIFICANT CHANGE UP (ref 22–31)
CREAT SERPL-MCNC: 0.46 MG/DL — LOW (ref 0.5–1.3)
GLUCOSE SERPL-MCNC: 107 MG/DL — HIGH (ref 70–99)
HCT VFR BLD CALC: 29.2 % — LOW (ref 34.5–45)
HGB BLD-MCNC: 8.8 G/DL — LOW (ref 11.5–15.5)
MAGNESIUM SERPL-MCNC: 1.7 MG/DL — SIGNIFICANT CHANGE UP (ref 1.6–2.6)
MCHC RBC-ENTMCNC: 24.6 PG — LOW (ref 27–34)
MCHC RBC-ENTMCNC: 30.1 % — LOW (ref 32–36)
MCV RBC AUTO: 81.6 FL — SIGNIFICANT CHANGE UP (ref 80–100)
NRBC # FLD: 0 K/UL — SIGNIFICANT CHANGE UP (ref 0–0)
PHOSPHATE SERPL-MCNC: 4.9 MG/DL — HIGH (ref 2.5–4.5)
PLATELET # BLD AUTO: 272 K/UL — SIGNIFICANT CHANGE UP (ref 150–400)
PMV BLD: 10.3 FL — SIGNIFICANT CHANGE UP (ref 7–13)
POTASSIUM SERPL-MCNC: 3.5 MMOL/L — SIGNIFICANT CHANGE UP (ref 3.5–5.3)
POTASSIUM SERPL-SCNC: 3.5 MMOL/L — SIGNIFICANT CHANGE UP (ref 3.5–5.3)
RBC # BLD: 3.58 M/UL — LOW (ref 3.8–5.2)
RBC # FLD: 23.9 % — HIGH (ref 10.3–14.5)
SODIUM SERPL-SCNC: 132 MMOL/L — LOW (ref 135–145)
WBC # BLD: 14.33 K/UL — HIGH (ref 3.8–10.5)
WBC # FLD AUTO: 14.33 K/UL — HIGH (ref 3.8–10.5)

## 2020-05-29 PROCEDURE — 99232 SBSQ HOSP IP/OBS MODERATE 35: CPT

## 2020-05-29 PROCEDURE — 99233 SBSQ HOSP IP/OBS HIGH 50: CPT

## 2020-05-29 RX ADMIN — Medication 500 MILLIGRAM(S): at 06:10

## 2020-05-29 RX ADMIN — SIMVASTATIN 5 MILLIGRAM(S): 20 TABLET, FILM COATED ORAL at 21:01

## 2020-05-29 RX ADMIN — Medication 1 MILLIGRAM(S): at 14:08

## 2020-05-29 RX ADMIN — Medication 500 MILLIGRAM(S): at 18:29

## 2020-05-29 RX ADMIN — AMLODIPINE BESYLATE 5 MILLIGRAM(S): 2.5 TABLET ORAL at 06:12

## 2020-05-29 RX ADMIN — Medication 3000 UNIT(S): at 14:07

## 2020-05-29 RX ADMIN — Medication 325 MILLIGRAM(S): at 14:08

## 2020-05-29 RX ADMIN — Medication 1 TABLET(S): at 14:07

## 2020-05-29 RX ADMIN — Medication 325 MILLIGRAM(S): at 21:01

## 2020-05-29 RX ADMIN — RIVAROXABAN 20 MILLIGRAM(S): KIT at 18:29

## 2020-05-29 RX ADMIN — Medication 200 MILLIGRAM(S): at 14:08

## 2020-05-29 RX ADMIN — Medication 200 MILLIGRAM(S): at 23:16

## 2020-05-29 RX ADMIN — Medication 200 MILLIGRAM(S): at 06:10

## 2020-05-29 RX ADMIN — Medication 325 MILLIGRAM(S): at 06:10

## 2020-05-29 RX ADMIN — ZINC SULFATE TAB 220 MG (50 MG ZINC EQUIVALENT) 220 MILLIGRAM(S): 220 (50 ZN) TAB at 14:06

## 2020-05-29 RX ADMIN — Medication 200 MILLIGRAM(S): at 18:29

## 2020-05-29 NOTE — PROGRESS NOTE ADULT - SUBJECTIVE AND OBJECTIVE BOX
Patient is a 44y old  Female who presents with a chief complaint of Acute Hypoxemic Respiratory Failure and Pneumonia (28 May 2020 14:21)    SUBJECTIVE / OVERNIGHT EVENTS: Pt seen and examined at 12:05pm, no overnight events, still On 6L NC, sats 92%, still has some cough and sob upon exertion, No other new complaints. No other new issues reported.        MEDICATIONS  (STANDING):  amLODIPine   Tablet 5 milliGRAM(s) Oral daily  ascorbic acid 500 milliGRAM(s) Oral two times a day  cholecalciferol 3000 Unit(s) Oral daily  ergocalciferol 04554 Unit(s) Oral <User Schedule>  ferrous    sulfate 325 milliGRAM(s) Oral three times a day  folic acid 1 milliGRAM(s) Oral daily  guaiFENesin   Syrup  (Sugar-Free) 200 milliGRAM(s) Oral every 6 hours  influenza   Vaccine 0.5 milliLiter(s) IntraMuscular once  multivitamin 1 Tablet(s) Oral daily  rivaroxaban 20 milliGRAM(s) Oral with dinner  simvastatin 5 milliGRAM(s) Oral at bedtime  zinc sulfate 220 milliGRAM(s) Oral daily    MEDICATIONS  (PRN):  acetaminophen   Tablet .. 650 milliGRAM(s) Oral every 6 hours PRN Temp greater or equal to 38C (100.4F), Mild Pain (1 - 3)  ALBUTerol    90 MICROgram(s) HFA Inhaler 2 Puff(s) Inhalation every 6 hours PRN Shortness of Breath and/or Wheezing  sodium chloride 0.65% Nasal 1 Spray(s) Both Nostrils daily PRN Nasal Congestion      Vital Signs Last 24 Hrs  T(C): 36.7 (29 May 2020 12:52), Max: 36.7 (28 May 2020 20:49)  T(F): 98.1 (29 May 2020 12:52), Max: 98.1 (29 May 2020 06:06)  HR: 107 (29 May 2020 12:52) (105 - 110)  BP: 131/92 (29 May 2020 12:52) (131/92 - 147/84)  BP(mean): --  RR: 22 (29 May 2020 12:52) (22 - 26)  SpO2: 93% (29 May 2020 12:52) (92% - 93%)  CAPILLARY BLOOD GLUCOSE        I&O's Summary      PHYSICAL EXAM:  GENERAL: NAD, well-developed  CHEST/LUNG: Bibasilar crackles at bases+  HEART: Tachycardia to 104/mt  ABDOMEN: Soft, Nontender, Nondistended  EXTREMITIES: no LE edema  PSYCH: Calm  NEUROLOGY: AAOx3  SKIN: No rashes or lesions      LABS:                        8.8    14.33 )-----------( 272      ( 29 May 2020 07:36 )             29.2     05-29    132<L>  |  96<L>  |  5<L>  ----------------------------<  107<H>  3.5   |  22  |  0.46<L>    Ca    9.1      29 May 2020 07:36  Phos  4.9     05-29  Mg     1.7     05-29    TPro  7.9  /  Alb  3.5  /  TBili  0.2  /  DBili  x   /  AST  13  /  ALT  11  /  AlkPhos  79  05-28                C-Reactive Protein, Serum: 35.4 mg/L (05-28-20 @ 06:04)  C-Reactive Protein, Serum: 35.5 mg/L (05-23-20 @ 17:23)      D-Dimer Assay, Quantitative: < 150 ng/mL (05-28-20 @ 06:04)  D-Dimer Assay, Quantitative: < 150 ng/mL (05-23-20 @ 17:23)    Ferritin, Serum: 58.84 ng/mL (05-28-20 @ 06:04)  Ferritin, Serum: 65.55 ng/mL (05-23-20 @ 17:23)      RADIOLOGY & ADDITIONAL TESTS:    Imaging Personally Reviewed:    Care Discussed with Consultants/Other Providers:

## 2020-05-29 NOTE — PROGRESS NOTE ADULT - SUBJECTIVE AND OBJECTIVE BOX
CC: Patient is a 44y old  Female who presents with a chief complaint of Acute Hypoxemic Respiratory Failure and Pneumonia (29 May 2020 14:22)    ID following for COVID, hypoxia, leukocytosis    Interval History/ROS: Patient remains on 6L NC. Afebrile. WBC has been fluctuating.    Rest of ROS negative.    Allergies  No Known Drug Allergies  Numbness (Other)    ANTIMICROBIALS:      OTHER MEDS:  acetaminophen   Tablet .. 650 milliGRAM(s) Oral every 6 hours PRN  ALBUTerol    90 MICROgram(s) HFA Inhaler 2 Puff(s) Inhalation every 6 hours PRN  amLODIPine   Tablet 5 milliGRAM(s) Oral daily  ascorbic acid 500 milliGRAM(s) Oral two times a day  cholecalciferol 3000 Unit(s) Oral daily  ergocalciferol 45228 Unit(s) Oral <User Schedule>  ferrous    sulfate 325 milliGRAM(s) Oral three times a day  folic acid 1 milliGRAM(s) Oral daily  guaiFENesin   Syrup  (Sugar-Free) 200 milliGRAM(s) Oral every 6 hours  influenza   Vaccine 0.5 milliLiter(s) IntraMuscular once  multivitamin 1 Tablet(s) Oral daily  rivaroxaban 20 milliGRAM(s) Oral with dinner  simvastatin 5 milliGRAM(s) Oral at bedtime  sodium chloride 0.65% Nasal 1 Spray(s) Both Nostrils daily PRN  zinc sulfate 220 milliGRAM(s) Oral daily    PE:    Vital Signs Last 24 Hrs  T(C): 36.7 (29 May 2020 12:52), Max: 36.7 (28 May 2020 20:49)  T(F): 98.1 (29 May 2020 12:52), Max: 98.1 (29 May 2020 06:06)  HR: 107 (29 May 2020 12:52) (105 - 110)  BP: 131/92 (29 May 2020 12:52) (131/92 - 147/84)  BP(mean): --  RR: 22 (29 May 2020 12:52) (22 - 26)  SpO2: 93% (29 May 2020 12:52) (92% - 93%)    Gen: AOx3, NAD, non-toxic appearing  CV: S1+S2 normal, no murmurs  Resp: Clear bilat, no resp distress  Abd: Soft, nontender, +BS  Ext: No LE edema, no wounds  : No Tamayo  IV/Skin: No thrombophlebitis  Neuro: no focal deficits    LABS:                          8.8    14.33 )-----------( 272      ( 29 May 2020 07:36 )             29.2       05-29    132<L>  |  96<L>  |  5<L>  ----------------------------<  107<H>  3.5   |  22  |  0.46<L>    Ca    9.1      29 May 2020 07:36  Phos  4.9     05-29  Mg     1.7     05-29    TPro  7.9  /  Alb  3.5  /  TBili  0.2  /  DBili  x   /  AST  13  /  ALT  11  /  AlkPhos  79  05-28    MICROBIOLOGY:  v  .Blood Blood-Venous  04-30-20   No Growth Final  --  --      .Blood Blood-Peripheral  04-30-20   No Growth Final  --  --    RADIOLOGY:    < from: Xray Chest 2 Views PA/Lat (05.11.20 @ 15:02) >  IMPRESSION:    Diffuse bilateral patchy opacities are unchanged.     < end of copied text >

## 2020-05-29 NOTE — PROGRESS NOTE ADULT - PROBLEM SELECTOR PLAN 1
-on NC 6L today, continue to downtitrate as tolerated. keep spo2>92%  -monitor inflammatory markers  - Patient was encouraged to use incentive spirometry 6 times hourly  - OOB to chair  - Incentive spirometry  -  cont to monitor respiratory status, discussed with ID -on NC 6L today, continue to downtitrate as tolerated. keep spo2>92%  -monitor inflammatory markers  - Patient was encouraged to use incentive spirometry 6 times hourly  - OOB to chair  - Incentive spirometry  -  cont to monitor respiratory status, discussed with ID  - Father Nav Gomez justina

## 2020-05-30 LAB
ALBUMIN SERPL ELPH-MCNC: 3.4 G/DL — SIGNIFICANT CHANGE UP (ref 3.3–5)
ALP SERPL-CCNC: 84 U/L — SIGNIFICANT CHANGE UP (ref 40–120)
ALT FLD-CCNC: 13 U/L — SIGNIFICANT CHANGE UP (ref 4–33)
ANION GAP SERPL CALC-SCNC: 16 MMO/L — HIGH (ref 7–14)
AST SERPL-CCNC: 16 U/L — SIGNIFICANT CHANGE UP (ref 4–32)
BASOPHILS # BLD AUTO: 0.04 K/UL — SIGNIFICANT CHANGE UP (ref 0–0.2)
BASOPHILS NFR BLD AUTO: 0.3 % — SIGNIFICANT CHANGE UP (ref 0–2)
BILIRUB SERPL-MCNC: 0.2 MG/DL — SIGNIFICANT CHANGE UP (ref 0.2–1.2)
BUN SERPL-MCNC: 6 MG/DL — LOW (ref 7–23)
CALCIUM SERPL-MCNC: 9.5 MG/DL — SIGNIFICANT CHANGE UP (ref 8.4–10.5)
CHLORIDE SERPL-SCNC: 98 MMOL/L — SIGNIFICANT CHANGE UP (ref 98–107)
CO2 SERPL-SCNC: 23 MMOL/L — SIGNIFICANT CHANGE UP (ref 22–31)
CREAT SERPL-MCNC: 0.53 MG/DL — SIGNIFICANT CHANGE UP (ref 0.5–1.3)
CRP SERPL-MCNC: 42.5 MG/L — HIGH
EOSINOPHIL # BLD AUTO: 0.61 K/UL — HIGH (ref 0–0.5)
EOSINOPHIL NFR BLD AUTO: 4.1 % — SIGNIFICANT CHANGE UP (ref 0–6)
FERRITIN SERPL-MCNC: 66.9 NG/ML — SIGNIFICANT CHANGE UP (ref 15–150)
GLUCOSE SERPL-MCNC: 100 MG/DL — HIGH (ref 70–99)
HCT VFR BLD CALC: 29.9 % — LOW (ref 34.5–45)
HGB BLD-MCNC: 9 G/DL — LOW (ref 11.5–15.5)
IMM GRANULOCYTES NFR BLD AUTO: 1.2 % — SIGNIFICANT CHANGE UP (ref 0–1.5)
LYMPHOCYTES # BLD AUTO: 15.2 % — SIGNIFICANT CHANGE UP (ref 13–44)
LYMPHOCYTES # BLD AUTO: 2.25 K/UL — SIGNIFICANT CHANGE UP (ref 1–3.3)
MAGNESIUM SERPL-MCNC: 1.7 MG/DL — SIGNIFICANT CHANGE UP (ref 1.6–2.6)
MCHC RBC-ENTMCNC: 24.5 PG — LOW (ref 27–34)
MCHC RBC-ENTMCNC: 30.1 % — LOW (ref 32–36)
MCV RBC AUTO: 81.5 FL — SIGNIFICANT CHANGE UP (ref 80–100)
MONOCYTES # BLD AUTO: 0.8 K/UL — SIGNIFICANT CHANGE UP (ref 0–0.9)
MONOCYTES NFR BLD AUTO: 5.4 % — SIGNIFICANT CHANGE UP (ref 2–14)
NEUTROPHILS # BLD AUTO: 10.96 K/UL — HIGH (ref 1.8–7.4)
NEUTROPHILS NFR BLD AUTO: 73.8 % — SIGNIFICANT CHANGE UP (ref 43–77)
NRBC # FLD: 0 K/UL — SIGNIFICANT CHANGE UP (ref 0–0)
PHOSPHATE SERPL-MCNC: 4.9 MG/DL — HIGH (ref 2.5–4.5)
PLATELET # BLD AUTO: 415 K/UL — HIGH (ref 150–400)
PMV BLD: 10.4 FL — SIGNIFICANT CHANGE UP (ref 7–13)
POTASSIUM SERPL-MCNC: 3.9 MMOL/L — SIGNIFICANT CHANGE UP (ref 3.5–5.3)
POTASSIUM SERPL-SCNC: 3.9 MMOL/L — SIGNIFICANT CHANGE UP (ref 3.5–5.3)
PROCALCITONIN SERPL-MCNC: 0.04 NG/ML — SIGNIFICANT CHANGE UP (ref 0.02–0.1)
PROT SERPL-MCNC: 8.1 G/DL — SIGNIFICANT CHANGE UP (ref 6–8.3)
RBC # BLD: 3.67 M/UL — LOW (ref 3.8–5.2)
RBC # FLD: 23.4 % — HIGH (ref 10.3–14.5)
SODIUM SERPL-SCNC: 137 MMOL/L — SIGNIFICANT CHANGE UP (ref 135–145)
WBC # BLD: 14.84 K/UL — HIGH (ref 3.8–10.5)
WBC # FLD AUTO: 14.84 K/UL — HIGH (ref 3.8–10.5)

## 2020-05-30 PROCEDURE — 99233 SBSQ HOSP IP/OBS HIGH 50: CPT

## 2020-05-30 RX ORDER — MAGNESIUM SULFATE 500 MG/ML
1 VIAL (ML) INJECTION ONCE
Refills: 0 | Status: DISCONTINUED | OUTPATIENT
Start: 2020-05-30 | End: 2020-05-30

## 2020-05-30 RX ORDER — LORATADINE 10 MG/1
10 TABLET ORAL DAILY
Refills: 0 | Status: COMPLETED | OUTPATIENT
Start: 2020-05-30 | End: 2020-06-05

## 2020-05-30 RX ORDER — MAGNESIUM SULFATE 500 MG/ML
1 VIAL (ML) INJECTION ONCE
Refills: 0 | Status: COMPLETED | OUTPATIENT
Start: 2020-05-30 | End: 2020-05-30

## 2020-05-30 RX ORDER — FLUTICASONE PROPIONATE 50 MCG
1 SPRAY, SUSPENSION NASAL
Refills: 0 | Status: DISCONTINUED | OUTPATIENT
Start: 2020-05-30 | End: 2020-06-09

## 2020-05-30 RX ADMIN — Medication 200 MILLIGRAM(S): at 23:53

## 2020-05-30 RX ADMIN — Medication 325 MILLIGRAM(S): at 21:58

## 2020-05-30 RX ADMIN — LORATADINE 10 MILLIGRAM(S): 10 TABLET ORAL at 17:50

## 2020-05-30 RX ADMIN — AMLODIPINE BESYLATE 5 MILLIGRAM(S): 2.5 TABLET ORAL at 06:45

## 2020-05-30 RX ADMIN — Medication 1 SPRAY(S): at 17:49

## 2020-05-30 RX ADMIN — Medication 100 GRAM(S): at 22:43

## 2020-05-30 RX ADMIN — RIVAROXABAN 20 MILLIGRAM(S): KIT at 17:50

## 2020-05-30 RX ADMIN — Medication 200 MILLIGRAM(S): at 12:08

## 2020-05-30 RX ADMIN — Medication 325 MILLIGRAM(S): at 12:08

## 2020-05-30 RX ADMIN — Medication 325 MILLIGRAM(S): at 06:45

## 2020-05-30 RX ADMIN — Medication 200 MILLIGRAM(S): at 17:49

## 2020-05-30 RX ADMIN — Medication 500 MILLIGRAM(S): at 17:49

## 2020-05-30 RX ADMIN — SIMVASTATIN 5 MILLIGRAM(S): 20 TABLET, FILM COATED ORAL at 21:57

## 2020-05-30 RX ADMIN — Medication 500 MILLIGRAM(S): at 06:45

## 2020-05-30 RX ADMIN — Medication 200 MILLIGRAM(S): at 06:44

## 2020-05-30 RX ADMIN — Medication 3000 UNIT(S): at 12:08

## 2020-05-30 RX ADMIN — Medication 1 MILLIGRAM(S): at 12:09

## 2020-05-30 RX ADMIN — Medication 1 TABLET(S): at 12:08

## 2020-05-30 RX ADMIN — ZINC SULFATE TAB 220 MG (50 MG ZINC EQUIVALENT) 220 MILLIGRAM(S): 220 (50 ZN) TAB at 12:09

## 2020-05-30 NOTE — PROGRESS NOTE ADULT - PROBLEM SELECTOR PLAN 1
-on NC 6L today, continue to downtitrate as tolerated. keep spo2>92%  -monitor inflammatory markers. Remain stable.  - Patient was encouraged to use incentive spirometry 6 times hourly  - OOB to chair  - Incentive spirometry  - cont to monitor respiratory status, discussed with ID  -Procal low, unlikely to have superimposed bacterial process

## 2020-05-30 NOTE — PROGRESS NOTE ADULT - SUBJECTIVE AND OBJECTIVE BOX
Patient is a 44y old  Female who presents with a chief complaint of Acute Hypoxemic Respiratory Failure and Pneumonia (28 May 2020 14:21)    SUBJECTIVE / OVERNIGHT EVENTS: No overnight events, still On 6L NC, sats 98%, still desats upon exertion however. Reports nasal congestion and reports no improvement with nasal saline.     MEDICATIONS  (STANDING):  amLODIPine   Tablet 5 milliGRAM(s) Oral daily  ascorbic acid 500 milliGRAM(s) Oral two times a day  cholecalciferol 3000 Unit(s) Oral daily  ergocalciferol 31940 Unit(s) Oral <User Schedule>  ferrous    sulfate 325 milliGRAM(s) Oral three times a day  folic acid 1 milliGRAM(s) Oral daily  guaiFENesin   Syrup  (Sugar-Free) 200 milliGRAM(s) Oral every 6 hours  influenza   Vaccine 0.5 milliLiter(s) IntraMuscular once  multivitamin 1 Tablet(s) Oral daily  rivaroxaban 20 milliGRAM(s) Oral with dinner  simvastatin 5 milliGRAM(s) Oral at bedtime  zinc sulfate 220 milliGRAM(s) Oral daily    MEDICATIONS  (PRN):  acetaminophen   Tablet .. 650 milliGRAM(s) Oral every 6 hours PRN Temp greater or equal to 38C (100.4F), Mild Pain (1 - 3)  ALBUTerol    90 MICROgram(s) HFA Inhaler 2 Puff(s) Inhalation every 6 hours PRN Shortness of Breath and/or Wheezing  sodium chloride 0.65% Nasal 1 Spray(s) Both Nostrils daily PRN Nasal Congestion    Vital Signs Last 24 Hrs  T(C): 36.5 (30 May 2020 06:40), Max: 36.7 (29 May 2020 12:52)  T(F): 97.7 (30 May 2020 06:40), Max: 98.1 (29 May 2020 12:52)  HR: 104 (30 May 2020 06:40) (104 - 107)  BP: 120/80 (30 May 2020 06:40) (120/80 - 131/92)  BP(mean): --  RR: 22 (30 May 2020 09:41) (22 - 24)  SpO2: 82% (30 May 2020 09:41) (82% - 98%)      I&O's Summary      PHYSICAL EXAM:  GENERAL: NAD, well-developed  CHEST/LUNG: Bibasilar crackles at bases+  HEART: Tachycardia to 104/mt  ABDOMEN: Soft, Nontender, Nondistended  EXTREMITIES: no LE edema  PSYCH: Calm  NEUROLOGY: AAOx3  SKIN: No rashes or lesions      LABS:                                   8.8    14.33 )-----------( 272      ( 29 May 2020 07:36 )             29.2   05-30    137  |  98  |  6<L>  ----------------------------<  100<H>  3.9   |  23  |  0.53    Ca    9.5      30 May 2020 08:51  Phos  4.9     05-30  Mg     1.7     05-30    TPro  8.1  /  Alb  3.4  /  TBili  0.2  /  DBili  x   /  AST  16  /  ALT  13  /  AlkPhos  84  05-30    Procalcitonin, Serum (05.30.20 @ 08:51)    Procalcitonin, Serum: 0.04: Procalcitonin (PCT) Interpretation (ng/mL) - Diagnosis of  systemic bacterial infection/sepsis:  PCT < 0.5: Systemic infection (sepsis) is not likely and  risk for progression to severe systemic infection is low.  Local bacterial infection is possible. If early sepsis is  suspected clinically, PCT should be re-assessed in 6-24  hours.  PCT >/= 0.5 but < 2.0: Systemic infection (sepsis) is  possible, but other conditions are known to elevate PCT as  well. Moderate risk for progression to severe systemic  infection. The patient should be closely monitored both  clinically and by re-assessing PCT within 6-24 hours.  PCT >/= 2.0 but < 10.0: Systemic infection (sepsis) is  likely, unless other causes are known. High risk of  progression to severe systemic infection (severe  sepsis/septic shock).  PCT >/= 10.0: Important systemic inflammatory response,  almost exclusively due to severe bacterial sepsis or septic  shock. High likelihood of severe sepsis or septic shock. ng/mL      C-Reactive Protein, Serum: 35.4 mg/L (05-28-20 @ 06:04)  C-Reactive Protein, Serum: 35.5 mg/L (05-23-20 @ 17:23)      D-Dimer Assay, Quantitative: < 150 ng/mL (05-28-20 @ 06:04)  D-Dimer Assay, Quantitative: < 150 ng/mL (05-23-20 @ 17:23)    Ferritin, Serum: 58.84 ng/mL (05-28-20 @ 06:04)  Ferritin, Serum: 65.55 ng/mL (05-23-20 @ 17:23)      RADIOLOGY & ADDITIONAL TESTS:    Imaging Personally Reviewed:    Care Discussed with Consultants/Other Providers:

## 2020-05-31 LAB
ALBUMIN SERPL ELPH-MCNC: 3.4 G/DL — SIGNIFICANT CHANGE UP (ref 3.3–5)
ALP SERPL-CCNC: 76 U/L — SIGNIFICANT CHANGE UP (ref 40–120)
ALT FLD-CCNC: 12 U/L — SIGNIFICANT CHANGE UP (ref 4–33)
ANION GAP SERPL CALC-SCNC: 14 MMO/L — SIGNIFICANT CHANGE UP (ref 7–14)
AST SERPL-CCNC: 12 U/L — SIGNIFICANT CHANGE UP (ref 4–32)
BASOPHILS # BLD AUTO: 0.03 K/UL — SIGNIFICANT CHANGE UP (ref 0–0.2)
BASOPHILS NFR BLD AUTO: 0.2 % — SIGNIFICANT CHANGE UP (ref 0–2)
BILIRUB SERPL-MCNC: < 0.2 MG/DL — LOW (ref 0.2–1.2)
BUN SERPL-MCNC: 6 MG/DL — LOW (ref 7–23)
CALCIUM SERPL-MCNC: 9.4 MG/DL — SIGNIFICANT CHANGE UP (ref 8.4–10.5)
CHLORIDE SERPL-SCNC: 100 MMOL/L — SIGNIFICANT CHANGE UP (ref 98–107)
CO2 SERPL-SCNC: 24 MMOL/L — SIGNIFICANT CHANGE UP (ref 22–31)
CREAT SERPL-MCNC: 0.53 MG/DL — SIGNIFICANT CHANGE UP (ref 0.5–1.3)
EOSINOPHIL # BLD AUTO: 0.62 K/UL — HIGH (ref 0–0.5)
EOSINOPHIL NFR BLD AUTO: 4.9 % — SIGNIFICANT CHANGE UP (ref 0–6)
GLUCOSE SERPL-MCNC: 111 MG/DL — HIGH (ref 70–99)
HCT VFR BLD CALC: 30 % — LOW (ref 34.5–45)
HGB BLD-MCNC: 9 G/DL — LOW (ref 11.5–15.5)
IMM GRANULOCYTES NFR BLD AUTO: 1.3 % — SIGNIFICANT CHANGE UP (ref 0–1.5)
LYMPHOCYTES # BLD AUTO: 17 % — SIGNIFICANT CHANGE UP (ref 13–44)
LYMPHOCYTES # BLD AUTO: 2.16 K/UL — SIGNIFICANT CHANGE UP (ref 1–3.3)
MAGNESIUM SERPL-MCNC: 1.9 MG/DL — SIGNIFICANT CHANGE UP (ref 1.6–2.6)
MCHC RBC-ENTMCNC: 24.5 PG — LOW (ref 27–34)
MCHC RBC-ENTMCNC: 30 % — LOW (ref 32–36)
MCV RBC AUTO: 81.5 FL — SIGNIFICANT CHANGE UP (ref 80–100)
MONOCYTES # BLD AUTO: 1.01 K/UL — HIGH (ref 0–0.9)
MONOCYTES NFR BLD AUTO: 7.9 % — SIGNIFICANT CHANGE UP (ref 2–14)
NEUTROPHILS # BLD AUTO: 8.75 K/UL — HIGH (ref 1.8–7.4)
NEUTROPHILS NFR BLD AUTO: 68.7 % — SIGNIFICANT CHANGE UP (ref 43–77)
NRBC # FLD: 0 K/UL — SIGNIFICANT CHANGE UP (ref 0–0)
PHOSPHATE SERPL-MCNC: 5.1 MG/DL — HIGH (ref 2.5–4.5)
PLATELET # BLD AUTO: 388 K/UL — SIGNIFICANT CHANGE UP (ref 150–400)
PMV BLD: 10.2 FL — SIGNIFICANT CHANGE UP (ref 7–13)
POTASSIUM SERPL-MCNC: 3.8 MMOL/L — SIGNIFICANT CHANGE UP (ref 3.5–5.3)
POTASSIUM SERPL-SCNC: 3.8 MMOL/L — SIGNIFICANT CHANGE UP (ref 3.5–5.3)
PROT SERPL-MCNC: 8.1 G/DL — SIGNIFICANT CHANGE UP (ref 6–8.3)
RBC # BLD: 3.68 M/UL — LOW (ref 3.8–5.2)
RBC # FLD: 24.2 % — HIGH (ref 10.3–14.5)
SODIUM SERPL-SCNC: 138 MMOL/L — SIGNIFICANT CHANGE UP (ref 135–145)
WBC # BLD: 12.74 K/UL — HIGH (ref 3.8–10.5)
WBC # FLD AUTO: 12.74 K/UL — HIGH (ref 3.8–10.5)

## 2020-05-31 PROCEDURE — 99233 SBSQ HOSP IP/OBS HIGH 50: CPT

## 2020-05-31 RX ADMIN — Medication 325 MILLIGRAM(S): at 14:44

## 2020-05-31 RX ADMIN — Medication 325 MILLIGRAM(S): at 21:54

## 2020-05-31 RX ADMIN — Medication 1 SPRAY(S): at 17:20

## 2020-05-31 RX ADMIN — RIVAROXABAN 20 MILLIGRAM(S): KIT at 17:20

## 2020-05-31 RX ADMIN — Medication 1 MILLIGRAM(S): at 11:59

## 2020-05-31 RX ADMIN — Medication 200 MILLIGRAM(S): at 11:58

## 2020-05-31 RX ADMIN — Medication 3000 UNIT(S): at 11:59

## 2020-05-31 RX ADMIN — Medication 200 MILLIGRAM(S): at 17:18

## 2020-05-31 RX ADMIN — AMLODIPINE BESYLATE 5 MILLIGRAM(S): 2.5 TABLET ORAL at 06:12

## 2020-05-31 RX ADMIN — SIMVASTATIN 5 MILLIGRAM(S): 20 TABLET, FILM COATED ORAL at 21:54

## 2020-05-31 RX ADMIN — ZINC SULFATE TAB 220 MG (50 MG ZINC EQUIVALENT) 220 MILLIGRAM(S): 220 (50 ZN) TAB at 12:36

## 2020-05-31 RX ADMIN — Medication 500 MILLIGRAM(S): at 17:20

## 2020-05-31 RX ADMIN — Medication 200 MILLIGRAM(S): at 06:12

## 2020-05-31 RX ADMIN — Medication 1 TABLET(S): at 11:59

## 2020-05-31 RX ADMIN — Medication 1 SPRAY(S): at 06:12

## 2020-05-31 RX ADMIN — Medication 500 MILLIGRAM(S): at 06:12

## 2020-05-31 RX ADMIN — LORATADINE 10 MILLIGRAM(S): 10 TABLET ORAL at 12:36

## 2020-05-31 RX ADMIN — Medication 325 MILLIGRAM(S): at 06:12

## 2020-05-31 NOTE — PROGRESS NOTE ADULT - SUBJECTIVE AND OBJECTIVE BOX
Patient is a 44y old  Female who presents with a chief complaint of Acute Hypoxemic Respiratory Failure and Pneumonia (28 May 2020 14:21)    SUBJECTIVE / OVERNIGHT EVENTS: No overnight events, still On 6L NC, sats 98%, still desats upon minimal movement however. Only uses incentive spirometry twice a day, recommended to do spirometry every hour    MEDICATIONS  (STANDING):  amLODIPine   Tablet 5 milliGRAM(s) Oral daily  ascorbic acid 500 milliGRAM(s) Oral two times a day  cholecalciferol 3000 Unit(s) Oral daily  ergocalciferol 17001 Unit(s) Oral <User Schedule>  ferrous    sulfate 325 milliGRAM(s) Oral three times a day  folic acid 1 milliGRAM(s) Oral daily  guaiFENesin   Syrup  (Sugar-Free) 200 milliGRAM(s) Oral every 6 hours  influenza   Vaccine 0.5 milliLiter(s) IntraMuscular once  multivitamin 1 Tablet(s) Oral daily  rivaroxaban 20 milliGRAM(s) Oral with dinner  simvastatin 5 milliGRAM(s) Oral at bedtime  zinc sulfate 220 milliGRAM(s) Oral daily    MEDICATIONS  (PRN):  acetaminophen   Tablet .. 650 milliGRAM(s) Oral every 6 hours PRN Temp greater or equal to 38C (100.4F), Mild Pain (1 - 3)  ALBUTerol    90 MICROgram(s) HFA Inhaler 2 Puff(s) Inhalation every 6 hours PRN Shortness of Breath and/or Wheezing  sodium chloride 0.65% Nasal 1 Spray(s) Both Nostrils daily PRN Nasal Congestion    Vital Signs Last 24 Hrs  T(C): 36.9 (31 May 2020 06:08), Max: 36.9 (31 May 2020 06:08)  T(F): 98.4 (31 May 2020 06:08), Max: 98.4 (31 May 2020 06:08)  HR: 100 (31 May 2020 06:08) (98 - 105)  BP: 101/73 (31 May 2020 06:08) (101/73 - 115/70)  BP(mean): --  RR: 20 (31 May 2020 06:08) (20 - 21)  SpO2: 97% (31 May 2020 06:08) (97% - 99%)    PHYSICAL EXAM:  GENERAL: NAD, well-developed  CHEST/LUNG: Bibasilar crackles at bases+  HEART: Tachycardia to 104/mt  ABDOMEN: Soft, Nontender, Nondistended  EXTREMITIES: no LE edema  PSYCH: Calm  NEUROLOGY: AAOx3  SKIN: No rashes or lesions      LABS:                                   9.0    12.74 )-----------( 388      ( 31 May 2020 05:06 )             30.0   05-31    138  |  100  |  6<L>  ----------------------------<  111<H>  3.8   |  24  |  0.53    Ca    9.4      31 May 2020 05:06  Phos  5.1     05-31  Mg     1.9     05-31    TPro  8.1  /  Alb  3.4  /  TBili  < 0.2<L>  /  DBili  x   /  AST  12  /  ALT  12  /  AlkPhos  76  05-31      Procalcitonin, Serum (05.30.20 @ 08:51)    Procalcitonin, Serum: 0.04: Procalcitonin (PCT) Interpretation (ng/mL) - Diagnosis of  systemic bacterial infection/sepsis:  PCT < 0.5: Systemic infection (sepsis) is not likely and  risk for progression to severe systemic infection is low.  Local bacterial infection is possible. If early sepsis is  suspected clinically, PCT should be re-assessed in 6-24  hours.  PCT >/= 0.5 but < 2.0: Systemic infection (sepsis) is  possible, but other conditions are known to elevate PCT as  well. Moderate risk for progression to severe systemic  infection. The patient should be closely monitored both  clinically and by re-assessing PCT within 6-24 hours.  PCT >/= 2.0 but < 10.0: Systemic infection (sepsis) is  likely, unless other causes are known. High risk of  progression to severe systemic infection (severe  sepsis/septic shock).  PCT >/= 10.0: Important systemic inflammatory response,  almost exclusively due to severe bacterial sepsis or septic  shock. High likelihood of severe sepsis or septic shock. ng/mL      C-Reactive Protein, Serum: 35.4 mg/L (05-28-20 @ 06:04)  C-Reactive Protein, Serum: 35.5 mg/L (05-23-20 @ 17:23)      D-Dimer Assay, Quantitative: < 150 ng/mL (05-28-20 @ 06:04)  D-Dimer Assay, Quantitative: < 150 ng/mL (05-23-20 @ 17:23)    Ferritin, Serum: 58.84 ng/mL (05-28-20 @ 06:04)  Ferritin, Serum: 65.55 ng/mL (05-23-20 @ 17:23)      RADIOLOGY & ADDITIONAL TESTS:    Imaging Personally Reviewed:    Care Discussed with Consultants/Other Providers:

## 2020-05-31 NOTE — PROGRESS NOTE ADULT - PROBLEM SELECTOR PLAN 1
-on NC 5L today, continue to downtitrate as tolerated. keep spo2>92%  -monitor inflammatory markers. Remains stable.  - Patient was encouraged to use incentive spirometry hourly  - OOB to chair  - Incentive spirometry  - cont to monitor respiratory status, discussed with ID  -Brian low, unlikely to have superimposed bacterial process

## 2020-06-01 LAB
ALBUMIN SERPL ELPH-MCNC: 4.1 G/DL — SIGNIFICANT CHANGE UP (ref 3.3–5)
ALP SERPL-CCNC: 93 U/L — SIGNIFICANT CHANGE UP (ref 40–120)
ALT FLD-CCNC: 13 U/L — SIGNIFICANT CHANGE UP (ref 4–33)
ANION GAP SERPL CALC-SCNC: 16 MMO/L — HIGH (ref 7–14)
AST SERPL-CCNC: 19 U/L — SIGNIFICANT CHANGE UP (ref 4–32)
BASOPHILS # BLD AUTO: 0.05 K/UL — SIGNIFICANT CHANGE UP (ref 0–0.2)
BASOPHILS NFR BLD AUTO: 0.4 % — SIGNIFICANT CHANGE UP (ref 0–2)
BILIRUB SERPL-MCNC: 0.2 MG/DL — SIGNIFICANT CHANGE UP (ref 0.2–1.2)
BUN SERPL-MCNC: 6 MG/DL — LOW (ref 7–23)
CALCIUM SERPL-MCNC: 9.9 MG/DL — SIGNIFICANT CHANGE UP (ref 8.4–10.5)
CHLORIDE SERPL-SCNC: 100 MMOL/L — SIGNIFICANT CHANGE UP (ref 98–107)
CO2 SERPL-SCNC: 25 MMOL/L — SIGNIFICANT CHANGE UP (ref 22–31)
CREAT SERPL-MCNC: 0.5 MG/DL — SIGNIFICANT CHANGE UP (ref 0.5–1.3)
EOSINOPHIL # BLD AUTO: 0.59 K/UL — HIGH (ref 0–0.5)
EOSINOPHIL NFR BLD AUTO: 4.2 % — SIGNIFICANT CHANGE UP (ref 0–6)
GLUCOSE SERPL-MCNC: 149 MG/DL — HIGH (ref 70–99)
HCT VFR BLD CALC: 33.1 % — LOW (ref 34.5–45)
HGB BLD-MCNC: 9.6 G/DL — LOW (ref 11.5–15.5)
IMM GRANULOCYTES NFR BLD AUTO: 1.2 % — SIGNIFICANT CHANGE UP (ref 0–1.5)
LYMPHOCYTES # BLD AUTO: 15.7 % — SIGNIFICANT CHANGE UP (ref 13–44)
LYMPHOCYTES # BLD AUTO: 2.19 K/UL — SIGNIFICANT CHANGE UP (ref 1–3.3)
MAGNESIUM SERPL-MCNC: 1.7 MG/DL — SIGNIFICANT CHANGE UP (ref 1.6–2.6)
MCHC RBC-ENTMCNC: 23.9 PG — LOW (ref 27–34)
MCHC RBC-ENTMCNC: 29 % — LOW (ref 32–36)
MCV RBC AUTO: 82.5 FL — SIGNIFICANT CHANGE UP (ref 80–100)
MONOCYTES # BLD AUTO: 0.71 K/UL — SIGNIFICANT CHANGE UP (ref 0–0.9)
MONOCYTES NFR BLD AUTO: 5.1 % — SIGNIFICANT CHANGE UP (ref 2–14)
NEUTROPHILS # BLD AUTO: 10.21 K/UL — HIGH (ref 1.8–7.4)
NEUTROPHILS NFR BLD AUTO: 73.4 % — SIGNIFICANT CHANGE UP (ref 43–77)
NRBC # FLD: 0 K/UL — SIGNIFICANT CHANGE UP (ref 0–0)
PHOSPHATE SERPL-MCNC: 4.2 MG/DL — SIGNIFICANT CHANGE UP (ref 2.5–4.5)
PLATELET # BLD AUTO: 431 K/UL — HIGH (ref 150–400)
PMV BLD: 10.7 FL — SIGNIFICANT CHANGE UP (ref 7–13)
POTASSIUM SERPL-MCNC: 3.9 MMOL/L — SIGNIFICANT CHANGE UP (ref 3.5–5.3)
POTASSIUM SERPL-SCNC: 3.9 MMOL/L — SIGNIFICANT CHANGE UP (ref 3.5–5.3)
PROT SERPL-MCNC: 9.5 G/DL — HIGH (ref 6–8.3)
RBC # BLD: 4.01 M/UL — SIGNIFICANT CHANGE UP (ref 3.8–5.2)
RBC # FLD: 23.8 % — HIGH (ref 10.3–14.5)
SODIUM SERPL-SCNC: 141 MMOL/L — SIGNIFICANT CHANGE UP (ref 135–145)
WBC # BLD: 13.92 K/UL — HIGH (ref 3.8–10.5)
WBC # FLD AUTO: 13.92 K/UL — HIGH (ref 3.8–10.5)

## 2020-06-01 PROCEDURE — 99233 SBSQ HOSP IP/OBS HIGH 50: CPT

## 2020-06-01 PROCEDURE — 99232 SBSQ HOSP IP/OBS MODERATE 35: CPT

## 2020-06-01 RX ADMIN — Medication 200 MILLIGRAM(S): at 12:22

## 2020-06-01 RX ADMIN — Medication 1 SPRAY(S): at 06:06

## 2020-06-01 RX ADMIN — Medication 1 SPRAY(S): at 17:21

## 2020-06-01 RX ADMIN — Medication 500 MILLIGRAM(S): at 06:06

## 2020-06-01 RX ADMIN — Medication 500 MILLIGRAM(S): at 17:20

## 2020-06-01 RX ADMIN — Medication 325 MILLIGRAM(S): at 13:21

## 2020-06-01 RX ADMIN — Medication 325 MILLIGRAM(S): at 21:40

## 2020-06-01 RX ADMIN — Medication 3000 UNIT(S): at 12:22

## 2020-06-01 RX ADMIN — Medication 1 MILLIGRAM(S): at 12:22

## 2020-06-01 RX ADMIN — SIMVASTATIN 5 MILLIGRAM(S): 20 TABLET, FILM COATED ORAL at 21:40

## 2020-06-01 RX ADMIN — Medication 1 TABLET(S): at 12:22

## 2020-06-01 RX ADMIN — RIVAROXABAN 20 MILLIGRAM(S): KIT at 17:21

## 2020-06-01 RX ADMIN — Medication 200 MILLIGRAM(S): at 06:06

## 2020-06-01 RX ADMIN — AMLODIPINE BESYLATE 5 MILLIGRAM(S): 2.5 TABLET ORAL at 06:06

## 2020-06-01 RX ADMIN — Medication 325 MILLIGRAM(S): at 06:06

## 2020-06-01 RX ADMIN — Medication 200 MILLIGRAM(S): at 17:21

## 2020-06-01 RX ADMIN — ZINC SULFATE TAB 220 MG (50 MG ZINC EQUIVALENT) 220 MILLIGRAM(S): 220 (50 ZN) TAB at 12:22

## 2020-06-01 RX ADMIN — LORATADINE 10 MILLIGRAM(S): 10 TABLET ORAL at 12:22

## 2020-06-01 RX ADMIN — Medication 200 MILLIGRAM(S): at 23:18

## 2020-06-01 NOTE — PROGRESS NOTE ADULT - SUBJECTIVE AND OBJECTIVE BOX
Patient is a 44y old  Female who presents with a chief complaint of Acute Hypoxemic Respiratory Failure and Pneumonia (28 May 2020 14:21)    SUBJECTIVE / OVERNIGHT EVENTS: No overnight events, now on 4L NC, sats 92-94%, still desats upon minimal movement however. Now using incentive spirometry 6x/day. Encouraged to use it more frequently     MEDICATIONS  (STANDING):  amLODIPine   Tablet 5 milliGRAM(s) Oral daily  ascorbic acid 500 milliGRAM(s) Oral two times a day  cholecalciferol 3000 Unit(s) Oral daily  ergocalciferol 36117 Unit(s) Oral <User Schedule>  ferrous    sulfate 325 milliGRAM(s) Oral three times a day  folic acid 1 milliGRAM(s) Oral daily  guaiFENesin   Syrup  (Sugar-Free) 200 milliGRAM(s) Oral every 6 hours  influenza   Vaccine 0.5 milliLiter(s) IntraMuscular once  multivitamin 1 Tablet(s) Oral daily  rivaroxaban 20 milliGRAM(s) Oral with dinner  simvastatin 5 milliGRAM(s) Oral at bedtime  zinc sulfate 220 milliGRAM(s) Oral daily    MEDICATIONS  (PRN):  acetaminophen   Tablet .. 650 milliGRAM(s) Oral every 6 hours PRN Temp greater or equal to 38C (100.4F), Mild Pain (1 - 3)  ALBUTerol    90 MICROgram(s) HFA Inhaler 2 Puff(s) Inhalation every 6 hours PRN Shortness of Breath and/or Wheezing  sodium chloride 0.65% Nasal 1 Spray(s) Both Nostrils daily PRN Nasal Congestion    Vital Signs Last 24 Hrs  T(C): 36.6 (31 May 2020 21:03), Max: 36.6 (31 May 2020 12:30)  T(F): 97.9 (31 May 2020 21:03), Max: 97.9 (31 May 2020 21:03)  HR: 110 (31 May 2020 21:03) (98 - 110)  BP: 144/96 (31 May 2020 21:03) (111/75 - 144/96)  BP(mean): --  RR: 24 (31 May 2020 21:03) (21 - 24)  SpO2: 94% (31 May 2020 21:03) (94% - 94%)    PHYSICAL EXAM:  GENERAL: NAD, well-developed  CHEST/LUNG: Bibasilar crackles at bases+  HEART: Tachycardia to 104/mt  ABDOMEN: Soft, Nontender, Nondistended  EXTREMITIES: no LE edema  PSYCH: Calm  NEUROLOGY: AAOx3  SKIN: No rashes or lesions      LABS:                                              9.6    13.92 )-----------( 431      ( 01 Jun 2020 09:43 )             33.1   06-01    141  |  100  |  6<L>  ----------------------------<  149<H>  3.9   |  25  |  0.50    Ca    9.9      01 Jun 2020 09:43  Phos  4.2     06-01  Mg     1.7     06-01    TPro  9.5<H>  /  Alb  4.1  /  TBili  0.2  /  DBili  x   /  AST  19  /  ALT  13  /  AlkPhos  93  06-01        Procalcitonin, Serum (05.30.20 @ 08:51)    Procalcitonin, Serum: 0.04: Procalcitonin (PCT) Interpretation (ng/mL) - Diagnosis of  systemic bacterial infection/sepsis:  PCT < 0.5: Systemic infection (sepsis) is not likely and  risk for progression to severe systemic infection is low.  Local bacterial infection is possible. If early sepsis is  suspected clinically, PCT should be re-assessed in 6-24  hours.  PCT >/= 0.5 but < 2.0: Systemic infection (sepsis) is  possible, but other conditions are known to elevate PCT as  well. Moderate risk for progression to severe systemic  infection. The patient should be closely monitored both  clinically and by re-assessing PCT within 6-24 hours.  PCT >/= 2.0 but < 10.0: Systemic infection (sepsis) is  likely, unless other causes are known. High risk of  progression to severe systemic infection (severe  sepsis/septic shock).  PCT >/= 10.0: Important systemic inflammatory response,  almost exclusively due to severe bacterial sepsis or septic  shock. High likelihood of severe sepsis or septic shock. ng/mL      C-Reactive Protein, Serum: 35.4 mg/L (05-28-20 @ 06:04)  C-Reactive Protein, Serum: 35.5 mg/L (05-23-20 @ 17:23)      D-Dimer Assay, Quantitative: < 150 ng/mL (05-28-20 @ 06:04)  D-Dimer Assay, Quantitative: < 150 ng/mL (05-23-20 @ 17:23)    Ferritin, Serum: 58.84 ng/mL (05-28-20 @ 06:04)  Ferritin, Serum: 65.55 ng/mL (05-23-20 @ 17:23)      RADIOLOGY & ADDITIONAL TESTS:    Imaging Personally Reviewed:    Care Discussed with Consultants/Other Providers:
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
## 2020-06-01 NOTE — PROGRESS NOTE ADULT - PROBLEM SELECTOR PLAN 1
-on NC 4L today, continue to downtitrate as tolerated. keep spo2>92%  -monitor inflammatory markers. Remains stable.  - Patient was encouraged to use incentive spirometry hourly  - OOB to chair  - Incentive spirometry  - cont to monitor respiratory status, discussed with ID  -Brian low, unlikely to have superimposed bacterial process

## 2020-06-01 NOTE — PROGRESS NOTE ADULT - SUBJECTIVE AND OBJECTIVE BOX
CC: Patient is a 44y old  Female who presents with a chief complaint of Acute Hypoxemic Respiratory Failure and Pneumonia (01 Jun 2020 10:44)    ID following for covid, leukocytosis    Interval History/ROS: Patient remains on 4 L NC. No fevers, no chills. No abd pain, no dysuria, no N/V/D/C.    Rest of ROS negative.    Allergies  No Known Drug Allergies  Numbness (Other)    ANTIMICROBIALS:      OTHER MEDS:  acetaminophen   Tablet .. 650 milliGRAM(s) Oral every 6 hours PRN  ALBUTerol    90 MICROgram(s) HFA Inhaler 2 Puff(s) Inhalation every 6 hours PRN  amLODIPine   Tablet 5 milliGRAM(s) Oral daily  ascorbic acid 500 milliGRAM(s) Oral two times a day  cholecalciferol 3000 Unit(s) Oral daily  ergocalciferol 62897 Unit(s) Oral <User Schedule>  ferrous    sulfate 325 milliGRAM(s) Oral three times a day  fluticasone propionate 50 MICROgram(s)/spray Nasal Spray 1 Spray(s) Both Nostrils two times a day  folic acid 1 milliGRAM(s) Oral daily  guaiFENesin   Syrup  (Sugar-Free) 200 milliGRAM(s) Oral every 6 hours  influenza   Vaccine 0.5 milliLiter(s) IntraMuscular once  loratadine 10 milliGRAM(s) Oral daily  multivitamin 1 Tablet(s) Oral daily  rivaroxaban 20 milliGRAM(s) Oral with dinner  simvastatin 5 milliGRAM(s) Oral at bedtime  sodium chloride 0.65% Nasal 1 Spray(s) Both Nostrils daily PRN  zinc sulfate 220 milliGRAM(s) Oral daily    PE:    Vital Signs Last 24 Hrs  T(C): 37 (01 Jun 2020 13:30), Max: 37 (01 Jun 2020 13:30)  T(F): 98.6 (01 Jun 2020 13:30), Max: 98.6 (01 Jun 2020 13:30)  HR: 113 (01 Jun 2020 13:30) (110 - 113)  BP: 121/70 (01 Jun 2020 13:30) (121/70 - 144/96)  BP(mean): --  RR: 20 (01 Jun 2020 13:30) (20 - 24)  SpO2: 94% (01 Jun 2020 13:30) (94% - 94%)    Gen: AOx3, NAD  CV: S1+S2 normal, no murmurs  Resp: Crackles bilaterally  Abd: Soft, nontender, +BS  Ext: No LE edema, no wounds  : No Tamayo  IV/Skin: No thrombophlebitis  Neuro: no focal deficits    LABS:                          9.6    13.92 )-----------( 431      ( 01 Jun 2020 09:43 )             33.1       06-01    141  |  100  |  6<L>  ----------------------------<  149<H>  3.9   |  25  |  0.50    Ca    9.9      01 Jun 2020 09:43  Phos  4.2     06-01  Mg     1.7     06-01    TPro  9.5<H>  /  Alb  4.1  /  TBili  0.2  /  DBili  x   /  AST  19  /  ALT  13  /  AlkPhos  93  06-01    MICROBIOLOGY:  v    RADIOLOGY:    < from: Xray Chest 2 Views PA/Lat (05.11.20 @ 15:02) >  IMPRESSION:    Diffuse bilateral patchy opacities are unchanged.     < end of copied text >

## 2020-06-02 LAB
ALBUMIN SERPL ELPH-MCNC: 3.6 G/DL — SIGNIFICANT CHANGE UP (ref 3.3–5)
ALP SERPL-CCNC: 76 U/L — SIGNIFICANT CHANGE UP (ref 40–120)
ALT FLD-CCNC: 10 U/L — SIGNIFICANT CHANGE UP (ref 4–33)
ANION GAP SERPL CALC-SCNC: 11 MMO/L — SIGNIFICANT CHANGE UP (ref 7–14)
AST SERPL-CCNC: 13 U/L — SIGNIFICANT CHANGE UP (ref 4–32)
BASOPHILS # BLD AUTO: 0.05 K/UL — SIGNIFICANT CHANGE UP (ref 0–0.2)
BASOPHILS NFR BLD AUTO: 0.4 % — SIGNIFICANT CHANGE UP (ref 0–2)
BILIRUB SERPL-MCNC: < 0.2 MG/DL — LOW (ref 0.2–1.2)
BUN SERPL-MCNC: 5 MG/DL — LOW (ref 7–23)
CALCIUM SERPL-MCNC: 9.4 MG/DL — SIGNIFICANT CHANGE UP (ref 8.4–10.5)
CHLORIDE SERPL-SCNC: 98 MMOL/L — SIGNIFICANT CHANGE UP (ref 98–107)
CO2 SERPL-SCNC: 25 MMOL/L — SIGNIFICANT CHANGE UP (ref 22–31)
CREAT SERPL-MCNC: 0.5 MG/DL — SIGNIFICANT CHANGE UP (ref 0.5–1.3)
CRP SERPL-MCNC: 32.6 MG/L — HIGH
EOSINOPHIL # BLD AUTO: 0.48 K/UL — SIGNIFICANT CHANGE UP (ref 0–0.5)
EOSINOPHIL NFR BLD AUTO: 3.6 % — SIGNIFICANT CHANGE UP (ref 0–6)
GLUCOSE SERPL-MCNC: 108 MG/DL — HIGH (ref 70–99)
HCT VFR BLD CALC: 29.8 % — LOW (ref 34.5–45)
HGB BLD-MCNC: 8.6 G/DL — LOW (ref 11.5–15.5)
IMM GRANULOCYTES NFR BLD AUTO: 1 % — SIGNIFICANT CHANGE UP (ref 0–1.5)
LYMPHOCYTES # BLD AUTO: 1.97 K/UL — SIGNIFICANT CHANGE UP (ref 1–3.3)
LYMPHOCYTES # BLD AUTO: 14.9 % — SIGNIFICANT CHANGE UP (ref 13–44)
MAGNESIUM SERPL-MCNC: 1.6 MG/DL — SIGNIFICANT CHANGE UP (ref 1.6–2.6)
MCHC RBC-ENTMCNC: 23.9 PG — LOW (ref 27–34)
MCHC RBC-ENTMCNC: 28.9 % — LOW (ref 32–36)
MCV RBC AUTO: 82.8 FL — SIGNIFICANT CHANGE UP (ref 80–100)
MONOCYTES # BLD AUTO: 1.06 K/UL — HIGH (ref 0–0.9)
MONOCYTES NFR BLD AUTO: 8 % — SIGNIFICANT CHANGE UP (ref 2–14)
NEUTROPHILS # BLD AUTO: 9.54 K/UL — HIGH (ref 1.8–7.4)
NEUTROPHILS NFR BLD AUTO: 72.1 % — SIGNIFICANT CHANGE UP (ref 43–77)
NRBC # FLD: 0 K/UL — SIGNIFICANT CHANGE UP (ref 0–0)
PHOSPHATE SERPL-MCNC: 4.5 MG/DL — SIGNIFICANT CHANGE UP (ref 2.5–4.5)
PLATELET # BLD AUTO: 382 K/UL — SIGNIFICANT CHANGE UP (ref 150–400)
PMV BLD: 11.4 FL — SIGNIFICANT CHANGE UP (ref 7–13)
POTASSIUM SERPL-MCNC: 3.6 MMOL/L — SIGNIFICANT CHANGE UP (ref 3.5–5.3)
POTASSIUM SERPL-SCNC: 3.6 MMOL/L — SIGNIFICANT CHANGE UP (ref 3.5–5.3)
PROCALCITONIN SERPL-MCNC: 0.05 NG/ML — SIGNIFICANT CHANGE UP (ref 0.02–0.1)
PROT SERPL-MCNC: 8.2 G/DL — SIGNIFICANT CHANGE UP (ref 6–8.3)
RBC # BLD: 3.6 M/UL — LOW (ref 3.8–5.2)
RBC # FLD: 23.4 % — HIGH (ref 10.3–14.5)
SODIUM SERPL-SCNC: 134 MMOL/L — LOW (ref 135–145)
WBC # BLD: 13.23 K/UL — HIGH (ref 3.8–10.5)
WBC # FLD AUTO: 13.23 K/UL — HIGH (ref 3.8–10.5)

## 2020-06-02 PROCEDURE — 99233 SBSQ HOSP IP/OBS HIGH 50: CPT

## 2020-06-02 RX ADMIN — Medication 200 MILLIGRAM(S): at 23:44

## 2020-06-02 RX ADMIN — Medication 200 MILLIGRAM(S): at 05:49

## 2020-06-02 RX ADMIN — Medication 325 MILLIGRAM(S): at 12:52

## 2020-06-02 RX ADMIN — Medication 3000 UNIT(S): at 12:51

## 2020-06-02 RX ADMIN — AMLODIPINE BESYLATE 5 MILLIGRAM(S): 2.5 TABLET ORAL at 05:49

## 2020-06-02 RX ADMIN — Medication 1 SPRAY(S): at 17:54

## 2020-06-02 RX ADMIN — Medication 500 MILLIGRAM(S): at 05:49

## 2020-06-02 RX ADMIN — RIVAROXABAN 20 MILLIGRAM(S): KIT at 17:53

## 2020-06-02 RX ADMIN — Medication 1 MILLIGRAM(S): at 12:51

## 2020-06-02 RX ADMIN — SIMVASTATIN 5 MILLIGRAM(S): 20 TABLET, FILM COATED ORAL at 23:44

## 2020-06-02 RX ADMIN — LORATADINE 10 MILLIGRAM(S): 10 TABLET ORAL at 12:52

## 2020-06-02 RX ADMIN — Medication 1 TABLET(S): at 12:51

## 2020-06-02 RX ADMIN — Medication 200 MILLIGRAM(S): at 12:51

## 2020-06-02 RX ADMIN — Medication 500 MILLIGRAM(S): at 17:53

## 2020-06-02 RX ADMIN — Medication 325 MILLIGRAM(S): at 05:49

## 2020-06-02 RX ADMIN — ZINC SULFATE TAB 220 MG (50 MG ZINC EQUIVALENT) 220 MILLIGRAM(S): 220 (50 ZN) TAB at 12:51

## 2020-06-02 RX ADMIN — Medication 200 MILLIGRAM(S): at 17:54

## 2020-06-02 RX ADMIN — Medication 325 MILLIGRAM(S): at 22:00

## 2020-06-02 NOTE — PROGRESS NOTE ADULT - PROBLEM SELECTOR PLAN 3
- Likely multifactorial, including acute blood loss  - Continue with ferrous sulfate, MVI, land folate  -CBC stable, no signs of overt bleeding reported

## 2020-06-02 NOTE — PROGRESS NOTE ADULT - SUBJECTIVE AND OBJECTIVE BOX
Patient is a 44y old  Female who presents with a chief complaint of Acute Hypoxemic Respiratory Failure and Pneumonia (28 May 2020 14:21)    SUBJECTIVE / OVERNIGHT EVENTS: No overnight events, now on 4L NC, sats 92-94%, still desats upon minimal movement however. Now using incentive spirometry 6x/day. Encouraged to use it more frequently. Nasal congestion improving as well. No new complaints today     MEDICATIONS  (STANDING):  amLODIPine   Tablet 5 milliGRAM(s) Oral daily  ascorbic acid 500 milliGRAM(s) Oral two times a day  cholecalciferol 3000 Unit(s) Oral daily  ergocalciferol 36435 Unit(s) Oral <User Schedule>  ferrous    sulfate 325 milliGRAM(s) Oral three times a day  folic acid 1 milliGRAM(s) Oral daily  guaiFENesin   Syrup  (Sugar-Free) 200 milliGRAM(s) Oral every 6 hours  influenza   Vaccine 0.5 milliLiter(s) IntraMuscular once  multivitamin 1 Tablet(s) Oral daily  rivaroxaban 20 milliGRAM(s) Oral with dinner  simvastatin 5 milliGRAM(s) Oral at bedtime  zinc sulfate 220 milliGRAM(s) Oral daily    MEDICATIONS  (PRN):  acetaminophen   Tablet .. 650 milliGRAM(s) Oral every 6 hours PRN Temp greater or equal to 38C (100.4F), Mild Pain (1 - 3)  ALBUTerol    90 MICROgram(s) HFA Inhaler 2 Puff(s) Inhalation every 6 hours PRN Shortness of Breath and/or Wheezing  sodium chloride 0.65% Nasal 1 Spray(s) Both Nostrils daily PRN Nasal Congestion    Vital Signs Last 24 Hrs  T(C): 36.6 (02 Jun 2020 05:48), Max: 37 (01 Jun 2020 13:30)  T(F): 97.9 (02 Jun 2020 05:48), Max: 98.6 (01 Jun 2020 13:30)  HR: 89 (02 Jun 2020 05:48) (89 - 113)  BP: 107/74 (02 Jun 2020 05:48) (107/74 - 144/81)  BP(mean): --  RR: 20 (02 Jun 2020 05:48) (20 - 20)  SpO2: 100% (02 Jun 2020 05:48) (94% - 100%)    PHYSICAL EXAM:  GENERAL: NAD, well-developed  CHEST/LUNG: Bibasilar crackles at bases+  HEART: Tachycardia to 100s  ABDOMEN: Soft, Nontender, Nondistended  EXTREMITIES: no LE edema  PSYCH: Calm  NEUROLOGY: AAOx3  SKIN: No rashes or lesions      LABS:                                   8.6    13.23 )-----------( 382      ( 02 Jun 2020 08:45 )             29.8   06-01    141  |  100  |  6<L>  ----------------------------<  149<H>  3.9   |  25  |  0.50    Ca    9.9      01 Jun 2020 09:43  Phos  4.2     06-01  Mg     1.7     06-01    TPro  9.5<H>  /  Alb  4.1  /  TBili  0.2  /  DBili  x   /  AST  19  /  ALT  13  /  AlkPhos  93  06-01      Procalcitonin, Serum (05.30.20 @ 08:51)    Procalcitonin, Serum: 0.04: Procalcitonin (PCT) Interpretation (ng/mL) - Diagnosis of  systemic bacterial infection/sepsis:  PCT < 0.5: Systemic infection (sepsis) is not likely and  risk for progression to severe systemic infection is low.  Local bacterial infection is possible. If early sepsis is  suspected clinically, PCT should be re-assessed in 6-24  hours.  PCT >/= 0.5 but < 2.0: Systemic infection (sepsis) is  possible, but other conditions are known to elevate PCT as  well. Moderate risk for progression to severe systemic  infection. The patient should be closely monitored both  clinically and by re-assessing PCT within 6-24 hours.  PCT >/= 2.0 but < 10.0: Systemic infection (sepsis) is  likely, unless other causes are known. High risk of  progression to severe systemic infection (severe  sepsis/septic shock).  PCT >/= 10.0: Important systemic inflammatory response,  almost exclusively due to severe bacterial sepsis or septic  shock. High likelihood of severe sepsis or septic shock. ng/mL      C-Reactive Protein, Serum: 35.4 mg/L (05-28-20 @ 06:04)  C-Reactive Protein, Serum: 35.5 mg/L (05-23-20 @ 17:23)      D-Dimer Assay, Quantitative: < 150 ng/mL (05-28-20 @ 06:04)  D-Dimer Assay, Quantitative: < 150 ng/mL (05-23-20 @ 17:23)    Ferritin, Serum: 58.84 ng/mL (05-28-20 @ 06:04)  Ferritin, Serum: 65.55 ng/mL (05-23-20 @ 17:23)      RADIOLOGY & ADDITIONAL TESTS:    Imaging Personally Reviewed:    Care Discussed with Consultants/Other Providers:

## 2020-06-03 LAB
ANION GAP SERPL CALC-SCNC: 15 MMO/L — HIGH (ref 7–14)
BUN SERPL-MCNC: 6 MG/DL — LOW (ref 7–23)
CALCIUM SERPL-MCNC: 9.3 MG/DL — SIGNIFICANT CHANGE UP (ref 8.4–10.5)
CHLORIDE SERPL-SCNC: 98 MMOL/L — SIGNIFICANT CHANGE UP (ref 98–107)
CO2 SERPL-SCNC: 23 MMOL/L — SIGNIFICANT CHANGE UP (ref 22–31)
CREAT SERPL-MCNC: 0.5 MG/DL — SIGNIFICANT CHANGE UP (ref 0.5–1.3)
GLUCOSE SERPL-MCNC: 104 MG/DL — HIGH (ref 70–99)
HCT VFR BLD CALC: 28.3 % — LOW (ref 34.5–45)
HGB BLD-MCNC: 8.6 G/DL — LOW (ref 11.5–15.5)
MAGNESIUM SERPL-MCNC: 1.7 MG/DL — SIGNIFICANT CHANGE UP (ref 1.6–2.6)
MCHC RBC-ENTMCNC: 24.8 PG — LOW (ref 27–34)
MCHC RBC-ENTMCNC: 30.4 % — LOW (ref 32–36)
MCV RBC AUTO: 81.6 FL — SIGNIFICANT CHANGE UP (ref 80–100)
NRBC # FLD: 0 K/UL — SIGNIFICANT CHANGE UP (ref 0–0)
PHOSPHATE SERPL-MCNC: 4.9 MG/DL — HIGH (ref 2.5–4.5)
PLATELET # BLD AUTO: 466 K/UL — HIGH (ref 150–400)
PMV BLD: 11.1 FL — SIGNIFICANT CHANGE UP (ref 7–13)
POTASSIUM SERPL-MCNC: 3.9 MMOL/L — SIGNIFICANT CHANGE UP (ref 3.5–5.3)
POTASSIUM SERPL-SCNC: 3.9 MMOL/L — SIGNIFICANT CHANGE UP (ref 3.5–5.3)
RBC # BLD: 3.47 M/UL — LOW (ref 3.8–5.2)
RBC # FLD: 23.5 % — HIGH (ref 10.3–14.5)
SODIUM SERPL-SCNC: 136 MMOL/L — SIGNIFICANT CHANGE UP (ref 135–145)
WBC # BLD: 13.75 K/UL — HIGH (ref 3.8–10.5)
WBC # FLD AUTO: 13.75 K/UL — HIGH (ref 3.8–10.5)

## 2020-06-03 PROCEDURE — 99233 SBSQ HOSP IP/OBS HIGH 50: CPT

## 2020-06-03 RX ADMIN — ZINC SULFATE TAB 220 MG (50 MG ZINC EQUIVALENT) 220 MILLIGRAM(S): 220 (50 ZN) TAB at 12:46

## 2020-06-03 RX ADMIN — ERGOCALCIFEROL 50000 UNIT(S): 1.25 CAPSULE ORAL at 12:46

## 2020-06-03 RX ADMIN — Medication 500 MILLIGRAM(S): at 05:50

## 2020-06-03 RX ADMIN — Medication 325 MILLIGRAM(S): at 12:47

## 2020-06-03 RX ADMIN — Medication 1 TABLET(S): at 12:47

## 2020-06-03 RX ADMIN — Medication 1 SPRAY(S): at 05:50

## 2020-06-03 RX ADMIN — Medication 325 MILLIGRAM(S): at 21:35

## 2020-06-03 RX ADMIN — Medication 200 MILLIGRAM(S): at 18:20

## 2020-06-03 RX ADMIN — Medication 200 MILLIGRAM(S): at 05:50

## 2020-06-03 RX ADMIN — AMLODIPINE BESYLATE 5 MILLIGRAM(S): 2.5 TABLET ORAL at 05:50

## 2020-06-03 RX ADMIN — Medication 3000 UNIT(S): at 12:47

## 2020-06-03 RX ADMIN — Medication 325 MILLIGRAM(S): at 05:50

## 2020-06-03 RX ADMIN — RIVAROXABAN 20 MILLIGRAM(S): KIT at 18:19

## 2020-06-03 RX ADMIN — Medication 1 MILLIGRAM(S): at 12:47

## 2020-06-03 RX ADMIN — Medication 500 MILLIGRAM(S): at 18:20

## 2020-06-03 RX ADMIN — Medication 1 SPRAY(S): at 18:20

## 2020-06-03 RX ADMIN — Medication 200 MILLIGRAM(S): at 12:47

## 2020-06-03 RX ADMIN — SIMVASTATIN 5 MILLIGRAM(S): 20 TABLET, FILM COATED ORAL at 21:35

## 2020-06-03 RX ADMIN — LORATADINE 10 MILLIGRAM(S): 10 TABLET ORAL at 12:46

## 2020-06-03 NOTE — PROGRESS NOTE ADULT - SUBJECTIVE AND OBJECTIVE BOX
Patient is a 44y old  Female who presents with a chief complaint of Acute Hypoxemic Respiratory Failure and Pneumonia (28 May 2020 14:21)    SUBJECTIVE / OVERNIGHT EVENTS: No overnight events, now on 4L NC, sats 92-94%, still desats upon minimal movement however is amenable to sitting in chair later today. Now using incentive spirometry 6x/day. Encouraged to use it more frequently. Nasal congestion improving as well. No new complaints today     MEDICATIONS  (STANDING):  amLODIPine   Tablet 5 milliGRAM(s) Oral daily  ascorbic acid 500 milliGRAM(s) Oral two times a day  cholecalciferol 3000 Unit(s) Oral daily  ergocalciferol 39102 Unit(s) Oral <User Schedule>  ferrous    sulfate 325 milliGRAM(s) Oral three times a day  folic acid 1 milliGRAM(s) Oral daily  guaiFENesin   Syrup  (Sugar-Free) 200 milliGRAM(s) Oral every 6 hours  influenza   Vaccine 0.5 milliLiter(s) IntraMuscular once  multivitamin 1 Tablet(s) Oral daily  rivaroxaban 20 milliGRAM(s) Oral with dinner  simvastatin 5 milliGRAM(s) Oral at bedtime  zinc sulfate 220 milliGRAM(s) Oral daily    MEDICATIONS  (PRN):  acetaminophen   Tablet .. 650 milliGRAM(s) Oral every 6 hours PRN Temp greater or equal to 38C (100.4F), Mild Pain (1 - 3)  ALBUTerol    90 MICROgram(s) HFA Inhaler 2 Puff(s) Inhalation every 6 hours PRN Shortness of Breath and/or Wheezing  sodium chloride 0.65% Nasal 1 Spray(s) Both Nostrils daily PRN Nasal Congestion    Vital Signs Last 24 Hrs  T(C): 36.6 (03 Jun 2020 05:19), Max: 37.1 (03 Jun 2020 00:07)  T(F): 97.8 (03 Jun 2020 05:19), Max: 98.7 (03 Jun 2020 00:07)  HR: 99 (03 Jun 2020 05:19) (98 - 108)  BP: 111/71 (03 Jun 2020 05:19) (111/71 - 115/77)  BP(mean): --  RR: 18 (03 Jun 2020 05:19) (18 - 22)  SpO2: 98% (03 Jun 2020 05:19) (82% - 98%)    PHYSICAL EXAM:  GENERAL: NAD, well-developed  CHEST/LUNG: Bibasilar crackles at bases+  HEART: Tachycardia to 100s  ABDOMEN: Soft, Nontender, Nondistended  EXTREMITIES: no LE edema  PSYCH: Calm  NEUROLOGY: AAOx3  SKIN: No rashes or lesions      LABS:                                   8.6    13.23 )-----------( 382      ( 02 Jun 2020 08:45 )             29.8   06-01    141  |  100  |  6<L>  ----------------------------<  149<H>  3.9   |  25  |  0.50    Ca    9.9      01 Jun 2020 09:43  Phos  4.2     06-01  Mg     1.7     06-01    TPro  9.5<H>  /  Alb  4.1  /  TBili  0.2  /  DBili  x   /  AST  19  /  ALT  13  /  AlkPhos  93  06-01      Procalcitonin, Serum (05.30.20 @ 08:51)    Procalcitonin, Serum: 0.04: Procalcitonin (PCT) Interpretation (ng/mL) - Diagnosis of  systemic bacterial infection/sepsis:  PCT < 0.5: Systemic infection (sepsis) is not likely and  risk for progression to severe systemic infection is low.  Local bacterial infection is possible. If early sepsis is  suspected clinically, PCT should be re-assessed in 6-24  hours.  PCT >/= 0.5 but < 2.0: Systemic infection (sepsis) is  possible, but other conditions are known to elevate PCT as  well. Moderate risk for progression to severe systemic  infection. The patient should be closely monitored both  clinically and by re-assessing PCT within 6-24 hours.  PCT >/= 2.0 but < 10.0: Systemic infection (sepsis) is  likely, unless other causes are known. High risk of  progression to severe systemic infection (severe  sepsis/septic shock).  PCT >/= 10.0: Important systemic inflammatory response,  almost exclusively due to severe bacterial sepsis or septic  shock. High likelihood of severe sepsis or septic shock. ng/mL      C-Reactive Protein, Serum: 35.4 mg/L (05-28-20 @ 06:04)  C-Reactive Protein, Serum: 35.5 mg/L (05-23-20 @ 17:23)      D-Dimer Assay, Quantitative: < 150 ng/mL (05-28-20 @ 06:04)  D-Dimer Assay, Quantitative: < 150 ng/mL (05-23-20 @ 17:23)    Ferritin, Serum: 58.84 ng/mL (05-28-20 @ 06:04)  Ferritin, Serum: 65.55 ng/mL (05-23-20 @ 17:23)      RADIOLOGY & ADDITIONAL TESTS:    Imaging Personally Reviewed:    Care Discussed with Consultants/Other Providers:

## 2020-06-04 PROCEDURE — 71045 X-RAY EXAM CHEST 1 VIEW: CPT | Mod: 26

## 2020-06-04 PROCEDURE — 99232 SBSQ HOSP IP/OBS MODERATE 35: CPT

## 2020-06-04 RX ADMIN — Medication 325 MILLIGRAM(S): at 06:24

## 2020-06-04 RX ADMIN — Medication 3000 UNIT(S): at 11:50

## 2020-06-04 RX ADMIN — Medication 500 MILLIGRAM(S): at 18:24

## 2020-06-04 RX ADMIN — Medication 325 MILLIGRAM(S): at 14:24

## 2020-06-04 RX ADMIN — Medication 200 MILLIGRAM(S): at 11:50

## 2020-06-04 RX ADMIN — Medication 1 MILLIGRAM(S): at 11:50

## 2020-06-04 RX ADMIN — LORATADINE 10 MILLIGRAM(S): 10 TABLET ORAL at 11:50

## 2020-06-04 RX ADMIN — RIVAROXABAN 20 MILLIGRAM(S): KIT at 18:24

## 2020-06-04 RX ADMIN — Medication 200 MILLIGRAM(S): at 23:30

## 2020-06-04 RX ADMIN — Medication 1 SPRAY(S): at 18:24

## 2020-06-04 RX ADMIN — AMLODIPINE BESYLATE 5 MILLIGRAM(S): 2.5 TABLET ORAL at 06:24

## 2020-06-04 RX ADMIN — Medication 1 SPRAY(S): at 06:24

## 2020-06-04 RX ADMIN — Medication 500 MILLIGRAM(S): at 06:24

## 2020-06-04 RX ADMIN — Medication 200 MILLIGRAM(S): at 06:15

## 2020-06-04 RX ADMIN — SIMVASTATIN 5 MILLIGRAM(S): 20 TABLET, FILM COATED ORAL at 21:41

## 2020-06-04 RX ADMIN — Medication 325 MILLIGRAM(S): at 21:41

## 2020-06-04 RX ADMIN — Medication 1 TABLET(S): at 11:50

## 2020-06-04 RX ADMIN — Medication 200 MILLIGRAM(S): at 18:25

## 2020-06-04 RX ADMIN — ZINC SULFATE TAB 220 MG (50 MG ZINC EQUIVALENT) 220 MILLIGRAM(S): 220 (50 ZN) TAB at 11:50

## 2020-06-04 RX ADMIN — Medication 200 MILLIGRAM(S): at 06:24

## 2020-06-04 NOTE — CHART NOTE - NSCHARTNOTEFT_GEN_A_CORE
Source: Patient [X]    Family [ ]     other [ ] Review of the patient's medical chart    Current Diet : Diet, Regular:   Consistent Carbohydrate {Evening Snack} (CSTCHOSN)  Low Sodium  No Pork  No Shellfish  Supplement Feeding Modality:  Oral  Glucerna Shake Cans or Servings Per Day:  2       Frequency:  Two Times a day (05-12-20 @ 14:16)    Current Weight: no recent weight available, please obtain and document current weight as feasible.  Admission weight: 106.8 kg 3-29    Nutrition follow-up 2/2 extended length of stay. Patient is a 44 y.o. woman with morbid obesity, PE, and ongoing acute respiratory failure with hypoxia secondary to COVID-19 infection. Unable to conduct a face to face interview or nutrition-focused physical exam due to limited contact restrictions related to patient's medical condition and isolation precautions. Obtained subjective information from extensive chart review.  No GI distress (nausea/vomiting/diarrhea/constipation.) No reported difficulties chewing and swallowing foods and fluids. Please obtain current weight and document % PO intake as feasible to assess adequacy of PO.     __________________ Pertinent Medications__________________   MEDICATIONS  (STANDING):  amLODIPine   Tablet 5 milliGRAM(s) Oral daily  ascorbic acid 500 milliGRAM(s) Oral two times a day  cholecalciferol 3000 Unit(s) Oral daily  ergocalciferol 46085 Unit(s) Oral <User Schedule>  ferrous    sulfate 325 milliGRAM(s) Oral three times a day  fluticasone propionate 50 MICROgram(s)/spray Nasal Spray 1 Spray(s) Both Nostrils two times a day  folic acid 1 milliGRAM(s) Oral daily  guaiFENesin   Syrup  (Sugar-Free) 200 milliGRAM(s) Oral every 6 hours  influenza   Vaccine 0.5 milliLiter(s) IntraMuscular once  loratadine 10 milliGRAM(s) Oral daily  multivitamin 1 Tablet(s) Oral daily  rivaroxaban 20 milliGRAM(s) Oral with dinner  simvastatin 5 milliGRAM(s) Oral at bedtime  zinc sulfate 220 milliGRAM(s) Oral daily    MEDICATIONS  (PRN):  acetaminophen   Tablet .. 650 milliGRAM(s) Oral every 6 hours PRN Temp greater or equal to 38C (100.4F), Mild Pain (1 - 3)  ALBUTerol    90 MICROgram(s) HFA Inhaler 2 Puff(s) Inhalation every 6 hours PRN Shortness of Breath and/or Wheezing  sodium chloride 0.65% Nasal 1 Spray(s) Both Nostrils daily PRN Nasal Congestion      __________________ Pertinent Labs__________________   06-03 Na136 mmol/L Glu 104 mg/dL<H> K+ 3.9 mmol/L Cr  0.50 mg/dL BUN 6 mg/dL<L> 06-03 Phos 4.9 mg/dL<H> 06-02 Alb 3.6 g/dL 05-19 Chol 206 mg/dL<H>  mg/dL HDL 33 mg/dL<L> Trig 91 mg/dL    Estimated Needs:   [x ] no change since previous assessment  [ ] recalculated:     Nutrition Recommendations:  1- Continue current diet order, which remains appropriate at this time.   2- Monitor weights, labs, BM's, skin integrity, p.o. intake. 2  3- Please Encourage po intake, assist with meals and menu selections, provide alternatives PRN.   4- RD remains available, re-consult as needed. Cristo Engle, MS, RDN Pager #[phone]

## 2020-06-04 NOTE — PROGRESS NOTE ADULT - PROBLEM SELECTOR PLAN 1
-on NC 4L today, continue to downtitrate as tolerated. keep spo2>92%  -monitor inflammatory markers. Remains stable.  - Patient was encouraged to use incentive spirometry hourly.  - OOB to chair  - Incentive spirometry  - cont to monitor respiratory status, discussed with ID  -Procal low, unlikely to have superimposed bacterial process

## 2020-06-04 NOTE — PROGRESS NOTE ADULT - SUBJECTIVE AND OBJECTIVE BOX
Patient is a 44y old  Female who presents with a chief complaint of Acute Hypoxemic Respiratory Failure and Pneumonia (03 Jun 2020 12:26)      SUBJECTIVE / OVERNIGHT EVENTS: Pt seen and examined earlier this am. No overnight events, now on 4L NC, sats 96% this am, still desats upon minimal movement however sitting up in bed today. No new complaints today. No other new issues reported by nsg.        MEDICATIONS  (STANDING):  amLODIPine   Tablet 5 milliGRAM(s) Oral daily  ascorbic acid 500 milliGRAM(s) Oral two times a day  cholecalciferol 3000 Unit(s) Oral daily  ergocalciferol 88968 Unit(s) Oral <User Schedule>  ferrous    sulfate 325 milliGRAM(s) Oral three times a day  fluticasone propionate 50 MICROgram(s)/spray Nasal Spray 1 Spray(s) Both Nostrils two times a day  folic acid 1 milliGRAM(s) Oral daily  guaiFENesin   Syrup  (Sugar-Free) 200 milliGRAM(s) Oral every 6 hours  influenza   Vaccine 0.5 milliLiter(s) IntraMuscular once  loratadine 10 milliGRAM(s) Oral daily  multivitamin 1 Tablet(s) Oral daily  rivaroxaban 20 milliGRAM(s) Oral with dinner  simvastatin 5 milliGRAM(s) Oral at bedtime  zinc sulfate 220 milliGRAM(s) Oral daily    MEDICATIONS  (PRN):  acetaminophen   Tablet .. 650 milliGRAM(s) Oral every 6 hours PRN Temp greater or equal to 38C (100.4F), Mild Pain (1 - 3)  ALBUTerol    90 MICROgram(s) HFA Inhaler 2 Puff(s) Inhalation every 6 hours PRN Shortness of Breath and/or Wheezing  sodium chloride 0.65% Nasal 1 Spray(s) Both Nostrils daily PRN Nasal Congestion      Vital Signs Last 24 Hrs  T(C): 36.7 (04 Jun 2020 09:02), Max: 36.8 (03 Jun 2020 15:43)  T(F): 98 (04 Jun 2020 09:02), Max: 98.3 (03 Jun 2020 15:43)  HR: 107 (04 Jun 2020 09:02) (100 - 111)  BP: 125/85 (04 Jun 2020 09:02) (120/76 - 125/85)  BP(mean): --  RR: 18 (04 Jun 2020 09:02) (18 - 19)  SpO2: 96% (04 Jun 2020 09:02) (91% - 96%)  CAPILLARY BLOOD GLUCOSE        I&O's Summary      PHYSICAL EXAM:  GENERAL: NAD, well-developed  CHEST/LUNG: Bibasilar crackles at bases+  HEART: Tachycardia to 100s  ABDOMEN: Soft, Nontender, Nondistended  EXTREMITIES: no LE edema  PSYCH: Calm  NEUROLOGY: AAOx3  SKIN: No rashes or lesions    LABS:                        8.6    13.75 )-----------( 466      ( 03 Jun 2020 08:33 )             28.3     06-03    136  |  98  |  6<L>  ----------------------------<  104<H>  3.9   |  23  |  0.50    Ca    9.3      03 Jun 2020 08:33  Phos  4.9     06-03  Mg     1.7     06-03                RADIOLOGY & ADDITIONAL TESTS:    Imaging Personally Reviewed:    Consultant(s) Notes Reviewed:      Care Discussed with Consultants/Other Providers:

## 2020-06-05 LAB
ALBUMIN SERPL ELPH-MCNC: 3.4 G/DL — SIGNIFICANT CHANGE UP (ref 3.3–5)
ALP SERPL-CCNC: 75 U/L — SIGNIFICANT CHANGE UP (ref 40–120)
ALT FLD-CCNC: 12 U/L — SIGNIFICANT CHANGE UP (ref 4–33)
ANION GAP SERPL CALC-SCNC: 11 MMO/L — SIGNIFICANT CHANGE UP (ref 7–14)
AST SERPL-CCNC: 14 U/L — SIGNIFICANT CHANGE UP (ref 4–32)
BASOPHILS # BLD AUTO: 0.03 K/UL — SIGNIFICANT CHANGE UP (ref 0–0.2)
BASOPHILS NFR BLD AUTO: 0.2 % — SIGNIFICANT CHANGE UP (ref 0–2)
BILIRUB SERPL-MCNC: < 0.2 MG/DL — LOW (ref 0.2–1.2)
BUN SERPL-MCNC: 7 MG/DL — SIGNIFICANT CHANGE UP (ref 7–23)
CALCIUM SERPL-MCNC: 9.4 MG/DL — SIGNIFICANT CHANGE UP (ref 8.4–10.5)
CHLORIDE SERPL-SCNC: 95 MMOL/L — LOW (ref 98–107)
CO2 SERPL-SCNC: 23 MMOL/L — SIGNIFICANT CHANGE UP (ref 22–31)
CREAT SERPL-MCNC: 0.53 MG/DL — SIGNIFICANT CHANGE UP (ref 0.5–1.3)
CRP SERPL-MCNC: 39.6 MG/L — HIGH
EOSINOPHIL # BLD AUTO: 0.39 K/UL — SIGNIFICANT CHANGE UP (ref 0–0.5)
EOSINOPHIL NFR BLD AUTO: 2.9 % — SIGNIFICANT CHANGE UP (ref 0–6)
GLUCOSE SERPL-MCNC: 106 MG/DL — HIGH (ref 70–99)
HCT VFR BLD CALC: 27.9 % — LOW (ref 34.5–45)
HGB BLD-MCNC: 8.3 G/DL — LOW (ref 11.5–15.5)
IMM GRANULOCYTES NFR BLD AUTO: 1.4 % — SIGNIFICANT CHANGE UP (ref 0–1.5)
LYMPHOCYTES # BLD AUTO: 15.4 % — SIGNIFICANT CHANGE UP (ref 13–44)
LYMPHOCYTES # BLD AUTO: 2.08 K/UL — SIGNIFICANT CHANGE UP (ref 1–3.3)
MAGNESIUM SERPL-MCNC: 1.7 MG/DL — SIGNIFICANT CHANGE UP (ref 1.6–2.6)
MCHC RBC-ENTMCNC: 24.1 PG — LOW (ref 27–34)
MCHC RBC-ENTMCNC: 29.7 % — LOW (ref 32–36)
MCV RBC AUTO: 81.1 FL — SIGNIFICANT CHANGE UP (ref 80–100)
MONOCYTES # BLD AUTO: 0.96 K/UL — HIGH (ref 0–0.9)
MONOCYTES NFR BLD AUTO: 7.1 % — SIGNIFICANT CHANGE UP (ref 2–14)
NEUTROPHILS # BLD AUTO: 9.89 K/UL — HIGH (ref 1.8–7.4)
NEUTROPHILS NFR BLD AUTO: 73 % — SIGNIFICANT CHANGE UP (ref 43–77)
NRBC # FLD: 0 K/UL — SIGNIFICANT CHANGE UP (ref 0–0)
PHOSPHATE SERPL-MCNC: 5 MG/DL — HIGH (ref 2.5–4.5)
PLATELET # BLD AUTO: 438 K/UL — HIGH (ref 150–400)
PMV BLD: 10.4 FL — SIGNIFICANT CHANGE UP (ref 7–13)
POTASSIUM SERPL-MCNC: 3.7 MMOL/L — SIGNIFICANT CHANGE UP (ref 3.5–5.3)
POTASSIUM SERPL-SCNC: 3.7 MMOL/L — SIGNIFICANT CHANGE UP (ref 3.5–5.3)
PROCALCITONIN SERPL-MCNC: 0.08 NG/ML — SIGNIFICANT CHANGE UP (ref 0.02–0.1)
PROT SERPL-MCNC: 7.7 G/DL — SIGNIFICANT CHANGE UP (ref 6–8.3)
RBC # BLD: 3.44 M/UL — LOW (ref 3.8–5.2)
RBC # FLD: 23.2 % — HIGH (ref 10.3–14.5)
SODIUM SERPL-SCNC: 129 MMOL/L — LOW (ref 135–145)
WBC # BLD: 13.54 K/UL — HIGH (ref 3.8–10.5)
WBC # FLD AUTO: 13.54 K/UL — HIGH (ref 3.8–10.5)

## 2020-06-05 PROCEDURE — 99232 SBSQ HOSP IP/OBS MODERATE 35: CPT

## 2020-06-05 RX ORDER — ONDANSETRON 8 MG/1
4 TABLET, FILM COATED ORAL ONCE
Refills: 0 | Status: COMPLETED | OUTPATIENT
Start: 2020-06-05 | End: 2020-06-05

## 2020-06-05 RX ORDER — ONDANSETRON 8 MG/1
4 TABLET, FILM COATED ORAL ONCE
Refills: 0 | Status: DISCONTINUED | OUTPATIENT
Start: 2020-06-05 | End: 2020-06-09

## 2020-06-05 RX ADMIN — Medication 1 SPRAY(S): at 17:33

## 2020-06-05 RX ADMIN — Medication 325 MILLIGRAM(S): at 21:18

## 2020-06-05 RX ADMIN — Medication 1 TABLET(S): at 13:07

## 2020-06-05 RX ADMIN — ONDANSETRON 4 MILLIGRAM(S): 8 TABLET, FILM COATED ORAL at 18:37

## 2020-06-05 RX ADMIN — Medication 1 SPRAY(S): at 05:41

## 2020-06-05 RX ADMIN — LORATADINE 10 MILLIGRAM(S): 10 TABLET ORAL at 13:08

## 2020-06-05 RX ADMIN — AMLODIPINE BESYLATE 5 MILLIGRAM(S): 2.5 TABLET ORAL at 05:41

## 2020-06-05 RX ADMIN — Medication 200 MILLIGRAM(S): at 17:33

## 2020-06-05 RX ADMIN — RIVAROXABAN 20 MILLIGRAM(S): KIT at 17:33

## 2020-06-05 RX ADMIN — Medication 500 MILLIGRAM(S): at 05:40

## 2020-06-05 RX ADMIN — Medication 200 MILLIGRAM(S): at 21:18

## 2020-06-05 RX ADMIN — Medication 500 MILLIGRAM(S): at 17:33

## 2020-06-05 RX ADMIN — Medication 3000 UNIT(S): at 13:07

## 2020-06-05 RX ADMIN — Medication 325 MILLIGRAM(S): at 13:07

## 2020-06-05 RX ADMIN — ZINC SULFATE TAB 220 MG (50 MG ZINC EQUIVALENT) 220 MILLIGRAM(S): 220 (50 ZN) TAB at 13:07

## 2020-06-05 RX ADMIN — Medication 200 MILLIGRAM(S): at 05:40

## 2020-06-05 RX ADMIN — Medication 1 MILLIGRAM(S): at 13:07

## 2020-06-05 RX ADMIN — Medication 200 MILLIGRAM(S): at 13:06

## 2020-06-05 RX ADMIN — SIMVASTATIN 5 MILLIGRAM(S): 20 TABLET, FILM COATED ORAL at 21:18

## 2020-06-05 RX ADMIN — Medication 325 MILLIGRAM(S): at 05:40

## 2020-06-05 NOTE — PROGRESS NOTE ADULT - SUBJECTIVE AND OBJECTIVE BOX
Patient is a 44y old  Female who presents with a chief complaint of Acute Hypoxemic Respiratory Failure and Pneumonia (04 Jun 2020 12:35)    SUBJECTIVE / OVERNIGHT EVENTS: Pt seen and examined earlier this am. No overnight events, still on 4L NC, sats 96% this am, still desats upon minimal movement however sitting up in bed today. Reports that she is using incentive spirometry encouraged to use more often. No new complaints today. No other new issues reported by nsg.        MEDICATIONS  (STANDING):  amLODIPine   Tablet 5 milliGRAM(s) Oral daily  ascorbic acid 500 milliGRAM(s) Oral two times a day  cholecalciferol 3000 Unit(s) Oral daily  ergocalciferol 10176 Unit(s) Oral <User Schedule>  ferrous    sulfate 325 milliGRAM(s) Oral three times a day  fluticasone propionate 50 MICROgram(s)/spray Nasal Spray 1 Spray(s) Both Nostrils two times a day  folic acid 1 milliGRAM(s) Oral daily  guaiFENesin   Syrup  (Sugar-Free) 200 milliGRAM(s) Oral every 6 hours  influenza   Vaccine 0.5 milliLiter(s) IntraMuscular once  multivitamin 1 Tablet(s) Oral daily  rivaroxaban 20 milliGRAM(s) Oral with dinner  simvastatin 5 milliGRAM(s) Oral at bedtime  zinc sulfate 220 milliGRAM(s) Oral daily    MEDICATIONS  (PRN):  acetaminophen   Tablet .. 650 milliGRAM(s) Oral every 6 hours PRN Temp greater or equal to 38C (100.4F), Mild Pain (1 - 3)  ALBUTerol    90 MICROgram(s) HFA Inhaler 2 Puff(s) Inhalation every 6 hours PRN Shortness of Breath and/or Wheezing  sodium chloride 0.65% Nasal 1 Spray(s) Both Nostrils daily PRN Nasal Congestion      Vital Signs Last 24 Hrs  T(C): 36.4 (05 Jun 2020 13:18), Max: 36.9 (05 Jun 2020 01:59)  T(F): 97.5 (05 Jun 2020 13:18), Max: 98.4 (05 Jun 2020 01:59)  HR: 98 (05 Jun 2020 13:18) (95 - 110)  BP: 106/63 (05 Jun 2020 13:18) (104/65 - 117/77)  BP(mean): --  RR: 19 (05 Jun 2020 13:18) (18 - 20)  SpO2: 99% (05 Jun 2020 13:18) (95% - 99%)  CAPILLARY BLOOD GLUCOSE        I&O's Summary    04 Jun 2020 07:01  -  05 Jun 2020 07:00  --------------------------------------------------------  IN: 0 mL / OUT: 900 mL / NET: -900 mL        PHYSICAL EXAM:  GENERAL: NAD, well-developed  CHEST/LUNG: Bibasilar crackles at bases+  HEART: Tachycardia to 100s  ABDOMEN: Soft, Nontender, Nondistended  EXTREMITIES: no LE edema  PSYCH: Calm  NEUROLOGY: AAOx3  SKIN: No rashes or lesions    LABS:                        8.3    13.54 )-----------( 438      ( 05 Jun 2020 06:35 )             27.9     06-05    129<L>  |  95<L>  |  7   ----------------------------<  106<H>  3.7   |  23  |  0.53    Ca    9.4      05 Jun 2020 06:35  Phos  5.0     06-05  Mg     1.7     06-05    TPro  7.7  /  Alb  3.4  /  TBili  < 0.2<L>  /  DBili  x   /  AST  14  /  ALT  12  /  AlkPhos  75  06-05              Procalcitonin, Serum: 0.08 ng/mL (06-05-20 @ 06:35)  Procalcitonin, Serum: 0.05 ng/mL (06-02-20 @ 08:45)  Procalcitonin, Serum: 0.04 ng/mL (05-30-20 @ 08:51)    C-Reactive Protein, Serum: 39.6 mg/L (06-05-20 @ 06:35)  C-Reactive Protein, Serum: 32.6 mg/L (06-02-20 @ 08:45)  C-Reactive Protein, Serum: 42.5 mg/L (05-30-20 @ 08:51)        Ferritin, Serum: 66.90 ng/mL (05-30-20 @ 08:51)      RADIOLOGY & ADDITIONAL TESTS:  < from: Xray Chest 1 View- PORTABLE-Urgent (06.04.20 @ 13:58) >   XR CHEST PORTABLE URGENT 1V      < end of copied text >  < from: Xray Chest 1 View- PORTABLE-Urgent (06.04.20 @ 13:58) >  No significant change in bilateral patchy lung opacities.      < end of copied text >    Imaging Personally Reviewed:    Care Discussed with Consultants/Other Providers:

## 2020-06-05 NOTE — PROGRESS NOTE ADULT - PROBLEM SELECTOR PLAN 1
-on NC 4L today, continue to downtitrate as tolerated. keep spo2>92%  -monitor inflammatory markers. Remains stable.  - Patient was encouraged to use incentive spirometry hourly.  - OOB to chair  - Incentive spirometry  - rpt cxr on 6/4 showed no significant change in bilateral patchy lung opacities.  - cont to monitor respiratory status  -Procal low, unlikely to have superimposed bacterial process

## 2020-06-06 LAB
ANION GAP SERPL CALC-SCNC: 13 MMO/L — SIGNIFICANT CHANGE UP (ref 7–14)
BUN SERPL-MCNC: 11 MG/DL — SIGNIFICANT CHANGE UP (ref 7–23)
CALCIUM SERPL-MCNC: 9.4 MG/DL — SIGNIFICANT CHANGE UP (ref 8.4–10.5)
CHLORIDE SERPL-SCNC: 96 MMOL/L — LOW (ref 98–107)
CO2 SERPL-SCNC: 26 MMOL/L — SIGNIFICANT CHANGE UP (ref 22–31)
CREAT SERPL-MCNC: 0.68 MG/DL — SIGNIFICANT CHANGE UP (ref 0.5–1.3)
GLUCOSE SERPL-MCNC: 112 MG/DL — HIGH (ref 70–99)
HCT VFR BLD CALC: 29.2 % — LOW (ref 34.5–45)
HGB BLD-MCNC: 8.8 G/DL — LOW (ref 11.5–15.5)
MAGNESIUM SERPL-MCNC: 1.7 MG/DL — SIGNIFICANT CHANGE UP (ref 1.6–2.6)
MCHC RBC-ENTMCNC: 24.5 PG — LOW (ref 27–34)
MCHC RBC-ENTMCNC: 30.1 % — LOW (ref 32–36)
MCV RBC AUTO: 81.3 FL — SIGNIFICANT CHANGE UP (ref 80–100)
NRBC # FLD: 0 K/UL — SIGNIFICANT CHANGE UP (ref 0–0)
PLATELET # BLD AUTO: 455 K/UL — HIGH (ref 150–400)
PMV BLD: 10.1 FL — SIGNIFICANT CHANGE UP (ref 7–13)
POTASSIUM SERPL-MCNC: 4.1 MMOL/L — SIGNIFICANT CHANGE UP (ref 3.5–5.3)
POTASSIUM SERPL-SCNC: 4.1 MMOL/L — SIGNIFICANT CHANGE UP (ref 3.5–5.3)
RBC # BLD: 3.59 M/UL — LOW (ref 3.8–5.2)
RBC # FLD: 23 % — HIGH (ref 10.3–14.5)
SODIUM SERPL-SCNC: 135 MMOL/L — SIGNIFICANT CHANGE UP (ref 135–145)
WBC # BLD: 14.38 K/UL — HIGH (ref 3.8–10.5)
WBC # FLD AUTO: 14.38 K/UL — HIGH (ref 3.8–10.5)

## 2020-06-06 PROCEDURE — 99232 SBSQ HOSP IP/OBS MODERATE 35: CPT

## 2020-06-06 RX ADMIN — Medication 200 MILLIGRAM(S): at 23:27

## 2020-06-06 RX ADMIN — Medication 325 MILLIGRAM(S): at 13:01

## 2020-06-06 RX ADMIN — Medication 1 SPRAY(S): at 17:49

## 2020-06-06 RX ADMIN — Medication 325 MILLIGRAM(S): at 23:27

## 2020-06-06 RX ADMIN — Medication 500 MILLIGRAM(S): at 17:49

## 2020-06-06 RX ADMIN — Medication 500 MILLIGRAM(S): at 05:26

## 2020-06-06 RX ADMIN — ZINC SULFATE TAB 220 MG (50 MG ZINC EQUIVALENT) 220 MILLIGRAM(S): 220 (50 ZN) TAB at 12:56

## 2020-06-06 RX ADMIN — Medication 1 MILLIGRAM(S): at 12:56

## 2020-06-06 RX ADMIN — Medication 1 TABLET(S): at 12:56

## 2020-06-06 RX ADMIN — SIMVASTATIN 5 MILLIGRAM(S): 20 TABLET, FILM COATED ORAL at 23:27

## 2020-06-06 RX ADMIN — Medication 3000 UNIT(S): at 12:56

## 2020-06-06 RX ADMIN — Medication 200 MILLIGRAM(S): at 12:56

## 2020-06-06 RX ADMIN — AMLODIPINE BESYLATE 5 MILLIGRAM(S): 2.5 TABLET ORAL at 05:26

## 2020-06-06 RX ADMIN — RIVAROXABAN 20 MILLIGRAM(S): KIT at 17:49

## 2020-06-06 RX ADMIN — Medication 325 MILLIGRAM(S): at 05:26

## 2020-06-06 RX ADMIN — Medication 200 MILLIGRAM(S): at 17:50

## 2020-06-06 RX ADMIN — Medication 1 SPRAY(S): at 05:26

## 2020-06-06 NOTE — PROGRESS NOTE ADULT - PROBLEM SELECTOR PLAN 8
mild hyponatremia on 6/5, will monitor Na level if persists to be low will check urine lytes and osm, no labs done yet, discussed with acp to f/u labs

## 2020-06-06 NOTE — PROGRESS NOTE ADULT - SUBJECTIVE AND OBJECTIVE BOX
Patient is a 44y old  Female who presents with a chief complaint of Acute Hypoxemic Respiratory Failure and Pneumonia (05 Jun 2020 14:09)    SUBJECTIVE / OVERNIGHT EVENTS: Pt seen and examined earlier this am. No overnight events, still on 4L NC, sats 96% this am, still desats upon minimal movement however sitting up in bed today. Reports that she is using incentive spirometry encouraged to use more often. No other new complaints today. No other new issues reported by nsg.        MEDICATIONS  (STANDING):  amLODIPine   Tablet 5 milliGRAM(s) Oral daily  ascorbic acid 500 milliGRAM(s) Oral two times a day  cholecalciferol 3000 Unit(s) Oral daily  ergocalciferol 05542 Unit(s) Oral <User Schedule>  ferrous    sulfate 325 milliGRAM(s) Oral three times a day  fluticasone propionate 50 MICROgram(s)/spray Nasal Spray 1 Spray(s) Both Nostrils two times a day  folic acid 1 milliGRAM(s) Oral daily  guaiFENesin   Syrup  (Sugar-Free) 200 milliGRAM(s) Oral every 6 hours  influenza   Vaccine 0.5 milliLiter(s) IntraMuscular once  multivitamin 1 Tablet(s) Oral daily  ondansetron Injectable 4 milliGRAM(s) IV Push once  rivaroxaban 20 milliGRAM(s) Oral with dinner  simvastatin 5 milliGRAM(s) Oral at bedtime  zinc sulfate 220 milliGRAM(s) Oral daily    MEDICATIONS  (PRN):  acetaminophen   Tablet .. 650 milliGRAM(s) Oral every 6 hours PRN Temp greater or equal to 38C (100.4F), Mild Pain (1 - 3)  ALBUTerol    90 MICROgram(s) HFA Inhaler 2 Puff(s) Inhalation every 6 hours PRN Shortness of Breath and/or Wheezing  sodium chloride 0.65% Nasal 1 Spray(s) Both Nostrils daily PRN Nasal Congestion      Vital Signs Last 24 Hrs  T(C): 36.8 (06 Jun 2020 05:21), Max: 36.8 (06 Jun 2020 05:21)  T(F): 98.3 (06 Jun 2020 05:21), Max: 98.3 (06 Jun 2020 05:21)  HR: 90 (06 Jun 2020 05:21) (90 - 101)  BP: 107/67 (06 Jun 2020 05:21) (106/60 - 107/67)  BP(mean): --  RR: 18 (06 Jun 2020 05:21) (18 - 19)  SpO2: 96% (06 Jun 2020 05:21) (95% - 99%)  CAPILLARY BLOOD GLUCOSE        I&O's Summary      PHYSICAL EXAM:  GENERAL: NAD, well-developed  CHEST/LUNG: Bibasilar crackles at bases+  HEART: regular rate and rhythm  ABDOMEN: Soft, Nontender, Nondistended  EXTREMITIES: no LE edema  PSYCH: Calm  NEUROLOGY: AAOx3  SKIN: No rashes or lesions    LABS:                        8.3    13.54 )-----------( 438      ( 05 Jun 2020 06:35 )             27.9     06-05    129<L>  |  95<L>  |  7   ----------------------------<  106<H>  3.7   |  23  |  0.53    Ca    9.4      05 Jun 2020 06:35  Phos  5.0     06-05  Mg     1.7     06-05    TPro  7.7  /  Alb  3.4  /  TBili  < 0.2<L>  /  DBili  x   /  AST  14  /  ALT  12  /  AlkPhos  75  06-05              Procalcitonin, Serum: 0.08 ng/mL (06-05-20 @ 06:35)  Procalcitonin, Serum: 0.05 ng/mL (06-02-20 @ 08:45)    C-Reactive Protein, Serum: 39.6 mg/L (06-05-20 @ 06:35)  C-Reactive Protein, Serum: 32.6 mg/L (06-02-20 @ 08:45)            RADIOLOGY & ADDITIONAL TESTS:    Imaging Personally Reviewed:    Care Discussed with Consultants/Other Providers:

## 2020-06-06 NOTE — PROGRESS NOTE ADULT - PROBLEM SELECTOR PLAN 1
-on NC 4L, continue to downtitrate as tolerated. keep spo2>92%  -monitor inflammatory markers. Remains stable.  - Patient was encouraged to use incentive spirometry hourly.  - OOB to chair  - Incentive spirometry  - rpt cxr on 6/4 showed no significant change in bilateral patchy lung opacities.  - cont to monitor respiratory status  -Procal low, unlikely to have superimposed bacterial process

## 2020-06-07 LAB
ANION GAP SERPL CALC-SCNC: 13 MMO/L — SIGNIFICANT CHANGE UP (ref 7–14)
BUN SERPL-MCNC: 8 MG/DL — SIGNIFICANT CHANGE UP (ref 7–23)
CALCIUM SERPL-MCNC: 9.4 MG/DL — SIGNIFICANT CHANGE UP (ref 8.4–10.5)
CHLORIDE SERPL-SCNC: 98 MMOL/L — SIGNIFICANT CHANGE UP (ref 98–107)
CO2 SERPL-SCNC: 24 MMOL/L — SIGNIFICANT CHANGE UP (ref 22–31)
CREAT SERPL-MCNC: 0.55 MG/DL — SIGNIFICANT CHANGE UP (ref 0.5–1.3)
GLUCOSE SERPL-MCNC: 131 MG/DL — HIGH (ref 70–99)
HCT VFR BLD CALC: 29.6 % — LOW (ref 34.5–45)
HGB BLD-MCNC: 8.6 G/DL — LOW (ref 11.5–15.5)
MAGNESIUM SERPL-MCNC: 1.7 MG/DL — SIGNIFICANT CHANGE UP (ref 1.6–2.6)
MCHC RBC-ENTMCNC: 23.6 PG — LOW (ref 27–34)
MCHC RBC-ENTMCNC: 29.1 % — LOW (ref 32–36)
MCV RBC AUTO: 81.1 FL — SIGNIFICANT CHANGE UP (ref 80–100)
NRBC # FLD: 0 K/UL — SIGNIFICANT CHANGE UP (ref 0–0)
PHOSPHATE SERPL-MCNC: 4.4 MG/DL — SIGNIFICANT CHANGE UP (ref 2.5–4.5)
PLATELET # BLD AUTO: 477 K/UL — HIGH (ref 150–400)
PMV BLD: 10.3 FL — SIGNIFICANT CHANGE UP (ref 7–13)
POTASSIUM SERPL-MCNC: 4 MMOL/L — SIGNIFICANT CHANGE UP (ref 3.5–5.3)
POTASSIUM SERPL-SCNC: 4 MMOL/L — SIGNIFICANT CHANGE UP (ref 3.5–5.3)
RBC # BLD: 3.65 M/UL — LOW (ref 3.8–5.2)
RBC # FLD: 23 % — HIGH (ref 10.3–14.5)
SODIUM SERPL-SCNC: 135 MMOL/L — SIGNIFICANT CHANGE UP (ref 135–145)
WBC # BLD: 13.66 K/UL — HIGH (ref 3.8–10.5)
WBC # FLD AUTO: 13.66 K/UL — HIGH (ref 3.8–10.5)

## 2020-06-07 PROCEDURE — 99232 SBSQ HOSP IP/OBS MODERATE 35: CPT

## 2020-06-07 RX ADMIN — Medication 1 TABLET(S): at 12:37

## 2020-06-07 RX ADMIN — Medication 200 MILLIGRAM(S): at 12:37

## 2020-06-07 RX ADMIN — Medication 500 MILLIGRAM(S): at 17:36

## 2020-06-07 RX ADMIN — Medication 500 MILLIGRAM(S): at 05:45

## 2020-06-07 RX ADMIN — Medication 325 MILLIGRAM(S): at 12:37

## 2020-06-07 RX ADMIN — Medication 3000 UNIT(S): at 12:36

## 2020-06-07 RX ADMIN — Medication 325 MILLIGRAM(S): at 05:45

## 2020-06-07 RX ADMIN — AMLODIPINE BESYLATE 5 MILLIGRAM(S): 2.5 TABLET ORAL at 05:45

## 2020-06-07 RX ADMIN — Medication 325 MILLIGRAM(S): at 23:01

## 2020-06-07 RX ADMIN — Medication 200 MILLIGRAM(S): at 05:45

## 2020-06-07 RX ADMIN — Medication 200 MILLIGRAM(S): at 23:01

## 2020-06-07 RX ADMIN — Medication 1 SPRAY(S): at 17:36

## 2020-06-07 RX ADMIN — ZINC SULFATE TAB 220 MG (50 MG ZINC EQUIVALENT) 220 MILLIGRAM(S): 220 (50 ZN) TAB at 12:37

## 2020-06-07 RX ADMIN — Medication 200 MILLIGRAM(S): at 17:36

## 2020-06-07 RX ADMIN — Medication 1 SPRAY(S): at 05:45

## 2020-06-07 RX ADMIN — Medication 1 MILLIGRAM(S): at 12:37

## 2020-06-07 RX ADMIN — RIVAROXABAN 20 MILLIGRAM(S): KIT at 17:36

## 2020-06-07 RX ADMIN — SIMVASTATIN 5 MILLIGRAM(S): 20 TABLET, FILM COATED ORAL at 23:01

## 2020-06-07 NOTE — PROGRESS NOTE ADULT - PROBLEM SELECTOR PLAN 8
mild hyponatremia on 6/5, improved, cont to monitor Na level, no labs done yet, discussed with acp to f/u labs

## 2020-06-07 NOTE — PROGRESS NOTE ADULT - PROBLEM SELECTOR PLAN 1
-on NC 3L, today, continue to downtitrate as tolerated. keep spo2>92%  -monitor inflammatory markers. Remains stable.  - Patient was encouraged to use incentive spirometry hourly.  - OOB to chair  - Incentive spirometry  - rpt cxr on 6/4 showed no significant change in bilateral patchy lung opacities.  - cont to monitor respiratory status  -Procal low, unlikely to have superimposed bacterial process  Father Mr. Gomez updated on 6/7

## 2020-06-07 NOTE — PROGRESS NOTE ADULT - SUBJECTIVE AND OBJECTIVE BOX
Patient is a 44y old  Female who presents with a chief complaint of Acute Hypoxemic Respiratory Failure and Pneumonia (06 Jun 2020 12:50)    SUBJECTIVE / OVERNIGHT EVENTS: Pt seen and examined earlier this am. No overnight events, today on 3 lits NC oxygen, sats 98% this am but still desats upon minimal exertion. Reports that she is using incentive spirometry often. Has minimal cough. No other new complaints today. No other new issues reported by nsg.        MEDICATIONS  (STANDING):  amLODIPine   Tablet 5 milliGRAM(s) Oral daily  ascorbic acid 500 milliGRAM(s) Oral two times a day  cholecalciferol 3000 Unit(s) Oral daily  ergocalciferol 10535 Unit(s) Oral <User Schedule>  ferrous    sulfate 325 milliGRAM(s) Oral three times a day  fluticasone propionate 50 MICROgram(s)/spray Nasal Spray 1 Spray(s) Both Nostrils two times a day  folic acid 1 milliGRAM(s) Oral daily  guaiFENesin   Syrup  (Sugar-Free) 200 milliGRAM(s) Oral every 6 hours  influenza   Vaccine 0.5 milliLiter(s) IntraMuscular once  multivitamin 1 Tablet(s) Oral daily  ondansetron Injectable 4 milliGRAM(s) IV Push once  rivaroxaban 20 milliGRAM(s) Oral with dinner  simvastatin 5 milliGRAM(s) Oral at bedtime  zinc sulfate 220 milliGRAM(s) Oral daily    MEDICATIONS  (PRN):  acetaminophen   Tablet .. 650 milliGRAM(s) Oral every 6 hours PRN Temp greater or equal to 38C (100.4F), Mild Pain (1 - 3)  ALBUTerol    90 MICROgram(s) HFA Inhaler 2 Puff(s) Inhalation every 6 hours PRN Shortness of Breath and/or Wheezing  sodium chloride 0.65% Nasal 1 Spray(s) Both Nostrils daily PRN Nasal Congestion      Vital Signs Last 24 Hrs  T(C): 37.6 (07 Jun 2020 05:04), Max: 37.6 (07 Jun 2020 05:04)  T(F): 99.6 (07 Jun 2020 05:04), Max: 99.6 (07 Jun 2020 05:04)  HR: 99 (07 Jun 2020 05:04) (97 - 108)  BP: 114/61 (07 Jun 2020 05:04) (108/72 - 123/74)  BP(mean): --  RR: 18 (07 Jun 2020 05:04) (18 - 22)  SpO2: 98% (07 Jun 2020 05:04) (84% - 100%)  CAPILLARY BLOOD GLUCOSE        I&O's Summary      PHYSICAL EXAM:  GENERAL: NAD, well-developed  CHEST/LUNG: Bibasilar crackles at bases+  HEART: regular rate and rhythm  ABDOMEN: Soft, Nontender, Nondistended  EXTREMITIES: no LE edema  PSYCH: Calm  NEUROLOGY: AAOx3  SKIN: No rashes or lesions    LABS:                        8.8    14.38 )-----------( 455      ( 06 Jun 2020 21:05 )             29.2     06-06    135  |  96<L>  |  11  ----------------------------<  112<H>  4.1   |  26  |  0.68    Ca    9.4      06 Jun 2020 21:05  Mg     1.7     06-06                Procalcitonin, Serum: 0.08 ng/mL (06-05-20 @ 06:35)  Procalcitonin, Serum: 0.05 ng/mL (06-02-20 @ 08:45)    C-Reactive Protein, Serum: 39.6 mg/L (06-05-20 @ 06:35)  C-Reactive Protein, Serum: 32.6 mg/L (06-02-20 @ 08:45)            RADIOLOGY & ADDITIONAL TESTS:    Imaging Personally Reviewed:    Care Discussed with Consultants/Other Providers:

## 2020-06-08 ENCOUNTER — TRANSCRIPTION ENCOUNTER (OUTPATIENT)
Age: 45
End: 2020-06-08

## 2020-06-08 LAB
ANION GAP SERPL CALC-SCNC: 13 MMO/L — SIGNIFICANT CHANGE UP (ref 7–14)
BUN SERPL-MCNC: 6 MG/DL — LOW (ref 7–23)
CALCIUM SERPL-MCNC: 9.2 MG/DL — SIGNIFICANT CHANGE UP (ref 8.4–10.5)
CHLORIDE SERPL-SCNC: 99 MMOL/L — SIGNIFICANT CHANGE UP (ref 98–107)
CO2 SERPL-SCNC: 24 MMOL/L — SIGNIFICANT CHANGE UP (ref 22–31)
CREAT SERPL-MCNC: 0.55 MG/DL — SIGNIFICANT CHANGE UP (ref 0.5–1.3)
GLUCOSE SERPL-MCNC: 97 MG/DL — SIGNIFICANT CHANGE UP (ref 70–99)
HCT VFR BLD CALC: 27.4 % — LOW (ref 34.5–45)
HGB BLD-MCNC: 8.3 G/DL — LOW (ref 11.5–15.5)
MAGNESIUM SERPL-MCNC: 1.7 MG/DL — SIGNIFICANT CHANGE UP (ref 1.6–2.6)
MCHC RBC-ENTMCNC: 24.6 PG — LOW (ref 27–34)
MCHC RBC-ENTMCNC: 30.3 % — LOW (ref 32–36)
MCV RBC AUTO: 81.3 FL — SIGNIFICANT CHANGE UP (ref 80–100)
NRBC # FLD: 0 K/UL — SIGNIFICANT CHANGE UP (ref 0–0)
PHOSPHATE SERPL-MCNC: 5.1 MG/DL — HIGH (ref 2.5–4.5)
PLATELET # BLD AUTO: 382 K/UL — SIGNIFICANT CHANGE UP (ref 150–400)
PMV BLD: 11.5 FL — SIGNIFICANT CHANGE UP (ref 7–13)
POTASSIUM SERPL-MCNC: 3.7 MMOL/L — SIGNIFICANT CHANGE UP (ref 3.5–5.3)
POTASSIUM SERPL-SCNC: 3.7 MMOL/L — SIGNIFICANT CHANGE UP (ref 3.5–5.3)
RBC # BLD: 3.37 M/UL — LOW (ref 3.8–5.2)
RBC # FLD: 23.4 % — HIGH (ref 10.3–14.5)
SODIUM SERPL-SCNC: 136 MMOL/L — SIGNIFICANT CHANGE UP (ref 135–145)
WBC # BLD: 12.29 K/UL — HIGH (ref 3.8–10.5)
WBC # FLD AUTO: 12.29 K/UL — HIGH (ref 3.8–10.5)

## 2020-06-08 PROCEDURE — 99233 SBSQ HOSP IP/OBS HIGH 50: CPT

## 2020-06-08 RX ADMIN — Medication 200 MILLIGRAM(S): at 11:23

## 2020-06-08 RX ADMIN — Medication 3000 UNIT(S): at 11:23

## 2020-06-08 RX ADMIN — Medication 325 MILLIGRAM(S): at 05:20

## 2020-06-08 RX ADMIN — Medication 1 TABLET(S): at 11:24

## 2020-06-08 RX ADMIN — Medication 1 SPRAY(S): at 05:21

## 2020-06-08 RX ADMIN — Medication 325 MILLIGRAM(S): at 11:25

## 2020-06-08 RX ADMIN — Medication 500 MILLIGRAM(S): at 17:10

## 2020-06-08 RX ADMIN — Medication 200 MILLIGRAM(S): at 17:10

## 2020-06-08 RX ADMIN — Medication 1 SPRAY(S): at 17:10

## 2020-06-08 RX ADMIN — Medication 200 MILLIGRAM(S): at 23:53

## 2020-06-08 RX ADMIN — AMLODIPINE BESYLATE 5 MILLIGRAM(S): 2.5 TABLET ORAL at 05:20

## 2020-06-08 RX ADMIN — Medication 500 MILLIGRAM(S): at 05:20

## 2020-06-08 RX ADMIN — SIMVASTATIN 5 MILLIGRAM(S): 20 TABLET, FILM COATED ORAL at 21:00

## 2020-06-08 RX ADMIN — Medication 1 MILLIGRAM(S): at 11:24

## 2020-06-08 RX ADMIN — Medication 325 MILLIGRAM(S): at 21:00

## 2020-06-08 RX ADMIN — Medication 200 MILLIGRAM(S): at 05:20

## 2020-06-08 RX ADMIN — RIVAROXABAN 20 MILLIGRAM(S): KIT at 17:10

## 2020-06-08 RX ADMIN — ZINC SULFATE TAB 220 MG (50 MG ZINC EQUIVALENT) 220 MILLIGRAM(S): 220 (50 ZN) TAB at 11:24

## 2020-06-08 NOTE — PROGRESS NOTE ADULT - PROBLEM SELECTOR PLAN 1
-on NC, wean as tolerating, keep spo2>92%  - Patient was encouraged to use incentive spirometry hourly.  - OOB to chair  - Incentive spirometry  - rpt cxr on 6/4 showed no significant change in bilateral patchy lung opacities.  - cont to monitor respiratory status  -Procal low, unlikely to have superimposed bacterial process  - Father Mr. Gomez updated on 6/7

## 2020-06-08 NOTE — DISCHARGE NOTE NURSING/CASE MANAGEMENT/SOCIAL WORK - PATIENT PORTAL LINK FT
You can access the FollowMyHealth Patient Portal offered by [facility] by registering at the following website: [email or web address]. By joining [facility]’s FollowMyHealth portal, you will also be able to view your health information using other applications (apps) compatible with our system.

## 2020-06-08 NOTE — PROGRESS NOTE ADULT - SUBJECTIVE AND OBJECTIVE BOX
Patient is a 44y old  Female who presents with a chief complaint of Acute Hypoxemic Respiratory Failure and Pneumonia (07 Jun 2020 12:51)      SUBJECTIVE / OVERNIGHT EVENTS:    No events overnight. This AM, patient without n/v/d. SOB improving with supplemental oxygen. No cp. Does not feel SOB when walking unless she exerts herself too much, resolves with rest.    MEDICATIONS  (STANDING):  amLODIPine   Tablet 5 milliGRAM(s) Oral daily  ascorbic acid 500 milliGRAM(s) Oral two times a day  cholecalciferol 3000 Unit(s) Oral daily  ergocalciferol 85816 Unit(s) Oral <User Schedule>  ferrous    sulfate 325 milliGRAM(s) Oral three times a day  fluticasone propionate 50 MICROgram(s)/spray Nasal Spray 1 Spray(s) Both Nostrils two times a day  folic acid 1 milliGRAM(s) Oral daily  guaiFENesin   Syrup  (Sugar-Free) 200 milliGRAM(s) Oral every 6 hours  influenza   Vaccine 0.5 milliLiter(s) IntraMuscular once  multivitamin 1 Tablet(s) Oral daily  ondansetron Injectable 4 milliGRAM(s) IV Push once  rivaroxaban 20 milliGRAM(s) Oral with dinner  simvastatin 5 milliGRAM(s) Oral at bedtime  zinc sulfate 220 milliGRAM(s) Oral daily    MEDICATIONS  (PRN):  acetaminophen   Tablet .. 650 milliGRAM(s) Oral every 6 hours PRN Temp greater or equal to 38C (100.4F), Mild Pain (1 - 3)  ALBUTerol    90 MICROgram(s) HFA Inhaler 2 Puff(s) Inhalation every 6 hours PRN Shortness of Breath and/or Wheezing  sodium chloride 0.65% Nasal 1 Spray(s) Both Nostrils daily PRN Nasal Congestion      PHYSICAL EXAM:  T(C): 36.6 (06-08-20 @ 14:45), Max: 36.8 (06-08-20 @ 05:15)  HR: 96 (06-08-20 @ 14:58) (96 - 110)  BP: 121/83 (06-08-20 @ 14:45) (111/75 - 121/83)  RR: 19 (06-08-20 @ 14:58) (18 - 20)  SpO2: 97% (06-08-20 @ 14:58) (83% - 100%)  I&O's Summary    GENERAL: NAD, well-developed, seated in bed  HEAD:  Atraumatic, Normocephalic, MMM  CHEST/LUNG: No use of accessory muscles, speaking in complete sentences, CTAB  COR: RR, no mrcg  PSYCH: AAOx3  NEUROLOGY: CN II-XII grossly intact, moving all extremities  SKIN: No rashes or lesions    LABS:  CAPILLARY BLOOD GLUCOSE                              8.3    12.29 )-----------( 382      ( 08 Jun 2020 05:54 )             27.4     06-08    136  |  99  |  6<L>  ----------------------------<  97  3.7   |  24  |  0.55    Ca    9.2      08 Jun 2020 05:54  Phos  5.1     06-08  Mg     1.7     06-08                  RADIOLOGY & ADDITIONAL TESTS:    Telemetry Personally Reviewed -     Imaging Personally Reviewed -     Imaging Reviewed -     Consultant(s) Notes Reviewed -       Care Discussed with Consultants/Other Providers -

## 2020-06-08 NOTE — DISCHARGE NOTE NURSING/CASE MANAGEMENT/SOCIAL WORK - NSSCNAMETXT_GEN_ALL_CORE
[facility] [Chest Pain] : chest pain [Negative] : Heme/Lymph [Shortness Of Breath] : no shortness of breath [Dyspnea on exertion] : not dyspnea during exertion [Chest  Pressure] : no chest pressure [Lower Ext Edema] : no extremity edema [Leg Claudication] : no intermittent leg claudication [Palpitations] : no palpitations

## 2020-06-08 NOTE — DISCHARGE NOTE NURSING/CASE MANAGEMENT/SOCIAL WORK - NSDCDMETYPESERV_GEN_ALL_CORE_FT
rolling walker & portable o2 tank to 9 tower ;concentrator pts home 6/9 rolling walker from [facility] ;[facility]  portable o2 tank &concentrator pts home 6/9

## 2020-06-08 NOTE — PROGRESS NOTE ADULT - PROBLEM SELECTOR PLAN 7
- OOB to chair with assistance  - PT evaluation note appreciated  - DC planning to home with home PT and home oxygen

## 2020-06-08 NOTE — PROGRESS NOTE ADULT - ASSESSMENT
44 y.o. woman with morbid obesity, PE, and ongoing acute respiratory failure with hypoxia secondary to COVID-19 infection.

## 2020-06-09 VITALS
SYSTOLIC BLOOD PRESSURE: 129 MMHG | HEART RATE: 94 BPM | RESPIRATION RATE: 20 BRPM | DIASTOLIC BLOOD PRESSURE: 89 MMHG | TEMPERATURE: 98 F | OXYGEN SATURATION: 94 %

## 2020-06-09 LAB
HCT VFR BLD CALC: 28.3 % — LOW (ref 34.5–45)
HGB BLD-MCNC: 8.4 G/DL — LOW (ref 11.5–15.5)
MCHC RBC-ENTMCNC: 23.7 PG — LOW (ref 27–34)
MCHC RBC-ENTMCNC: 29.7 % — LOW (ref 32–36)
MCV RBC AUTO: 79.9 FL — LOW (ref 80–100)
NRBC # FLD: 0 K/UL — SIGNIFICANT CHANGE UP (ref 0–0)
PLATELET # BLD AUTO: 423 K/UL — HIGH (ref 150–400)
PMV BLD: 9.8 FL — SIGNIFICANT CHANGE UP (ref 7–13)
RBC # BLD: 3.54 M/UL — LOW (ref 3.8–5.2)
RBC # FLD: 22.9 % — HIGH (ref 10.3–14.5)
SARS-COV-2 RNA SPEC QL NAA+PROBE: SIGNIFICANT CHANGE UP
WBC # BLD: 12.6 K/UL — HIGH (ref 3.8–10.5)
WBC # FLD AUTO: 12.6 K/UL — HIGH (ref 3.8–10.5)

## 2020-06-09 PROCEDURE — 99239 HOSP IP/OBS DSCHRG MGMT >30: CPT

## 2020-06-09 RX ORDER — FLUTICASONE PROPIONATE 50 MCG
1 SPRAY, SUSPENSION NASAL
Qty: 1 | Refills: 0
Start: 2020-06-09 | End: 2020-07-08

## 2020-06-09 RX ORDER — LOSARTAN POTASSIUM 100 MG/1
1 TABLET, FILM COATED ORAL
Qty: 0 | Refills: 0 | DISCHARGE

## 2020-06-09 RX ORDER — ZINC SULFATE TAB 220 MG (50 MG ZINC EQUIVALENT) 220 (50 ZN) MG
1 TAB ORAL
Qty: 30 | Refills: 0
Start: 2020-06-09 | End: 2020-07-08

## 2020-06-09 RX ORDER — SIMVASTATIN 20 MG/1
1 TABLET, FILM COATED ORAL
Qty: 30 | Refills: 0
Start: 2020-06-09 | End: 2020-07-08

## 2020-06-09 RX ORDER — AMLODIPINE BESYLATE 2.5 MG/1
1 TABLET ORAL
Qty: 30 | Refills: 0
Start: 2020-06-09 | End: 2020-07-08

## 2020-06-09 RX ORDER — RIVAROXABAN 15 MG-20MG
1 KIT ORAL
Qty: 30 | Refills: 0
Start: 2020-06-09 | End: 2020-07-08

## 2020-06-09 RX ORDER — SIMVASTATIN 20 MG/1
0 TABLET, FILM COATED ORAL
Qty: 0 | Refills: 0 | DISCHARGE

## 2020-06-09 RX ORDER — FOLIC ACID 0.8 MG
1 TABLET ORAL
Qty: 30 | Refills: 0
Start: 2020-06-09 | End: 2020-07-08

## 2020-06-09 RX ORDER — FERROUS SULFATE 325(65) MG
1 TABLET ORAL
Qty: 90 | Refills: 0
Start: 2020-06-09 | End: 2020-07-08

## 2020-06-09 RX ORDER — ERGOCALCIFEROL 1.25 MG/1
1 CAPSULE ORAL
Qty: 1 | Refills: 0
Start: 2020-06-09 | End: 2020-06-09

## 2020-06-09 RX ORDER — ASCORBIC ACID 60 MG
1 TABLET,CHEWABLE ORAL
Qty: 60 | Refills: 0
Start: 2020-06-09 | End: 2020-07-08

## 2020-06-09 RX ORDER — CHOLECALCIFEROL (VITAMIN D3) 125 MCG
3000 CAPSULE ORAL
Qty: 11 | Refills: 0
Start: 2020-06-09 | End: 2020-06-19

## 2020-06-09 RX ADMIN — Medication 1 MILLIGRAM(S): at 11:54

## 2020-06-09 RX ADMIN — Medication 500 MILLIGRAM(S): at 05:37

## 2020-06-09 RX ADMIN — Medication 1 SPRAY(S): at 05:37

## 2020-06-09 RX ADMIN — Medication 325 MILLIGRAM(S): at 05:37

## 2020-06-09 RX ADMIN — Medication 3000 UNIT(S): at 11:53

## 2020-06-09 RX ADMIN — Medication 200 MILLIGRAM(S): at 11:53

## 2020-06-09 RX ADMIN — Medication 200 MILLIGRAM(S): at 05:37

## 2020-06-09 RX ADMIN — Medication 325 MILLIGRAM(S): at 11:54

## 2020-06-09 RX ADMIN — Medication 1 TABLET(S): at 11:53

## 2020-06-09 RX ADMIN — ZINC SULFATE TAB 220 MG (50 MG ZINC EQUIVALENT) 220 MILLIGRAM(S): 220 (50 ZN) TAB at 11:53

## 2020-06-09 RX ADMIN — AMLODIPINE BESYLATE 5 MILLIGRAM(S): 2.5 TABLET ORAL at 05:37

## 2020-06-09 NOTE — PROGRESS NOTE ADULT - REASON FOR ADMISSION
Acute Hypoxemic Respiratory Failure and Pneumonia

## 2020-06-09 NOTE — PROGRESS NOTE ADULT - PROVIDER SPECIALTY LIST ADULT
Hospitalist
Infectious Disease
Internal Medicine
Pulmonology
Pulmonology
Hospitalist

## 2020-06-09 NOTE — PROGRESS NOTE ADULT - PROBLEM SELECTOR PROBLEM 8
Preventive measure
Preventive measure
Hyponatremia
Preventive measure
Preventive measure
Discharge planning issues

## 2020-06-09 NOTE — PROGRESS NOTE ADULT - PROBLEM SELECTOR PLAN 1
-on NC, wean as tolerating, keep spo2>92%  - Patient was encouraged to use incentive spirometry hourly.  - OOB to chair  - Incentive spirometry  - rpt cxr on 6/4 showed no significant change in bilateral patchy lung opacities.  - cont to monitor respiratory status  -Procal low, unlikely to have superimposed bacterial process  - Father Mr. Gomez updated on 6/7  - Dyspnea manageable with rest and supplemental oxygen, patient is stable for dc with home O2

## 2020-06-09 NOTE — PROGRESS NOTE ADULT - PROBLEM SELECTOR PROBLEM 3
Anemia, unspecified type
Acute hypoxemic respiratory failure
Acute pulmonary embolism without acute cor pulmonale, unspecified pulmonary embolism type
Acute respiratory failure with hypoxia
Acute respiratory failure with hypoxia
Anemia
Anemia, unspecified type
Hypokalemia
Hypokalemia
Leukocytosis, unspecified type
Multifocal pneumonia
Transaminitis
Hypokalemia
Transaminitis
Hypokalemia
Multifocal pneumonia

## 2020-06-09 NOTE — PROGRESS NOTE ADULT - PROBLEM SELECTOR PROBLEM 1
Acute hypoxemic respiratory failure
Acute respiratory failure with hypoxia
Leukocytosis, unspecified type
Leukocytosis, unspecified type
Pneumonia due to 2019 novel coronavirus
SARS-associated coronavirus infection
Pneumonia due to 2019 novel coronavirus
Acute respiratory failure with hypoxia
Pneumonia due to 2019 novel coronavirus
Acute hypoxemic respiratory failure

## 2020-06-09 NOTE — PROGRESS NOTE ADULT - PROBLEM SELECTOR PROBLEM 2
SARS-associated coronavirus infection
Acute hypoxemic respiratory failure
Acute hypoxemic respiratory failure
Acute pulmonary embolism without acute cor pulmonale, unspecified pulmonary embolism type
Acute pulmonary embolism without acute cor pulmonale, unspecified pulmonary embolism type
Hyponatremia
Hyponatremia
Leukocytosis, unspecified type
Pneumonia due to 2019 novel coronavirus
SARS-associated coronavirus infection
Transaminitis
Hyponatremia
SARS-associated coronavirus infection
Hyponatremia
Pneumonia due to 2019 novel coronavirus

## 2020-06-09 NOTE — PROGRESS NOTE ADULT - SUBJECTIVE AND OBJECTIVE BOX
Patient is a 44y old  Female who presents with a chief complaint of Acute Hypoxemic Respiratory Failure and Pneumonia (08 Jun 2020 17:37)      SUBJECTIVE / OVERNIGHT EVENTS:    No events overnight. This AM, patient without n/v/d. SOB manageable with supplemental oxygen and only triggered by exertion; improves with rest. No cp.    MEDICATIONS  (STANDING):  amLODIPine   Tablet 5 milliGRAM(s) Oral daily  ascorbic acid 500 milliGRAM(s) Oral two times a day  cholecalciferol 3000 Unit(s) Oral daily  ergocalciferol 43811 Unit(s) Oral <User Schedule>  ferrous    sulfate 325 milliGRAM(s) Oral three times a day  fluticasone propionate 50 MICROgram(s)/spray Nasal Spray 1 Spray(s) Both Nostrils two times a day  folic acid 1 milliGRAM(s) Oral daily  guaiFENesin   Syrup  (Sugar-Free) 200 milliGRAM(s) Oral every 6 hours  influenza   Vaccine 0.5 milliLiter(s) IntraMuscular once  multivitamin 1 Tablet(s) Oral daily  ondansetron Injectable 4 milliGRAM(s) IV Push once  rivaroxaban 20 milliGRAM(s) Oral with dinner  simvastatin 5 milliGRAM(s) Oral at bedtime  zinc sulfate 220 milliGRAM(s) Oral daily    MEDICATIONS  (PRN):  acetaminophen   Tablet .. 650 milliGRAM(s) Oral every 6 hours PRN Temp greater or equal to 38C (100.4F), Mild Pain (1 - 3)  ALBUTerol    90 MICROgram(s) HFA Inhaler 2 Puff(s) Inhalation every 6 hours PRN Shortness of Breath and/or Wheezing  sodium chloride 0.65% Nasal 1 Spray(s) Both Nostrils daily PRN Nasal Congestion      PHYSICAL EXAM:  T(C): 36.6 (06-09-20 @ 12:42), Max: 37.1 (06-08-20 @ 20:54)  HR: 94 (06-09-20 @ 12:42) (94 - 100)  BP: 129/89 (06-09-20 @ 12:42) (104/67 - 136/90)  RR: 20 (06-09-20 @ 12:42) (19 - 20)  SpO2: 94% (06-09-20 @ 12:42) (94% - 100%)  I&O's Summary    GENERAL: NAD, well-developed, seated in bed  HEAD:  Atraumatic, Normocephalic, MMM  CHEST/LUNG: No use of accessory muscles, speaking in complete sentences, CTAB  COR: RR, no mrcg  PSYCH: AAOx3  NEUROLOGY: CN II-XII grossly intact, moving all extremities  SKIN: No rashes or lesions    LABS:  CAPILLARY BLOOD GLUCOSE                              8.4    12.60 )-----------( 423      ( 09 Jun 2020 06:30 )             28.3     06-08    136  |  99  |  6<L>  ----------------------------<  97  3.7   |  24  |  0.55    Ca    9.2      08 Jun 2020 05:54  Phos  5.1     06-08  Mg     1.7     06-08                  RADIOLOGY & ADDITIONAL TESTS:    Telemetry Personally Reviewed -     Imaging Personally Reviewed -     Imaging Reviewed -     Consultant(s) Notes Reviewed -       Care Discussed with Consultants/Other Providers -

## 2021-03-23 NOTE — PROGRESS NOTE ADULT - ATTENDING COMMENTS
Chief complaint:   Chief Complaint   Patient presents with   • fatigue       Vitals:  Visit Vitals  BP (!) 134/92   Pulse 76   Temp 98.3 °F (36.8 °C) (Oral)   Resp 18   SpO2 96%       HISTORY OF PRESENT ILLNESS     The patient is a 29-year-old male who presents to the urgent care with complaint of a 1 month history of increased fatigue, lack of motivation, blurred vision, and having what he describes as panic attacks intermittently.  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  He admits that he has been feeling more isolated during the COVID-19 pandemic.  He denies having any suicidal or homicidal ideations.  He denies having any visual or auditory hallucinations.  He reports having friends as well as a girlfriend who are very supportive.  He reports having several episodes where he feels short of breath and that is heart is racing.  He states these episodes will last 10-15 minutes.  He reports that when they occurred he tries to take slow deep breaths and drink a lot of water.  He states he has been more anxious wearing about having a panic attack.  He denies a history of anxiety or depression.  He reports occasional alcohol use, socially only.  He denies having any street drug use.  He denies any past medical history and does not currently have a primary care provider.  He denies having any nausea, vomiting, diarrhea, or abdominal pain.  He denies any recent fevers or upper respiratory symptoms.  He denies a history of COVID-19.      Other significant problems:  There are no active problems to display for this patient.      PAST MEDICAL, FAMILY AND SOCIAL HISTORY     Medications:  Current Outpatient Medications   Medication   • hydrOXYzine (ATARAX) 25 MG tablet     No current facility-administered medications for this visit.        Allergies:  ALLERGIES:   Allergen Reactions   • Amoxicillin RASH       Past Medical  History/Surgeries:  History reviewed. No  pertinent past medical history.    History reviewed. No pertinent surgical history.    Family History:  History reviewed. No pertinent family history.    Social History:  Social History     Tobacco Use   • Smoking status: Never Smoker   • Smokeless tobacco: Never Used   Substance Use Topics   • Alcohol use: Not on file       REVIEW OF SYSTEMS     Review of Systems   Constitutional: Positive for fatigue. Negative for appetite change, chills and fever.   HENT: Negative for congestion, rhinorrhea and sore throat.    Eyes: Positive for visual disturbance. Negative for photophobia, pain, redness and itching.   Respiratory: Positive for shortness of breath. Negative for cough and wheezing.    Cardiovascular: Positive for chest pain and palpitations.   Gastrointestinal: Negative for abdominal pain, diarrhea, nausea and vomiting.   Skin: Negative for rash.   Neurological: Negative for dizziness, tremors, seizures, syncope, speech difficulty, light-headedness and headaches.       PHYSICAL EXAM     Physical Exam  Vitals signs reviewed.   Constitutional:       General: He is not in acute distress.     Appearance: He is well-developed. He is not toxic-appearing or diaphoretic.   HENT:      Head: Normocephalic and atraumatic.      Right Ear: Tympanic membrane, ear canal and external ear normal.      Left Ear: Tympanic membrane, ear canal and external ear normal.      Nose: Nose normal.      Mouth/Throat:      Lips: Pink.      Mouth: Mucous membranes are moist.      Pharynx: Oropharynx is clear. Uvula midline. No pharyngeal swelling, oropharyngeal exudate, posterior oropharyngeal erythema or uvula swelling.      Tonsils: No tonsillar exudate or tonsillar abscesses.   Eyes:      Pupils: Pupils are equal, round, and reactive to light.   Neck:      Musculoskeletal: Normal range of motion and neck supple.   Cardiovascular:      Rate and Rhythm: Normal rate and regular rhythm.      Pulses: Normal pulses.      Heart sounds: Normal  heart sounds, S1 normal and S2 normal. No murmur. No friction rub. No gallop.    Pulmonary:      Effort: Pulmonary effort is normal. No respiratory distress.      Breath sounds: Normal breath sounds. No stridor. No decreased breath sounds, wheezing, rhonchi or rales.   Lymphadenopathy:      Cervical: No cervical adenopathy.   Skin:     General: Skin is warm and dry.      Findings: No rash.   Neurological:      Mental Status: He is alert and oriented to person, place, and time.      GCS: GCS eye subscore is 4. GCS verbal subscore is 5. GCS motor subscore is 6.   Psychiatric:         Mood and Affect: Mood normal.         Speech: Speech normal.         Behavior: Behavior normal.         Thought Content: Thought content normal.         ASSESSMENT/PLAN     The patient is afebrile and nontoxic appearing.  His vital signs are stable.  He is not in distress and conversant in full sentences.  His lungs are clear to auscultation.  He currently denies having any chest pain, palpitations, or shortness of breath.  He does admit to recent blurred vision over the past 1-2 months and states he has not seen an eye doctor.  He denies wearing any corrective lenses.  His visual acuity was obtained and is intact.  He denies any recent head trauma.  His EKG shows a normal sinus rhythm.  The EKG was reviewed by myself and my attending, Dr. Melendez.  I will obtain labs including a CBC, CMP, and TSH to assess for an acute abnormality.  I will place a stat referral to [facility] and discussed that the patient would need to follow-up with a primary care provider to further manage his care.  His symptoms are consistent with likely anxiety and depression.  The patient is in agreement with the treatment plan.    The CBC and CMP are within normal limits.  I updated the patient on his results.  I will contact him later today with the TSH results.  A stat referral was placed to [facility] and I advised the patient to stop at the front  desk to schedule an urgent care follow-up appointment.  I will prescribe a short course of hydroxyzine to use as needed for anxiety like symptoms.  I discussed if he developed any suicidal or homicidal ideations to go to the emergency department or call 911 immediately.  I discussed relaxation techniques as well as return precautions.  The patient is in agreement with the treatment plan.    The patient's TSH result is within normal limits.    Robinson was seen today for fatigue.    Diagnoses and all orders for this visit:    Chest pain, unspecified type  -     ELECTROCARDIOGRAM 12-LEAD  -     CBC WITH DIFFERENTIAL  -     COMPREHENSIVE METABOLIC PANEL  -     THYROID STIMULATING HORMONE  -     CBC WITH AUTOMATED DIFFERENTIAL (PERFORMABLE ONLY)    Anxiety and depression  -     SERVICE TO FAMILY PRACTICE    Vision changes  -     SNELLEN EYE EXAM    Other orders  -     hydrOXYzine (ATARAX) 25 MG tablet; Take 1 tablet by mouth every 6 hours as needed for Anxiety.      The patient was wearing a mask and the writer wore personal protective equipment in accordance with CDC guidelines during the encounter.    Supervising physician: Dr. Melendez   Plan of care discussed with ACP.

## 2021-09-21 NOTE — ED PROVIDER NOTE - CONSTITUTIONAL [+], MLM
"Subjective   Rachelle Hummel is a 44 y.o. female who presents today for follow up     Chief Complaint:   Depression anxiety     History of Present Illness: the pt suffered from depression for many years,   Was on different meds, was stable recently on latuda, cymbalta, wellbutrin, viibryd.  Meds were prescribed by PCP.    Today the pt reported no major changes, mod is leveled but emotions are slightly  Flat. Overall, the pt feels better, stable   Anxiety - mild,  Persistent,  Few  panic attacks , a lot of negative thoughts and cognitive errors     She worries about different things, unable to relax, denied AVH/SI/HI     Depression 5/10, denied feeling hopeless/helpless,     Triggers - negative news, situation at work   Alleviatign factors - to read , to spend time outdoor   Last manic episode - 3-4 years ago   Denied alc use  THC -- for pain management, recommended by PCP     The following portions of the patient's history were reviewed and updated as appropriate: allergies, current medications, past family history, past medical history, past social history, past surgical history and problem list.    PAST PSYCHIATRIC HISTORY  Axis I  Affective/Bipolar Disorder, Anxiety/Panic Disorder, Posttraumatic Stress  inpt in [place] 2 times,   Axis II  Defer     PAST OUTPATIENT TREATMENT  Diagnosis treated:  Affective Disorder, Anxiety/Panic Disorder, Post-Traumatic Stress  [facility] in the past,  Treatment Type:  Psychotherapy (EMDR) , Medication Management  Prior Psychiatric Medications:  zoloft -  \"better than nothing but did not address all problems\"   Elavil - not effective   Valium, Xanax  Effective   Buspar  - not effective   hydroxyzine  Support Groups:  Not now   Sequelae Of Mental Disorder:  social isolation, emotional distress          Interval History  No changes     Side Effects  Denied       Past Medical History:  Past Medical History:   Diagnosis Date   • Ankylosing spondylitis of site in spine (CMS/[facility])    • Anorexia " nervosa    • Arthritis of back    • Autism spectrum disorder    • Bursitis of hip    • Crohn disease (CMS/HCC)    • Depression    • Hip arthrosis    • HTN (hypertension)    • MS (multiple sclerosis) (CMS/HCC)    • Obesity    • PTSD (post-traumatic stress disorder)    • Rheumatoid arthritis (CMS/HCC)    • Vitamin D deficiency        Social History:  Social History     Socioeconomic History   • Marital status:      Spouse name: Juan Cline   • Number of children: Not on file   • Years of education: Not on file   • Highest education level: Not on file   Tobacco Use   • Smoking status: Current Every Day Smoker     Packs/day: 0.25   • Smokeless tobacco: Never Used   • Tobacco comment: cape   Substance and Sexual Activity   • Alcohol use: Yes     Comment: occasionally   • Drug use: Yes     Types: Marijuana     Comment: for pain management    • Sexual activity: Not Currently       Family History:  Family History   Problem Relation Age of Onset   • Diabetes Mother    • Hypertension Mother    • Atrial fibrillation Mother    • Crohn's disease Father    • Heart disease Father        Past Surgical History:  Past Surgical History:   Procedure Laterality Date   • CHOLECYSTECTOMY     • COLON RESECTION         Problem List:  Patient Active Problem List   Diagnosis   • Allergy-induced asthma, mild intermittent, uncomplicated   • Ankylosing spondylitis (CMS/HCC)   • Back pain   • Crohn's disease (CMS/HCC)   • Current every day smoker   • Fatigue   • Hypertension   • Inflammatory arthritis   • Rheumatoid arthritis (CMS/HCC)   • Long term current use of systemic steroids   • Multiple sclerosis (CMS/HCC)   • Immunosuppressive disease (CMS/HCC)   • Obesity   • Other long term (current) drug therapy   • Pain of right hip joint   • Radiculopathy   • Seasonal allergies   • Trochanteric bursitis of right hip   • Urinary tract infection   • Vitamin D deficiency   • Bipolar 1 disorder, depressed, severe (CMS/HCC)   • PTSD  (post-traumatic stress disorder)       Allergy:   Allergies   Allergen Reactions   • Sulfa Antibiotics Hives        Discontinued Medications:  Medications Discontinued During This Encounter   Medication Reason   • buPROPion SR (WELLBUTRIN SR) 150 MG 12 hr tablet Reorder   • DULoxetine (CYMBALTA) 30 MG capsule Reorder   • vilazodone (Viibryd) 40 MG tablet tablet Reorder   • diazePAM (Valium) 5 MG tablet Reorder   • Latuda 60 MG tablet tablet Reorder       Current Medications:   Current Outpatient Medications   Medication Sig Dispense Refill   • acetaminophen (TYLENOL 8 HOUR ARTHRITIS PAIN) 650 MG 8 hr tablet Every 6 (Six) Hours.     • albuterol sulfate HFA (VENTOLIN HFA) 108 (90 Base) MCG/ACT inhaler VENTOLIN  (90 Base) MCG/ACT AERS     • buPROPion SR (WELLBUTRIN SR) 150 MG 12 hr tablet Take 1 tablet by mouth Every Morning. 90 tablet 0   • cephalexin (KEFLEX) 500 MG capsule      • chlorpheniramine (CHLOR-TRIMETON) 4 MG tablet Take 4 mg by mouth.     • colestipol (COLESTID) 1 g tablet Take 1 g by mouth 2 (Two) Times a Day.     • cyclobenzaprine (FLEXERIL) 10 MG tablet Every 12 (Twelve) Hours.     • diazePAM (Valium) 5 MG tablet Take 1 tablet by mouth Daily As Needed for Anxiety. 30 tablet 3   • diphenhydrAMINE (BENADRYL ALLERGY) 25 MG tablet Every 12 (Twelve) Hours.     • DULoxetine (CYMBALTA) 30 MG capsule Take 1 capsule by mouth Daily. 90 capsule 1   • fluconazole (DIFLUCAN) 150 MG tablet      • gabapentin (NEURONTIN) 600 MG tablet Take 600 mg by mouth 3 (Three) Times a Day.     • HUMIRA 40 MG/0.4ML Prefilled Syringe Kit injection      • hydrOXYzine (ATARAX) 25 MG tablet Take 1 tablet by mouth 2 (Two) Times a Day As Needed for Anxiety. 180 tablet 1   • leflunomide (ARAVA) 20 MG tablet TAKE ONE TABLET BY MOUTH DAILY 90 tablet 0   • lurasidone HCl (Latuda) 60 MG tablet tablet Take 1 tablet by mouth Daily. 30 tablet 3   • mesalamine (LIALDA) 1.2 g EC tablet Every 12 (Twelve) Hours.     • mupirocin (BACTROBAN) 2  % ointment      • ondansetron ODT (ZOFRAN-ODT) 4 MG disintegrating tablet      • pantoprazole (PROTONIX) 40 MG EC tablet      • promethazine (PHENERGAN) 25 MG tablet Every 6 (Six) Hours.     • vilazodone (Viibryd) 40 MG tablet tablet Take 1 tablet by mouth Daily. 90 tablet 1   • vitamin D (ERGOCALCIFEROL) 1.25 MG (63973 UT) capsule capsule Take 1 capsule by mouth 1 (One) Time Per Week. 12 capsule 0     No current facility-administered medications for this visit.         Review of Symptoms:    Psychiatric/Behavioral: Negative for agitation, behavioral problems, confusion, decreased concentration, dysphoric mood, hallucinations, self-injury, sleep disturbance and suicidal ideas. The patient is  Less depressed, less  nervous/anxious and is not hyperactive.        Physical Exam:   There were no vitals taken for this visit.    Mental Status Exam:   Hygiene:   good  Cooperation:  Cooperative  Eye Contact:  Good  Psychomotor Behavior:  Appropriate  Affect:  Blunted  Mood: fluctates  Hopelessness: Denies  Speech:  Normal  Thought Process:  Goal directed and Linear  Thought Content:  Normal  Suicidal:  None  Homicidal:  None  Hallucinations:  None  Delusion:  None  Memory:  fair   Orientation:  Person, Place, Time and Situation  Reliability:  good  Insight:  Good  Judgement:  Good  Impulse Control:  Good  Physical/Medical Issues:  Yes       MSE from 5/21/2021 reviewed and accepted with changes     PHQ-9 Depression Screening  Little interest or pleasure in doing things? 2   Feeling down, depressed, or hopeless? 2   Trouble falling or staying asleep, or sleeping too much? 1   Feeling tired or having little energy? 1   Poor appetite or overeating? 1   Feeling bad about yourself - or that you are a failure or have let yourself or your family down? 2   Trouble concentrating on things, such as reading the newspaper or watching television? 1   Moving or speaking so slowly that other people could have noticed? Or the opposite -  being so fidgety or restless that you have been moving around a lot more than usual? 0   Thoughts that you would be better off dead, or of hurting yourself in some way? 0   PHQ-9 Total Score 10   If you checked off any problems, how difficult have these problems made it for you to do your work, take care of things at home, or get along with other people? Very difficult           Current every day smoker 3-10 mintues spent counseling Has reduced [place] use    I advised Rachelle of the risks of tobacco use.     Lab Results:   No visits with results within 3 Month(s) from this visit.   Latest known visit with results is:   Lab on 01/21/2020   Component Date Value Ref Range Status   • Glucose 01/21/2020 99  65 - 99 mg/dL Final   • BUN 01/21/2020 7  6 - 20 mg/dL Final   • Creatinine 01/21/2020 0.83  0.57 - 1.00 mg/dL Final   • Sodium 01/21/2020 136  136 - 145 mmol/L Final   • Potassium 01/21/2020 4.1  3.5 - 5.2 mmol/L Final   • Chloride 01/21/2020 99  98 - 107 mmol/L Final   • CO2 01/21/2020 23.1  22.0 - 29.0 mmol/L Final   • Calcium 01/21/2020 9.8  8.6 - 10.5 mg/dL Final   • Total Protein 01/21/2020 8.3  6.0 - 8.5 g/dL Final   • Albumin 01/21/2020 4.60  3.50 - 5.20 g/dL Final   • ALT (SGPT) 01/21/2020 15  1 - 33 U/L Final   • AST (SGOT) 01/21/2020 15  1 - 32 U/L Final   • Alkaline Phosphatase 01/21/2020 66  39 - 117 U/L Final   • Total Bilirubin 01/21/2020 0.3  0.2 - 1.2 mg/dL Final   • eGFR Non African Amer 01/21/2020 75  >60 mL/min/1.73 Final   • Globulin 01/21/2020 3.7  gm/dL Final   • A/G Ratio 01/21/2020 1.2  g/dL Final   • BUN/Creatinine Ratio 01/21/2020 8.4  7.0 - 25.0 Final   • Anion Gap 01/21/2020 13.9  5.0 - 15.0 mmol/L Final   • C-Reactive Protein 01/21/2020 1.02* 0.00 - 0.50 mg/dL Final   • CCP Antibodies IgG/IgA 01/21/2020 14  0 - 19 units Final                              Negative               <20                            Weak positive      20 - 39                            Moderate positive  40 -  59                            Strong positive        >59   • Rheumatoid Factor Quantitative 01/21/2020 <10.0  0.0 - 14.0 IU/mL Final   • Sed Rate 01/21/2020 32* 0 - 20 mm/hr Final   • 25 Hydroxy, Vitamin D 01/21/2020 15.7* 30.0 - 100.0 ng/ml Final   • WBC 01/21/2020 7.65  3.40 - 10.80 10*3/mm3 Final   • RBC 01/21/2020 4.33  3.77 - 5.28 10*6/mm3 Final   • Hemoglobin 01/21/2020 13.4  12.0 - 15.9 g/dL Final   • Hematocrit 01/21/2020 38.9  34.0 - 46.6 % Final   • MCV 01/21/2020 89.8  79.0 - 97.0 fL Final   • MCH 01/21/2020 30.9  26.6 - 33.0 pg Final   • MCHC 01/21/2020 34.4  31.5 - 35.7 g/dL Final   • RDW 01/21/2020 14.3  12.3 - 15.4 % Final   • RDW-SD 01/21/2020 47.2  37.0 - 54.0 fl Final   • MPV 01/21/2020 9.4  6.0 - 12.0 fL Final   • Platelets 01/21/2020 347  140 - 450 10*3/mm3 Final   • Neutrophil % 01/21/2020 64.1  42.7 - 76.0 % Final   • Lymphocyte % 01/21/2020 23.4  19.6 - 45.3 % Final   • Monocyte % 01/21/2020 7.1  5.0 - 12.0 % Final   • Eosinophil % 01/21/2020 3.7  0.3 - 6.2 % Final   • Basophil % 01/21/2020 1.4  0.0 - 1.5 % Final   • Immature Grans % 01/21/2020 0.3  0.0 - 0.5 % Final   • Neutrophils, Absolute 01/21/2020 4.91  1.70 - 7.00 10*3/mm3 Final   • Lymphocytes, Absolute 01/21/2020 1.79  0.70 - 3.10 10*3/mm3 Final   • Monocytes, Absolute 01/21/2020 0.54  0.10 - 0.90 10*3/mm3 Final   • Eosinophils, Absolute 01/21/2020 0.28  0.00 - 0.40 10*3/mm3 Final   • Basophils, Absolute 01/21/2020 0.11  0.00 - 0.20 10*3/mm3 Final   • Immature Grans, Absolute 01/21/2020 0.02  0.00 - 0.05 10*3/mm3 Final   • nRBC 01/21/2020 0.0  0.0 - 0.2 /100 WBC Final       Assessment/Plan   Problems Addressed this Visit        Advance Directives and General Issues    Other long term (current) drug therapy       Mental Health    Bipolar 1 disorder, depressed, severe (CMS/HCC) - Primary (Chronic)    Relevant Medications    lurasidone HCl (Latuda) 60 MG tablet tablet    vilazodone (Viibryd) 40 MG tablet tablet    buPROPion SR  (WELLBUTRIN SR) 150 MG 12 hr tablet    diazePAM (Valium) 5 MG tablet    DULoxetine (CYMBALTA) 30 MG capsule    PTSD (post-traumatic stress disorder) (Chronic)    Relevant Medications    lurasidone HCl (Latuda) 60 MG tablet tablet    vilazodone (Viibryd) 40 MG tablet tablet    buPROPion SR (WELLBUTRIN SR) 150 MG 12 hr tablet    diazePAM (Valium) 5 MG tablet    DULoxetine (CYMBALTA) 30 MG capsule      Other Visit Diagnoses     Generalized anxiety disorder  (Chronic)       Relevant Medications    lurasidone HCl (Latuda) 60 MG tablet tablet    vilazodone (Viibryd) 40 MG tablet tablet    buPROPion SR (WELLBUTRIN SR) 150 MG 12 hr tablet    diazePAM (Valium) 5 MG tablet    DULoxetine (CYMBALTA) 30 MG capsule      Diagnoses       Codes Comments    Bipolar 1 disorder, depressed, severe (CMS/HCC)    -  Primary ICD-10-CM: F31.4  ICD-9-CM: 296.53     PTSD (post-traumatic stress disorder)     ICD-10-CM: F43.10  ICD-9-CM: 309.81     Other long term (current) drug therapy     ICD-10-CM: Z79.899  ICD-9-CM: V58.69     Generalized anxiety disorder     ICD-10-CM: F41.1  ICD-9-CM: 300.02           Visit Diagnoses:    ICD-10-CM ICD-9-CM   1. Bipolar 1 disorder, depressed, severe (CMS/HCC)  F31.4 296.53   2. PTSD (post-traumatic stress disorder)  F43.10 309.81   3. Other long term (current) drug therapy  Z79.899 V58.69   4. Generalized anxiety disorder  F41.1 300.02       TREATMENT PLAN/GOALS: Continue supportive psychotherapy efforts and medications as indicated. Treatment and medication options discussed during today's visit. Patient ackowledged and verbally consented to continue with current treatment plan and was educated on the importance of compliance with treatment and follow-up appointments.    MEDICATION ISSUES:  1. Bipolar d/o depressed moderate - cont  latuda  60 mg The pt was stable on current meds, no changes necessary   Cont  viibryd,  Cont  Wellbutrin  150 mg (anxiety improved)  , Valium 5 mg QD PRN for anxiety   Cont  cymbalta 30 mg for now, she is on low dose for fibromyalgia   2. PTSD - cont cymbalta and viibryd   3. MIREILLE - viibryd and cymbalta, valium     CBT book was suggested and negative schemas discussed   The pt had psychtherapy including EMDR - did not respond well   Referred to [facility]   INSPECT reviewed as expected - gabapentin , valium - 8/30/2021  last refill   UDS (dec 2, 2020)  + benzo (consistent) and + THC >300   Will repeat random in few weeks     PHQ scored 10 and indicated moderate depression     Discussed medication options and treatment plan of prescribed medication as well as the risks, benefits, and side effects including potential falls, possible impaired driving and metabolic adversities among others. Patient is agreeable to call the office with any worsening of symptoms or onset of side effects. Patient is agreeable to call 911 or go to the nearest ER should he/she begin having SI/HI. No medication side effects or related complaints today.     MEDS ORDERED DURING VISIT:  New Medications Ordered This Visit   Medications   • lurasidone HCl (Latuda) 60 MG tablet tablet     Sig: Take 1 tablet by mouth Daily.     Dispense:  30 tablet     Refill:  3   • vilazodone (Viibryd) 40 MG tablet tablet     Sig: Take 1 tablet by mouth Daily.     Dispense:  90 tablet     Refill:  1   • buPROPion SR (WELLBUTRIN SR) 150 MG 12 hr tablet     Sig: Take 1 tablet by mouth Every Morning.     Dispense:  90 tablet     Refill:  0   • diazePAM (Valium) 5 MG tablet     Sig: Take 1 tablet by mouth Daily As Needed for Anxiety.     Dispense:  30 tablet     Refill:  3     Please dispense on or after Sept 27, 2021   • DULoxetine (CYMBALTA) 30 MG capsule     Sig: Take 1 capsule by mouth Daily.     Dispense:  90 capsule     Refill:  1       Return in about 4 months (around 1/21/2022).          This document has been electronically signed by Roxann Johnson MD  September 21, 2021 13:57 EDT   +sweats

## 2022-06-12 NOTE — ED PROVIDER NOTE - CPE EDP NEURO NORM
R2 GYN ONC Progress Note    POD#2   HD#8    Interval Events:  - s/p RTOR with abdominal washout, Abthera placement 6/10   - Received Lasix 20mg with improvement in UOP  - TF held since midnight  - Overnight labs revealed Plt 13, goal >50  - Pt sedated and intubated    Vital Signs Last 24 Hours  T(C): 37.4 (22 @ 04:00), Max: 37.5 (22 @ 00:00)  HR: 80 (22 @ 07:13) (75 - 131)  BP: 106/61 (06-10-22 @ 11:15) (106/61 - 106/61)  RR: 18 (22 @ 07:00) (11 - 26)  SpO2: 100% (22 @ 07:13) (93% - 100%)    I&O's Summary    10 Noe 2022 07:01  -  2022 07:00  --------------------------------------------------------  IN: 2681.9 mL / OUT: 1807 mL / NET: 874.9 mL      Physical Exam:  Gen: NAD, sedated  CV: RRR   Pulm: Mechanical breath sounds 2/2 ventilator   Abd: Soft, non-distended, +BS, Abthera in place with surrounding incision edges well perfused, 600cc brown fluid in wound vac cannister; ostomy pink and well perfused with air in ostomy bag; NGT in place  : L nephrostomy tube draining light yellow urine; R nephrostomy tube draining blood-tinged urine, hilliard in place draining dark red-la nena urine  Ext: Warm & well perfused  Ventilator: AC 18/500/5/40%                        8.3    12.74 )-----------( 59       ( 2022 04:23 )             24.8           140  |  105  |  35<H>  ----------------------------<  98  3.6   |  21<L>  |  1.29    Ca    7.8<L>      2022 00:35  Phos  2.6     -  Mg     1.90     -11    TPro  4.5<L>  /  Alb  2.1<L>  /  TBili  3.3<H>  /  DBili  3.2<H>  /  AST  33<H>  /  ALT  16  /  AlkPhos  54  06-11          ABG - ( 2022 00:35 )  pH, Arterial: 7.45  pH, Blood: x     /  pCO2: 32    /  pO2: 118   / HCO3: 22    / Base Excess: -1.4  /  SaO2: 99.3        PT/INR - ( 2022 00:35 )   PT: 14.2 sec;   INR: 1.22 ratio         PTT - ( 2022 00:35 )  PTT:29.6 sec          CAPILLARY BLOOD GLUCOSE      POCT Blood Glucose.: 107 mg/dL (2022 06:33)      MEDICATIONS  (STANDING):  chlorhexidine 0.12% Liquid 15 milliLiter(s) Oral Mucosa every 12 hours  chlorhexidine 4% Liquid 1 Application(s) Topical daily  dexMEDEtomidine Infusion 0.05 MICROgram(s)/kG/Hr (1.26 mL/Hr) IV Continuous <Continuous>  fentaNYL   Infusion 0.5 MICROgram(s)/kG/Hr (5.02 mL/Hr) IV Continuous <Continuous>  HYDROmorphone  Injectable 1 milliGRAM(s) IV Push once  norepinephrine Infusion 0.05 MICROgram(s)/kG/Min (9.41 mL/Hr) IV Continuous <Continuous>  pantoprazole  Injectable 40 milliGRAM(s) IV Push two times a day  piperacillin/tazobactam IVPB.. 3.375 Gram(s) IV Intermittent every 8 hours    MEDICATIONS  (PRN):  HYDROmorphone  Injectable 0.5 milliGRAM(s) IV Push every 4 hours PRN Moderate Pain (4 - 6)  HYDROmorphone  Injectable 1 milliGRAM(s) IV Push every 3 hours PRN Severe Pain (7 - 10)          Assessment and Plan:  54y  with recurrent cervical CA admitted for management of neutropenic fever, now s/p emergent exploratory laparotomy, abdominal washout, rectosigmoid colectomy, diverting colostomy, bronchoscopy and Abthera placement on 22 (ebl 2000cc, s/p 5UpRBC, 3L albumin, 3U Plts, 3U FFP, 3U Cryo) for findings of increased free air on abdominal xray concerning for bowel perforation. Patient POD#2 s/p RTOR for abdominal washout and replacement of Abthera.    Neuro: Continue IV analgesics regimen per SICU. Sedated; continue Precedex, Levophed, Fentanyl, Seroquel, per SICU.  CV: Continue titration of pressors per SICU.  Pulm: Intubated on AC.   GI: NPO. GI following. Continue IV Protonix/Reglan. NG tube in place (- ). TF held in anticipation of RTOR tomorrow.  : pt scheduled for RTOR tomorrow for abdominal washout and possible closure. b/l nephrostomy tubes in place. Hilliard in place. Patient has ASHLEY likely 2/2 IV Contrast & Dehydration, Cr 1.29->1.31. Continue to monitor UOP. Replete electrolytes PRN  Heme: Continue Venodynes for DVT ppx.    - s/p 2u Plts for platelets of 14 -> s/p additional 1u plt intraop 6/10 -> 1u Plts in PM of 6/10->10->2u PLTS's ->plts uptrended to 59. Downtrended again to 13 overnight. Patient to receive 2u Plt now. Will repeat CBC after platelet transfusion to ensure platelet optimization prior to RTOR.  - s/p 5u pRBC intraop on  and 1u pRBC intraop on 6/10  ID: Afebrile. Continue Zosyn (s/p Cefepime). Continue to trend WBC. s/p Zarixo (-6/10) for neutropenic fever. WBC 9.56. Lactate 1.1.    Dispo: Appreciate excellent SICU care. Plan for RTOR in AM for abdominal washout and possible abdominal closure.    Missy Rockwell PGY2 normal...

## 2022-08-04 NOTE — PROGRESS NOTE ADULT - PROBLEM SELECTOR PLAN 4
- BP well controlled  - Continue with low sodium diet  - Continue with amlodipine Rhofade Counseling: Rhofade is a topical medication which can decrease superficial blood flow where applied. Side effects are uncommon and include stinging, redness and allergic reactions.

## 2023-02-13 NOTE — ED PROVIDER NOTE - PSYCHIATRIC, MLM
----- Message from Joe Gordon MD sent at 2/11/2023 11:43 AM EST -----  Urine culture contaminated.  Verify improving on the macrobid.     Alert and normal mood and affect. no apparent risk to self or others.

## 2023-05-01 NOTE — PATIENT PROFILE ADULT - FALL HARM RISK
Detail Level: Detailed
Show Applicator Variable?: Yes
Duration Of Freeze Thaw-Cycle (Seconds): 1
Render Note In Bullet Format When Appropriate: No
Number Of Freeze-Thaw Cycles: 2 freeze-thaw cycles
Post-Care Instructions: I reviewed with the patient in detail post-care instructions. Patient is to wear sunprotection, and avoid picking at any of the treated lesions. Pt may apply Vaseline to crusted or scabbing areas.
Consent: The patient's consent was obtained including but not limited to risks of crusting, scabbing, blistering, scarring, darker or lighter pigmentary change, recurrence, incomplete removal and infection.
other

## 2023-08-01 NOTE — PROGRESS NOTE ADULT - PROBLEM SELECTOR PROBLEM 4
Patient has upcoming appointment on 8/18.  Would like to have labs done this Friday at [facility] Essential hypertension

## 2023-10-20 NOTE — PROGRESS NOTE ADULT - PROBLEM SELECTOR PROBLEM 5
Acute pulmonary embolism without acute cor pulmonale, unspecified pulmonary embolism type 36 y/o  @ 40 wks gestation presents with decrease FM and early labor   GBS- neg 2023  seen and evaluated with dr Gomez  admit to l&D  40 wks gestation early labor for expectant management   see admission orders

## 2024-09-12 NOTE — PROGRESS NOTE ADULT - PROBLEM SELECTOR PLAN 5
Per [facility], Iron infusion was completed on 09/06/24.   - Given COVID severity will continue hold ARB for now.  - c/w amlodipine 5mg daily

## 2025-04-02 NOTE — PROGRESS NOTE ADULT - PROBLEM SELECTOR PROBLEM 5
Smoked 30 years, .75 packs per year for 30 years, equivalent to 22.5 pack years, quit in 2015, would be within screening guidelines until 2030.   Patient agrees with CT lung cancer screening, discussed risks and benefits.    Essential hypertension